# Patient Record
Sex: FEMALE | Race: BLACK OR AFRICAN AMERICAN | Employment: OTHER | URBAN - METROPOLITAN AREA
[De-identification: names, ages, dates, MRNs, and addresses within clinical notes are randomized per-mention and may not be internally consistent; named-entity substitution may affect disease eponyms.]

---

## 2017-01-04 ENCOUNTER — HOSPITAL ENCOUNTER (OUTPATIENT)
Dept: MAMMOGRAPHY | Age: 62
Discharge: HOME | End: 2017-01-04
Attending: OBSTETRICS & GYNECOLOGY
Payer: MEDICARE

## 2017-01-04 DIAGNOSIS — Z12.31 VISIT FOR SCREENING MAMMOGRAM: ICD-10-CM

## 2017-01-04 PROCEDURE — 77067 SCR MAMMO BI INCL CAD: CPT

## 2017-01-19 RX ORDER — GABAPENTIN 300 MG/1
300 CAPSULE ORAL 3 TIMES DAILY
Qty: 90 CAP | Refills: 2 | Status: SHIPPED | OUTPATIENT
Start: 2017-01-19 | End: 2017-06-05 | Stop reason: SDUPTHER

## 2017-01-19 NOTE — TELEPHONE ENCOUNTER
Last Visit: 12/27/2016 with MD Giorgio Guerrero    Next Appointment: 02/06/2017 with MD Giorgio Guerrero   Previous Refill Encounters: 09/01/2016 per MD Alvares Ek #90 with 2 refills     Requested Prescriptions     Pending Prescriptions Disp Refills    gabapentin (NEURONTIN) 300 mg capsule 90 Cap 2     Sig: Take 1 Cap by mouth three (3) times daily.  Indications: sensory sensitivity

## 2017-02-06 ENCOUNTER — OFFICE VISIT (OUTPATIENT)
Dept: ORTHOPEDIC SURGERY | Age: 62
End: 2017-02-06

## 2017-02-06 VITALS
RESPIRATION RATE: 20 BRPM | WEIGHT: 224 LBS | BODY MASS INDEX: 33.95 KG/M2 | OXYGEN SATURATION: 98 % | SYSTOLIC BLOOD PRESSURE: 145 MMHG | DIASTOLIC BLOOD PRESSURE: 88 MMHG | TEMPERATURE: 98.8 F | HEART RATE: 113 BPM | HEIGHT: 68 IN

## 2017-02-06 DIAGNOSIS — M47.899 FACET SYNDROME: ICD-10-CM

## 2017-02-06 DIAGNOSIS — M79.18 MYOFASCIAL PAIN: ICD-10-CM

## 2017-02-06 RX ORDER — TRAMADOL HYDROCHLORIDE 50 MG/1
50 TABLET ORAL
Qty: 90 TAB | Refills: 2 | Status: SHIPPED | OUTPATIENT
Start: 2017-02-06 | End: 2017-10-16 | Stop reason: SDUPTHER

## 2017-02-06 RX ORDER — SOD CHLOR,BICARB/SQUEEZ BOTTLE
PACKET, WITH RINSE DEVICE NASAL
Refills: 0 | COMMUNITY
Start: 2016-12-16 | End: 2018-05-03

## 2017-02-06 RX ORDER — PAROXETINE 7.5 MG/1
CAPSULE ORAL
Refills: 0 | COMMUNITY
Start: 2017-01-04 | End: 2018-05-03

## 2017-02-06 RX ORDER — MOMETASONE FUROATE 50 UG/1
SPRAY, METERED NASAL
Refills: 0 | COMMUNITY
Start: 2016-12-16 | End: 2018-07-19 | Stop reason: ALTCHOICE

## 2017-02-06 NOTE — MR AVS SNAPSHOT
Visit Information Date & Time Provider Department Dept. Phone Encounter #  
 2/6/2017  2:30 PM Eileen Coelho MD [facility] [phone] [id number] Follow-up Instructions Return if symptoms worsen or fail to improve. Upcoming Health Maintenance Date Due Hepatitis C Screening 1955 DTaP/Tdap/Td series (1 - Tdap) 9/11/1978 PAP AKA CERVICAL CYTOLOGY 9/11/1978 FOBT Q 1 YEAR AGE 50-75 9/11/2007 INFLUENZA AGE 9 TO ADULT 8/1/2016 BREAST CANCER SCRN MAMMOGRAM 1/4/2019 Allergies as of 2/6/2017  Review Complete On: 2/6/2017 By: Eileen Coelho MD  
  
 Severity Noted Reaction Type Reactions Aspirin High 11/21/2012   Side Effect Other (comments) GI Pain Adhesive Tape-silicones  91/45/7703    Other (comments)  
 blisters Current Immunizations  Never Reviewed No immunizations on file. Not reviewed this visit You Were Diagnosed With   
  
 Codes Comments Facet syndrome     ICD-10-CM: M12.88 ICD-9-CM: 724.8 Myofascial pain     ICD-10-CM: M79.1 ICD-9-CM: 729.1 Vitals BP Pulse Temp Resp Height(growth percentile) Weight(growth percentile) 145/88 (!) 113 98.8 °F (37.1 °C) (Oral) 20 5' 8\" (1.727 m) 224 lb (101.6 kg) SpO2 BMI OB Status Smoking Status 98% 34.06 kg/m2 Postmenopausal Never Smoker BMI and BSA Data Body Mass Index Body Surface Area 34.06 kg/m 2 2.21 m 2 Preferred Pharmacy Pharmacy Name Phone [address] [facility] [address] [facility] [phone] Your Updated Medication List  
  
   
This list is accurate as of: 2/6/17  3:38 PM.  Always use your most recent med list.  
  
  
  
  
 acetaminophen 650 mg Tab Take 650 mg by mouth every four (4) hours as needed for Pain or Fever. ascorbic acid (vitamin C) 250 mg tablet Commonly known as:  VITAMIN C Take 1 Tab by mouth two (2) times daily (with meals). BRISDELLE 7.5 mg Cap Generic drug:  PARoxetine mesylate TAKE 1 CAPSULE BY MOUTH EVERY DAY  
  
 DEPAKOTE PO Take 500 mg by mouth as needed. Pt takes for migraines  
  
 diclofenac EC 75 mg EC tablet Commonly known as:  VOLTAREN Take 1 Tab by mouth two (2) times daily as needed. docusate sodium 100 mg capsule Commonly known as:  Case [place] Take 1 Cap by mouth two (2) times a day. esomeprazole 40 mg capsule Commonly known as:  NexIUM Take 1 Cap by mouth daily. Indications: GASTROESOPHAGEAL REFLUX  
  
 ferrous sulfate 325 mg (65 mg iron) tablet Take 1 Tab by mouth two (2) times daily (with meals). FIORICET -40 mg per tablet Generic drug:  butalbital-acetaminophen-caffeine Take 1 Tab by mouth. FLEXERIL 10 mg tablet Generic drug:  cyclobenzaprine Take  by mouth three (3) times daily as needed for Muscle Spasm(s). gabapentin 300 mg capsule Commonly known as:  NEURONTIN Take 1 Cap by mouth three (3) times daily. Indications: sensory sensitivity  
  
 losartan-hydroCHLOROthiazide 100-25 mg per tablet Commonly known as:  HYZAAR Take 1 Tab by mouth daily. Indications: HYPERTENSION  
  
 mometasone 50 mcg/actuation nasal spray Commonly known as:  NASONEX  
instill 2 spray into each nostril once daily if needed NEILMED SINUS RINSE COMPLETE Pkdv Generic drug:  sod chlor-bicarb-squeez bottle  
use 1 packet NASAL daily  
  
 nortriptyline 75 mg capsule Commonly known as:  PAMELOR Take 1 Cap by mouth two (2) times a day. potassium chloride 20 mEq tablet Commonly known as:  K-DUR, KLOR-CON Take 1 Tab by mouth daily. traMADol 50 mg tablet Commonly known as:  ULTRAM  
Take 1 Tab by mouth three (3) times daily as needed for Pain. Max Daily Amount: 150 mg. VITAMIN D2 50,000 unit capsule Generic drug:  ergocalciferol Take 50,000 Units by mouth. Prescriptions Printed Refills traMADol (ULTRAM) 50 mg tablet 2 Sig: Take 1 Tab by mouth three (3) times daily as needed for Pain. Max Daily Amount: 150 mg.  
 Class: Print Route: Oral  
  
Follow-up Instructions Return if symptoms worsen or fail to improve. To-Do List   
 02/16/2017 1:15 PM  
  Appointment with [facility] RM 2 at [address] [place] [address] ([phone]) GENERAL INSTRUCTIONS  Bring information (ID card) if you have any medically implanted devices. You will be required to lie still while the procedure is being performed. Remove any jewelry (including body piercing, hairpins) prior to MRI. If you have had a creatinine level drawn within the past 30 days, please bring most recent results to your appt. Bring any films, CD's, and reports related to your study with you on the day of your exam.  This only includes studies done outside of [facility], [facility], [facility], and [facility]. Bring a complete list of all medications you are currently taking to include prescriptions, over-the-counter meds, herbals, vitamins & any dietary supplements. If you were given medications for claustrophobia or anxiety, please arrange to have someone drive you to your appointment. QUESTIONS  Notify the MRI Department if you have any questions concerning your study. [facility] - [phone] [facility] [address] [place] [address] - [phone] [facility] SERVICES! Dear Isabel Sanchez: Thank you for requesting a [employer] account. Our records indicate that you already have an active [employer] account. You can access your account anytime at [email or web address] Did you know that you can access your hospital and ER discharge instructions at any time in [employer]? You can also review all of your test results from your hospital stay or ER visit. Additional Information If you have questions, please visit the Frequently Asked Questions section of the [employer] website at [email or web address] Remember, [employer] is NOT to be used for urgent needs. For medical emergencies, dial 911. Now available from your iPhone and Android! Please provide this summary of care documentation to your next provider. Your primary care clinician is listed as Sally Manning. If you have any questions after today's visit, please call [phone].

## 2017-02-06 NOTE — PROGRESS NOTES
[address], [address]  [place], [facility] [address]  Phone: [phone]  Fax: [phone]        Elijah Doran  : 1955  PCP: Shama Grimaldo MD  2017    PROGRESS NOTE      ASSESSMENT AND PLAN    Vamshi Monterroso comes in to the office today for a medication f/u. She has been finding relief with diclofenac and tramadol although she is still experiencing some lumbar pain from her facet syndrome and myofascial pain. We discussed having her see Dr. Doreatha Lefort for the RFA but pt notes that she currently has other issues and would be unable to have transportation for the procedure. I prescribed her refills of her medications and advised her to look into the procedure again. We also discussed her vertigo. Pt will f/u prn. Stephanie Love was seen today for back pain and follow-up. Diagnoses and all orders for this visit:    Facet syndrome  -     traMADol (ULTRAM) 50 mg tablet; Take 1 Tab by mouth three (3) times daily as needed for Pain. Max Daily Amount: 150 mg. Myofascial pain  -     traMADol (ULTRAM) 50 mg tablet; Take 1 Tab by mouth three (3) times daily as needed for Pain. Max Daily Amount: 150 mg. Follow-up Disposition:  Return if symptoms worsen or fail to improve. HISTORY OF PRESENT ILLNESS  Vamshi Monterroso is a 63 y.o. female. A&P / HPI from 2016:  Brian Lopez was in the office today for a f/u visit. She was prescribed Norco 7.5 and Lyrica 100mg. The risks, benefits, and potential side effects of this medication were discussed.       She was advised to f/u with Dr. Bry Christian to be evaluated for RFA and for medication management.       Pt will f/u in 3 months, or prn.      Updates from 16:  Pt presents for a medication and RFA f/u. She did not have the RFA completed due to scheduling issues. Her pain today is predominantly due to facet syndrome with a component of myofascial pain.     Updates from 17:  Pt presents for a medication f/u. She has been finding relief with diclofenac and tramadol although she is still experiencing some lumbar pain from her facet syndrome and myofascial pain. PAST MEDICAL HISTORY   Past Medical History   Diagnosis Date    Arthritis     Balance problems     Bronchitis     Fibromyalgia 11/29/2012    GERD (gastroesophageal reflux disease)      Hiatal Hernia    Hypertension     Kidney stones     Low back pain     Lumbar spinal stenosis 11/25/2012    Migraine headache     Nervousness     Neurological disease     Osteoarthritis 11/25/2012    Other ill-defined conditions(799.89)      neuropathy    Other ill-defined conditions(799.89)      fibromyalgia    Peripheral neuropathy ([address] 11/29/2012    Post laminectomy syndrome     Postoperative anemia due to acute blood loss 11/30/2012    Ringing in ears     Spinal stenosis     Thoracic spine pain     Vitamin D deficiency 11/30/2012       Past Surgical History   Procedure Laterality Date    Hx orthopaedic       foot l heel spur    Hx orthopaedic       right knee sx    Hx other surgical       non cancer cyst removed from right shoulder    Pr neurological procedure unlisted       Bilateral L3-4 hemilaminotomy and medial facetectomy by Dr. Magaly Wan   . MEDICATIONS      Current Outpatient Prescriptions   Medication Sig Dispense Refill    mometasone (NASONEX) 50 mcg/actuation nasal spray instill 2 spray into each nostril once daily if needed  0    BRISDELLE 7.5 mg cap TAKE 1 CAPSULE BY MOUTH EVERY DAY  0    NEILMED SINUS RINSE COMPLETE pkdv use 1 packet NASAL daily  0    traMADol (ULTRAM) 50 mg tablet Take 1 Tab by mouth three (3) times daily as needed for Pain. Max Daily Amount: 150 mg. 90 Tab 2    gabapentin (NEURONTIN) 300 mg capsule Take 1 Cap by mouth three (3) times daily. Indications: sensory sensitivity 90 Cap 2    diclofenac EC (VOLTAREN) 75 mg EC tablet Take 1 Tab by mouth two (2) times daily as needed.  60 Tab 5  nortriptyline (PAMELOR) 75 mg capsule Take 1 Cap by mouth two (2) times a day. 60 Cap 0    cyclobenzaprine (FLEXERIL) 10 mg tablet Take  by mouth three (3) times daily as needed for Muscle Spasm(s).  butalbital-acetaminophen-caffeine (FIORICET) -40 mg per tablet Take 1 Tab by mouth.  ergocalciferol (VITAMIN D2) 50,000 unit capsule Take 50,000 Units by mouth.  acetaminophen 650 mg Tab Take 650 mg by mouth every four (4) hours as needed for Pain or Fever. 30 Tab 0    esomeprazole (NEXIUM) 40 mg capsule Take 1 Cap by mouth daily. Indications: GASTROESOPHAGEAL REFLUX 15 Cap 0    docusate sodium (COLACE) 100 mg capsule Take 1 Cap by mouth two (2) times a day. 30 Cap 0    ferrous sulfate 325 mg (65 mg iron) tablet Take 1 Tab by mouth two (2) times daily (with meals). 30 Tab 0    ascorbic acid (VITAMIN C) 250 mg tablet Take 1 Tab by mouth two (2) times daily (with meals). 30 Tab 0    losartan-hydrochlorothiazide (HYZAAR) 100-25 mg per tablet Take 1 Tab by mouth daily. Indications: HYPERTENSION      potassium chloride (K-DUR, KLOR-CON) 20 mEq tablet Take 1 Tab by mouth daily. 15 Tab 0    DIVALPROEX SODIUM (DEPAKOTE PO) Take 500 mg by mouth as needed.  Pt takes for migraines           ALLERGIES    Allergies   Allergen Reactions    Aspirin Other (comments)     GI Pain    Adhesive Tape-Silicones Other (comments)     blisters          SOCIAL HISTORY    Social History     Social History    Marital status: SINGLE     Spouse name: N/A    Number of children: N/A    Years of education: N/A     Social History Main Topics    Smoking status: Never Smoker    Smokeless tobacco: None    Alcohol use No    Drug use: No    Sexual activity: Not Asked     Other Topics Concern    None     Social History Narrative     Social History Narrative      Problem Relation Age of Onset    Hypertension Mother     Arthritis-osteo Mother     Neuropathy Mother     Hypertension Father     Cancer Father     Hypertension Brother     Cancer Brother     Neuropathy Other     Arthritis-rheumatoid Other     GERD Other     Diabetes Other          REVIEW OF SYSTEMS  Review of Systems   Constitutional: Negative for chills, diaphoresis, fever, malaise/fatigue and weight loss. Respiratory: Negative for shortness of breath. Cardiovascular: Negative for chest pain and leg swelling. Gastrointestinal: Negative for constipation, nausea and vomiting. Neurological: Negative for dizziness, tingling, seizures, loss of consciousness and headaches. Psychiatric/Behavioral: The patient does not have insomnia. PHYSICAL EXAMINATION  Visit Vitals    /88    Pulse (!) 113    Temp 98.8 °F (37.1 °C) (Oral)    Resp 20    Ht 5' 8\" (1.727 m)    Wt 224 lb (101.6 kg)    SpO2 98%    BMI 34.06 kg/m2       Pain Assessment  12/27/2016   Location of Pain Back   Severity of Pain 7   Quality of Pain Aching   Quality of Pain Comment N/T   Duration of Pain -   Frequency of Pain Constant   Aggravating Factors -   Relieving Factors -   Result of Injury No           Constitutional:  Well developed, well nourished, in no acute distress. Psychiatric: Affect and mood are appropriate. Integumentary: No rashes or abrasions noted on exposed areas. SPINE/MUSCULOSKELETAL EXAM    Lumbar spine:  No rash, ecchymosis, or gross obliquity. No fasciculations. No focal atrophy is noted. No pain with hip ROM. Range of motion is normal.   Diffuse tenderness to palpation. No tenderness to palpation at the sciatic notch. SI joints non-tender. Trochanters non tender. Pain with back extension > back flexion.      Sensation in the bilateral legs grossly intact to light touch.     MOTOR:      Biceps  Triceps Deltoids Wrist Ext Wrist Flex Hand Intrin   Right 5/5 5/5 5/5 5/5 5/5 5/5   Left 5/5 5/5 5/5 5/5 5/5 5/5             Hip Flex  Quads Hamstrings Ankle DF EHL Ankle PF   Right 5/5 5/5 5/5 5/5 5/5 5/5   Left 5/5 5/5 5/5 5/5 5/5 5/5     DTRs are 2+ biceps, triceps, brachioradialis, patella, and Achilles.      Negative Straight Leg raise. Squat not tested.       Ambulation with single point cane. DAVID. Jenn Martinez PMP reviewed      This plan was reviewed with the patient and patient agrees. All questions were answered. More than half of this visit today was spent on counseling.     Written by Julita Weston, as dictated by Dr. Chichi Rodriguez, Dr. Emil Reece, confirm that all documentation is accurate.

## 2017-02-08 ENCOUNTER — HOSPITAL ENCOUNTER (OUTPATIENT)
Dept: LAB | Age: 62
Discharge: HOME | End: 2017-02-08
Payer: MEDICARE

## 2017-02-08 LAB
BUN SERPL-MCNC: 13 MG/DL (ref 7–18)
CREAT SERPL-MCNC: 1.06 MG/DL (ref 0.6–1.3)

## 2017-02-08 PROCEDURE — 82565 ASSAY OF CREATININE: CPT | Performed by: OTOLARYNGOLOGY

## 2017-02-08 PROCEDURE — 84520 ASSAY OF UREA NITROGEN: CPT | Performed by: OTOLARYNGOLOGY

## 2017-02-08 PROCEDURE — 36415 COLL VENOUS BLD VENIPUNCTURE: CPT | Performed by: OTOLARYNGOLOGY

## 2017-02-16 ENCOUNTER — HOSPITAL ENCOUNTER (OUTPATIENT)
Age: 62
Discharge: HOME | End: 2017-02-16
Attending: OTOLARYNGOLOGY
Payer: MEDICARE

## 2017-02-16 DIAGNOSIS — H81.13 BENIGN PAROXYSMAL VERTIGO OF BOTH EARS: ICD-10-CM

## 2017-02-16 PROCEDURE — A9585 GADOBUTROL INJECTION: HCPCS | Performed by: OTOLARYNGOLOGY

## 2017-02-16 PROCEDURE — 70553 MRI BRAIN STEM W/O & W/DYE: CPT

## 2017-02-16 PROCEDURE — 74011250636 HC RX REV CODE- 250/636: Performed by: OTOLARYNGOLOGY

## 2017-02-16 RX ADMIN — GADOBUTROL 10 ML: 604.72 INJECTION INTRAVENOUS at 13:00

## 2017-05-31 ENCOUNTER — HOSPITAL ENCOUNTER (OUTPATIENT)
Dept: LAB | Age: 62
Discharge: HOME | End: 2017-05-31
Payer: MEDICARE

## 2017-05-31 ENCOUNTER — HOSPITAL ENCOUNTER (OUTPATIENT)
Age: 62
Discharge: HOME | End: 2017-05-31
Attending: PSYCHIATRY & NEUROLOGY
Payer: MEDICARE

## 2017-05-31 DIAGNOSIS — G43.009 COMMON MIGRAINE: ICD-10-CM

## 2017-05-31 LAB
ERYTHROCYTE [SEDIMENTATION RATE] IN BLOOD: 31 MM/HR (ref 0–30)
FOLATE SERPL-MCNC: >20 NG/ML (ref 3.1–17.5)
TSH SERPL DL<=0.05 MIU/L-ACNC: 1.21 UIU/ML (ref 0.36–3.74)
VIT B12 SERPL-MCNC: 354 PG/ML (ref 211–911)

## 2017-05-31 PROCEDURE — 70551 MRI BRAIN STEM W/O DYE: CPT

## 2017-06-02 LAB
ANA TITR SER IF: POSITIVE {TITER}
HOMOGENEOUS PATTERN, 016279: NORMAL
NOTE:, 016270: NORMAL
T PALLIDUM AB SER QL IF: NON REACTIVE

## 2017-06-05 RX ORDER — GABAPENTIN 300 MG/1
300 CAPSULE ORAL 3 TIMES DAILY
Qty: 90 CAP | Refills: 2 | Status: SHIPPED | OUTPATIENT
Start: 2017-06-05 | End: 2017-10-16 | Stop reason: SDUPTHER

## 2017-06-05 NOTE — TELEPHONE ENCOUNTER
Last Visit: 02/06/2017 with MD Norma Oppenheim    Next Appointment: noted to f/u as needed   Previous Refill Encounters: 01/19/2017 per MD Norma Oppenheim #90 with 2 refills     Requested Prescriptions     Pending Prescriptions Disp Refills    gabapentin (NEURONTIN) 300 mg capsule 90 Cap 2     Sig: Take 1 Cap by mouth three (3) times daily.  Indications: sensory sensitivity

## 2017-06-06 ENCOUNTER — HOSPITAL ENCOUNTER (OUTPATIENT)
Dept: LAB | Age: 62
Discharge: HOME | End: 2017-06-06

## 2017-06-06 LAB — SENTARA SPECIMEN COL,SENBCF: NORMAL

## 2017-06-06 PROCEDURE — 99001 SPECIMEN HANDLING PT-LAB: CPT | Performed by: PSYCHIATRY & NEUROLOGY

## 2017-10-04 DIAGNOSIS — M47.899 FACET SYNDROME: ICD-10-CM

## 2017-10-04 DIAGNOSIS — M79.18 MYOFASCIAL PAIN: ICD-10-CM

## 2017-10-04 RX ORDER — TRAMADOL HYDROCHLORIDE 50 MG/1
50 TABLET ORAL
Qty: 90 TAB | Refills: 2 | OUTPATIENT
Start: 2017-10-04

## 2017-10-04 NOTE — TELEPHONE ENCOUNTER
PHONE IN RX    Last Visit: 02/06/2017 with MD Vidal Blanco    Next Appointment: noted to f/u prn   Previous Refill Encounters: 02/06/2017 per MD Vidal Blanco #90 with 2 refills     Requested Prescriptions     Pending Prescriptions Disp Refills    traMADol (ULTRAM) 50 mg tablet 90 Tab 2     Sig: Take 1 Tab by mouth three (3) times daily as needed for Pain. Max Daily Amount: 150 mg.

## 2017-10-04 NOTE — TELEPHONE ENCOUNTER
Please contact the patient for scheduling per refusal reason below. Thanks.      Refused by: Trace Martin NP  Refusal reason: other (has not been seen in 6 months, no uds/pa)

## 2017-10-06 NOTE — TELEPHONE ENCOUNTER
Left a voicemail on an unidentifed voicemail requesting a return call to the office. The request for medication has been denied. An appointment is required prior to refills.      Refused by: Trace Martin NP  Refusal reason: other (has not been seen in 6 months, no uds/pa)

## 2017-10-16 ENCOUNTER — OFFICE VISIT (OUTPATIENT)
Dept: ORTHOPEDIC SURGERY | Age: 62
End: 2017-10-16

## 2017-10-16 VITALS
RESPIRATION RATE: 18 BRPM | BODY MASS INDEX: 32.58 KG/M2 | WEIGHT: 215 LBS | DIASTOLIC BLOOD PRESSURE: 68 MMHG | HEIGHT: 68 IN | TEMPERATURE: 98.2 F | OXYGEN SATURATION: 99 % | HEART RATE: 108 BPM | SYSTOLIC BLOOD PRESSURE: 127 MMHG

## 2017-10-16 DIAGNOSIS — Z79.891 ENCOUNTER FOR LONG-TERM OPIATE ANALGESIC USE: ICD-10-CM

## 2017-10-16 DIAGNOSIS — M79.18 MYOFASCIAL PAIN: ICD-10-CM

## 2017-10-16 DIAGNOSIS — M47.899 FACET SYNDROME: ICD-10-CM

## 2017-10-16 RX ORDER — AMITRIPTYLINE HYDROCHLORIDE 25 MG/1
TABLET, FILM COATED ORAL
Refills: 0 | COMMUNITY
Start: 2017-09-10 | End: 2018-07-19 | Stop reason: SDUPTHER

## 2017-10-16 RX ORDER — DICLOFENAC SODIUM 75 MG/1
75 TABLET, DELAYED RELEASE ORAL
Qty: 60 TAB | Refills: 5 | Status: SHIPPED | OUTPATIENT
Start: 2017-10-16 | End: 2018-01-15 | Stop reason: SDUPTHER

## 2017-10-16 RX ORDER — OMEPRAZOLE 40 MG/1
CAPSULE, DELAYED RELEASE ORAL DAILY
Refills: 1 | COMMUNITY
Start: 2017-09-18 | End: 2018-07-19 | Stop reason: ALTCHOICE

## 2017-10-16 RX ORDER — TRAMADOL HYDROCHLORIDE 50 MG/1
50 TABLET ORAL
Qty: 90 TAB | Refills: 2 | Status: SHIPPED | OUTPATIENT
Start: 2017-10-16 | End: 2018-07-19 | Stop reason: SDUPTHER

## 2017-10-16 RX ORDER — GABAPENTIN 300 MG/1
300 CAPSULE ORAL 3 TIMES DAILY
Qty: 90 CAP | Refills: 2 | Status: SHIPPED | OUTPATIENT
Start: 2017-10-16 | End: 2018-01-15 | Stop reason: SDUPTHER

## 2017-10-16 RX ORDER — BUDESONIDE 32 UG/1
SPRAY, METERED NASAL
Refills: 1 | COMMUNITY
Start: 2017-09-11 | End: 2018-07-19 | Stop reason: ALTCHOICE

## 2017-10-16 RX ORDER — ALBUTEROL SULFATE 90 UG/1
AEROSOL, METERED RESPIRATORY (INHALATION)
Refills: 0 | COMMUNITY
Start: 2017-08-17 | End: 2018-05-03

## 2017-10-16 NOTE — PATIENT INSTRUCTIONS

## 2017-10-16 NOTE — MR AVS SNAPSHOT
Visit Information Date & Time Provider Department Dept. Phone Encounter #  
 10/16/2017  1:30 PM Andrey Dorantes MD [facility] [phone] [id number] Follow-up Instructions Return in about 3 months (around 1/16/2018), or if symptoms worsen or fail to improve. Upcoming Health Maintenance Date Due Hepatitis C Screening 1955 DTaP/Tdap/Td series (1 - Tdap) 9/11/1978 PAP AKA CERVICAL CYTOLOGY 9/11/1978 FOBT Q 1 YEAR AGE 50-75 9/11/2007 ZOSTER VACCINE AGE 60> 7/11/2017 INFLUENZA AGE 9 TO ADULT 8/1/2017 BREAST CANCER SCRN MAMMOGRAM 1/4/2019 Allergies as of 10/16/2017  Review Complete On: 10/16/2017 By: Andrey Dorantes MD  
  
 Severity Noted Reaction Type Reactions Aspirin High 11/21/2012   Side Effect Other (comments) GI Pain Adhesive Tape-silicones  79/20/3603    Other (comments)  
 blisters Current Immunizations  Never Reviewed No immunizations on file. Not reviewed this visit You Were Diagnosed With   
  
 Codes Comments Facet syndrome     ICD-10-CM: M12.88 ICD-9-CM: 724.8 Myofascial pain     ICD-10-CM: M79.1 ICD-9-CM: 729.1 Encounter for long-term opiate analgesic use     ICD-10-CM: X69.125 ICD-9-CM: V58.69 Vitals BP Pulse Temp Resp Height(growth percentile) Weight(growth percentile) 127/68 (!) 108 98.2 °F (36.8 °C) 18 5' 8\" (1.727 m) 215 lb (97.5 kg) SpO2 BMI OB Status Smoking Status 99% 32.69 kg/m2 Postmenopausal Never Smoker BMI and BSA Data Body Mass Index Body Surface Area  
 32.69 kg/m 2 2.16 m 2 Preferred Pharmacy Pharmacy Name Phone [address], [facility], [address] [place] [address] [phone] Your Updated Medication List  
  
   
This list is accurate as of: 10/16/17  2:41 PM.  Always use your most recent med list.  
  
  
  
  
 acetaminophen 650 mg Tab Take 650 mg by mouth every four (4) hours as needed for Pain or Fever. amitriptyline 25 mg tablet Commonly known as:  ELAVIL  
  
 ascorbic acid (vitamin C) 250 mg tablet Commonly known as:  VITAMIN C Take 1 Tab by mouth two (2) times daily (with meals). BRISDELLE 7.5 mg Cap Generic drug:  PARoxetine mesylate TAKE 1 CAPSULE BY MOUTH EVERY DAY  
  
 DEPAKOTE PO Take 500 mg by mouth as needed. Pt takes for migraines  
  
 diclofenac EC 75 mg EC tablet Commonly known as:  VOLTAREN Take 1 Tab by mouth two (2) times daily as needed. docusate sodium 100 mg capsule Commonly known as:  David Bough Take 1 Cap by mouth two (2) times a day. esomeprazole 40 mg capsule Commonly known as:  NexIUM Take 1 Cap by mouth daily. Indications: GASTROESOPHAGEAL REFLUX  
  
 ferrous sulfate 325 mg (65 mg iron) tablet Take 1 Tab by mouth two (2) times daily (with meals). FIORICET -40 mg per tablet Generic drug:  butalbital-acetaminophen-caffeine Take 1 Tab by mouth. FLEXERIL 10 mg tablet Generic drug:  cyclobenzaprine Take  by mouth three (3) times daily as needed for Muscle Spasm(s). gabapentin 300 mg capsule Commonly known as:  NEURONTIN Take 1 Cap by mouth three (3) times daily. Indications: sensory sensitivity  
  
 losartan-hydroCHLOROthiazide 100-25 mg per tablet Commonly known as:  HYZAAR Take 1 Tab by mouth daily. Indications: HYPERTENSION  
  
 mometasone 50 mcg/actuation nasal spray Commonly known as:  NASONEX  
instill 2 spray into each nostril once daily if needed NEILMED SINUS RINSE COMPLETE Pkdv Generic drug:  sod chlor-bicarb-squeez bottle  
use 1 packet NASAL daily  
  
 nortriptyline 75 mg capsule Commonly known as:  PAMELOR Take 1 Cap by mouth two (2) times a day. omeprazole 40 mg capsule Commonly known as:  PRILOSEC  
  
 potassium chloride 20 mEq tablet Commonly known as:  K-STEVE, TONYA-CON  
 Take 1 Tab by mouth daily. RHINOCORT ALLERGY 32 mcg/actuation nasal spray Generic drug:  budesonide  
instill 2 sprays into each nostril once daily  
  
 traMADol 50 mg tablet Commonly known as:  ULTRAM  
Take 1 Tab by mouth three (3) times daily as needed for Pain. Max Daily Amount: 150 mg. VENTOLIN HFA 90 mcg/actuation inhaler Generic drug:  albuterol  
inhale 2 puffs by mouth every 4 to 6 hours if needed VITAMIN D2 50,000 unit capsule Generic drug:  ergocalciferol Take 50,000 Units by mouth. Prescriptions Printed Refills  
 traMADol (ULTRAM) 50 mg tablet 2 Sig: Take 1 Tab by mouth three (3) times daily as needed for Pain. Max Daily Amount: 150 mg.  
 Class: Print Route: Oral  
  
Prescriptions Sent to Pharmacy Refills  
 gabapentin (NEURONTIN) 300 mg capsule 2 Sig: Take 1 Cap by mouth three (3) times daily. Indications: sensory sensitivity Class: Normal  
 Pharmacy: [address] , [facility] Ph #: [phone] Route: Oral  
 diclofenac EC (VOLTAREN) 75 mg EC tablet 5 Sig: Take 1 Tab by mouth two (2) times daily as needed. Class: Normal  
 Pharmacy: [address] , [address] [facility] Ph #: [phone] Route: Oral  
  
Follow-up Instructions Return in about 3 months (around 1/16/2018), or if symptoms worsen or fail to improve. To-Do List   
 10/16/2017 Lab:  DRUG SCREEN UR - W/ CONFIRM Patient Instructions Low Back Pain: Exercises Your Care Instructions Here are some examples of typical rehabilitation exercises for your condition. Start each exercise slowly. Ease off the exercise if you start to have pain. Your doctor or physical therapist will tell you when you can start these exercises and which ones will work best for you. How to do the exercises Press-up 1. Lie on your stomach, supporting your body with your forearms. 2. Press your elbows down into the floor to raise your upper back. As you do this, relax your stomach muscles and allow your back to arch without using your back muscles. As your press up, do not let your hips or pelvis come off the floor. 3. Hold for 15 to 30 seconds, then relax. 4. Repeat 2 to 4 times. Alternate arm and leg (bird dog) exercise Note: Do this exercise slowly. Try to keep your body straight at all times, and do not let one hip drop lower than the other. 1. Start on the floor, on your hands and knees. 2. Tighten your belly muscles. 3. Raise one leg off the floor, and hold it straight out behind you. Be careful not to let your hip drop down, because that will twist your trunk. 4. Hold for about 6 seconds, then lower your leg and switch to the other leg. 5. Repeat 8 to 12 times on each leg. 6. Over time, work up to holding for 10 to 30 seconds each time. 7. If you feel stable and secure with your leg raised, try raising the opposite arm straight out in front of you at the same time. Knee-to-chest exercise 1. Lie on your back with your knees bent and your feet flat on the floor. 2. Bring one knee to your chest, keeping the other foot flat on the floor (or keeping the other leg straight, whichever feels better on your lower back). 3. Keep your lower back pressed to the floor. Hold for at least 15 to 30 seconds. 4. Relax, and lower the knee to the starting position. 5. Repeat with the other leg. Repeat 2 to 4 times with each leg. 6. To get more stretch, put your other leg flat on the floor while pulling your knee to your chest. 
Curl-ups 1. Lie on the floor on your back with your knees bent at a 90-degree angle. Your feet should be flat on the floor, about 12 inches from your buttocks. 2. Cross your arms over your chest. If this bothers your neck, try putting your hands behind your neck (not your head), with your elbows spread apart. 3. Slowly tighten your belly muscles and raise your shoulder blades off the floor. 4. Keep your head in line with your body, and do not press your chin to your chest. 
5. Hold this position for 1 or 2 seconds, then slowly lower yourself back down to the floor. 6. Repeat 8 to 12 times. Pelvic tilt exercise 1. Lie on your back with your knees bent. 2. \"Brace\" your stomach. This means to tighten your muscles by pulling in and imagining your belly button moving toward your spine. You should feel like your back is pressing to the floor and your hips and pelvis are rocking back. 3. Hold for about 6 seconds while you breathe smoothly. 4. Repeat 8 to 12 times. Heel dig bridging 1. Lie on your back with both knees bent and your ankles bent so that only your heels are digging into the floor. Your knees should be bent about 90 degrees. 2. Then push your heels into the floor, squeeze your buttocks, and lift your hips off the floor until your shoulders, hips, and knees are all in a straight line. 3. Hold for about 6 seconds as you continue to breathe normally, and then slowly lower your hips back down to the floor and rest for up to 10 seconds. 4. Do 8 to 12 repetitions. Hamstring stretch in doorway 1. Lie on your back in a doorway, with one leg through the open door. 2. Slide your leg up the wall to straighten your knee. You should feel a gentle stretch down the back of your leg. 3. Hold the stretch for at least 15 to 30 seconds. Do not arch your back, point your toes, or bend either knee. Keep one heel touching the floor and the other heel touching the wall. 4. Repeat with your other leg. 5. Do 2 to 4 times for each leg. Hip flexor stretch 1. Kneel on the floor with one knee bent and one leg behind you. Place your forward knee over your foot. Keep your other knee touching the floor. 2. Slowly push your hips forward until you feel a stretch in the upper thigh of your rear leg. 3. Hold the stretch for at least 15 to 30 seconds. Repeat with your other leg. 4. Do 2 to 4 times on each side. Wall sit 1. Stand with your back 10 to 12 inches away from a wall. 2. Lean into the wall until your back is flat against it. 3. Slowly slide down until your knees are slightly bent, pressing your lower back into the wall. 4. Hold for about 6 seconds, then slide back up the wall. 5. Repeat 8 to 12 times. Follow-up care is a key part of your treatment and safety. Be sure to make and go to all appointments, and call your doctor if you are having problems. It's also a good idea to know your test results and keep a list of the medicines you take. Where can you learn more? Go to http://janessa-raphael.info/. Enter E388 in the search box to learn more about \"Low Back Pain: Exercises. \" Current as of: March 21, 2017 Content Version: 11.3 © 2102-4456 [employer]. Care instructions adapted under license by [employer] (which disclaims liability or warranty for this information). If you have questions about a medical condition or this instruction, always ask your healthcare professional. [place] [address] any warranty or liability for your use of this information. Introducing [facility]! Dear Nida Nunez: Thank you for requesting a [employer] account. Our records indicate that you already have an active [employer] account. You can access your account anytime at [email or web address] Did you know that you can access your hospital and ER discharge instructions at any time in [employer]? You can also review all of your test results from your hospital stay or ER visit. Additional Information If you have questions, please visit the Frequently Asked Questions section of the [employer] website at [email or web address] Remember, [employer] is NOT to be used for urgent needs. For medical emergencies, dial 911. Now available from your iPhone and Android! Please provide this summary of care documentation to your next provider. Your primary care clinician is listed as [place]. If you have any questions after today's visit, please call [phone].

## 2017-10-16 NOTE — PROGRESS NOTES
[address]  [address], [address]  [place] [address]  Phone: [phone]  Fax: [phone]        Jerry Jim  : 1955  PCP: Suyapa Gonzalez MD  10/16/2017    PROGRESS NOTE      ASSESSMENT AND PLAN    Cathi Maldonado comes in to the office today for a medication f/u. Her current medication regimen is working well for her. I refilled her Tramadol 50 mg TID prn, Gabapentin 300 mg TID, and Diclofenac 75 mg BID prn. This is a chronic problem that is not changed. Per review of available records and patients , there are not sign of overuse, misuse, diversion, or concerning side effects. Today we reviewed: the risk of overdose, addiction, and dependency  The following changes were made to the patients current treatment plan: nothing, medications refilled. Pt will f/u in 3 months or sooner as needed. Diagnoses and all orders for this visit:    1. Facet syndrome  -     traMADol (ULTRAM) 50 mg tablet; Take 1 Tab by mouth three (3) times daily as needed for Pain. Max Daily Amount: 150 mg.  -     diclofenac EC (VOLTAREN) 75 mg EC tablet; Take 1 Tab by mouth two (2) times daily as needed. 2. Myofascial pain  -     traMADol (ULTRAM) 50 mg tablet; Take 1 Tab by mouth three (3) times daily as needed for Pain. Max Daily Amount: 150 mg.  -     diclofenac EC (VOLTAREN) 75 mg EC tablet; Take 1 Tab by mouth two (2) times daily as needed. Other orders  -     gabapentin (NEURONTIN) 300 mg capsule; Take 1 Cap by mouth three (3) times daily. Indications: sensory sensitivity       Follow-up Disposition:  Return in about 3 months (around 2018), or if symptoms worsen or fail to improve. HISTORY OF PRESENT ILLNESS  Cathi Maldonado is a 63 y.o. female. A&P / HPI from 2016:  Adolfoazeem Villanueva was in the office today for a f/u visit. She was prescribed Norco 7.5 and Lyrica 100mg.  The risks, benefits, and potential side effects of this medication were discussed.       She was advised to f/u with Dr. Nakul Fields to be evaluated for RFA and for medication management.       Pt will f/u in 3 months, or prn.       Updates from 12/27/16:  Pt presents for a medication and RFA f/u. She did not have the RFA completed due to scheduling issues. Her pain today is predominantly due to facet syndrome with a component of myofascial pain.     Updates from 02/06/17:  Pt presents for a medication f/u. She has been finding relief with diclofenac and tramadol although she is still experiencing some lumbar pain from her facet syndrome and myofascial pain. Updates from 10/16/17:  Pt presents for a medication f/u. She continues to find relief with her current medication regimen of Diclofenac, Tramadol, and Gabapentin. Her symptoms have not changed since her last office visit. Her pain today is rated at an intermittent aching 7/10 with numbness.        PAST MEDICAL HISTORY   Past Medical History:   Diagnosis Date    Arthritis     Balance problems     Bronchitis     Fibromyalgia 11/29/2012    GERD (gastroesophageal reflux disease)     Hiatal Hernia    Hypertension     Kidney stones     Low back pain     Lumbar spinal stenosis 11/25/2012    Migraine headache     Nervousness     Neurological disease     Osteoarthritis 11/25/2012    Other ill-defined conditions(799.89)     neuropathy    Other ill-defined conditions(799.89)     fibromyalgia    Peripheral neuropathy 11/29/2012    Post laminectomy syndrome     Postoperative anemia due to acute blood loss 11/30/2012    Ringing in ears     Spinal stenosis     Thoracic spine pain     Vitamin D deficiency 11/30/2012       Past Surgical History:   Procedure Laterality Date    HX ORTHOPAEDIC      foot l heel spur    HX ORTHOPAEDIC      right knee sx    HX OTHER SURGICAL      non cancer cyst removed from right shoulder    NEUROLOGICAL PROCEDURE UNLISTED      Bilateral L3-4 hemilaminotomy and medial facetectomy by Dr. Daniel De Luna   . MEDICATIONS      Current Outpatient Prescriptions   Medication Sig Dispense Refill    RHINOCORT ALLERGY 32 mcg/actuation nasal spray instill 2 sprays into each nostril once daily  1    VENTOLIN HFA 90 mcg/actuation inhaler inhale 2 puffs by mouth every 4 to 6 hours if needed  0    amitriptyline (ELAVIL) 25 mg tablet   0    omeprazole (PRILOSEC) 40 mg capsule   1    gabapentin (NEURONTIN) 300 mg capsule Take 1 Cap by mouth three (3) times daily. Indications: sensory sensitivity 90 Cap 2    mometasone (NASONEX) 50 mcg/actuation nasal spray instill 2 spray into each nostril once daily if needed  0    BRISDELLE 7.5 mg cap TAKE 1 CAPSULE BY MOUTH EVERY DAY  0    traMADol (ULTRAM) 50 mg tablet Take 1 Tab by mouth three (3) times daily as needed for Pain. Max Daily Amount: 150 mg. 90 Tab 2    diclofenac EC (VOLTAREN) 75 mg EC tablet Take 1 Tab by mouth two (2) times daily as needed. 60 Tab 5    cyclobenzaprine (FLEXERIL) 10 mg tablet Take  by mouth three (3) times daily as needed for Muscle Spasm(s).  butalbital-acetaminophen-caffeine (FIORICET) -40 mg per tablet Take 1 Tab by mouth.  ergocalciferol (VITAMIN D2) 50,000 unit capsule Take 50,000 Units by mouth.  potassium chloride (K-DUR, KLOR-CON) 20 mEq tablet Take 1 Tab by mouth daily. 15 Tab 0    ferrous sulfate 325 mg (65 mg iron) tablet Take 1 Tab by mouth two (2) times daily (with meals). 30 Tab 0    ascorbic acid (VITAMIN C) 250 mg tablet Take 1 Tab by mouth two (2) times daily (with meals). 30 Tab 0    losartan-hydrochlorothiazide (HYZAAR) 100-25 mg per tablet Take 1 Tab by mouth daily. Indications: HYPERTENSION      NEILMED SINUS RINSE COMPLETE pkdv use 1 packet NASAL daily  0    nortriptyline (PAMELOR) 75 mg capsule Take 1 Cap by mouth two (2) times a day. 60 Cap 0    acetaminophen 650 mg Tab Take 650 mg by mouth every four (4) hours as needed for Pain or Fever.  30 Tab 0    esomeprazole (NEXIUM) 40 mg capsule Take 1 Cap by mouth daily. Indications: GASTROESOPHAGEAL REFLUX 15 Cap 0    docusate sodium (COLACE) 100 mg capsule Take 1 Cap by mouth two (2) times a day. 30 Cap 0    DIVALPROEX SODIUM (DEPAKOTE PO) Take 500 mg by mouth as needed. Pt takes for migraines           ALLERGIES    Allergies   Allergen Reactions    Aspirin Other (comments)     GI Pain    Adhesive Tape-Silicones Other (comments)     blisters          SOCIAL HISTORY    Social History     Social History    Marital status: SINGLE     Spouse name: N/A    Number of children: N/A    Years of education: N/A     Social History Main Topics    Smoking status: Never Smoker    Smokeless tobacco: Not on file    Alcohol use No    Drug use: No    Sexual activity: Not on file     Other Topics Concern    Not on file     Social History Narrative       FAMILY HISTORY    Family History   Problem Relation Age of Onset    Hypertension Mother     Arthritis-osteo Mother     Neuropathy Mother     Hypertension Father     Cancer Father     Hypertension Brother     Cancer Brother     Neuropathy Other     Arthritis-rheumatoid Other     GERD Other     Diabetes Other          REVIEW OF SYSTEMS  Review of Systems   Constitutional: Negative for chills, diaphoresis, fever, malaise/fatigue and weight loss. Respiratory: Negative for shortness of breath. Cardiovascular: Negative for chest pain and leg swelling. Gastrointestinal: Negative for constipation, nausea and vomiting. Neurological: Negative for dizziness, tingling, seizures, loss of consciousness, weakness and headaches. Psychiatric/Behavioral: The patient does not have insomnia.            PHYSICAL EXAMINATION  Visit Vitals    /68    Pulse (!) 108    Temp 98.2 °F (36.8 °C)    Resp 18    Ht 5' 8\" (1.727 m)    Wt 215 lb (97.5 kg)    SpO2 99%    BMI 32.69 kg/m2       Pain Assessment  10/16/2017   Location of Pain Back   Severity of Pain 7   Quality of Pain Aching   Quality of Pain Comment -   Duration of Pain A few hours   Frequency of Pain Intermittent   Aggravating Factors -   Relieving Factors -   Result of Injury -           Constitutional:  Well developed, well nourished, in no acute distress. Psychiatric: Affect and mood are appropriate. Integumentary: No rashes or abrasions noted on exposed areas. SPINE/MUSCULOSKELETAL EXAM    Lumbar spine:  No rash, ecchymosis, or gross obliquity. No fasciculations. No focal atrophy is noted. No pain with hip ROM. Range of motion is normal.   Diffuse tenderness to palpation. No tenderness to palpation at the sciatic notch. SI joints non-tender. Trochanters non tender. Pain with back extension > back flexion.      Sensation in the bilateral legs grossly intact to light touch. MOTOR:      Biceps  Triceps Deltoids Wrist Ext Wrist Flex Hand Intrin   Right 5/5 5/5 5/5 5/5 5/5 5/5   Left 5/5 5/5 5/5 5/5 5/5 5/5             Hip Flex  Quads Hamstrings Ankle DF EHL Ankle PF   Right 5/5 5/5 5/5 5/5 5/5 5/5   Left 5/5 5/5 5/5 5/5 5/5 5/5     DTRs are 2+ biceps, triceps, brachioradialis, patella, and Achilles.      Negative Straight Leg raise. Squat not tested.       Ambulation with single point cane. FWB. Elvis Cortez   reviewed      This plan was reviewed with the patient and patient agrees. All questions were answered. More than half of this visit today was spent on counseling.      Written by Diane Peña, as dictated by Dr. Anila Kelley. I, Dr. Anila Kelley, confirm that all documentation is accurate.

## 2018-01-15 ENCOUNTER — OFFICE VISIT (OUTPATIENT)
Dept: ORTHOPEDIC SURGERY | Age: 63
End: 2018-01-15

## 2018-01-15 VITALS
OXYGEN SATURATION: 100 % | HEIGHT: 68 IN | WEIGHT: 215 LBS | DIASTOLIC BLOOD PRESSURE: 66 MMHG | BODY MASS INDEX: 32.58 KG/M2 | SYSTOLIC BLOOD PRESSURE: 132 MMHG | RESPIRATION RATE: 18 BRPM | TEMPERATURE: 98.8 F | HEART RATE: 98 BPM

## 2018-01-15 DIAGNOSIS — M47.899 FACET SYNDROME: ICD-10-CM

## 2018-01-15 DIAGNOSIS — Z79.891 USE OF OPIATES FOR THERAPEUTIC PURPOSES: ICD-10-CM

## 2018-01-15 DIAGNOSIS — M79.18 MYOFASCIAL PAIN: ICD-10-CM

## 2018-01-15 DIAGNOSIS — M47.899 FACET SYNDROME: Primary | ICD-10-CM

## 2018-01-15 RX ORDER — BUTALBITAL, ACETAMINOPHEN AND CAFFEINE 300; 40; 50 MG/1; MG/1; MG/1
CAPSULE ORAL
Refills: 0 | COMMUNITY
Start: 2017-12-16 | End: 2018-12-12 | Stop reason: ALTCHOICE

## 2018-01-15 RX ORDER — CLINDAMYCIN HYDROCHLORIDE 300 MG/1
CAPSULE ORAL
Refills: 0 | COMMUNITY
Start: 2017-12-27 | End: 2018-05-03

## 2018-01-15 RX ORDER — TOPIRAMATE 50 MG/1
TABLET, FILM COATED ORAL
Refills: 0 | COMMUNITY
Start: 2017-12-18 | End: 2018-07-19 | Stop reason: ALTCHOICE

## 2018-01-15 RX ORDER — GABAPENTIN 300 MG/1
300 CAPSULE ORAL 3 TIMES DAILY
Qty: 90 CAP | Refills: 2 | Status: SHIPPED | OUTPATIENT
Start: 2018-01-15 | End: 2018-07-19 | Stop reason: SDUPTHER

## 2018-01-15 RX ORDER — BECLOMETHASONE DIPROPIONATE 80 UG/1
AEROSOL, METERED NASAL
Refills: 1 | COMMUNITY
Start: 2018-01-09 | End: 2018-05-03

## 2018-01-15 RX ORDER — DICLOFENAC SODIUM 75 MG/1
75 TABLET, DELAYED RELEASE ORAL
Qty: 60 TAB | Refills: 5 | Status: SHIPPED | OUTPATIENT
Start: 2018-01-15 | End: 2018-07-19 | Stop reason: SDUPTHER

## 2018-01-15 NOTE — PROGRESS NOTES
Janice Juarez [address]  [address], [address]  [place]  Phone: [phone]  Fax: [phone]        Shaneka Ramin  : 1955  PCP: Garfield Saenz MD  1/15/2018    PROGRESS NOTE      ASSESSMENT AND PLAN    Bay Wyatt comes in to the office today for a 3 month f/u. She continues to find relief with her current medication regimen of Diclofenac, Gabapentin and Tramadol. She does not take the Tramadol daily. Opioid Assessment    Least pain over the last week has been 5/10. Worst pain over the last week has been 7/10. Opioid Risk Tool Reviewed: YES,   Aberrant behaviors: None. Urine Drug Screen: reviewed and up to date. Controlled substance agreement on file: YES.  reviewed:yes  Pill count is consistent with her prescription: yes  Concomitant use of a benzodiazepine: yes  Medications exceed 50 MME/day. Will continue on current dose of medications as coarse has been stable and there are no signs of overuse, misuse, diversion, or concerning side effects  Naloxone prescription is warranted. It has been provided or is already on file. Also,  abstinence syndrome was reviewed and discussed with her today YES    Reports that pain would be a 8/10 w/out medication and that it goes down to a 5/10 after medication. This enables the pt to be functional, achieve ADLs and engage in social activities. Risks and benefits of chronic opiate therapy have been reviewed with the patient. No pain behaviors. Denies thoughts of harming self or others. Pt has a good risk to benefit ratio which allows the pt to function in a home environment without side effects      IMPRESSION AND PLAN:  This is a chronic problem that is stable. Per review of available records and patients , there are not sign of overuse, misuse, diversion, or concerning side effects.  Today we reviewed: the risk of overdose, addiction, and dependency the goals of treatment (improve functionality, quality of life, and pain)  The following changes were made to the patients current treatment plan: nothing, medications refilled. Pt will f/u in 3 months or sooner as needed. Diagnoses and all orders for this visit:    1. Facet syndrome  -     diclofenac EC (VOLTAREN) 75 mg EC tablet; Take 1 Tab by mouth two (2) times daily as needed. -     gabapentin (NEURONTIN) 300 mg capsule; Take 1 Cap by mouth three (3) times daily. Indications: sensory sensitivity    2. Myofascial pain  -     diclofenac EC (VOLTAREN) 75 mg EC tablet; Take 1 Tab by mouth two (2) times daily as needed. -     gabapentin (NEURONTIN) 300 mg capsule; Take 1 Cap by mouth three (3) times daily. Indications: sensory sensitivity         Follow-up Disposition:  Return in about 3 months (around 4/15/2018), or if symptoms worsen or fail to improve. HISTORY OF PRESENT ILLNESS  Sara Nick is a 63 y.o. female. A&P / HPI from 4/19/2016:  Umesh Zaman was in the office today for a f/u visit. She was prescribed Norco 7.5 and Lyrica 100mg. The risks, benefits, and potential side effects of this medication were discussed.       She was advised to f/u with Dr. Blessing Abraham to be evaluated for RFA and for medication management.       Pt will f/u in 3 months, or prn.       Updates from 12/27/16:  Pt presents for a medication and RFA f/u. She did not have the RFA completed due to scheduling issues. Her pain today is predominantly due to facet syndrome with a component of myofascial pain.      Updates from 02/06/17:  Pt presents for a medication f/u. She has been finding relief with diclofenac and tramadol although she is still experiencing some lumbar pain from her facet syndrome and myofascial pain.     Updates from 10/16/17:  Pt presents for a medication f/u. She continues to find relief with her current medication regimen of Diclofenac, Tramadol, and Gabapentin. Her symptoms have not changed since her last office visit.  Her pain today is rated at an intermittent aching 7/10 with numbness.        Updates from 01/15/18:  Pt presents for a 3 month f/u. The Gabapentin and Tramadol continues to provide relief. However, she notes that her low back pain has started to worsen. HPI  This is a patient with chronic pain who has a hx of facet syndrome and myofascial pain that contributes to her pain. Pain is described as aching and is worse with walking and affects recreational activities, and her ability to perform ADLs. Pain is better with pain medication. She has tried; PT-Yes,  Non-opioid medications Yes, and spinal injections Yes. PAST MEDICAL HISTORY   Past Medical History:   Diagnosis Date    Arthritis     Balance problems     Bronchitis     Fibromyalgia 11/29/2012    GERD (gastroesophageal reflux disease)     Hiatal Hernia    Hypertension     Kidney stones     Low back pain     Lumbar spinal stenosis 11/25/2012    Migraine headache     Nervousness     Neurological disease     Osteoarthritis 11/25/2012    Other ill-defined conditions(799.89)     neuropathy    Other ill-defined conditions(799.89)     fibromyalgia    Peripheral neuropathy 11/29/2012    Post laminectomy syndrome     Postoperative anemia due to acute blood loss 11/30/2012    Ringing in ears     Spinal stenosis     Thoracic spine pain     Vitamin D deficiency 11/30/2012       Past Surgical History:   Procedure Laterality Date    HX ORTHOPAEDIC      foot l heel spur    HX ORTHOPAEDIC      right knee sx    HX OTHER SURGICAL      non cancer cyst removed from right shoulder    NEUROLOGICAL PROCEDURE UNLISTED      Bilateral L3-4 hemilaminotomy and medial facetectomy by Dr. Ronda River   .       MEDICATIONS      Current Outpatient Prescriptions   Medication Sig Dispense Refill    QNASL 80 mcg/actuation HFAA   1    butalbital-acetaminophen-caff (FIORICET) -40 mg per capsule   0    topiramate (TOPAMAX) 50 mg tablet take 1 tablet by mouth EVERY NIGHT FOR 2 WEEKS then 2 tablets by mouth EVERY NIGHT  0    RHINOCORT ALLERGY 32 mcg/actuation nasal spray instill 2 sprays into each nostril once daily  1    VENTOLIN HFA 90 mcg/actuation inhaler inhale 2 puffs by mouth every 4 to 6 hours if needed  0    omeprazole (PRILOSEC) 40 mg capsule   1    gabapentin (NEURONTIN) 300 mg capsule Take 1 Cap by mouth three (3) times daily. Indications: sensory sensitivity 90 Cap 2    traMADol (ULTRAM) 50 mg tablet Take 1 Tab by mouth three (3) times daily as needed for Pain. Max Daily Amount: 150 mg. 90 Tab 2    diclofenac EC (VOLTAREN) 75 mg EC tablet Take 1 Tab by mouth two (2) times daily as needed. 60 Tab 5    mometasone (NASONEX) 50 mcg/actuation nasal spray instill 2 spray into each nostril once daily if needed  0    BRISDELLE 7.5 mg cap TAKE 1 CAPSULE BY MOUTH EVERY DAY  0    NEILMED SINUS RINSE COMPLETE pkdv use 1 packet NASAL daily  0    cyclobenzaprine (FLEXERIL) 10 mg tablet Take  by mouth three (3) times daily as needed for Muscle Spasm(s).  butalbital-acetaminophen-caffeine (FIORICET) -40 mg per tablet Take 1 Tab by mouth.  ergocalciferol (VITAMIN D2) 50,000 unit capsule Take 50,000 Units by mouth.  acetaminophen 650 mg Tab Take 650 mg by mouth every four (4) hours as needed for Pain or Fever. 30 Tab 0    esomeprazole (NEXIUM) 40 mg capsule Take 1 Cap by mouth daily. Indications: GASTROESOPHAGEAL REFLUX 15 Cap 0    docusate sodium (COLACE) 100 mg capsule Take 1 Cap by mouth two (2) times a day. 30 Cap 0    ferrous sulfate 325 mg (65 mg iron) tablet Take 1 Tab by mouth two (2) times daily (with meals). 30 Tab 0    ascorbic acid (VITAMIN C) 250 mg tablet Take 1 Tab by mouth two (2) times daily (with meals). 30 Tab 0    losartan-hydrochlorothiazide (HYZAAR) 100-25 mg per tablet Take 1 Tab by mouth daily.     Indications: HYPERTENSION      clindamycin (CLEOCIN) 300 mg capsule take 1 capsule by mouth every 6 hours for 10 days  0  amitriptyline (ELAVIL) 25 mg tablet   0    nortriptyline (PAMELOR) 75 mg capsule Take 1 Cap by mouth two (2) times a day. 60 Cap 0    potassium chloride (K-DUR, KLOR-CON) 20 mEq tablet Take 1 Tab by mouth daily. 15 Tab 0    DIVALPROEX SODIUM (DEPAKOTE PO) Take 500 mg by mouth as needed. Pt takes for migraines           ALLERGIES    Allergies   Allergen Reactions    Aspirin Other (comments)     GI Pain    Adhesive Tape-Silicones Other (comments)     blisters          SOCIAL HISTORY    Social History     Social History    Marital status: SINGLE     Spouse name: N/A    Number of children: N/A    Years of education: N/A     Social History Main Topics    Smoking status: Never Smoker    Smokeless tobacco: Not on file    Alcohol use No    Drug use: No    Sexual activity: Not on file     Other Topics Concern    Not on file     Social History Narrative     Social History Narrative      Problem Relation Age of Onset    Hypertension Mother     Arthritis-osteo Mother     Neuropathy Mother     Hypertension Father     Cancer Father     Hypertension Brother     Cancer Brother     Neuropathy Other     Arthritis-rheumatoid Other     GERD Other     Diabetes Other          REVIEW OF SYSTEMS  Review of Systems   Constitutional: Negative for chills, diaphoresis, fever, malaise/fatigue and weight loss. Respiratory: Negative for shortness of breath. Cardiovascular: Negative for chest pain and leg swelling. Gastrointestinal: Negative for constipation, nausea and vomiting. Neurological: Negative for dizziness, tingling, seizures, loss of consciousness, weakness and headaches. Psychiatric/Behavioral: The patient does not have insomnia.            PHYSICAL EXAMINATION  Visit Vitals    /66    Pulse 98    Temp 98.8 °F (37.1 °C)    Resp 18    Ht 5' 8\" (1.727 m)    Wt 215 lb (97.5 kg)    SpO2 100%    BMI 32.69 kg/m2       Pain Assessment  10/16/2017   Location of Pain Back   Severity of Pain 7 Quality of Pain Aching   Quality of Pain Comment -   Duration of Pain A few hours   Frequency of Pain Intermittent   Aggravating Factors -   Relieving Factors -   Result of Injury -           Constitutional:  Well developed, well nourished, in no acute distress. Psychiatric: Affect and mood are appropriate. Integumentary: No rashes or abrasions noted on exposed areas. SPINE/MUSCULOSKELETAL EXAM    Lumbar spine:  No rash, ecchymosis, or gross obliquity. No fasciculations. No focal atrophy is noted. No pain with hip ROM. Range of motion is normal.   Diffuse tenderness to palpation. No tenderness to palpation at the sciatic notch. SI joints non-tender. Trochanters non tender. Pain with back extension > back flexion.      Sensation in the bilateral legs grossly intact to light touch. MOTOR:      Biceps  Triceps Deltoids Wrist Ext Wrist Flex Hand Intrin   Right 5/5 5/5 5/5 5/5 5/5 5/5   Left 5/5 5/5 5/5 5/5 5/5 5/5             Hip Flex  Quads Hamstrings Ankle DF EHL Ankle PF   Right 5/5 5/5 5/5 5/5 5/5 5/5   Left 5/5 5/5 5/5 5/5 5/5 5/5     DTRs are 2+ biceps, triceps, brachioradialis, patella, and Achilles.      Negative Straight Leg raise. Squat not tested.       Ambulation with single point cane. [place] reviewed      This plan was reviewed with the patient and patient agrees. All questions were answered. More than half of this visit today was spent on counseling.      Written by Florentin Aceves, as dictated by Dr. Edwin Nunez, Dr. Clotilde Hunt, confirm that all documentation is accurate.

## 2018-02-20 ENCOUNTER — HOSPITAL ENCOUNTER (OUTPATIENT)
Dept: MAMMOGRAPHY | Age: 63
Discharge: HOME | End: 2018-02-20
Attending: OBSTETRICS & GYNECOLOGY
Payer: MEDICARE

## 2018-02-20 DIAGNOSIS — Z12.31 VISIT FOR SCREENING MAMMOGRAM: ICD-10-CM

## 2018-02-20 PROCEDURE — 77063 BREAST TOMOSYNTHESIS BI: CPT

## 2018-03-06 ENCOUNTER — HOSPITAL ENCOUNTER (OUTPATIENT)
Dept: ULTRASOUND IMAGING | Age: 63
Discharge: HOME | End: 2018-03-06
Attending: OBSTETRICS & GYNECOLOGY
Payer: MEDICARE

## 2018-03-06 ENCOUNTER — HOSPITAL ENCOUNTER (OUTPATIENT)
Dept: MAMMOGRAPHY | Age: 63
Discharge: HOME | End: 2018-03-06
Attending: OBSTETRICS & GYNECOLOGY
Payer: MEDICARE

## 2018-03-06 DIAGNOSIS — N63.0 LUMP OR MASS IN BREAST: ICD-10-CM

## 2018-03-06 DIAGNOSIS — R92.8 ABNORMAL MAMMOGRAM: ICD-10-CM

## 2018-03-06 PROCEDURE — 76642 ULTRASOUND BREAST LIMITED: CPT

## 2018-03-06 PROCEDURE — 77065 DX MAMMO INCL CAD UNI: CPT

## 2018-03-14 ENCOUNTER — HOSPITAL ENCOUNTER (OUTPATIENT)
Dept: ULTRASOUND IMAGING | Age: 63
Discharge: HOME | End: 2018-03-14
Attending: OBSTETRICS & GYNECOLOGY
Payer: MEDICARE

## 2018-03-14 ENCOUNTER — HOSPITAL ENCOUNTER (OUTPATIENT)
Dept: MAMMOGRAPHY | Age: 63
Discharge: HOME | End: 2018-03-14
Attending: OBSTETRICS & GYNECOLOGY
Payer: MEDICARE

## 2018-03-14 DIAGNOSIS — R92.8 ABNORMAL MAMMOGRAM: ICD-10-CM

## 2018-03-14 PROCEDURE — 77065 DX MAMMO INCL CAD UNI: CPT

## 2018-03-14 PROCEDURE — 88341 IMHCHEM/IMCYTCHM EA ADD ANTB: CPT | Performed by: RADIOLOGY

## 2018-03-14 PROCEDURE — 88374 M/PHMTRC ALYS ISHQUANT/SEMIQ: CPT | Performed by: RADIOLOGY

## 2018-03-14 PROCEDURE — 88342 IMHCHEM/IMCYTCHM 1ST ANTB: CPT | Performed by: RADIOLOGY

## 2018-03-14 PROCEDURE — 88360 TUMOR IMMUNOHISTOCHEM/MANUAL: CPT | Performed by: RADIOLOGY

## 2018-03-14 PROCEDURE — 88305 TISSUE EXAM BY PATHOLOGIST: CPT | Performed by: RADIOLOGY

## 2018-03-14 PROCEDURE — 77030020003 US BX BREAST VAC RT 1ST LESION W/CLIP AND SPECIMEN

## 2018-03-14 PROCEDURE — 74011000250 HC RX REV CODE- 250

## 2018-03-14 PROCEDURE — A4648 IMPLANTABLE TISSUE MARKER: HCPCS

## 2018-03-14 RX ORDER — LIDOCAINE HYDROCHLORIDE AND EPINEPHRINE 10; 10 MG/ML; UG/ML
1.5 INJECTION, SOLUTION INFILTRATION; PERINEURAL
Status: COMPLETED | OUTPATIENT
Start: 2018-03-14 | End: 2018-03-14

## 2018-03-14 RX ORDER — LIDOCAINE HYDROCHLORIDE 10 MG/ML
5 INJECTION, SOLUTION EPIDURAL; INFILTRATION; INTRACAUDAL; PERINEURAL
Status: COMPLETED | OUTPATIENT
Start: 2018-03-14 | End: 2018-03-14

## 2018-03-14 RX ADMIN — LIDOCAINE HYDROCHLORIDE AND EPINEPHRINE 10 MG: 10; 10 INJECTION, SOLUTION INFILTRATION; PERINEURAL at 13:23

## 2018-03-14 RX ADMIN — LIDOCAINE HYDROCHLORIDE 5 ML: 10 INJECTION, SOLUTION EPIDURAL; INFILTRATION; INTRACAUDAL; PERINEURAL at 13:21

## 2018-03-19 ENCOUNTER — DOCUMENTATION ONLY (OUTPATIENT)
Dept: SURGERY | Age: 63
End: 2018-03-19

## 2018-03-19 NOTE — PROGRESS NOTES
Met with patient, pt's spouse, and radiologist (Dr. Uriel Bingham) to discuss positive breast biopsy results. Reviewed patient guide for breast cancer w/ pt and gave her my contact information if she has questions or concerns. Included copy of pathology report as well. Pt encouraged to attend breast cancer support group and flyer given. Gave pt opportunity to ask questions which were answered to her satisfaction. She has an appt with Dr. Alis Hannah on Friday, 3/23/18 at 18 (Dr. Gracia Joe notified earlier today and gave ok to refer to Dr. Alis Hannah). Pt declined sooner appt which was previously made for her for tomorrow morning.

## 2018-03-23 ENCOUNTER — OFFICE VISIT (OUTPATIENT)
Dept: SURGERY | Age: 63
End: 2018-03-23

## 2018-03-23 VITALS
RESPIRATION RATE: 18 BRPM | BODY MASS INDEX: 32.58 KG/M2 | WEIGHT: 215 LBS | HEART RATE: 90 BPM | TEMPERATURE: 98.2 F | OXYGEN SATURATION: 97 % | SYSTOLIC BLOOD PRESSURE: 128 MMHG | HEIGHT: 68 IN | DIASTOLIC BLOOD PRESSURE: 78 MMHG

## 2018-03-23 DIAGNOSIS — C50.911 STAGE 1 BREAST CANCER, ER+, RIGHT (HCC): ICD-10-CM

## 2018-03-23 DIAGNOSIS — Z17.0 STAGE 1 BREAST CANCER, ER+, RIGHT (HCC): ICD-10-CM

## 2018-03-23 DIAGNOSIS — C50.919 MALIGNANT NEOPLASM OF FEMALE BREAST, UNSPECIFIED ESTROGEN RECEPTOR STATUS, UNSPECIFIED LATERALITY, UNSPECIFIED SITE OF BREAST (HCC): Primary | ICD-10-CM

## 2018-03-23 NOTE — PROGRESS NOTES
[facility]  General Surgery    Subjective:      HPI: The patient is a very pleasant 27-year-old female with a past medical history that is remarkable for obesity with BMI 32.69 kg/m², hypertension, fibromyalgia, degenerative disc disease of the lumbar spine, lumbar spondylosis, nervousness, post laminectomy syndrome, vitamin D deficiency, gastroesophageal reflux disease and tinnitus. [facility] for evaluation and management of newly diagnosed right breast cancer. 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 I took greater than 10 minutes explaining the patient's pathology, receptor status, staging workup, referrals to medical oncology and radiation oncology, surgical options and proton therapy to the patient's daughter and again to the patient. The patient denies any unintentional weight loss or breast pain or nipple drainage or discharge or masses in the breast on self breast exam.  She had no family history of breast cancer. Her father had colon cancer in his 76s. She was 17years old at menarche. 27years old at first live birth birth. She was 41years old at menopause. She recently underwent ultrasound-guided core biopsy of a 1.1 cm x 0.7 cm x 1 cm spiculated mass in the 5 o'clock position of the right breast 9 cm from the nipple. The pathology reveals a invasive mammary carcinoma with tubular features ER IL positive HER-2 pending.     Patient Active Problem List    Diagnosis Date Noted    DDD (degenerative disc disease), lumbar 12/17/2015    Lumbar spondylosis 12/17/2015    Chronic pain 10/26/2015    HNP (herniated nucleus pulposus), lumbar 10/26/2015    Facet arthritis of lumbar region [facility]) 10/26/2015    Post laminectomy syndrome     Kidney stones     Ringing in ears     Balance problems     Nervousness     Postoperative anemia due to acute blood loss 11/30/2012    Vitamin D deficiency 11/30/2012    Status post laminectomy 11/29/2012    Impaired mobility and activities of daily living 11/29/2012    Hypertension 11/29/2012    Peripheral neuropathy 11/29/2012    Fibromyalgia 11/29/2012    Lumbar spinal stenosis 11/25/2012    GERD (gastroesophageal reflux disease) 11/25/2012    Osteoarthritis 11/25/2012    Migraines 11/25/2012     Past Medical History:   Diagnosis Date    Arthritis     Balance problems     Bronchitis     Fibromyalgia 11/29/2012    GERD (gastroesophageal reflux disease)     Hiatal Hernia    Hypertension     Kidney stones     Low back pain     Lumbar spinal stenosis 11/25/2012    Migraine headache     Nervousness     Neurological disease     Osteoarthritis 11/25/2012    Other ill-defined conditions(799.89)     neuropathy    Other ill-defined conditions(799.89)     fibromyalgia    Peripheral neuropathy 11/29/2012    Post laminectomy syndrome     Postoperative anemia due to acute blood loss 11/30/2012    Ringing in ears     Spinal stenosis     Thoracic spine pain     Vitamin D deficiency 11/30/2012      Past Surgical History:   Procedure Laterality Date    HX ORTHOPAEDIC      foot l heel spur    HX ORTHOPAEDIC      right knee sx    HX OTHER SURGICAL      non cancer cyst removed from right shoulder    NEUROLOGICAL PROCEDURE UNLISTED      Bilateral L3-4 hemilaminotomy and medial facetectomy by Dr. Silvestre Habermann      Family History   Problem Relation Age of Onset    Hypertension Mother     Arthritis-osteo Mother     Neuropathy Mother     Hypertension Father     Cancer Father     Hypertension Brother     Cancer Brother     Neuropathy Other     Arthritis-rheumatoid Other     GERD Other     Diabetes Other       Social History   Substance Use Topics    Smoking status: Never Smoker    Smokeless tobacco: Never Used    Alcohol use No      Allergies   Allergen Reactions    Aspirin Other (comments)     GI Pain    Adhesive Tape-Silicones Other (comments)     blisters       Prior to Admission medications    Medication Sig Start Date End Date Taking? Authorizing Provider   QNASL 80 mcg/actuation HFAA  1/9/18  Yes Historical Provider   topiramate (TOPAMAX) 50 mg tablet take 1 tablet by mouth EVERY NIGHT FOR 2 WEEKS then 2 tablets by mouth EVERY NIGHT 12/18/17  Yes Historical Provider   diclofenac EC (VOLTAREN) 75 mg EC tablet Take 1 Tab by mouth two (2) times daily as needed. 1/15/18  Yes Clmeentine Bryant MD   gabapentin (NEURONTIN) 300 mg capsule Take 1 Cap by mouth three (3) times daily. Indications: sensory sensitivity 1/15/18  Yes Clementine Bryant MD   VENTOLIN HFA 90 mcg/actuation inhaler inhale 2 puffs by mouth every 4 to 6 hours if needed 8/17/17  Yes Historical Provider   amitriptyline (ELAVIL) 25 mg tablet  9/10/17  Yes Historical Provider   omeprazole (PRILOSEC) 40 mg capsule  9/18/17  Yes Historical Provider   nortriptyline (PAMELOR) 75 mg capsule Take 1 Cap by mouth two (2) times a day. 7/11/16  Yes Clementine Bryant MD   ergocalciferol (VITAMIN D2) 50,000 unit capsule Take 50,000 Units by mouth. Yes Historical Provider   potassium chloride (K-DUR, KLOR-CON) 20 mEq tablet Take 1 Tab by mouth daily. 12/7/12  Yes Rhonda Mccracken MD   docusate sodium (COLACE) 100 mg capsule Take 1 Cap by mouth two (2) times a day. 12/7/12  Yes Rhonda Mccracken MD   ferrous sulfate 325 mg (65 mg iron) tablet Take 1 Tab by mouth two (2) times daily (with meals). 12/7/12  Yes Rhonda Mccracken MD   ascorbic acid (VITAMIN C) 250 mg tablet Take 1 Tab by mouth two (2) times daily (with meals). 12/7/12  Yes Rhonda Mccracken MD   losartan-hydrochlorothiazide [facility]) 100-25 mg per tablet Take 1 Tab by mouth daily.     Indications: HYPERTENSION   Yes Historical Provider   butalbital-acetaminophen-caff (FIORICET) -40 mg per capsule  12/16/17   Historical Provider   clindamycin (CLEOCIN) 300 mg capsule take 1 capsule by mouth every 6 hours for 10 days 12/27/17 Historical Provider   RHINOCORT ALLERGY 32 mcg/actuation nasal spray instill 2 sprays into each nostril once daily 9/11/17   Historical Provider   traMADol (ULTRAM) 50 mg tablet Take 1 Tab by mouth three (3) times daily as needed for Pain. Max Daily Amount: 150 mg. 10/16/17   Charmaine Rogel MD   mometasone (NASONEX) 50 mcg/actuation nasal spray instill 2 spray into each nostril once daily if needed 12/16/16   Historical Provider   BRISDELLE 7.5 mg cap TAKE 1 CAPSULE BY MOUTH EVERY DAY 1/4/17   Historical Provider   NEILMED SINUS RINSE COMPLETE pkdv use 1 packet NASAL daily 12/16/16   Historical Provider   cyclobenzaprine (FLEXERIL) 10 mg tablet Take  by mouth three (3) times daily as needed for Muscle Spasm(s). Historical Provider   butalbital-acetaminophen-caffeine (FIORICET) -40 mg per tablet Take 1 Tab by mouth. Historical Provider   acetaminophen 650 mg Tab Take 650 mg by mouth every four (4) hours as needed for Pain or Fever. 12/7/12   Merritt Lynn MD   esomeprazole (NEXIUM) 40 mg capsule Take 1 Cap by mouth daily. Indications: GASTROESOPHAGEAL REFLUX 12/7/12   Merritt Lynn MD   DIVALPROEX SODIUM (DEPAKOTE PO) Take 500 mg by mouth as needed. Pt takes for migraines     Historical Provider       Review of Systems:    14 systems were reviewed. The results are as above in the HPI and otherwise negative. Objective:     Vitals:    03/23/18 1338   BP: 128/78   Pulse: 90   Resp: 18   Temp: 98.2 °F (36.8 °C)   TempSrc: Oral   SpO2: 97%   Weight: 97.5 kg (215 lb)   Height: 5' 8\" (1.727 m)       Physical Exam:  GENERAL: alert, cooperative, no distress, appears stated age,   EYE: conjunctivae/corneas clear. PERRL, EOM's intact.   THROAT & NECK: normal and no erythema or exudates noted. ,    LYMPHATIC: Cervical, supraclavicular, and axillary nodes normal. ,   LUNG: clear to auscultation bilaterally,   HEART: regular rate and rhythm, S1, S2 normal, no murmur, click, rub or gallop,   BREASTS:   Left: No dimpling, discoloration, nipple inversion or retractions. No axillary or supraclavicular lymphadenopathy. No mass  Right: No dimpling, discoloration, nipple inversion or retractions. No axillary or supraclavicular lymphadenopathy. No mass  ABDOMEN: soft, non-tender. Bowel sounds normal. No masses,  no organomegaly,   EXTREMITIES:  extremities normal, atraumatic, no cyanosis or edema,   SKIN: Normal.,   NEUROLOGIC: AOx3. Cranial nerves 2-12 and sensation grossly intact. ,     Data Review:  to be done    Impression:     · Patient with newly diagnosed invasive mammary carcinoma with tubular features of the right breast inner lower quadrant ER HI positive.     Plan:     · Staging workup to include bilateral breast MRI, CT chest abdomen pelvis, whole body bone scan  · Referral to Dr. Lazarus Ariza for medical oncology  · Referral to Dr. Stephanie Schwab or [place] for radiation oncology  · Follow-up in 1 week

## 2018-03-23 NOTE — PROGRESS NOTES
HISTORY OF PRESENT ILLNESS  Melba Jones is a 63 y.o. female. HPI    Review of Systems   Constitutional: Negative. HENT: Positive for hearing loss. Negative for congestion, ear discharge, ear pain, nosebleeds and tinnitus. Eyes: Negative. Respiratory: Positive for shortness of breath. Negative for cough, hemoptysis, sputum production and wheezing. Cardiovascular: Negative. Gastrointestinal: Negative. Genitourinary: Negative. Musculoskeletal: Positive for back pain, joint pain and neck pain. Negative for falls and myalgias. Neurological: Positive for tingling. Negative for dizziness, tremors, sensory change, speech change, focal weakness, seizures, loss of consciousness and headaches. Endo/Heme/Allergies: Negative. Psychiatric/Behavioral: Negative for depression, hallucinations, memory loss, substance abuse and suicidal ideas. The patient is nervous/anxious. The patient does not have insomnia.         Physical Exam

## 2018-03-29 ENCOUNTER — HOSPITAL ENCOUNTER (OUTPATIENT)
Dept: RADIATION THERAPY | Age: 63
Discharge: HOME | End: 2018-03-29
Payer: MEDICARE

## 2018-03-29 PROCEDURE — 99211 OFF/OP EST MAY X REQ PHY/QHP: CPT

## 2018-04-02 ENCOUNTER — HOSPITAL ENCOUNTER (OUTPATIENT)
Dept: ONCOLOGY | Age: 63
Discharge: HOME | End: 2018-04-02

## 2018-04-02 ENCOUNTER — OFFICE VISIT (OUTPATIENT)
Dept: ONCOLOGY | Age: 63
End: 2018-04-02

## 2018-04-02 ENCOUNTER — HOSPITAL ENCOUNTER (OUTPATIENT)
Dept: LAB | Age: 63
Discharge: HOME | End: 2018-04-02
Payer: MEDICARE

## 2018-04-02 VITALS
HEIGHT: 68 IN | DIASTOLIC BLOOD PRESSURE: 85 MMHG | SYSTOLIC BLOOD PRESSURE: 131 MMHG | TEMPERATURE: 97.5 F | WEIGHT: 215.4 LBS | HEART RATE: 98 BPM | BODY MASS INDEX: 32.64 KG/M2

## 2018-04-02 DIAGNOSIS — C50.511 MALIGNANT NEOPLASM OF LOWER-OUTER QUADRANT OF RIGHT BREAST OF FEMALE, ESTROGEN RECEPTOR POSITIVE (HCC): ICD-10-CM

## 2018-04-02 DIAGNOSIS — Z17.0 MALIGNANT NEOPLASM OF LOWER-OUTER QUADRANT OF RIGHT BREAST OF FEMALE, ESTROGEN RECEPTOR POSITIVE (HCC): Primary | ICD-10-CM

## 2018-04-02 DIAGNOSIS — C50.511 MALIGNANT NEOPLASM OF LOWER-OUTER QUADRANT OF RIGHT BREAST OF FEMALE, ESTROGEN RECEPTOR POSITIVE (HCC): Primary | ICD-10-CM

## 2018-04-02 DIAGNOSIS — Z17.0 MALIGNANT NEOPLASM OF LOWER-OUTER QUADRANT OF RIGHT BREAST OF FEMALE, ESTROGEN RECEPTOR POSITIVE (HCC): ICD-10-CM

## 2018-04-02 LAB
ALBUMIN SERPL-MCNC: 4 G/DL (ref 3.4–5)
ALBUMIN/GLOB SERPL: 0.9 {RATIO} (ref 0.8–1.7)
ALP SERPL-CCNC: 92 U/L (ref 45–117)
ALT SERPL-CCNC: 21 U/L (ref 13–56)
ANION GAP SERPL CALC-SCNC: 8 MMOL/L (ref 3–18)
AST SERPL-CCNC: 24 U/L (ref 15–37)
BASO+EOS+MONOS # BLD AUTO: 0.5 K/UL (ref 0–2.3)
BASO+EOS+MONOS # BLD AUTO: 6 % (ref 0.1–17)
BILIRUB SERPL-MCNC: 0.4 MG/DL (ref 0.2–1)
BUN SERPL-MCNC: 11 MG/DL (ref 7–18)
BUN/CREAT SERPL: 10 (ref 12–20)
CALCIUM SERPL-MCNC: 9.5 MG/DL (ref 8.5–10.1)
CHLORIDE SERPL-SCNC: 102 MMOL/L (ref 100–108)
CO2 SERPL-SCNC: 27 MMOL/L (ref 21–32)
CREAT SERPL-MCNC: 1.05 MG/DL (ref 0.6–1.3)
DIFFERENTIAL METHOD BLD: ABNORMAL
ERYTHROCYTE [DISTWIDTH] IN BLOOD BY AUTOMATED COUNT: 13.8 % (ref 11.5–14.5)
GLOBULIN SER CALC-MCNC: 4.4 G/DL (ref 2–4)
GLUCOSE SERPL-MCNC: 88 MG/DL (ref 74–99)
HCT VFR BLD AUTO: 36.7 % (ref 36–48)
HGB BLD-MCNC: 11.7 G/DL (ref 12–16)
LYMPHOCYTES # BLD: 2.8 K/UL (ref 1.1–5.9)
LYMPHOCYTES NFR BLD: 35 % (ref 14–44)
MCH RBC QN AUTO: 26.7 PG (ref 25–35)
MCHC RBC AUTO-ENTMCNC: 31.9 G/DL (ref 31–37)
MCV RBC AUTO: 83.6 FL (ref 78–102)
NEUTS SEG # BLD: 4.7 K/UL (ref 1.8–9.5)
NEUTS SEG NFR BLD: 59 % (ref 40–70)
PLATELET # BLD AUTO: 289 K/UL (ref 140–440)
POTASSIUM SERPL-SCNC: 3.9 MMOL/L (ref 3.5–5.5)
PROT SERPL-MCNC: 8.4 G/DL (ref 6.4–8.2)
RBC # BLD AUTO: 4.39 M/UL (ref 4.1–5.1)
SODIUM SERPL-SCNC: 137 MMOL/L (ref 136–145)
WBC # BLD AUTO: 8 K/UL (ref 4.5–13)

## 2018-04-02 PROCEDURE — 86300 IMMUNOASSAY TUMOR CA 15-3: CPT | Performed by: INTERNAL MEDICINE

## 2018-04-02 PROCEDURE — 80053 COMPREHEN METABOLIC PANEL: CPT | Performed by: INTERNAL MEDICINE

## 2018-04-02 PROCEDURE — 36415 COLL VENOUS BLD VENIPUNCTURE: CPT | Performed by: INTERNAL MEDICINE

## 2018-04-02 NOTE — PATIENT INSTRUCTIONS
Breast Cancer: Care Instructions  Your Care Instructions    Breast cancer occurs when abnormal cells grow out of control in the breast. These cancer cells can spread within the breast, to nearby lymph nodes and other tissues, and to other parts of the body. Being treated for cancer can weaken your body, and you may feel very tired. Get the rest your body needs so you can feel better. Finding out that you have cancer is scary. You may feel many emotions and may need some help coping. Seek out family, friends, and counselors for support. You also can do things at home to make yourself feel better while you go through treatment. Call the [facility] (0-581.521.7896) or visit its website at [employer] for more information. Follow-up care is a key part of your treatment and safety. Be sure to make and go to all appointments, and call your doctor if you are having problems. It's also a good idea to know your test results and keep a list of the medicines you take. How can you care for yourself at home? · Take your medicines exactly as prescribed. Call your doctor if you think you are having a problem with your medicine. You may get medicine for nausea and vomiting if you have these side effects. · Follow your doctor's instructions to relieve pain. Pain from cancer and surgery can almost always be controlled. Use pain medicine when you first notice pain, before it becomes severe. · Eat healthy food. If you do not feel like eating, try to eat food that has protein and extra calories to keep up your strength and prevent weight loss. Drink liquid meal replacements for extra calories and protein. Try to eat your main meal early. · Get some physical activity every day, but do not get too tired. Keep doing the hobbies you enjoy as your energy allows. · Do not smoke. Smoking can make your cancer worse. If you need help quitting, talk to your doctor about stop-smoking programs and medicines.  These can increase your chances of quitting for good. · Take steps to control your stress and workload. Learn relaxation techniques. ¨ Share your feelings. Stress and tension affect our emotions. By expressing your feelings to others, you may be able to understand and cope with them. ¨ Consider joining a support group. Talking about a problem with your spouse, a good friend, or other people with similar problems is a good way to reduce tension and stress. ¨ Express yourself through art. Try writing, crafts, dance, or art to relieve stress. Some dance, writing, or art groups may be available just for people who have cancer. ¨ Be kind to your body and mind. Getting enough sleep, eating a healthy diet, and taking time to do things you enjoy can contribute to an overall feeling of balance in your life and can help reduce stress. ¨ Get help if you need it. Discuss your concerns with your doctor or counselor. · If you are vomiting or have diarrhea:  ¨ Drink plenty of fluids (enough so that your urine is light yellow or clear like water) to prevent dehydration. Choose water and other caffeine-free clear liquids. If you have kidney, heart, or liver disease and have to limit fluids, talk with your doctor before you increase the amount of fluids you drink. ¨ When you are able to eat, try clear soups, mild foods, and liquids until all symptoms are gone for 12 to 48 hours. Other good choices include dry toast, crackers, cooked cereal, and gelatin dessert, such as Jell-O.  · If you have not already done so, prepare a list of advance directives. Advance directives are instructions to your doctor and family members about what kind of care you want if you become unable to speak or express yourself. When should you call for help? Call 911 anytime you think you may need emergency care. For example, call if:  ? · You passed out (lost consciousness). ?Call your doctor now or seek immediate medical care if:  ? · You have a fever. ? · You have abnormal bleeding. ? · You think you have an infection. ? · You have new or worse pain. ? · You have new symptoms, such as a cough, belly pain, vomiting, diarrhea, or a rash. ? Watch closely for changes in your health, and be sure to contact your doctor if:  ? · You are much more tired than usual.   ? · You have swollen glands in your armpits, groin, or neck. ? · You do not get better as expected. Where can you learn more? Go to http://janessa-raphael.info/. Enter V321 in the search box to learn more about \"Breast Cancer: Care Instructions. \"  Current as of: May 12, 2017  Content Version: 11.4  © 9464-0828 [employer] Care instructions adapted under license by [employer] (which disclaims liability or warranty for this information). If you have questions about a medical condition or this instruction, always ask your healthcare professional. [address] any warranty or liability for your use of this information.

## 2018-04-02 NOTE — PROGRESS NOTES
Hematology/Oncology Consultation Note    Name: Sandi Barnes  Date: 2018  : 1955    PCP: Latoya Garcia MD       Ms. Alexa Perez  is a 63 y.o. woman who has recently been diagnosed with an invasive ductal carcinoma involving the right breast    Subjective:   Chief complaint: Breast cancer    History of present illness:  Ms. Alexa Perez is a 42-year-old -American woman who had an abnormal screening mammography done recently which revealed a lesion in the right breast.  On 3/14/2018 she underwent a core biopsy of the right breast mass and this was positive for invasive mammary carcinoma with tubular features. Additional testing of the specimen revealed that the tumor is positive for both the estrogen and progesterone receptors. HER-2 was negative by FISH analysis. The patient now is undergoing an extensive evaluation, which was arranged by her surgeon, to stage the extent of involvement. She is here today to discuss potential adjuvant treatment options while she completes her staging evaluation and surgical therapeutic intervention. The patient reports that she had not been doing her breast self exams on a regular basis. She is not aware of any known family members with breast cancer.   Past Medical History:   Diagnosis Date    Arthritis     Balance problems     Bronchitis     Fibromyalgia 2012    GERD (gastroesophageal reflux disease)     Hiatal Hernia    Hypertension     Kidney stones     Low back pain     Lumbar spinal stenosis 2012    Migraine headache     Nervousness     Neurological disease     Osteoarthritis 2012    Other ill-defined conditions(799.89)     neuropathy    Other ill-defined conditions(799.89)     fibromyalgia    Peripheral neuropathy 2012    Post laminectomy syndrome     Postoperative anemia due to acute blood loss 2012    Ringing in ears     Spinal stenosis     Thoracic spine pain     Vitamin D deficiency 2012 Allergies   Allergen Reactions    Aspirin Other (comments)     GI Pain    Adhesive Tape-Silicones Other (comments)     blisters       Past Surgical History:   Procedure Laterality Date    HX ORTHOPAEDIC      foot l heel spur    HX ORTHOPAEDIC      right knee sx    HX OTHER SURGICAL      non cancer cyst removed from right shoulder    NEUROLOGICAL PROCEDURE UNLISTED      Bilateral L3-4 hemilaminotomy and medial facetectomy by Dr. Nadiya Hughes History     Social History    Marital status: SINGLE     Spouse name: N/A    Number of children: N/A    Years of education: N/A     Occupational History    Not on file. Social History Main Topics    Smoking status: Never Smoker    Smokeless tobacco: Never Used    Alcohol use No    Drug use: No    Sexual activity: Not on file     Other Topics Concern    Not on file     Social History Narrative       Family History   Problem Relation Age of Onset    Hypertension Mother     Arthritis-osteo Mother     Neuropathy Mother     Hypertension Father     Cancer Father     Hypertension Brother     Cancer Brother     Neuropathy Other     Arthritis-rheumatoid Other     GERD Other     Diabetes Other        Current Outpatient Prescriptions   Medication Sig Dispense Refill    QNASL 80 mcg/actuation HFAA   1    butalbital-acetaminophen-caff (FIORICET) -40 mg per capsule   0    clindamycin (CLEOCIN) 300 mg capsule take 1 capsule by mouth every 6 hours for 10 days  0    topiramate (TOPAMAX) 50 mg tablet take 1 tablet by mouth EVERY NIGHT FOR 2 WEEKS then 2 tablets by mouth EVERY NIGHT  0    diclofenac EC (VOLTAREN) 75 mg EC tablet Take 1 Tab by mouth two (2) times daily as needed. 60 Tab 5    gabapentin (NEURONTIN) 300 mg capsule Take 1 Cap by mouth three (3) times daily.  Indications: sensory sensitivity 90 Cap 2    RHINOCORT ALLERGY 32 mcg/actuation nasal spray instill 2 sprays into each nostril once daily  1    VENTOLIN HFA 90 mcg/actuation inhaler inhale 2 puffs by mouth every 4 to 6 hours if needed  0    amitriptyline (ELAVIL) 25 mg tablet   0    omeprazole (PRILOSEC) 40 mg capsule   1    traMADol (ULTRAM) 50 mg tablet Take 1 Tab by mouth three (3) times daily as needed for Pain. Max Daily Amount: 150 mg. 90 Tab 2    mometasone (NASONEX) 50 mcg/actuation nasal spray instill 2 spray into each nostril once daily if needed  0    BRISDELLE 7.5 mg cap TAKE 1 CAPSULE BY MOUTH EVERY DAY  0    NEILMED SINUS RINSE COMPLETE pkdv use 1 packet NASAL daily  0    nortriptyline (PAMELOR) 75 mg capsule Take 1 Cap by mouth two (2) times a day. 60 Cap 0    cyclobenzaprine (FLEXERIL) 10 mg tablet Take  by mouth three (3) times daily as needed for Muscle Spasm(s).  butalbital-acetaminophen-caffeine (FIORICET) -40 mg per tablet Take 1 Tab by mouth.  ergocalciferol (VITAMIN D2) 50,000 unit capsule Take 50,000 Units by mouth.  acetaminophen 650 mg Tab Take 650 mg by mouth every four (4) hours as needed for Pain or Fever. 30 Tab 0    esomeprazole (NEXIUM) 40 mg capsule Take 1 Cap by mouth daily. Indications: GASTROESOPHAGEAL REFLUX 15 Cap 0    potassium chloride (K-DUR, KLOR-CON) 20 mEq tablet Take 1 Tab by mouth daily. 15 Tab 0    docusate sodium (COLACE) 100 mg capsule Take 1 Cap by mouth two (2) times a day. 30 Cap 0    ferrous sulfate 325 mg (65 mg iron) tablet Take 1 Tab by mouth two (2) times daily (with meals). 30 Tab 0    ascorbic acid (VITAMIN C) 250 mg tablet Take 1 Tab by mouth two (2) times daily (with meals). 30 Tab 0    DIVALPROEX SODIUM (DEPAKOTE PO) Take 500 mg by mouth as needed. Pt takes for migraines       losartan-hydrochlorothiazide (HYZAAR) 100-25 mg per tablet Take 1 Tab by mouth daily. Indications: HYPERTENSION       Review of Systems    General ROS:The patient has no complaints and there is no physical distress evident.   Psychological ROS: patient denies having any psychological symptoms such as hallucinations, depression or anxiety. Ophthalmic ROS:the patient denies having any visual impairment or eye discomfort. ENT ROS: there are no abnormalities reported. Allergy and Immunology ROS:the patient denies having any seasonal allergies or allergies to medications other than those already outlined above. Hematological and Lymphatic ROS: the patient denies having any bruising, bleeding or lymphadenopathy. Endocrine ROS: the patient denies having any heat or cold intolerance. There is no history of diabetes or thyroid disorders. Breast ROS: the patient has recently been diagnosed with an invasive ductal adenocarcinoma involving the right breast  Respiratory ROS:the patient denies having any cough, shortness of breath, or dyspnea on exertion. Cardiovascular ROS: there are no complaints of chest pain, palpitations, chest pounding, or dyspnea on exertion. Gastrointestinal ROS: the patient denies having nausea, emesis, diarrhea, constipation, or blood in the stool. Genito-Urinary ROS: the patient denies having urinary urgency, frequency, or dysuria. Musculoskeletal ROS: with the exception of mild arthralgias the patient has no other musculoskeletal complaints. Neurological ROS: the patient denies having any numbness, tingling, or neurologic deficits. Dermatological ROS:patient denies having any unexplained rash, skin ulcerations, or hives. Objective:     Visit Vitals    /85    Pulse 98    Temp 97.5 °F (36.4 °C)    Ht 5' 8\" (1.727 m)    Wt 97.7 kg (215 lb 6.4 oz)    BMI 32.75 kg/m2        Physical Exam:   Gen. Appearance: the patient is in no acute distress. Skin: There is no evidence of bruise or rash. HEENT: The head is normocephalic and atraumatic. The conjunctiva and sclera are clear. Pupils are equal, round, reactive to light, and accommodation. The extraocular movements are intact. ENT reveals no oral mucosal lesions or ulcerations.   Neck: Supple without lymphadenopathy or thyromegaly. Anterior chest wall and breast: The left breast shows no abnormality. The left axilla shows no axillary lymphadenopathy. The right breast has a palpable mass lesion in the 5 o'clock position. This area was previously biopsied and a small hematoma persists. The right axilla reveals no palpable right axillary lymphadenopathy. Lungs: Clear to auscultation and percussion; there are no wheezes or rhonchi. Heart: Regular rate and rhythm; there are no murmurs, gallops, or rubs. Abdomen: Bowel sounds are present and normal.  There is no guarding, tenderness, or hepatosplenomegaly. Extremities: There is no clubbing, cyanosis, or edema. Neurologic: There are no focal neurologic deficits. Lymphatics: There is no palpable peripheral lymphadenopathy. Lab data: The pathology report dated 3/14/2018 from a core biopsy shows invasive mammary carcinoma with tubular features. The ER/MA positive and HER-2 is negative by FISH analysis. Assessment:   Invasive mammary (ductal) carcinoma involving the right breast.  The tumor is ER/MA positive and HER-2 negative. Plan:   Invasive mammary (ductal) carcinoma involving the right breast: The tumor is ER/MA positive and HER-2 negative: I have explained to the patient that she is tentatively scheduled to have MRI of the breast, CT scans of the chest, abdomen, and pelvis and a bone scan. Pending this evaluation she will decide on the surgical approach that she wishes to undergo with her surgeon. Pending the results of the final surgical pathologic staging we will be able to discuss whether or not she will be a candidate for antiestrogen therapy only (tamoxifen or an aromatase inhibitor) or if she will require systemic chemotherapy with 1 of the chemotherapeutic regimens depending on the final pathologic stage of her malignancy. She understands that she will require radiation treatment particularly if she decides to go with a breast conserving procedure.   I will therefore plan to see her back in clinic in about 3 weeks to review the final pathologic staging and to discuss the finalize systemic adjuvant treatment approaches. The patient had her questions answered to her satisfaction. She also had her daughter on speaker phone who asked multiple questions and appeared somewhat irritated that we did not have all the answers regarding her staging procedures. I did explain to the patient's daughter that the procedures have been scheduled and once all of the procedures have been completed and she completes her surgical staging then we will have a more definitive recommendation, systemically, for the patient. At this time, I will order a baseline CA-27-29 level along with the comprehensive metabolic panel. I will plan to see her back in clinic in about 3 weeks. Follow-up in 3 weeks      Orders Placed This Encounter    METABOLIC PANEL, COMPREHENSIVE     Standing Status:   Future     Standing Expiration Date:   4/3/2019    CA 27.29     Standing Status:   Future     Standing Expiration Date:   4/3/2019           Mahendra Foster MD  4/2/2018      Please note: This document has been produced using voice recognition software. Unrecognized errors in transcription may be present.

## 2018-04-02 NOTE — MR AVS SNAPSHOT
[address] 805 200 [facility] 
[phone] Patient: Greta Brothers MRN: [id number] VYI:[id number] Visit Information Date & Time Provider Department Dept. Phone Encounter #  
 4/2/2018 11:00 AM Iram Antony MD [facility] [phone] [id number] Follow-up Instructions Return in about 3 weeks (around 4/23/2018). Upcoming Health Maintenance Date Due Hepatitis C Screening 1955 Pneumococcal 19-64 Highest Risk (1 of 3 - PCV13) 9/11/1976 DTaP/Tdap/Td series (1 - Tdap) 9/11/1978 PAP AKA CERVICAL CYTOLOGY 9/11/1978 FOBT Q 1 YEAR AGE 50-75 9/11/2007 ZOSTER VACCINE AGE 60> 7/11/2017 Influenza Age 5 to Adult 8/1/2017 MEDICARE YEARLY EXAM 3/14/2018 BREAST CANCER SCRN MAMMOGRAM 3/6/2020 Allergies as of 4/2/2018  Review Complete On: 3/23/2018 By: Tobin Fowler MD  
  
 Severity Noted Reaction Type Reactions Aspirin High 11/21/2012   Side Effect Other (comments) GI Pain Adhesive Tape-silicones  45/22/1831    Other (comments)  
 blisters Current Immunizations  Never Reviewed No immunizations on file. Not reviewed this visit You Were Diagnosed With   
  
 Codes Comments Malignant neoplasm of lower-outer quadrant of right breast of female, estrogen receptor positive ([facility]    -  Primary ICD-10-CM: C50.511, Z17.0 ICD-9-CM: 174.5, V86.0 Vitals BP Pulse Temp Height(growth percentile) Weight(growth percentile) BMI  
 131/85 98 97.5 °F (36.4 °C) 5' 8\" (1.727 m) 215 lb 6.4 oz (97.7 kg) 32.75 kg/m2 OB Status Smoking Status Postmenopausal Never Smoker BMI and BSA Data Body Mass Index Body Surface Area 32.75 kg/m 2 2.17 m 2 Preferred Pharmacy Pharmacy Name Phone [address] [facility], [address] [facility] [phone] Your Updated Medication List  
  
   
This list is accurate as of 4/2/18 12:23 PM.  Always use your most recent med list.  
  
  
  
  
 acetaminophen 650 mg Tab Take 650 mg by mouth every four (4) hours as needed for Pain or Fever. amitriptyline 25 mg tablet Commonly known as:  ELAVIL  
  
 ascorbic acid (vitamin C) 250 mg tablet Commonly known as:  VITAMIN C Take 1 Tab by mouth two (2) times daily (with meals). BRISDELLE 7.5 mg Cap Generic drug:  PARoxetine mesylate(menop.sym) TAKE 1 CAPSULE BY MOUTH EVERY DAY  
  
 clindamycin 300 mg capsule Commonly known as:  CLEOCIN  
take 1 capsule by mouth every 6 hours for 10 days DEPAKOTE PO Take 500 mg by mouth as needed. Pt takes for migraines  
  
 diclofenac EC 75 mg EC tablet Commonly known as:  VOLTAREN Take 1 Tab by mouth two (2) times daily as needed. docusate sodium 100 mg capsule Commonly known as:  Leeland Leon Take 1 Cap by mouth two (2) times a day. esomeprazole 40 mg capsule Commonly known as:  NexIUM Take 1 Cap by mouth daily. Indications: GASTROESOPHAGEAL REFLUX  
  
 ferrous sulfate 325 mg (65 mg iron) tablet Take 1 Tab by mouth two (2) times daily (with meals). * FIORICET -40 mg per tablet Generic drug:  butalbital-acetaminophen-caffeine Take 1 Tab by mouth. * butalbital-acetaminophen-caff -40 mg per capsule Commonly known as:  [employer] FLEXERIL 10 mg tablet Generic drug:  cyclobenzaprine Take  by mouth three (3) times daily as needed for Muscle Spasm(s). gabapentin 300 mg capsule Commonly known as:  NEURONTIN Take 1 Cap by mouth three (3) times daily. Indications: sensory sensitivity  
  
 losartan-hydroCHLOROthiazide 100-25 mg per tablet Commonly known as:  HYZAAR Take 1 Tab by mouth daily. Indications: HYPERTENSION  
  
 mometasone 50 mcg/actuation nasal spray Commonly known as:  Lavena Million instill 2 spray into each nostril once daily if needed NEILMED SINUS RINSE COMPLETE Pkdv Generic drug:  sod chlor-bicarb-squeez bottle  
use 1 packet NASAL daily  
  
 nortriptyline 75 mg capsule Commonly known as:  PAMELOR Take 1 Cap by mouth two (2) times a day. omeprazole 40 mg capsule Commonly known as:  PRILOSEC  
  
 potassium chloride 20 mEq tablet Commonly known as:  K-DUR, KLOR-CON Take 1 Tab by mouth daily. QNASL 80 mcg/actuation [employer] Generic drug:  beclomethasone dipropionate RHINOCORT ALLERGY 32 mcg/actuation nasal spray Generic drug:  budesonide  
instill 2 sprays into each nostril once daily  
  
 topiramate 50 mg tablet Commonly known as:  TOPAMAX  
take 1 tablet by mouth EVERY NIGHT FOR 2 WEEKS then 2 tablets by mouth EVERY NIGHT  
  
 traMADol 50 mg tablet Commonly known as:  ULTRAM  
Take 1 Tab by mouth three (3) times daily as needed for Pain. Max Daily Amount: 150 mg. VENTOLIN HFA 90 mcg/actuation inhaler Generic drug:  albuterol  
inhale 2 puffs by mouth every 4 to 6 hours if needed VITAMIN D2 50,000 unit capsule Generic drug:  ergocalciferol Take 50,000 Units by mouth. * Notice: This list has 2 medication(s) that are the same as other medications prescribed for you. Read the directions carefully, and ask your doctor or other care provider to review them with you. Follow-up Instructions Return in about 3 weeks (around 4/23/2018). To-Do List   
 04/02/2018 Lab:  CA 27.29   
  
 04/02/2018 Lab:  METABOLIC PANEL, COMPREHENSIVE   
  
 04/03/2018 11:30 AM  
  Appointment with [facility] MRI RM 2 at [address] [place] [address] ([phone]) GENERAL INSTRUCTIONS  You will be required to lie still face down for 45-60 minutes. Bring information (ID card) if you have any medically implanted devices.   Remove any jewelry (including body piercing, hairpins) prior to MRI. If you have had a creatinine level drawn within the past 30 days, please bring most recent results to your appt. Bring all prior Mammogram, Breast Ultrasound, and Breast MRI films, CD's, and reports with you on the day of your exam.  This only includes studies done outside of [address] [place] [address] and [facility]. Bring a complete list of all medications you are currently taking to include prescriptions, over-the-counter meds, herbals, vitamins & any dietary supplements. If you were given medications for claustrophobia or anxiety, please arrange to have someone drive you to your appointment. QUESTIONS  Notify the MRI Department if you have any questions concerning your study. Teodoramaria luisakali [phone]  
  
 04/05/2018 9:00 AM  
  Appointment with [address] at [facility]  [facility] ([phone]) OUTSIDE FILMS  - Any outside films related to the study being scheduled should be brought with you on the day of the exam.  If this cannot be done there may be a delay in the reading of the study. STUDY DETAILS  - This is a two part test. The first part consists of the injection of our tracer, and the second part (approx. 3 hours post-injection) is the imaging part. Imaging can be 30-60 minutes in duration. MEDICATIONS  - Patient must bring a complete list of all medications currently taking to include prescriptions, over-the-counter meds, herbals, vitamins & any dietary supplements 04/05/2018 9:30 AM  
  Appointment with [facility] CT RM 2 at [facility] RAD [address] ([phone]) ORAL CONTRAST/PREP   Oral Contrast/Prep from radiology  no later than one day prior to study date/time.   DIET RESTRICTIONS  Nothing to eat or drink, 4 hours prior to study  May have water to take meds  May drink oral contrast if directed  GENERAL INSTRUCTIONS  If you were given premedications for IV contrast to take prior to having your study, please arrange to have someone drive you to your appointment. If you have had a creatinine level drawn within the past 30 days, please bring most recent results to your appt. MEDICATIONS  Bring a complete list of all medications you are currently taking to include prescriptions, over-the-counter meds, herbals, vitamins & any dietary supplements. RELATED STUDY INFORMATION  Bring any films, CDs, and reports related with you on the day of your exam.  This only includes studies done outside of [address] [place] [address], [facility], Jhonny Pedraza , and [facility]. QUESTIONS  Notify the CT Department if you have any questions concerning your study. Jhonny Pedraza [address] [facility] [phone]  
  
 04/05/2018 12:00 PM  
  Appointment with [address] [address] at [facility] RAD NUC MED ([phone])  
  
 04/20/2018 1:15 PM  
  Appointment with [address] [facility] at [facility] RADIATION THERAPY ([phone]) ATTENTION: The Appointment Time in [facility] may not reflect your scheduled appointment time as the time is only a place le. If you have any questions about your appointment time, please call [facility] Radiation Therapy at [phone]. Introducing [facility]! Dear Uzma Leal: Thank you for requesting a [employer] account. Our records indicate that you already have an active [employer] account. You can access your account anytime at [email or web address] Did you know that you can access your hospital and ER discharge instructions at any time in [employer]? You can also review all of your test results from your hospital stay or ER visit. Additional Information If you have questions, please visit the Frequently Asked Questions section of the [employer] website at [email or web address] Remember, [employer] is NOT to be used for urgent needs. For medical emergencies, dial 911. Now available from your iPhone and Android! Please provide this summary of care documentation to your next provider. Your primary care clinician is listed as Perlita Ackerman. If you have any questions after today's visit, please call [phone].

## 2018-04-03 ENCOUNTER — HOSPITAL ENCOUNTER (OUTPATIENT)
Age: 63
Discharge: HOME | End: 2018-04-03
Attending: SURGERY
Payer: MEDICARE

## 2018-04-03 DIAGNOSIS — C50.919 MALIGNANT NEOPLASM OF FEMALE BREAST, UNSPECIFIED ESTROGEN RECEPTOR STATUS, UNSPECIFIED LATERALITY, UNSPECIFIED SITE OF BREAST (HCC): ICD-10-CM

## 2018-04-03 LAB — CANCER AG27-29 SERPL-ACNC: 30.5 U/ML (ref 0–38.6)

## 2018-04-03 PROCEDURE — 77059 MRI BREAST BI W WO CONT: CPT

## 2018-04-03 PROCEDURE — A9585 GADOBUTROL INJECTION: HCPCS | Performed by: SURGERY

## 2018-04-03 PROCEDURE — 74011250636 HC RX REV CODE- 250/636: Performed by: SURGERY

## 2018-04-03 RX ADMIN — GADOBUTROL 9.5 ML: 604.72 INJECTION INTRAVENOUS at 13:00

## 2018-04-05 ENCOUNTER — HOSPITAL ENCOUNTER (OUTPATIENT)
Dept: CT IMAGING | Age: 63
Discharge: HOME | End: 2018-04-05
Attending: SURGERY
Payer: MEDICARE

## 2018-04-05 ENCOUNTER — HOSPITAL ENCOUNTER (OUTPATIENT)
Dept: NUCLEAR MEDICINE | Age: 63
Discharge: HOME | End: 2018-04-05
Attending: SURGERY
Payer: MEDICARE

## 2018-04-05 DIAGNOSIS — C50.919 MALIGNANT NEOPLASM OF FEMALE BREAST, UNSPECIFIED ESTROGEN RECEPTOR STATUS, UNSPECIFIED LATERALITY, UNSPECIFIED SITE OF BREAST (HCC): ICD-10-CM

## 2018-04-05 PROCEDURE — 74011636320 HC RX REV CODE- 636/320: Performed by: SURGERY

## 2018-04-05 PROCEDURE — 71270 CT THORAX DX C-/C+: CPT

## 2018-04-05 PROCEDURE — 74177 CT ABD & PELVIS W/CONTRAST: CPT

## 2018-04-05 PROCEDURE — 78306 BONE IMAGING WHOLE BODY: CPT

## 2018-04-05 RX ADMIN — IOPAMIDOL 80 ML: 612 INJECTION, SOLUTION INTRAVENOUS at 09:10

## 2018-04-17 ENCOUNTER — OFFICE VISIT (OUTPATIENT)
Dept: SURGERY | Age: 63
End: 2018-04-17

## 2018-04-17 VITALS
SYSTOLIC BLOOD PRESSURE: 132 MMHG | RESPIRATION RATE: 18 BRPM | HEART RATE: 78 BPM | WEIGHT: 216 LBS | TEMPERATURE: 99 F | BODY MASS INDEX: 32.74 KG/M2 | DIASTOLIC BLOOD PRESSURE: 72 MMHG | HEIGHT: 68 IN

## 2018-04-17 DIAGNOSIS — Z17.0 MALIGNANT NEOPLASM OF RIGHT BREAST, STAGE 1, ESTROGEN RECEPTOR POSITIVE (HCC): Primary | ICD-10-CM

## 2018-04-17 DIAGNOSIS — C50.911 MALIGNANT NEOPLASM OF RIGHT BREAST, STAGE 1, ESTROGEN RECEPTOR POSITIVE (HCC): Primary | ICD-10-CM

## 2018-04-17 DIAGNOSIS — C50.511 MALIGNANT NEOPLASM OF LOWER-OUTER QUADRANT OF RIGHT BREAST OF FEMALE, ESTROGEN RECEPTOR POSITIVE (HCC): Primary | ICD-10-CM

## 2018-04-17 DIAGNOSIS — Z17.0 MALIGNANT NEOPLASM OF LOWER-OUTER QUADRANT OF RIGHT BREAST OF FEMALE, ESTROGEN RECEPTOR POSITIVE (HCC): Primary | ICD-10-CM

## 2018-04-17 RX ORDER — SODIUM CHLORIDE 0.9 % (FLUSH) 0.9 %
5-10 SYRINGE (ML) INJECTION EVERY 8 HOURS
Status: CANCELLED | OUTPATIENT
Start: 2018-04-17

## 2018-04-17 RX ORDER — SODIUM CHLORIDE 0.9 % (FLUSH) 0.9 %
5-10 SYRINGE (ML) INJECTION AS NEEDED
Status: CANCELLED | OUTPATIENT
Start: 2018-04-17

## 2018-04-17 NOTE — PROGRESS NOTES
[facility]  General Surgery    Subjective:      HPI: Patient is a very pleasant 74-year-old female with past medical history which is remarkable for hypertension, vitamin D deficiency, kidney stones, degenerative disc disease of the lumbar spine, lumbar spondylosis, herniated nucleus pulposis of the lumbar spine, post laminectomy syndrome, peripheral neuropathy, migraine headaches and fibromyalgia. 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  Her staging workup was negative for any evidence of metastasis. I had a long discussion with the patient and her daughter regarding the importance of proceeding with needle localized lumpectomy and sentinel lymph node biopsy of the right breast.  The patient would like to wait until June when her daughter can come for the surgery. I explained to the patient that while there always extenuating circumstances, we always like to get patients to surgery within a 2 week period from the diagnosis of the cancer when possible. She and the daughter are adamant that she will not have surgery until June.   Patient Active Problem List    Diagnosis Date Noted    Malignant neoplasm of lower-outer quadrant of right breast of female, estrogen receptor positive ([facility] 04/02/2018    DDD (degenerative disc disease), lumbar 12/17/2015    Lumbar spondylosis 12/17/2015    Chronic pain 10/26/2015    HNP (herniated nucleus pulposus), lumbar 10/26/2015    Facet arthritis of lumbar region [facility]) 10/26/2015    Post laminectomy syndrome     Kidney stones     Ringing in ears     Balance problems     Nervousness     Postoperative anemia due to acute blood loss 11/30/2012    Vitamin D deficiency 11/30/2012    Status post laminectomy 11/29/2012    Impaired mobility and activities of daily living 11/29/2012    Hypertension 11/29/2012    Peripheral neuropathy 11/29/2012    Fibromyalgia 11/29/2012    Lumbar spinal stenosis 11/25/2012    GERD (gastroesophageal reflux disease) 11/25/2012    Osteoarthritis 11/25/2012    Migraines 11/25/2012     Past Medical History:   Diagnosis Date    Arthritis     Balance problems     Bronchitis     Fibromyalgia 11/29/2012    GERD (gastroesophageal reflux disease)     Hiatal Hernia    Hypertension     Kidney stones     Low back pain     Lumbar spinal stenosis 11/25/2012    Migraine headache     Nervousness     Neurological disease     Osteoarthritis 11/25/2012    Other ill-defined conditions(799.89)     neuropathy    Other ill-defined conditions(799.89)     fibromyalgia    Peripheral neuropathy 11/29/2012    Post laminectomy syndrome     Postoperative anemia due to acute blood loss 11/30/2012    Ringing in ears     Spinal stenosis     Thoracic spine pain     Vitamin D deficiency 11/30/2012      Past Surgical History:   Procedure Laterality Date    HX ORTHOPAEDIC      foot l heel spur    HX ORTHOPAEDIC      right knee sx    HX OTHER SURGICAL      non cancer cyst removed from right shoulder    NEUROLOGICAL PROCEDURE UNLISTED      Bilateral L3-4 hemilaminotomy and medial facetectomy by Dr. Carly Lauren      Family History   Problem Relation Age of Onset    Hypertension Mother     Arthritis-osteo Mother     Neuropathy Mother     Hypertension Father     Cancer Father      colon    Hypertension Brother     Cancer Brother     Neuropathy Brother     Neuropathy Other     Arthritis-rheumatoid Other     GERD Other     Diabetes Other     Diabetes Maternal Aunt     Diabetes Maternal Uncle     Hypertension Maternal Grandfather       Social History   Substance Use Topics    Smoking status: Never Smoker    Smokeless tobacco: Never Used    Alcohol use No      Allergies   Allergen Reactions    Aspirin Other (comments)     GI Pain    Adhesive Tape-Silicones Other (comments)     blisters       Prior to Admission medications    Medication Sig Start Date End Date Taking? Authorizing Provider   QNASL 80 mcg/actuation HFAA  1/9/18  Yes Historical Provider   butalbital-acetaminophen-caff (FIORICET) -40 mg per capsule  12/16/17  Yes Historical Provider   clindamycin (CLEOCIN) 300 mg capsule take 1 capsule by mouth every 6 hours for 10 days 12/27/17  Yes Historical Provider   topiramate (TOPAMAX) 50 mg tablet take 1 tablet by mouth EVERY NIGHT FOR 2 WEEKS then 2 tablets by mouth EVERY NIGHT 12/18/17  Yes Historical Provider   diclofenac EC (VOLTAREN) 75 mg EC tablet Take 1 Tab by mouth two (2) times daily as needed. 1/15/18  Yes Tereza Villavicencio MD   gabapentin (NEURONTIN) 300 mg capsule Take 1 Cap by mouth three (3) times daily. Indications: sensory sensitivity 1/15/18  Yes Tereza Villavicencio MD   RHINOCORT ALLERGY 32 mcg/actuation nasal spray instill 2 sprays into each nostril once daily 9/11/17  Yes Historical Provider   VENTOLIN HFA 90 mcg/actuation inhaler inhale 2 puffs by mouth every 4 to 6 hours if needed 8/17/17  Yes Historical Provider   amitriptyline (ELAVIL) 25 mg tablet  9/10/17  Yes Historical Provider   omeprazole (PRILOSEC) 40 mg capsule  9/18/17  Yes Historical Provider   traMADol (ULTRAM) 50 mg tablet Take 1 Tab by mouth three (3) times daily as needed for Pain. Max Daily Amount: 150 mg. 10/16/17  Yes Tereza Villavicencio MD   mometasone (NASONEX) 50 mcg/actuation nasal spray instill 2 spray into each nostril once daily if needed 12/16/16  Yes Historical Provider   BRISDELLE 7.5 mg cap TAKE 1 CAPSULE BY MOUTH EVERY DAY 1/4/17  Yes Historical Provider   NEILMED SINUS RINSE COMPLETE pkdv use 1 packet NASAL daily 12/16/16  Yes Historical Provider   nortriptyline (PAMELOR) 75 mg capsule Take 1 Cap by mouth two (2) times a day. 7/11/16  Yes Tereza Villavicencio MD   cyclobenzaprine (FLEXERIL) 10 mg tablet Take  by mouth three (3) times daily as needed for Muscle Spasm(s).    Yes Historical Provider butalbital-acetaminophen-caffeine (FIORICET) -40 mg per tablet Take 1 Tab by mouth. Yes Historical Provider   ergocalciferol (VITAMIN D2) 50,000 unit capsule Take 50,000 Units by mouth. Yes Historical Provider   acetaminophen 650 mg Tab Take 650 mg by mouth every four (4) hours as needed for Pain or Fever. 12/7/12  Yes Ever Hunt MD   esomeprazole (NEXIUM) 40 mg capsule Take 1 Cap by mouth daily. Indications: GASTROESOPHAGEAL REFLUX 12/7/12  Yes Ever Hunt MD   potassium chloride (K-DUR, KLOR-CON) 20 mEq tablet Take 1 Tab by mouth daily. 12/7/12  Yes Ever Hunt MD   docusate sodium (COLACE) 100 mg capsule Take 1 Cap by mouth two (2) times a day. 12/7/12  Yes Ever Hunt MD   ferrous sulfate 325 mg (65 mg iron) tablet Take 1 Tab by mouth two (2) times daily (with meals). 12/7/12  Yes Ever Hunt MD   ascorbic acid (VITAMIN C) 250 mg tablet Take 1 Tab by mouth two (2) times daily (with meals). 12/7/12  Yes Ever Hunt MD   DIVALPROEX SODIUM (DEPAKOTE PO) Take 500 mg by mouth as needed. Pt takes for migraines    Yes Historical Provider   losartan-hydrochlorothiazide (HYZAAR) 100-25 mg per tablet Take 1 Tab by mouth daily. Indications: HYPERTENSION   Yes Historical Provider       Review of Systems:    14 systems were reviewed. The results are as above in the HPI and otherwise negative. Objective:       Physical Exam:  GENERAL: alert, cooperative, no distress, appears stated age,   EYE: conjunctivae/corneas clear. PERRL, EOM's intact. Fundi benign,  THROAT & NECK: normal and no erythema or exudates noted. ,    LYMPHATIC: Cervical, supraclavicular, and axillary nodes normal. ,   LUNG: clear to auscultation bilaterally,   HEART: regular rate and rhythm, S1, S2 normal, no murmur, click, rub or gallop  BREAST: The breast are symmetrical.  No dimpling, retractions, skin changes or nipple retractions are visible. No axillary of supraclavicular lymphadenopathy bilaterally.   No nipple retraction or discharge bilaterally. No breast masses bilaterally. ABDOMEN: soft, non-tender. Bowel sounds normal. No masses,  no organomegaly,  EXTREMITIES:  extremities normal, atraumatic, no cyanosis or edema,   SKIN: Normal.,   NEUROLOGIC: AOx3. Gait normal. Reflexes and motor strength normal and symmetric. Cranial nerves 2-12 and sensation grossly intact. ,     Data Review: Mammography     Ms. Ana Farnsworth has a reminder for a \"due or due soon\" health maintenance. I have asked that she contact her primary care provider for follow-up on this health maintenance.     Impression:     · Patient with stage I ER MT positive HER-2 negative invasive adenocarcinoma of the inner lower quadrant of the right breast.    Plan:     · Needle localized lumpectomy right breast with right axillary sentinel lymph node biopsy  · Consent on chart  · Preoperative orders written    Signed By: Tobin Fowler MD     April 17, 2018

## 2018-04-20 ENCOUNTER — HOSPITAL ENCOUNTER (OUTPATIENT)
Dept: RADIATION THERAPY | Age: 63
Discharge: HOME | End: 2018-04-20

## 2018-04-30 ENCOUNTER — OFFICE VISIT (OUTPATIENT)
Dept: ONCOLOGY | Age: 63
End: 2018-04-30

## 2018-04-30 ENCOUNTER — HOSPITAL ENCOUNTER (OUTPATIENT)
Dept: ONCOLOGY | Age: 63
Discharge: HOME | End: 2018-04-30

## 2018-04-30 VITALS
TEMPERATURE: 98.3 F | WEIGHT: 213.8 LBS | SYSTOLIC BLOOD PRESSURE: 133 MMHG | HEIGHT: 68 IN | DIASTOLIC BLOOD PRESSURE: 78 MMHG | BODY MASS INDEX: 32.4 KG/M2 | HEART RATE: 101 BPM

## 2018-04-30 DIAGNOSIS — Z17.0 MALIGNANT NEOPLASM OF LOWER-OUTER QUADRANT OF RIGHT BREAST OF FEMALE, ESTROGEN RECEPTOR POSITIVE (HCC): ICD-10-CM

## 2018-04-30 DIAGNOSIS — Z17.0 MALIGNANT NEOPLASM OF LOWER-OUTER QUADRANT OF RIGHT BREAST OF FEMALE, ESTROGEN RECEPTOR POSITIVE (HCC): Primary | ICD-10-CM

## 2018-04-30 DIAGNOSIS — C50.511 MALIGNANT NEOPLASM OF LOWER-OUTER QUADRANT OF RIGHT BREAST OF FEMALE, ESTROGEN RECEPTOR POSITIVE (HCC): Primary | ICD-10-CM

## 2018-04-30 DIAGNOSIS — C50.511 MALIGNANT NEOPLASM OF LOWER-OUTER QUADRANT OF RIGHT BREAST OF FEMALE, ESTROGEN RECEPTOR POSITIVE (HCC): ICD-10-CM

## 2018-04-30 LAB
BASO+EOS+MONOS # BLD AUTO: 0.7 K/UL (ref 0–2.3)
BASO+EOS+MONOS # BLD AUTO: 8 % (ref 0.1–17)
DIFFERENTIAL METHOD BLD: ABNORMAL
ERYTHROCYTE [DISTWIDTH] IN BLOOD BY AUTOMATED COUNT: 14.2 % (ref 11.5–14.5)
HCT VFR BLD AUTO: 35.2 % (ref 36–48)
HGB BLD-MCNC: 11.4 G/DL (ref 12–16)
LYMPHOCYTES # BLD: 2.4 K/UL (ref 1.1–5.9)
LYMPHOCYTES NFR BLD: 27 % (ref 14–44)
MCH RBC QN AUTO: 27 PG (ref 25–35)
MCHC RBC AUTO-ENTMCNC: 32.4 G/DL (ref 31–37)
MCV RBC AUTO: 83.4 FL (ref 78–102)
NEUTS SEG # BLD: 5.8 K/UL (ref 1.8–9.5)
NEUTS SEG NFR BLD: 65 % (ref 40–70)
PLATELET # BLD AUTO: 281 K/UL (ref 140–440)
RBC # BLD AUTO: 4.22 M/UL (ref 4.1–5.1)
WBC # BLD AUTO: 8.9 K/UL (ref 4.5–13)

## 2018-04-30 NOTE — PATIENT INSTRUCTIONS
Breast Cancer: Care Instructions  Your Care Instructions    Breast cancer occurs when abnormal cells grow out of control in the breast. These cancer cells can spread within the breast, to nearby lymph nodes and other tissues, and to other parts of the body. Being treated for cancer can weaken your body, and you may feel very tired. Get the rest your body needs so you can feel better. Finding out that you have cancer is scary. You may feel many emotions and may need some help coping. Seek out family, friends, and counselors for support. You also can do things at home to make yourself feel better while you go through treatment. Call the [employer] (6-786.511.3566) or visit its website at [employer] for more information. Follow-up care is a key part of your treatment and safety. Be sure to make and go to all appointments, and call your doctor if you are having problems. It's also a good idea to know your test results and keep a list of the medicines you take. How can you care for yourself at home? · Take your medicines exactly as prescribed. Call your doctor if you think you are having a problem with your medicine. You may get medicine for nausea and vomiting if you have these side effects. · Follow your doctor's instructions to relieve pain. Pain from cancer and surgery can almost always be controlled. Use pain medicine when you first notice pain, before it becomes severe. · Eat healthy food. If you do not feel like eating, try to eat food that has protein and extra calories to keep up your strength and prevent weight loss. Drink liquid meal replacements for extra calories and protein. Try to eat your main meal early. · Get some physical activity every day, but do not get too tired. Keep doing the hobbies you enjoy as your energy allows. · Do not smoke. Smoking can make your cancer worse. If you need help quitting, talk to your doctor about stop-smoking programs and medicines.  These can increase your chances of quitting for good. · Take steps to control your stress and workload. Learn relaxation techniques. ¨ Share your feelings. Stress and tension affect our emotions. By expressing your feelings to others, you may be able to understand and cope with them. ¨ Consider joining a support group. Talking about a problem with your spouse, a good friend, or other people with similar problems is a good way to reduce tension and stress. ¨ Express yourself through art. Try writing, crafts, dance, or art to relieve stress. Some dance, writing, or art groups may be available just for people who have cancer. ¨ Be kind to your body and mind. Getting enough sleep, eating a healthy diet, and taking time to do things you enjoy can contribute to an overall feeling of balance in your life and can help reduce stress. ¨ Get help if you need it. Discuss your concerns with your doctor or counselor. · If you are vomiting or have diarrhea:  ¨ Drink plenty of fluids (enough so that your urine is light yellow or clear like water) to prevent dehydration. Choose water and other caffeine-free clear liquids. If you have kidney, heart, or liver disease and have to limit fluids, talk with your doctor before you increase the amount of fluids you drink. ¨ When you are able to eat, try clear soups, mild foods, and liquids until all symptoms are gone for 12 to 48 hours. Other good choices include dry toast, crackers, cooked cereal, and gelatin dessert, such as Jell-O.  · If you have not already done so, prepare a list of advance directives. Advance directives are instructions to your doctor and family members about what kind of care you want if you become unable to speak or express yourself. When should you call for help? Call 911 anytime you think you may need emergency care. For example, call if:  ? · You passed out (lost consciousness). ?Call your doctor now or seek immediate medical care if:  ? · You have a fever. ? · You have abnormal bleeding. ? · You think you have an infection. ? · You have new or worse pain. ? · You have new symptoms, such as a cough, belly pain, vomiting, diarrhea, or a rash. ? Watch closely for changes in your health, and be sure to contact your doctor if:  ? · You are much more tired than usual.   ? · You have swollen glands in your armpits, groin, or neck. ? · You do not get better as expected. Where can you learn more? Go to http://janessa-raphael.info/. Enter V321 in the search box to learn more about \"Breast Cancer: Care Instructions. \"  Current as of: May 12, 2017  Content Version: 11.4  © 7099-9997 [employer]. Care instructions adapted under license by [employer] (which disclaims liability or warranty for this information). If you have questions about a medical condition or this instruction, always ask your healthcare professional. [address] any warranty or liability for your use of this information.

## 2018-04-30 NOTE — PROGRESS NOTES
Hematology/medical oncology progress note    4/30/2018  Linsey Borges  YOB: 1955    PCP: Dr. Latrell Camarillo    Diagnosis ER positive invasive ductal carcinoma, right breast    Ms. Meera Jung is a 46-year-old woman who has recently been diagnosed with an ER positive invasive ductal adenocarcinoma the right breast.  She previously completed an MRI of the breasts and the left breast was negative. The right breast MRI confirmed a 1 cm x 0.7 x 0.5 cm lesion. This lesion was previously biopsied. The patient is tentatively scheduled to undergo her definitive surgical procedure on Friday May 4, 2018. I have explained to the patient that I will plan to see her back in clinic in about 3 weeks to finalize systemic treatment. I have also reinforced to the patient that she will need to undergo radiation treatment since she is planning to have a breast conserving surgical procedure. She had her questions answered to her satisfaction. We will plan to see her in clinic in 3 weeks to finalize antiestrogen therapy. Total time 20 minutes, greater than 50% of the time was in counseling and coordination of care. Malcolm Cobos MD, Chantale Macias

## 2018-04-30 NOTE — MR AVS SNAPSHOT
[facility] [address], [place] [address] [facility] 
[phone] Patient: Roslyn Becker MRN: [id number] QFL:2[birth year]31/[birth year]81 Visit Information Date & Time Provider Department Dept. Phone Encounter #  
 4/30/2018  3:15 PM [facility] [phone] [id number] Follow-up Instructions Return in about 3 weeks (around 5/21/2018). Your Appointments 5/9/2018  2:00 PM  
POST OP with JOHNNY Mchugh 33 ([facility]) Appt Note: Re: Jamil Steel / DOS: 05/04/2018 / Needle localized lumpectomy right breast and sentinel lymph node biopsy right axilla / [address] [facility] [address] [id number] [address] [facility] [address] [place] [address] 5/14/2018  2:45 PM  
Follow Up with Silke Cook MD  
[facility] ONE [address] [place] [address]) Appt Note: 4m back f/u  
 [address] [facility] [address]  
[phone]  
  
   
 [address] [facility] [address] 5/21/2018  3:15 PM  
Office Visit with Rosa Klein MD  
[facility] [place] [address] [facility] [address] [place] [address]) Appt Note: 3 weeks [facility] [address], [place] 135 [facility] 3200 [facility], [address] [place] [address] Upcoming Health Maintenance Date Due Hepatitis C Screening 1955 Pneumococcal 19-64 Highest Risk (1 of 3 - PCV13) 9/11/1976 DTaP/Tdap/Td series (1 - Tdap) 9/11/1978 PAP AKA CERVICAL CYTOLOGY 9/11/1978 FOBT Q 1 YEAR AGE 50-75 9/11/2007 ZOSTER VACCINE AGE 60> 7/11/2017 MEDICARE YEARLY EXAM 3/14/2018 Influenza Age 5 to Adult 8/1/2018 BREAST CANCER SCRN MAMMOGRAM 3/6/2020 Allergies as of 4/30/2018  Review Complete On: 4/30/2018 By: Ayla Huynh MD  
  
 Severity Noted Reaction Type Reactions Aspirin High 11/21/2012   Side Effect Other (comments) GI Pain Adhesive Tape-silicones  05/63/4768    Other (comments)  
 blisters Current Immunizations  Never Reviewed No immunizations on file. Not reviewed this visit You Were Diagnosed With   
  
 Codes Comments Malignant neoplasm of lower-outer quadrant of right breast of female, estrogen receptor positive ([facility]    -  Primary ICD-10-CM: C50.511, Z17.0 ICD-9-CM: 174.5, V86.0 Vitals BP Pulse Temp Height(growth percentile) Weight(growth percentile) BMI  
 133/78 (!) 101 98.3 °F (36.8 °C) 5' 8\" (1.727 m) 213 lb 12.8 oz (97 kg) 32.51 kg/m2 OB Status Smoking Status Postmenopausal Never Smoker BMI and BSA Data Body Mass Index Body Surface Area 32.51 kg/m 2 2.16 m 2 [facility] [address] [phone] Your Updated Medication List  
  
   
This list is accurate as of 4/30/18  3:44 PM.  Always use your most recent med list.  
  
  
  
  
 acetaminophen 650 mg Tab Take 650 mg by mouth every four (4) hours as needed for Pain or Fever. amitriptyline 25 mg tablet Commonly known as:  ELAVIL  
  
 ascorbic acid (vitamin C) 250 mg tablet Commonly known as:  VITAMIN C Take 1 Tab by mouth two (2) times daily (with meals). BRISDELLE 7.5 mg Cap Generic drug:  PARoxetine mesylate(menop.sym) TAKE 1 CAPSULE BY MOUTH EVERY DAY  
  
 clindamycin 300 mg capsule Commonly known as:  CLEOCIN  
take 1 capsule by mouth every 6 hours for 10 days DEPAKOTE PO Take 500 mg by mouth as needed. Pt takes for migraines  
  
 diclofenac EC 75 mg EC tablet Commonly known as:  VOLTAREN Take 1 Tab by mouth two (2) times daily as needed. docusate sodium 100 mg capsule Commonly known as:  Tiffany Marley Take 1 Cap by mouth two (2) times a day. esomeprazole 40 mg capsule Commonly known as:  NexIUM Take 1 Cap by mouth daily. Indications: GASTROESOPHAGEAL REFLUX  
  
 ferrous sulfate 325 mg (65 mg iron) tablet Take 1 Tab by mouth two (2) times daily (with meals). * FIORICET -40 mg per tablet Generic drug:  butalbital-acetaminophen-caffeine Take 1 Tab by mouth. * butalbital-acetaminophen-caff -40 mg per capsule Commonly known as:  [employer] FLEXERIL 10 mg tablet Generic drug:  cyclobenzaprine Take  by mouth three (3) times daily as needed for Muscle Spasm(s). gabapentin 300 mg capsule Commonly known as:  NEURONTIN Take 1 Cap by mouth three (3) times daily. Indications: sensory sensitivity  
  
 losartan-hydroCHLOROthiazide 100-25 mg per tablet Commonly known as:  HYZAAR Take 1 Tab by mouth daily. Indications: HYPERTENSION  
  
 mometasone 50 mcg/actuation nasal spray Commonly known as:  NASONEX  
instill 2 spray into each nostril once daily if needed NEILMED SINUS RINSE COMPLETE Pkdv Generic drug:  sod chlor-bicarb-squeez bottle  
use 1 packet NASAL daily  
  
 nortriptyline 75 mg capsule Commonly known as:  PAMELOR Take 1 Cap by mouth two (2) times a day. omeprazole 40 mg capsule Commonly known as:  PRILOSEC  
  
 potassium chloride 20 mEq tablet Commonly known as:  K-DUR, KLOR-CON Take 1 Tab by mouth daily. QNASL 80 mcg/actuation [employer] Generic drug:  beclomethasone dipropionate RHINOCORT ALLERGY 32 mcg/actuation nasal spray Generic drug:  budesonide  
instill 2 sprays into each nostril once daily  
  
 topiramate 50 mg tablet Commonly known as:  TOPAMAX  
take 1 tablet by mouth EVERY NIGHT FOR 2 WEEKS then 2 tablets by mouth EVERY NIGHT  
  
 traMADol 50 mg tablet Commonly known as:  ULTRAM  
Take 1 Tab by mouth three (3) times daily as needed for Pain.  Max Daily Amount: 150 mg. VENTOLIN HFA 90 mcg/actuation inhaler Generic drug:  albuterol  
inhale 2 puffs by mouth every 4 to 6 hours if needed VITAMIN D2 50,000 unit capsule Generic drug:  ergocalciferol Take 50,000 Units by mouth. * Notice: This list has 2 medication(s) that are the same as other medications prescribed for you. Read the directions carefully, and ask your doctor or other care provider to review them with you. We Performed the Following COMPLETE CBC & AUTO DIFF WBC [45789 CPT(R)] Follow-up Instructions Return in about 3 weeks (around 5/21/2018). To-Do List   
 04/30/2018 Lab:  CBC WITH 3 PART DIFF   
  
 05/03/2018 1:00 PM  
  Appointment with Olivia Frederick [place] [facility] at [facility]  [place] [facility] ([phone]) MEDICATIONS  - Patient must bring a complete list of all medications currently taking to include prescriptions, over-the-counter meds, herbals, vitamins & any dietary supplements  STUDY DETAILS  If exam is being done prior to surgery there is no preparation required. .  If exam is being done on the day of surgery follow instructions below:         - Patient must be NPO (Nothing to eat/drink) after Midnight day prior to exam.        - There is no other prep for the nuclear medicine lympho study. 05/04/2018 10:00 AM  
  Appointment with [facility] LOC RM 1 at [facility] ([phone]) OUTSIDE FILMS  - Any outside films related to the study being scheduled should be brought with you on the day of the exam.  If this cannot be done there may be a delay in the reading of the study. MEDICATIONS  - Patient must bring a complete list of all medications currently taking to include prescriptions, over-the-counter meds, herbals, vitamins & any dietary supplements  GENERAL INSTRUCTIONS  - On the day of your exam do not use any bath powder, deodorant or lotions on the armpit area. Patient Instructions Breast Cancer: Care Instructions Your Care Instructions Breast cancer occurs when abnormal cells grow out of control in the breast. These cancer cells can spread within the breast, to nearby lymph nodes and other tissues, and to other parts of the body. Being treated for cancer can weaken your body, and you may feel very tired. Get the rest your body needs so you can feel better. Finding out that you have cancer is scary. You may feel many emotions and may need some help coping. Seek out family, friends, and counselors for support. You also can do things at home to make yourself feel better while you go through treatment. Call the [employer] (1-178.835.5159) or visit its website at [address] for more information. Follow-up care is a key part of your treatment and safety. Be sure to make and go to all appointments, and call your doctor if you are having problems. It's also a good idea to know your test results and keep a list of the medicines you take. How can you care for yourself at home? · Take your medicines exactly as prescribed. Call your doctor if you think you are having a problem with your medicine. You may get medicine for nausea and vomiting if you have these side effects. · Follow your doctor's instructions to relieve pain. Pain from cancer and surgery can almost always be controlled. Use pain medicine when you first notice pain, before it becomes severe. · Eat healthy food. If you do not feel like eating, try to eat food that has protein and extra calories to keep up your strength and prevent weight loss. Drink liquid meal replacements for extra calories and protein. Try to eat your main meal early. · Get some physical activity every day, but do not get too tired. Keep doing the hobbies you enjoy as your energy allows. · Do not smoke. Smoking can make your cancer worse. If you need help quitting, talk to your doctor about stop-smoking programs and medicines. These can increase your chances of quitting for good. · Take steps to control your stress and workload. Learn relaxation techniques. ¨ Share your feelings. Stress and tension affect our emotions. By expressing your feelings to others, you may be able to understand and cope with them. ¨ Consider joining a support group. Talking about a problem with your spouse, a good friend, or other people with similar problems is a good way to reduce tension and stress. ¨ Express yourself through art. Try writing, crafts, dance, or art to relieve stress. Some dance, writing, or art groups may be available just for people who have cancer. ¨ Be kind to your body and mind. Getting enough sleep, eating a healthy diet, and taking time to do things you enjoy can contribute to an overall feeling of balance in your life and can help reduce stress. ¨ Get help if you need it. Discuss your concerns with your doctor or counselor. · If you are vomiting or have diarrhea: ¨ Drink plenty of fluids (enough so that your urine is light yellow or clear like water) to prevent dehydration. Choose water and other caffeine-free clear liquids. If you have kidney, heart, or liver disease and have to limit fluids, talk with your doctor before you increase the amount of fluids you drink. ¨ When you are able to eat, try clear soups, mild foods, and liquids until all symptoms are gone for 12 to 48 hours. Other good choices include dry toast, crackers, cooked cereal, and gelatin dessert, such as Jell-O. · If you have not already done so, prepare a list of advance directives. Advance directives are instructions to your doctor and family members about what kind of care you want if you become unable to speak or express yourself. When should you call for help? Call 911 anytime you think you may need emergency care. For example, call if: 
? · You passed out (lost consciousness). ?Call your doctor now or seek immediate medical care if: ? · You have a fever. ? · You have abnormal bleeding. ? · You think you have an infection. ? · You have new or worse pain. ? · You have new symptoms, such as a cough, belly pain, vomiting, diarrhea, or a rash. ? Watch closely for changes in your health, and be sure to contact your doctor if: 
? · You are much more tired than usual.  
? · You have swollen glands in your armpits, groin, or neck. ? · You do not get better as expected. Where can you learn more? Go to http://janessa-raphael.info/. Enter V321 in the search box to learn more about \"Breast Cancer: Care Instructions. \" Current as of: May 12, 2017 Content Version: 11.4 © 5292-3214 [employer]. Care instructions adapted under license by [employer] (which disclaims liability or warranty for this information). If you have questions about a medical condition or this instruction, always ask your healthcare professional. [place] [address] any warranty or liability for your use of this information. Introducing [facility]! Dear Zoë Carpio: Thank you for requesting a Yast account. Our records indicate that you already have an active Yast account. You can access your account anytime at [email or web address] Did you know that you can access your hospital and ER discharge instructions at any time in Yast? You can also review all of your test results from your hospital stay or ER visit. Additional Information If you have questions, please visit the Frequently Asked Questions section of the Yast website at [email or web address] Remember, Yast is NOT to be used for urgent needs. For medical emergencies, dial 911. Now available from your iPhone and Android! Please provide this summary of care documentation to your next provider. Your primary care clinician is listed as Tate Ackerman.  If you have any questions after today's visit, please call [phone].

## 2018-05-01 ENCOUNTER — HOSPITAL ENCOUNTER (OUTPATIENT)
Dept: GENERAL RADIOLOGY | Age: 63
Discharge: HOME | End: 2018-05-01
Payer: MEDICARE

## 2018-05-01 ENCOUNTER — HOSPITAL ENCOUNTER (OUTPATIENT)
Dept: LAB | Age: 63
Discharge: HOME | End: 2018-05-01
Payer: MEDICARE

## 2018-05-01 DIAGNOSIS — C50.911 MALIGNANT NEOPLASM OF RIGHT BREAST, STAGE 1, ESTROGEN RECEPTOR POSITIVE (HCC): ICD-10-CM

## 2018-05-01 DIAGNOSIS — Z17.0 MALIGNANT NEOPLASM OF RIGHT BREAST, STAGE 1, ESTROGEN RECEPTOR POSITIVE (HCC): ICD-10-CM

## 2018-05-01 LAB
ATRIAL RATE: 85 BPM
CALCULATED P AXIS, ECG09: 47 DEGREES
CALCULATED R AXIS, ECG10: 9 DEGREES
CALCULATED T AXIS, ECG11: 32 DEGREES
DIAGNOSIS, 93000: NORMAL
P-R INTERVAL, ECG05: 134 MS
Q-T INTERVAL, ECG07: 370 MS
QRS DURATION, ECG06: 84 MS
QTC CALCULATION (BEZET), ECG08: 440 MS
VENTRICULAR RATE, ECG03: 85 BPM

## 2018-05-01 PROCEDURE — 71046 X-RAY EXAM CHEST 2 VIEWS: CPT

## 2018-05-01 PROCEDURE — 93005 ELECTROCARDIOGRAM TRACING: CPT

## 2018-05-03 ENCOUNTER — ANESTHESIA EVENT (OUTPATIENT)
Dept: SURGERY | Age: 63
End: 2018-05-03
Payer: MEDICARE

## 2018-05-03 ENCOUNTER — HOSPITAL ENCOUNTER (OUTPATIENT)
Dept: NUCLEAR MEDICINE | Age: 63
Discharge: HOME | End: 2018-05-03
Attending: SURGERY
Payer: MEDICARE

## 2018-05-03 DIAGNOSIS — Z17.0 MALIGNANT NEOPLASM OF RIGHT BREAST, STAGE 1, ESTROGEN RECEPTOR POSITIVE (HCC): ICD-10-CM

## 2018-05-03 DIAGNOSIS — C50.511 MALIGNANT NEOPLASM OF LOWER-OUTER QUADRANT OF RIGHT BREAST OF FEMALE, ESTROGEN RECEPTOR POSITIVE (HCC): ICD-10-CM

## 2018-05-03 DIAGNOSIS — C50.311 MALIGNANT NEOPLASM OF LOWER-INNER QUADRANT OF RIGHT BREAST OF FEMALE, ESTROGEN RECEPTOR POSITIVE (HCC): Primary | ICD-10-CM

## 2018-05-03 DIAGNOSIS — C50.911 MALIGNANT NEOPLASM OF RIGHT BREAST, STAGE 1, ESTROGEN RECEPTOR POSITIVE (HCC): ICD-10-CM

## 2018-05-03 DIAGNOSIS — Z17.0 MALIGNANT NEOPLASM OF LOWER-INNER QUADRANT OF RIGHT BREAST OF FEMALE, ESTROGEN RECEPTOR POSITIVE (HCC): Primary | ICD-10-CM

## 2018-05-03 DIAGNOSIS — Z17.0 MALIGNANT NEOPLASM OF LOWER-OUTER QUADRANT OF RIGHT BREAST OF FEMALE, ESTROGEN RECEPTOR POSITIVE (HCC): ICD-10-CM

## 2018-05-03 PROCEDURE — 78195 LYMPH SYSTEM IMAGING: CPT

## 2018-05-03 PROCEDURE — 74011000250 HC RX REV CODE- 250: Performed by: SURGERY

## 2018-05-03 RX ORDER — LIDOCAINE HYDROCHLORIDE 10 MG/ML
INJECTION, SOLUTION EPIDURAL; INFILTRATION; INTRACAUDAL; PERINEURAL
Status: DISPENSED
Start: 2018-05-03 | End: 2018-05-04

## 2018-05-03 RX ORDER — ACETAMINOPHEN 500 MG
TABLET ORAL DAILY
COMMUNITY
End: 2020-05-29

## 2018-05-03 RX ORDER — FLUTICASONE FUROATE AND VILANTEROL 200; 25 UG/1; UG/1
1 POWDER RESPIRATORY (INHALATION) DAILY
COMMUNITY
End: 2022-04-25

## 2018-05-03 RX ADMIN — SODIUM BICARBONATE 3 ML: 84 INJECTION, SOLUTION INTRAVENOUS at 14:24

## 2018-05-04 ENCOUNTER — HOSPITAL ENCOUNTER (OUTPATIENT)
Dept: MAMMOGRAPHY | Age: 63
Discharge: HOME | End: 2018-05-04
Attending: SURGERY
Payer: MEDICARE

## 2018-05-04 ENCOUNTER — HOSPITAL ENCOUNTER (OUTPATIENT)
Age: 63
Setting detail: OUTPATIENT SURGERY
Discharge: HOME | End: 2018-05-04
Attending: SURGERY | Admitting: SURGERY
Payer: MEDICARE

## 2018-05-04 ENCOUNTER — APPOINTMENT (OUTPATIENT)
Dept: MAMMOGRAPHY | Age: 63
End: 2018-05-04
Attending: SURGERY
Payer: MEDICARE

## 2018-05-04 ENCOUNTER — ANESTHESIA (OUTPATIENT)
Dept: SURGERY | Age: 63
End: 2018-05-04
Payer: MEDICARE

## 2018-05-04 VITALS
DIASTOLIC BLOOD PRESSURE: 74 MMHG | WEIGHT: 215 LBS | SYSTOLIC BLOOD PRESSURE: 127 MMHG | HEIGHT: 68 IN | OXYGEN SATURATION: 100 % | RESPIRATION RATE: 15 BRPM | HEART RATE: 77 BPM | TEMPERATURE: 97.6 F | BODY MASS INDEX: 32.58 KG/M2

## 2018-05-04 DIAGNOSIS — C50.911 BREAST CANCER, STAGE 1, RIGHT (HCC): ICD-10-CM

## 2018-05-04 DIAGNOSIS — G89.18 POSTOPERATIVE PAIN: Primary | ICD-10-CM

## 2018-05-04 DIAGNOSIS — C50.511 MALIGNANT NEOPLASM OF LOWER-OUTER QUADRANT OF RIGHT BREAST OF FEMALE, ESTROGEN RECEPTOR POSITIVE (HCC): ICD-10-CM

## 2018-05-04 DIAGNOSIS — Z17.0 MALIGNANT NEOPLASM OF LOWER-OUTER QUADRANT OF RIGHT BREAST OF FEMALE, ESTROGEN RECEPTOR POSITIVE (HCC): ICD-10-CM

## 2018-05-04 PROCEDURE — 88307 TISSUE EXAM BY PATHOLOGIST: CPT | Performed by: SURGERY

## 2018-05-04 PROCEDURE — 77030020782 HC GWN BAIR PAWS FLX 3M -B: Performed by: SURGERY

## 2018-05-04 PROCEDURE — 76210000026 HC REC RM PH II 1 TO 1.5 HR: Performed by: SURGERY

## 2018-05-04 PROCEDURE — 76060000034 HC ANESTHESIA 1.5 TO 2 HR: Performed by: SURGERY

## 2018-05-04 PROCEDURE — 74011250636 HC RX REV CODE- 250/636

## 2018-05-04 PROCEDURE — 77030002933 HC SUT MCRYL J&J -A: Performed by: SURGERY

## 2018-05-04 PROCEDURE — 77030002996 HC SUT SLK J&J -A: Performed by: SURGERY

## 2018-05-04 PROCEDURE — 77030031139 HC SUT VCRL2 J&J -A: Performed by: SURGERY

## 2018-05-04 PROCEDURE — 77030011640 HC PAD GRND REM COVD -A: Performed by: SURGERY

## 2018-05-04 PROCEDURE — 76210000017 HC OR PH I REC 1.5 TO 2 HR: Performed by: SURGERY

## 2018-05-04 PROCEDURE — 74011250636 HC RX REV CODE- 250/636: Performed by: ANESTHESIOLOGY

## 2018-05-04 PROCEDURE — 77030013079 HC BLNKT BAIR HGGR 3M -A: Performed by: SURGERY

## 2018-05-04 PROCEDURE — 74011250636 HC RX REV CODE- 250/636: Performed by: NURSE ANESTHETIST, CERTIFIED REGISTERED

## 2018-05-04 PROCEDURE — 74011000250 HC RX REV CODE- 250

## 2018-05-04 PROCEDURE — 77030003028 HC SUT VCRL J&J -A: Performed by: SURGERY

## 2018-05-04 PROCEDURE — 19281 PERQ DEVICE BREAST 1ST IMAG: CPT

## 2018-05-04 PROCEDURE — 88342 IMHCHEM/IMCYTCHM 1ST ANTB: CPT | Performed by: SURGERY

## 2018-05-04 PROCEDURE — 77030018836 HC SOL IRR NACL ICUM -A: Performed by: SURGERY

## 2018-05-04 PROCEDURE — 77030032490 HC SLV COMPR SCD KNE COVD -B: Performed by: SURGERY

## 2018-05-04 PROCEDURE — 77030034115 HC SHR ENDO COAG HARM FOCS J&J -F: Performed by: SURGERY

## 2018-05-04 PROCEDURE — 76010000153 HC OR TIME 1.5 TO 2 HR: Performed by: SURGERY

## 2018-05-04 PROCEDURE — 74011250637 HC RX REV CODE- 250/637: Performed by: NURSE ANESTHETIST, CERTIFIED REGISTERED

## 2018-05-04 PROCEDURE — 88341 IMHCHEM/IMCYTCHM EA ADD ANTB: CPT | Performed by: SURGERY

## 2018-05-04 PROCEDURE — 77030010509 HC AIRWY LMA MSK TELE -A: Performed by: ANESTHESIOLOGY

## 2018-05-04 PROCEDURE — 74011000250 HC RX REV CODE- 250: Performed by: SURGERY

## 2018-05-04 PROCEDURE — 74011250636 HC RX REV CODE- 250/636: Performed by: SURGERY

## 2018-05-04 PROCEDURE — 77030011265 HC ELECTRD BLD HEX COVD -A: Performed by: SURGERY

## 2018-05-04 RX ORDER — SODIUM CHLORIDE 0.9 % (FLUSH) 0.9 %
5-10 SYRINGE (ML) INJECTION AS NEEDED
Status: DISCONTINUED | OUTPATIENT
Start: 2018-05-04 | End: 2018-05-04 | Stop reason: HOSPADM

## 2018-05-04 RX ORDER — SODIUM CHLORIDE 0.9 % (FLUSH) 0.9 %
5-10 SYRINGE (ML) INJECTION EVERY 8 HOURS
Status: DISCONTINUED | OUTPATIENT
Start: 2018-05-04 | End: 2018-05-04 | Stop reason: HOSPADM

## 2018-05-04 RX ORDER — DOCUSATE SODIUM 100 MG/1
100 CAPSULE, LIQUID FILLED ORAL 2 TIMES DAILY
Qty: 60 CAP | Refills: 2 | Status: SHIPPED | OUTPATIENT
Start: 2018-05-04 | End: 2018-07-19 | Stop reason: SDUPTHER

## 2018-05-04 RX ORDER — KETOROLAC TROMETHAMINE 30 MG/ML
INJECTION, SOLUTION INTRAMUSCULAR; INTRAVENOUS
Status: DISCONTINUED
Start: 2018-05-04 | End: 2018-05-04 | Stop reason: HOSPADM

## 2018-05-04 RX ORDER — FENTANYL CITRATE 50 UG/ML
25 INJECTION, SOLUTION INTRAMUSCULAR; INTRAVENOUS AS NEEDED
Status: COMPLETED | OUTPATIENT
Start: 2018-05-04 | End: 2018-05-04

## 2018-05-04 RX ORDER — LIDOCAINE HYDROCHLORIDE 20 MG/ML
INJECTION, SOLUTION EPIDURAL; INFILTRATION; INTRACAUDAL; PERINEURAL AS NEEDED
Status: DISCONTINUED | OUTPATIENT
Start: 2018-05-04 | End: 2018-05-04 | Stop reason: HOSPADM

## 2018-05-04 RX ORDER — CEFAZOLIN SODIUM 2 G/50ML
2 SOLUTION INTRAVENOUS ONCE
Status: COMPLETED | OUTPATIENT
Start: 2018-05-04 | End: 2018-05-04

## 2018-05-04 RX ORDER — KETOROLAC TROMETHAMINE 30 MG/ML
30 INJECTION, SOLUTION INTRAMUSCULAR; INTRAVENOUS
Status: COMPLETED | OUTPATIENT
Start: 2018-05-04 | End: 2018-05-04

## 2018-05-04 RX ORDER — BUPIVACAINE HYDROCHLORIDE AND EPINEPHRINE 2.5; 5 MG/ML; UG/ML
INJECTION, SOLUTION EPIDURAL; INFILTRATION; INTRACAUDAL; PERINEURAL AS NEEDED
Status: DISCONTINUED | OUTPATIENT
Start: 2018-05-04 | End: 2018-05-04 | Stop reason: HOSPADM

## 2018-05-04 RX ORDER — ONDANSETRON 2 MG/ML
INJECTION INTRAMUSCULAR; INTRAVENOUS AS NEEDED
Status: DISCONTINUED | OUTPATIENT
Start: 2018-05-04 | End: 2018-05-04 | Stop reason: HOSPADM

## 2018-05-04 RX ORDER — LIDOCAINE HYDROCHLORIDE 10 MG/ML
INJECTION, SOLUTION EPIDURAL; INFILTRATION; INTRACAUDAL; PERINEURAL AS NEEDED
Status: DISCONTINUED | OUTPATIENT
Start: 2018-05-04 | End: 2018-05-04 | Stop reason: HOSPADM

## 2018-05-04 RX ORDER — PROPOFOL 10 MG/ML
INJECTION, EMULSION INTRAVENOUS AS NEEDED
Status: DISCONTINUED | OUTPATIENT
Start: 2018-05-04 | End: 2018-05-04 | Stop reason: HOSPADM

## 2018-05-04 RX ORDER — FAMOTIDINE 20 MG/1
20 TABLET, FILM COATED ORAL ONCE
Status: COMPLETED | OUTPATIENT
Start: 2018-05-04 | End: 2018-05-04

## 2018-05-04 RX ORDER — SODIUM CHLORIDE, SODIUM LACTATE, POTASSIUM CHLORIDE, CALCIUM CHLORIDE 600; 310; 30; 20 MG/100ML; MG/100ML; MG/100ML; MG/100ML
75 INJECTION, SOLUTION INTRAVENOUS CONTINUOUS
Status: DISCONTINUED | OUTPATIENT
Start: 2018-05-04 | End: 2018-05-04 | Stop reason: HOSPADM

## 2018-05-04 RX ORDER — MIDAZOLAM HYDROCHLORIDE 1 MG/ML
INJECTION, SOLUTION INTRAMUSCULAR; INTRAVENOUS AS NEEDED
Status: DISCONTINUED | OUTPATIENT
Start: 2018-05-04 | End: 2018-05-04 | Stop reason: HOSPADM

## 2018-05-04 RX ORDER — FENTANYL CITRATE 50 UG/ML
INJECTION, SOLUTION INTRAMUSCULAR; INTRAVENOUS AS NEEDED
Status: DISCONTINUED | OUTPATIENT
Start: 2018-05-04 | End: 2018-05-04 | Stop reason: HOSPADM

## 2018-05-04 RX ORDER — HYDROCODONE BITARTRATE AND ACETAMINOPHEN 5; 325 MG/1; MG/1
TABLET ORAL
Qty: 40 TAB | Refills: 0 | Status: SHIPPED | OUTPATIENT
Start: 2018-05-04 | End: 2018-07-19 | Stop reason: ALTCHOICE

## 2018-05-04 RX ADMIN — FENTANYL CITRATE 25 MCG: 50 INJECTION INTRAMUSCULAR; INTRAVENOUS at 16:30

## 2018-05-04 RX ADMIN — ONDANSETRON 4 MG: 2 INJECTION INTRAMUSCULAR; INTRAVENOUS at 15:00

## 2018-05-04 RX ADMIN — SODIUM CHLORIDE, SODIUM LACTATE, POTASSIUM CHLORIDE, AND CALCIUM CHLORIDE 75 ML/HR: 600; 310; 30; 20 INJECTION, SOLUTION INTRAVENOUS at 16:00

## 2018-05-04 RX ADMIN — LIDOCAINE HYDROCHLORIDE 40 MG: 20 INJECTION, SOLUTION EPIDURAL; INFILTRATION; INTRACAUDAL; PERINEURAL at 14:09

## 2018-05-04 RX ADMIN — FENTANYL CITRATE 25 MCG: 50 INJECTION INTRAMUSCULAR; INTRAVENOUS at 16:05

## 2018-05-04 RX ADMIN — KETOROLAC TROMETHAMINE 30 MG: 30 INJECTION, SOLUTION INTRAMUSCULAR at 16:57

## 2018-05-04 RX ADMIN — FENTANYL CITRATE 25 MCG: 50 INJECTION INTRAMUSCULAR; INTRAVENOUS at 16:00

## 2018-05-04 RX ADMIN — FENTANYL CITRATE 50 MCG: 50 INJECTION, SOLUTION INTRAMUSCULAR; INTRAVENOUS at 15:42

## 2018-05-04 RX ADMIN — SODIUM CHLORIDE, SODIUM LACTATE, POTASSIUM CHLORIDE, AND CALCIUM CHLORIDE: 600; 310; 30; 20 INJECTION, SOLUTION INTRAVENOUS at 13:50

## 2018-05-04 RX ADMIN — MIDAZOLAM HYDROCHLORIDE 1 MG: 1 INJECTION, SOLUTION INTRAMUSCULAR; INTRAVENOUS at 14:03

## 2018-05-04 RX ADMIN — Medication 10 ML: at 09:00

## 2018-05-04 RX ADMIN — PROPOFOL 150 MG: 10 INJECTION, EMULSION INTRAVENOUS at 14:09

## 2018-05-04 RX ADMIN — FENTANYL CITRATE 25 MCG: 50 INJECTION INTRAMUSCULAR; INTRAVENOUS at 16:45

## 2018-05-04 RX ADMIN — CEFAZOLIN SODIUM 2 G: 2 SOLUTION INTRAVENOUS at 14:03

## 2018-05-04 RX ADMIN — FAMOTIDINE 20 MG: 20 TABLET ORAL at 09:08

## 2018-05-04 RX ADMIN — FENTANYL CITRATE 50 MCG: 50 INJECTION, SOLUTION INTRAMUSCULAR; INTRAVENOUS at 14:09

## 2018-05-04 RX ADMIN — MIDAZOLAM HYDROCHLORIDE 1 MG: 1 INJECTION, SOLUTION INTRAMUSCULAR; INTRAVENOUS at 14:05

## 2018-05-04 RX ADMIN — FENTANYL CITRATE 50 MCG: 50 INJECTION, SOLUTION INTRAMUSCULAR; INTRAVENOUS at 15:00

## 2018-05-04 NOTE — ANESTHESIA POSTPROCEDURE EVALUATION
Post-Anesthesia Evaluation and Assessment    Patient: Riddhi Diana MRN: [id number]  SSN: [id number]    YOB: 1955  Age: 63 y.o. Sex: female       Cardiovascular Function/Vital Signs  Visit Vitals    /74 (BP 1 Location: Left arm, BP Patient Position: At rest)    Pulse 77    Temp 36.4 °C (97.6 °F)    Resp 15    Ht 5' 8\" (1.727 m)    Wt 97.5 kg (215 lb)    SpO2 100%    BMI 32.69 kg/m2       Patient is status post general anesthesia for Procedure(s):  NEEDLE LOCALIZED (1000) LUMPECTOMY RIGHT BREAST AND SENTINEL LYMPH NODE BIOPSY RIGHT AXILLA. Nausea/Vomiting: None    Postoperative hydration reviewed and adequate. Pain:  Pain Scale 1: FACES (05/04/18 1739)  Pain Intensity 1: 4 (05/04/18 1739)   Managed    Neurological Status:   Neuro (WDL): Within Defined Limits (05/04/18 1739)  Neuro  LUE Motor Response: Purposeful (05/04/18 1548)  LLE Motor Response: Purposeful (05/04/18 1548)  RUE Motor Response: Purposeful (05/04/18 1548)  RLE Motor Response: Purposeful (05/04/18 1548)   At baseline    Mental Status and Level of Consciousness: Alert and oriented     Pulmonary Status:   O2 Device: Room air (05/04/18 1739)   Adequate oxygenation and airway patent    Complications related to anesthesia: None    Post-anesthesia assessment completed.  No concerns    Signed By: Norma Stiles MD     May 4, 2018

## 2018-05-04 NOTE — H&P (VIEW-ONLY)
[facility]  General Surgery    Subjective:      HPI: Patient is a very pleasant 80-year-old female with past medical history which is remarkable for hypertension, vitamin D deficiency, kidney stones, degenerative disc disease of the lumbar spine, lumbar spondylosis, herniated nucleus pulposis of the lumbar spine, post laminectomy syndrome, peripheral neuropathy, migraine headaches and fibromyalgia. 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  Her staging workup was negative for any evidence of metastasis. I had a long discussion with the patient and her daughter regarding the importance of proceeding with needle localized lumpectomy and sentinel lymph node biopsy of the right breast.  The patient would like to wait until June when her daughter can come for the surgery. I explained to the patient that while there always extenuating circumstances, we always like to get patients to surgery within a 2 week period from the diagnosis of the cancer when possible. She and the daughter are adamant that she will not have surgery until June.   Patient Active Problem List    Diagnosis Date Noted    Malignant neoplasm of lower-outer quadrant of right breast of female, estrogen receptor positive ([facility] [address] 04/02/2018    DDD (degenerative disc disease), lumbar 12/17/2015    Lumbar spondylosis 12/17/2015    Chronic pain 10/26/2015    HNP (herniated nucleus pulposus), lumbar 10/26/2015    Facet arthritis of lumbar region [facility]) 10/26/2015    Post laminectomy syndrome     Kidney stones     Ringing in ears     Balance problems     Nervousness     Postoperative anemia due to acute blood loss 11/30/2012    Vitamin D deficiency 11/30/2012    Status post laminectomy 11/29/2012    Impaired mobility and activities of daily living 11/29/2012    Hypertension 11/29/2012    Peripheral neuropathy 11/29/2012    Fibromyalgia 11/29/2012    Lumbar spinal stenosis 11/25/2012    GERD (gastroesophageal reflux disease) 11/25/2012    Osteoarthritis 11/25/2012    Migraines 11/25/2012     Past Medical History:   Diagnosis Date    Arthritis     Balance problems     Bronchitis     Fibromyalgia 11/29/2012    GERD (gastroesophageal reflux disease)     Hiatal Hernia    Hypertension     Kidney stones     Low back pain     Lumbar spinal stenosis 11/25/2012    Migraine headache     Nervousness     Neurological disease     Osteoarthritis 11/25/2012    Other ill-defined conditions(799.89)     neuropathy    Other ill-defined conditions(799.89)     fibromyalgia    Peripheral neuropathy 11/29/2012    Post laminectomy syndrome     Postoperative anemia due to acute blood loss 11/30/2012    Ringing in ears     Spinal stenosis     Thoracic spine pain     Vitamin D deficiency 11/30/2012      Past Surgical History:   Procedure Laterality Date    HX ORTHOPAEDIC      foot l heel spur    HX ORTHOPAEDIC      right knee sx    HX OTHER SURGICAL      non cancer cyst removed from right shoulder    NEUROLOGICAL PROCEDURE UNLISTED      Bilateral L3-4 hemilaminotomy and medial facetectomy by Dr. Scout Davies      Family History   Problem Relation Age of Onset    Hypertension Mother     Arthritis-osteo Mother     Neuropathy Mother     Hypertension Father     Cancer Father      colon    Hypertension Brother     Cancer Brother     Neuropathy Brother     Neuropathy Other     Arthritis-rheumatoid Other     GERD Other     Diabetes Other     Diabetes Maternal Aunt     Diabetes Maternal Uncle     Hypertension Maternal Grandfather       Social History   Substance Use Topics    Smoking status: Never Smoker    Smokeless tobacco: Never Used    Alcohol use No      Allergies   Allergen Reactions    Aspirin Other (comments)     GI Pain    Adhesive Tape-Silicones Other (comments)     blisters       Prior to Admission medications    Medication Sig Start Date End Date Taking? Authorizing Provider   QNASL 80 mcg/actuation HFAA  1/9/18  Yes Historical Provider   butalbital-acetaminophen-caff (FIORICET) -40 mg per capsule  12/16/17  Yes Historical Provider   clindamycin (CLEOCIN) 300 mg capsule take 1 capsule by mouth every 6 hours for 10 days 12/27/17  Yes Historical Provider   topiramate (TOPAMAX) 50 mg tablet take 1 tablet by mouth EVERY NIGHT FOR 2 WEEKS then 2 tablets by mouth EVERY NIGHT 12/18/17  Yes Historical Provider   diclofenac EC (VOLTAREN) 75 mg EC tablet Take 1 Tab by mouth two (2) times daily as needed. 1/15/18  Yes Trell Zhou MD   gabapentin (NEURONTIN) 300 mg capsule Take 1 Cap by mouth three (3) times daily. Indications: sensory sensitivity 1/15/18  Yes Trell Zhou MD   RHINOCORT ALLERGY 32 mcg/actuation nasal spray instill 2 sprays into each nostril once daily 9/11/17  Yes Historical Provider   VENTOLIN HFA 90 mcg/actuation inhaler inhale 2 puffs by mouth every 4 to 6 hours if needed 8/17/17  Yes Historical Provider   amitriptyline (ELAVIL) 25 mg tablet  9/10/17  Yes Historical Provider   omeprazole (PRILOSEC) 40 mg capsule  9/18/17  Yes Historical Provider   traMADol (ULTRAM) 50 mg tablet Take 1 Tab by mouth three (3) times daily as needed for Pain. Max Daily Amount: 150 mg. 10/16/17  Yes Trell Zhou MD   mometasone (NASONEX) 50 mcg/actuation nasal spray instill 2 spray into each nostril once daily if needed 12/16/16  Yes Historical Provider   BRISDELLE 7.5 mg cap TAKE 1 CAPSULE BY MOUTH EVERY DAY 1/4/17  Yes Historical Provider   NEILMED SINUS RINSE COMPLETE pkdv use 1 packet NASAL daily 12/16/16  Yes Historical Provider   nortriptyline (PAMELOR) 75 mg capsule Take 1 Cap by mouth two (2) times a day. 7/11/16  Yes Trell Zhou MD   cyclobenzaprine (FLEXERIL) 10 mg tablet Take  by mouth three (3) times daily as needed for Muscle Spasm(s).    Yes Historical Provider butalbital-acetaminophen-caffeine (FIORICET) -40 mg per tablet Take 1 Tab by mouth. Yes Historical Provider   ergocalciferol (VITAMIN D2) 50,000 unit capsule Take 50,000 Units by mouth. Yes Historical Provider   acetaminophen 650 mg Tab Take 650 mg by mouth every four (4) hours as needed for Pain or Fever. 12/7/12  Yes Howie Campbell MD   esomeprazole (NEXIUM) 40 mg capsule Take 1 Cap by mouth daily. Indications: GASTROESOPHAGEAL REFLUX 12/7/12  Yes Howie Campbell MD   potassium chloride (K-DUR, KLOR-CON) 20 mEq tablet Take 1 Tab by mouth daily. 12/7/12  Yes Howie Campbell MD   docusate sodium (COLACE) 100 mg capsule Take 1 Cap by mouth two (2) times a day. 12/7/12  Yes Howie Campbell MD   ferrous sulfate 325 mg (65 mg iron) tablet Take 1 Tab by mouth two (2) times daily (with meals). 12/7/12  Yes Howie Campbell MD   ascorbic acid (VITAMIN C) 250 mg tablet Take 1 Tab by mouth two (2) times daily (with meals). 12/7/12  Yes Howie Campbell MD   DIVALPROEX SODIUM (DEPAKOTE PO) Take 500 mg by mouth as needed. Pt takes for migraines    Yes Historical Provider   losartan-hydrochlorothiazide (HYZAAR) 100-25 mg per tablet Take 1 Tab by mouth daily. Indications: HYPERTENSION   Yes Historical Provider       Review of Systems:    14 systems were reviewed. The results are as above in the HPI and otherwise negative. Objective:       Physical Exam:  GENERAL: alert, cooperative, no distress, appears stated age,   EYE: conjunctivae/corneas clear. PERRL, EOM's intact. Fundi benign,  THROAT & NECK: normal and no erythema or exudates noted. ,    LYMPHATIC: Cervical, supraclavicular, and axillary nodes normal. ,   LUNG: clear to auscultation bilaterally,   HEART: regular rate and rhythm, S1, S2 normal, no murmur, click, rub or gallop  BREAST: The breast are symmetrical.  No dimpling, retractions, skin changes or nipple retractions are visible. No axillary of supraclavicular lymphadenopathy bilaterally.   No nipple retraction or discharge bilaterally. No breast masses bilaterally. ABDOMEN: soft, non-tender. Bowel sounds normal. No masses,  no organomegaly,  EXTREMITIES:  extremities normal, atraumatic, no cyanosis or edema,   SKIN: Normal.,   NEUROLOGIC: AOx3. Gait normal. Reflexes and motor strength normal and symmetric. Cranial nerves 2-12 and sensation grossly intact. ,     Data Review: Mammography     Ms. Jesús Taylor has a reminder for a \"due or due soon\" health maintenance. I have asked that she contact her primary care provider for follow-up on this health maintenance.     Impression:     · Patient with stage I ER [place] positive HER-2 negative invasive adenocarcinoma of the inner lower quadrant of the right breast.    Plan:     · Needle localized lumpectomy right breast with right axillary sentinel lymph node biopsy  · Consent on chart  · Preoperative orders written    Signed By: Jeromy Fajardo MD     April 17, 2018

## 2018-05-04 NOTE — DISCHARGE SUMMARY
Briseida Laurenao MD, [facility]  General Surgery  Discharge Summary     Patient ID:  Riddhi Diana  [id number]  63 y.o.  1955    Admit Date: 5/4/2018    Discharge Date: 5/4/2018    Admission Diagnoses: Malignant neoplasm of right breast, stage 1, estrogen recep*    Discharge Diagnoses:    Problem List as of 5/4/2018  Date Reviewed: 5/4/2018          Codes Class Noted - Resolved    Breast cancer, stage 1, right ([facility] [address] ICD-10-CM: C50.911  ICD-9-CM: 174.9  5/4/2018 - Present        Malignant neoplasm of lower-outer quadrant of right breast of female, estrogen receptor positive ([facility] [address] ICD-10-CM: C50.511, Z17.0  ICD-9-CM: 174.5, V86.0  4/2/2018 - Present        DDD (degenerative disc disease), lumbar ICD-10-CM: M51.36  ICD-9-CM: 722.52  12/17/2015 - Present        Lumbar spondylosis ICD-10-CM: M47.816  ICD-9-CM: 721.3  12/17/2015 - Present        Chronic pain ICD-10-CM: G89.29  ICD-9-CM: 338.29  10/26/2015 - Present        HNP (herniated nucleus pulposus), lumbar ICD-10-CM: M51.26  ICD-9-CM: 722.10  10/26/2015 - Present        Facet arthritis of lumbar region [facility]) ICD-10-CM: M46.96  ICD-9-CM: 721.3  10/26/2015 - Present        Post laminectomy syndrome ICD-10-CM: M96.1  ICD-9-CM: 722.80  Unknown - Present        Kidney stones ICD-10-CM: N20.0  ICD-9-CM: 592.0  Unknown - Present        Ringing in ears ICD-10-CM: H93.19  ICD-9-CM: 388.30  Unknown - Present        Balance problems ICD-10-CM: R26.89  ICD-9-CM: 781.99  Unknown - Present        Nervousness ICD-10-CM: R45.0  ICD-9-CM: 799.21  Unknown - Present        Postoperative anemia due to acute blood loss ICD-10-CM: D62  ICD-9-CM: 285.1  11/30/2012 - Present        Vitamin D deficiency (Chronic) ICD-10-CM: E55.9  ICD-9-CM: 268.9  11/30/2012 - Present    Overview Signed 11/30/2012  9:56 AM by Annette Roberson MD     Vitamin D 25-Hydroxy 19.5             Status post laminectomy ICD-10-CM: P77.589  ICD-9-CM: V45.89  11/29/2012 - Present Overview Signed 11/29/2012  2:30 PM by Will Williamson MD     S/P L3L4 Bilateral Hemilaminotomy and Medial Facetectomy (11/26/2012 - Dr. Champ Whitman)             Impaired mobility and activities of daily living ICD-10-CM: Z74.09  ICD-9-CM: 799.89  11/29/2012 - Present        Hypertension (Chronic) ICD-10-CM: I10  ICD-9-CM: 401.9  11/29/2012 - Present        Peripheral neuropathy (Chronic) ICD-10-CM: G62.9  ICD-9-CM: 356.9  11/29/2012 - Present        Fibromyalgia (Chronic) ICD-10-CM: M79.7  ICD-9-CM: 729.1  11/29/2012 - Present        Lumbar spinal stenosis (Chronic) ICD-10-CM: M48.061  ICD-9-CM: 724.02  11/25/2012 - Present        GERD (gastroesophageal reflux disease) (Chronic) ICD-10-CM: K21.9  ICD-9-CM: 530.81  11/25/2012 - Present        Osteoarthritis (Chronic) ICD-10-CM: M19.90  ICD-9-CM: 715.90  11/25/2012 - Present        Migraines (Chronic) ICD-10-CM: E20.403  ICD-9-CM: 346.90  11/25/2012 - Present        RESOLVED: Hypokalemia ICD-10-CM: E87.6  ICD-9-CM: 276.8  11/30/2012 - 12/7/2012    Overview Signed 11/30/2012  9:56 AM by Will Williamson MD     K 3.4                    Admission Condition: Good    Discharge Condition: Good    Last Procedure: Procedure(s):  NEEDLE LOCALIZED (1000) LUMPECTOMY RIGHT BREAST AND SENTINEL LYMPH NODE BIOPSY (5.3.18 @ 1300) [address] Course:   Normal hospital course for this procedure. Consults: None    Significant Diagnostic Studies: None    Disposition: home    Patient Instructions:   Current Discharge Medication List      START taking these medications    Details   HYDROcodone-acetaminophen (NORCO) 5-325 mg per tablet 1-2 tablets every 4-6 hours prn pain  Qty: 40 Tab, Refills: 0    Associated Diagnoses: Postoperative pain      !! docusate sodium (COLACE) 100 mg capsule Take 1 Cap by mouth two (2) times a day for 90 days. Qty: 60 Cap, Refills: 2    Associated Diagnoses: Postoperative pain       !! - Potential duplicate medications found.  Please discuss with provider. CONTINUE these medications which have NOT CHANGED    Details   Cholecalciferol, Vitamin D3, (VITAMIN D3) 2,000 unit cap capsule Take  by mouth daily. fluticasone-vilanterol (BREO ELLIPTA) 200-25 mcg/dose inhaler Take 1 Puff by inhalation daily. butalbital-acetaminophen-caff (FIORICET) -40 mg per capsule every four (4) hours as needed. Refills: 0      topiramate (TOPAMAX) 50 mg tablet take 1 tablet by mouth EVERY NIGHT FOR 2 WEEKS then 2 tablets by mouth EVERY NIGHT  Refills: 0      diclofenac EC (VOLTAREN) 75 mg EC tablet Take 1 Tab by mouth two (2) times daily as needed. Qty: 60 Tab, Refills: 5    Associated Diagnoses: Facet syndrome ([address] Myofascial pain      gabapentin (NEURONTIN) 300 mg capsule Take 1 Cap by mouth three (3) times daily. Indications: sensory sensitivity  Qty: 90 Cap, Refills: 2    Associated Diagnoses: Facet syndrome ([address] Myofascial pain      RHINOCORT ALLERGY 32 mcg/actuation nasal spray instill 2 sprays into each nostril once daily  Refills: 1      amitriptyline (ELAVIL) 25 mg tablet nightly. Indications: MIGRAINE PREVENTION  Refills: 0      omeprazole (PRILOSEC) 40 mg capsule daily. Refills: 1      traMADol (ULTRAM) 50 mg tablet Take 1 Tab by mouth three (3) times daily as needed for Pain. Max Daily Amount: 150 mg.  Qty: 90 Tab, Refills: 2    Associated Diagnoses: Facet syndrome ([address] Myofascial pain      mometasone (NASONEX) 50 mcg/actuation nasal spray instill 2 spray into each nostril once daily if needed  Refills: 0      cyclobenzaprine (FLEXERIL) 10 mg tablet Take  by mouth three (3) times daily as needed for Muscle Spasm(s). acetaminophen 650 mg Tab Take 650 mg by mouth every four (4) hours as needed for Pain or Fever. Qty: 30 Tab, Refills: 0      !! docusate sodium (COLACE) 100 mg capsule Take 1 Cap by mouth two (2) times a day.   Qty: 30 Cap, Refills: 0      ferrous sulfate 325 mg (65 mg iron) tablet Take 1 Tab by mouth two (2) times daily (with meals). Qty: 30 Tab, Refills: 0      ascorbic acid (VITAMIN C) 250 mg tablet Take 1 Tab by mouth two (2) times daily (with meals). Qty: 30 Tab, Refills: 0      losartan-hydrochlorothiazide (HYZAAR) 100-25 mg per tablet Take 1 Tab by mouth daily. Indications: HYPERTENSION       ! ! - Potential duplicate medications found. Please discuss with provider. Activity: See surgical instructions  Diet: Low fat, Low cholesterol  Wound Care: As directed    Follow-up with Dr. Arcadio Ashford in 1 weeks.   Follow-up tests/labs None    Signed:  Franklin Griffin MD  5/4/2018  12:00 PM

## 2018-05-04 NOTE — DISCHARGE INSTRUCTIONS
[facility]  Tobin Fowler MD, FACS  General Surgery    Pt may shower. Allow soap and water to run over the incision. No driving or operating heavy machinery while on narcotic pain medications. No strenuous activity or contact sports for two weeks. No lifting greater than 15 lbs for 2 weeks. Call MD for any redness, swelling, bleeding or pus at the incision. Also call for any nausea, vomiting, increased pain or pain uncontrolled by pain medicine. [place] Node Biopsy for Breast Cancer: What to Expect at Home  Your Recovery  After a sentinel node biopsy, many women have no side effects. Some women have pain or bruising at the cut (incision) and feel tired. Your breast and underarm area may be slightly swollen. This may last a few days. You should feel close to normal in a few days. The incision the doctor made usually heals in about 2 weeks. The scar usually fades with time. Some women have a buildup of fluid in the area where the lymph nodes were removed. This is known as seroma. This goes away on its own, or your doctor can drain it. When you had this test, your doctor injected blue dye or radioactive material (or both) into your breast. The blue dye may give your breast a bluish color and turn your urine green for about 24 hours. The radioactive material leaves the body on its own in 24 to 48 hours. A sentinel node biopsy may be done at the same time as other breast surgeries. If this is the case, how you recover will be different. This care sheet gives you a general idea about how long it will take for you to recover. But each person recovers at a different pace. Follow the steps below to get better as quickly as possible. How can you care for yourself at home? Activity  ? · Rest when you feel tired. Getting enough sleep will help you recover. ? · Try to walk each day. Start by walking a little more than you did the day before.  Bit by bit, increase the amount you walk. Walking boosts blood flow and helps prevent pneumonia and constipation. ? · You may drive when you are no longer taking pain medicine and you feel up to it. ? · You can lift things when you feel comfortable doing so.   ? · Most women return to work and their normal routines in 2 to 7 days. ? · You may shower 24 to 48 hours after surgery, if your doctor okays it. Pat the incision dry. Do not take a bath for the first 2 weeks, or until your doctor tells you it is okay. ? · Avoid activity or exercise that may put stress on the cut. This includes washing windows, vacuuming, or gardening with the affected arm. Diet  ? · You can eat your normal diet. If your stomach is upset, try bland, low-fat foods like plain rice, broiled chicken, toast, and yogurt. ? · You may notice that your bowels are not regular right after your surgery. This is common. Try to avoid constipation and straining with bowel movements. Take a fiber supplement such as Citrucel or Metamucil every day. If you have not had a bowel movement after a couple of days, take a mild laxative. Medicines  ? · Your doctor will tell you if and when you can restart your medicines. He or she will also give you instructions about taking any new medicines. ? · If you take blood thinners, such as warfarin (Coumadin), clopidogrel (Plavix), or aspirin, be sure to talk to your doctor. He or she will tell you if and when to start taking those medicines again. Make sure that you understand exactly what your doctor wants you to do. ? · Take pain medicines exactly as directed. ¨ If the doctor gave you a prescription medicine for pain, take it as prescribed. ¨ If you are not taking a prescription pain medicine, take an over-the-counter medicine such as acetaminophen (Tylenol), ibuprofen (Advil, Motrin), or naproxen (Aleve). Read and follow all instructions on the label.   ¨ Do not take two or more pain medicines at the same time unless the doctor told you to. Many pain medicines have acetaminophen, which is Tylenol. Too much acetaminophen (Tylenol) can be harmful. ? · If your doctor prescribed antibiotics, take them as directed. Do not stop taking them just because you feel better. You need to take the full course of antibiotics. ? · If you think your pain medicine is making you sick to your stomach:  ¨ Take your medicine after meals (unless your doctor has told you not to). ¨ Ask your doctor for a different pain medicine. Incision care  ? · If you have strips of tape on the cut (incision) the doctor made, leave the tape on for about 1 week or until it falls off.   ? · After you can shower, wash the area daily with warm, soapy water and pat it dry. Follow-up care is a key part of your treatment and safety. Be sure to make and go to all appointments, and call your doctor if you are having problems. It's also a good idea to know your test results and keep a list of the medicines you take. When should you call for help? Call 911 anytime you think you may need emergency care. For example, call if:  ? · You passed out (lost consciousness). ? · You have chest pain, are short of breath, or cough up blood. ?Call your doctor now or seek immediate medical care if:  ? · You have pain that does not get better after you take pain medicine. ? · You cannot pass stools or gas. ? · You are sick to your stomach or cannot drink fluids. ? · You have signs of a blood clot in your leg (called a deep vein thrombosis), such as:  ¨ Pain in your calf, back of the knee, thigh, or groin. ¨ Redness or swelling in your leg. ? · You have signs of infection, such as:  ¨ Increased pain, swelling, warmth, or redness. ¨ Red streaks leading from the incision. ¨ Pus draining from the incision. ¨ A fever. ? · You have loose stitches, or your incision comes open. ? · Bright red blood has soaked through the bandage over your incision. ? Watch closely for changes in your health, and be sure to contact your doctor if:  ? · You have any problems. ? · You have new or worse swelling or pain in your arm. Where can you learn more? Go to http://janessa-raphael.info/. Enter 168 [id number] in the search box to learn more about \"[place] Node Biopsy for Breast Cancer: What to Expect at Home. \"  Current as of: May 12, 2017  Content Version: 11.4  © 2111-3943 [employer]. Care instructions adapted under license by [employer] [place] (which disclaims liability or warranty for this information). If you have questions about a medical condition or this instruction, always ask your healthcare professional. [place] [address] any warranty or liability for your use of this information. Open Breast Biopsy: What to Expect at Home  Your Recovery  An open breast biopsy is surgery to take a sample of breast tissue. A breast biopsy may be done to check a lump found during a breast exam. Or it may be done to check an area of concern found on a mammogram or ultrasound. The breast tissue will be sent to a lab, where a doctor will look at the tissue under a microscope to check for breast cancer. Your doctor may have some answers right away. But it can take up to 1 to 2 weeks to get the final results. Your doctor will discuss the results with you. For a few days after the surgery, you will probably feel tired and have some pain. The skin around the cut (incision) may feel firm, swollen, and tender. The area may be bruised. Tenderness usually goes away in a few days, and the bruising within 2 weeks. Firmness and swelling may take 3 to 6 months to go away. The stitches in your incision may dissolve on their own, or the doctor may take them out 7 to 10 days after surgery. This care sheet gives you a general idea about how long it will take for you to recover. But each person recovers at a different pace.  Follow the steps below to get better as quickly as possible. How can you care for yourself at home? Activity  ? · Rest when you feel tired. Getting enough sleep will help you recover. ? · Try to walk each day. Start by walking a little more than you did the day before. Bit by bit, increase the amount you walk. Walking boosts blood flow and helps prevent pneumonia and constipation. ? · For 2 weeks, avoid strenuous activities that put pressure on your chest or that involve vigorous movement of your upper body and arm on the side of the biopsy. Examples of these might include strenuous housework, holding an active child, jogging, or aerobic exercise. ? · For 2 weeks, avoid lifting anything that would make you strain. This may include heavy grocery bags and milk containers, a heavy briefcase or backpack, cat litter or dog food bags, a vacuum , or a child. ? · Ask your doctor when you can drive again. ? · You will probably need to take 1 or 2 days off from work. This depends on the type of work you do and how you feel. Diet  ? · You can eat your normal diet. If your stomach is upset, try bland, low-fat foods like plain rice, broiled chicken, toast, and yogurt. Medicines  ? · Your doctor will tell you if and when you can restart your medicines. He or she will also give you instructions about taking any new medicines. ? · If you take blood thinners, such as warfarin (Coumadin), clopidogrel (Plavix), or aspirin, be sure to talk to your doctor. He or she will tell you if and when to start taking those medicines again. Make sure that you understand exactly what your doctor wants you to do. ? · Be safe with medicines. Take pain medicines exactly as directed. ¨ If the doctor gave you a prescription medicine for pain, take it as prescribed. ¨ If you are not taking a prescription pain medicine, ask your doctor if you can take an over-the-counter medicine.    ? · If you think your pain medicine is making you sick to your stomach:  ¨ Take your medicine after meals (unless your doctor has told you not to). ¨ Ask your doctor for a different pain medicine. ? · If your doctor prescribed antibiotics, take them as directed. Do not stop taking them just because you feel better. You need to take the full course of antibiotics. Incision care  ? · If you have strips of tape on the incision, leave the tape on for a week or until it falls off.   ? · Wash the area daily with warm, soapy water, and pat it dry. Don't use hydrogen peroxide or alcohol, which can slow healing. You may cover the area with a gauze bandage if it weeps or rubs against clothing. Change the bandage every day. ? · Keep the area clean and dry. Other instructions  ? · For the first 3 days after surgery, wear a supportive bra all the time, even at night. Follow-up care is a key part of your treatment and safety. Be sure to make and go to all appointments, and call your doctor if you are having problems. It's also a good idea to know your test results and keep a list of the medicines you take. When should you call for help? Call 911 anytime you think you may need emergency care. For example, call if:  ? · You passed out (lost consciousness). ? · You have chest pain, are short of breath, or cough up blood. ?Call your doctor now or seek immediate medical care if:  ? · You are sick to your stomach or cannot drink fluids. ? · You have pain that does not get better after you take pain medicine. ? · You cannot pass stools or gas. ? · You have signs of a blood clot in your leg (called a deep vein thrombosis), such as:  ¨ Pain in your calf, back of the knee, thigh, or groin. ¨ Redness or swelling in your leg. ? · You have signs of infection, such as:  ¨ Increased pain, swelling, warmth, or redness. ¨ Red streaks leading from the incision. ¨ Pus draining from the incision. ¨ A fever. ? · You have loose stitches, or your incision comes open.    ? · Bright red blood has soaked through the bandage over your incision. ? Watch closely for changes in your health, and be sure to contact your doctor if:  ? · You have any problems. ? · You have new or worse swelling or pain in your arm. Where can you learn more? Go to http://janessa-raphael.info/. Enter F745 in the search box to learn more about \"Open Breast Biopsy: What to Expect at Home. \"  Current as of: May 12, 2017  Content Version: 11.4  © 4698-5516 [employer]. Care instructions adapted under license by [employer] (which disclaims liability or warranty for this information). If you have questions about a medical condition or this instruction, always ask your healthcare professional. [place] [address] any warranty or liability for your use of this information. Hydrocodone/Acetaminophen (By mouth)   Acetaminophen (v-jtmc-v-MIN-oh-fen), Hydrocodone Bitartrate (pvc-esfn-AMG-done bye-TAR-trate)  Treats pain. This medicine contains a narcotic pain reliever. Brand Name(s): Hycet, Lorcet, Lorcet HD, Lorcet Plus, Lortab 10/325, Lortab 5/325, Lortab 7.5/325, Lortab Elixir, Norco, Verdrocet, Vicodin, Vicodin ES, Vicodin HP, Xodol, Xodol 5/300   There may be other brand names for this medicine. When This Medicine Should Not Be Used: This medicine is not right for everyone. Do not use it if you had an allergic reaction to acetaminophen, hydrocodone, or other narcotic medicines, or stomach or bowel blockage (including paralytic ileus). How to Use This Medicine:   Capsule, Liquid, Tablet  · Your doctor will tell you how much medicine to use. Do not use more than directed. · An overdose can be dangerous. Follow directions carefully so you do not get too much medicine at one time. · Oral liquid: Measure the oral liquid medicine with a marked measuring spoon, oral syringe, or medicine cup. · Drink plenty of liquids to help avoid constipation. · This medicine should come with a Medication Guide. Ask your pharmacist for a copy if you do not have one. · Missed dose: Take a dose as soon as you remember. If it is almost time for your next dose, wait until then and take a regular dose. Do not take extra medicine to make up for a missed dose. · Store the medicine in a closed container at room temperature, away from heat, moisture, and direct light. Flush any unused Norco® tablets down the toilet. Drugs and Foods to Avoid:   Ask your doctor or pharmacist before using any other medicine, including over-the-counter medicines, vitamins, and herbal products. · Do not use this medicine if you are using or have used an MAO inhibitor within the past 14 days. · Some medicines can affect how hydrocodone/acetaminophen works. Tell your doctor if you are using any of the following:   ¨ Carbamazepine, erythromycin, ketoconazole, mirtazapine, phenytoin, rifampin, ritonavir, tramadol, trazodone  ¨ Diuretic (water pill)  ¨ Medicine to treat depression or mental health problems  ¨ Medicine to treat migraine headaches  ¨ Phenothiazine medicine  · Tell your doctor if you use anything else that makes you sleepy. Some examples are allergy medicine, narcotic pain medicine, and alcohol. Tell your doctor if you are using buprenorphine, butorphanol, nalbuphine, pentazocine, or a muscle relaxer. · Do not drink alcohol while you are using this medicine. Acetaminophen can damage your liver, and your risk is higher if you also drink alcohol. Warnings While Using This Medicine:   · Tell your doctor if you are pregnant or breastfeeding, or if you have kidney disease, liver disease, lung or breathing problems, gallbladder or pancreas problems, an underactive thyroid, [place] disease, prostate problems, trouble urinating, stomach problems, or a history of head injury or brain tumor, seizures, alcohol or drug addiction.   · This medicine may cause the following problems:   ¨ High risk of overdose, which can lead to death  ¨ Respiratory depression (serious breathing problem that can be life-threatening)  ¨ Liver problems  ¨ Serious skin reactions  ¨ Serotonin syndrome (when used with certain medicines)  · This medicine can be habit-forming. Do not use more than your prescribed dose. Call your doctor if you think your medicine is not working. · This medicine may make you dizzy or drowsy. Do not drive or doing anything else that could be dangerous until you know how this medicine affects you. · This medicine contains acetaminophen. Read the labels of all other medicines you are using to see if they also contain acetaminophen, or ask your doctor or pharmacist. Ashlee Barrios not use more than 4 grams (4,000 milligrams) total of acetaminophen in one day. · Tell any doctor or dentist who treats you that you are using this medicine. This medicine may affect certain medical test results. · This medicine may cause constipation, especially with long-term use. Ask your doctor if you should use a laxative to prevent and treat constipation. · This medicine could cause infertility. Talk with your doctor before using this medicine if you plan to have children. · Keep all medicine out of the reach of children. Never share your medicine with anyone.   Possible Side Effects While Using This Medicine:   Call your doctor right away if you notice any of these side effects:  · Allergic reaction: Itching or hives, swelling in your face or hands, swelling or tingling in your mouth or throat, chest tightness, trouble breathing  · Anxiety, restlessness, fast heartbeat, fever, sweating, muscle spasms, twitching, diarrhea, seeing or hearing things that are not there  · Blistering, peeling, red skin rash  · Blue lips, fingernails, or skin  · Dark urine or pale stools, loss of appetite, nausea or vomiting, stomach pain, yellow skin or eyes  · Extreme weakness, shallow breathing, slow heartbeat, sweating, seizures, cold or clammy skin  · Lightheadedness, dizziness, fainting  If you notice these less serious side effects, talk with your doctor:   · Constipation, nausea, vomiting  · Tiredness or sleepiness  If you notice other side effects that you think are caused by this medicine, tell your doctor. Call your doctor for medical advice about side effects. You may report side effects to FDA at 8-512-FDA-3622  © 2017 [address] [employer] Information is for End User's use only and may not be sold, redistributed or otherwise used for commercial purposes. The above information is an  only. It is not intended as medical advice for individual conditions or treatments. Talk to your doctor, nurse or pharmacist before following any medical regimen to see if it is safe and effective for you. Laxative, Stimulant Combination (By mouth)   Treats constipation by helping you have a bowel movement. Brand Name(s): Colace, Doc-Q-Lax, Dok Plus, [facility] Stool Softener & Laxative, Good Sense Stimulant Laxative Plus, Laxacin, Medi-Laxx, Sweta-Colace, [employer] Laxative and Stool Softener, [employer] P Col-Rite, Senexon-S, Senna-S, Senna-Time S, SennaLax-S, SennaPrompt   There may be other brand names for this medicine. When This Medicine Should Not Be Used: You should not use this medicine if you have had an allergic reaction to senna, sennosides, docusate, casanthranol, or psyllium. Some brand names for these medicines are Correctol® stool softener, Ex-Lax®, Colace®, or Metamucil®. Make sure your doctor knows if you are allergic to any other laxative medicines. How to Use This Medicine:   Tablet, Liquid Filled Capsule, Liquid, Granule, Capsule, Powder for Suspension  Your doctor will tell you how much medicine to use. Do not use more than directed. If you have had a sudden change in your bowel movements in the past two weeks, ask your doctor before using this medicine.   Follow the instructions on the medicine label if you are using this medicine without a prescription. Drink a full glass of water when you take this medicine. One full glass of water is about 8 ounces or 1 cup. Drinking 6 to 8 full glasses of water every day will help keep your bowel movements soft. You might need to mix the granules or powder with water before you take each dose. Drink this mixture right away. Do not swallow the granules or powder dry unless the directions say you can. Measure the oral liquid medicine with a marked measuring spoon, oral syringe, or medicine cup. Use this medicine at bedtime, unless your doctor tells you otherwise. If a dose is missed: Take a dose as soon as you remember. If it is almost time for your next dose, wait until then and take a regular dose. Do not take extra medicine to make up for a missed dose. How to Store and Dispose of This Medicine:   Store the medicine in a closed container at room temperature, away from heat, moisture, and direct light. Ask your pharmacist, doctor, or health caregiver about the best way to dispose of any outdated medicine or medicine no longer needed. Keep all medicine out of the reach of children. Never share your medicine with anyone. Drugs and Foods to Avoid:   Ask your doctor or pharmacist before using any other medicine, including over-the-counter medicines, vitamins, and herbal products. Make sure your doctor knows if you are also using mineral oil. Some laxatives need to be taken 2 hours before or 2 hours after other medicines. If you need to take any other medicine, ask your health caregiver if you need to follow a special schedule. Warnings While Using This Medicine:   Make sure your doctor knows if you are pregnant or breast feeding. Do not use this medicine if you have stomach pain, nausea, or vomiting, unless your health caregiver tells you to use it. If you do not have a bowel movement after using this medicine, talk to your doctor.  Most people will have a bowel movement within 6 to 12 hours after using this laxative. You should not use this laxative for more than 1 week unless your doctor says it is okay. Laxatives may be habit-forming and can harm your bowels if you use them too long. Possible Side Effects While Using This Medicine:   Call your doctor right away if you notice any of these side effects:  Bleeding from your rectum. Skin rash. Urine turns a different color. If you notice these less serious side effects, talk with your doctor:   Diarrhea, cramps, nausea, burping. If you notice other side effects that you think are caused by this medicine, tell your doctor. Call your doctor for medical advice about side effects. You may report side effects to FDA at 1-473-FDA-2162  © 2017 26[address] [employer] Information is for End User's use only and may not be sold, redistributed or otherwise used for commercial purposes. The above information is an  only. It is not intended as medical advice for individual conditions or treatments. Talk to your doctor, nurse or pharmacist before following any medical regimen to see if it is safe and effective for you. DISCHARGE SUMMARY from Nurse    PATIENT INSTRUCTIONS:    After general anesthesia or intravenous sedation, for 24 hours or while taking prescription Narcotics:  · Limit your activities  · Do not drive and operate hazardous machinery  · Do not make important personal or business decisions  · Do  not drink alcoholic beverages  · If you have not urinated within 8 hours after discharge, please contact your surgeon on call.     Report the following to your surgeon:  · Excessive pain, swelling, redness or odor of or around the surgical area  · Temperature over 100.5  · Nausea and vomiting lasting longer than 4 hours or if unable to take medications  · Any signs of decreased circulation or nerve impairment to extremity: change in color, persistent  numbness, tingling, coldness or increase pain  · Any questions    What to do at Home:  Recommended activity: Activity as tolerated and no driving for today, or while taking narcotic pain medication. *  Please give a list of your current medications to your Primary Care Provider. *  Please update this list whenever your medications are discontinued, doses are      changed, or new medications (including over-the-counter products) are added. *  Please carry medication information at all times in case of emergency situations. These are general instructions for a healthy lifestyle:    No smoking/ No tobacco products/ Avoid exposure to second hand smoke  Surgeon General's Warning:  Quitting smoking now greatly reduces serious risk to your health. Obesity, smoking, and sedentary lifestyle greatly increases your risk for illness    A healthy diet, regular physical exercise & weight monitoring are important for maintaining a healthy lifestyle    You may be retaining fluid if you have a history of heart failure or if you experience any of the following symptoms:  Weight gain of 3 pounds or more overnight or 5 pounds in a week, increased swelling in our hands or feet or shortness of breath while lying flat in bed. Please call your doctor as soon as you notice any of these symptoms; do not wait until your next office visit. Recognize signs and symptoms of STROKE:    F-face looks uneven    A-arms unable to move or move unevenly    S-speech slurred or non-existent    T-time-call 911 as soon as signs and symptoms begin-DO NOT go       Back to bed or wait to see if you get better-TIME IS BRAIN. Warning Signs of HEART ATTACK     Call 911 if you have these symptoms:   Chest discomfort. Most heart attacks involve discomfort in the center of the chest that lasts more than a few minutes, or that goes away and comes back. It can feel like uncomfortable pressure, squeezing, fullness, or pain.  Discomfort in other areas of the upper body.  Symptoms can include pain or discomfort in one or both arms, the back, neck, jaw, or stomach.  Shortness of breath with or without chest discomfort.  Other signs may include breaking out in a cold sweat, nausea, or lightheadedness. Don't wait more than five minutes to call 911 - MINUTES MATTER! Fast action can save your life. Calling 911 is almost always the fastest way to get lifesaving treatment. Emergency Medical Services staff can begin treatment when they arrive -- up to an hour sooner than if someone gets to the hospital by car. The discharge information has been reviewed with the patient and daughter. The patient and daughter verbalized understanding. Discharge medications reviewed with the patient and daughter and appropriate educational materials and side effects teaching were provided.   ___________________________________________________________________________________________________________________________________

## 2018-05-04 NOTE — ANESTHESIA PREPROCEDURE EVALUATION
Anesthetic History   No history of anesthetic complications            Review of Systems / Medical History  Patient summary reviewed and pertinent labs reviewed    Pulmonary  Within defined limits                 Neuro/Psych   Within defined limits           Cardiovascular    Hypertension: well controlled              Exercise tolerance: >4 METS     GI/Hepatic/Renal     GERD: well controlled           Endo/Other        Arthritis and cancer (Right breast cancer)     Other Findings   Comments: Documentation of current medication  Current medications obtained, documented and obtained? YES      Risk Factors for Postoperative nausea/vomiting:       History of postoperative nausea/vomiting? NO       Female? YES       Motion sickness? NO       Intended opioid administration for postoperative analgesia? YES      Smoking Abstinence:  Current Smoker? NO  Elective Surgery? YES  Seen preoperatively by anesthesiologist or proxy prior to day of surgery? YES  Pt abstained from smoking 24 hours prior to anesthesia?  N/A    Preventive care/screening for High Blood Pressure:  Aged 18 years and older: YES  Screened for high blood pressure: YES  Patients with high blood pressure referred to primary care provider   for BP management: YES                 Physical Exam    Airway  Mallampati: II  TM Distance: 4 - 6 cm  Neck ROM: normal range of motion   Mouth opening: Normal     Cardiovascular  Regular rate and rhythm,  S1 and S2 normal,  no murmur, click, rub, or gallop             Dental  No notable dental hx       Pulmonary  Breath sounds clear to auscultation               Abdominal  GI exam deferred       Other Findings            Anesthetic Plan    ASA: 3  Anesthesia type: general          Induction: Intravenous  Anesthetic plan and risks discussed with: Patient

## 2018-05-04 NOTE — IP AVS SNAPSHOT
Nallely Pelayo 
 
 
 [address] [facility] [address] 
[phone] Patient: Cathryn Gauthier MRN: [id number] AZB:[id number] About your hospitalization You were admitted on: May 4, 2018 You last received care in the:  [facility] PACU You were discharged on: May 4, 2018 Why you were hospitalized Your primary diagnosis was:  Not on File Your diagnoses also included:  Breast Cancer, Stage 1, Right (Hcc) Follow-up Information Follow up With Details Comments Contact Info Hermann Severance, [address] [facility] 
[phone] Antonia Jarvis MD Follow up in 1 week(s)  [address] [facility] [address] [facility] 
[phone] Your Scheduled Appointments Wednesday May 09, 2018  2:00 PM EDT  
POST OP with Donita Solano, JOHNNY [address] ([address] [facility]) [address] [facility]  
[phone] Monday May 14, 2018  2:45 PM EDT Follow Up with Shorty Camarillo MD  
[address] [place] [address], [address] [facility] [address]) [address] [place] [address]  
[phone] Monday May 21, 2018  3:15 PM EDT Office Visit with Clair Greco MD  
[address]irm[address] Foy [address] [place] [address]) [address] [facility]  
[phone] Discharge Orders None A check sergio indicates which time of day the medication should be taken. My Medications START taking these medications Instructions Each Dose to Equal  
 Morning Noon Evening Bedtime HYDROcodone-acetaminophen 5-325 mg per tablet Commonly known as:  Tyrone Fay Your last dose was: Your next dose is:    
   
   
 1-2 tablets every 4-6 hours prn pain CHANGE how you take these medications Instructions Each Dose to Equal  
 Morning Noon Evening Bedtime * docusate sodium 100 mg capsule Commonly known as:  Phil Stai What changed:  Another medication with the same name was added. Make sure you understand how and when to take each. Your last dose was: Your next dose is: Take 1 Cap by mouth two (2) times a day. 100 mg  
    
   
   
   
  
 * docusate sodium 100 mg capsule Commonly known as:  Phil Stai What changed: You were already taking a medication with the same name, and this prescription was added. Make sure you understand how and when to take each. Your last dose was: Your next dose is: Take 1 Cap by mouth two (2) times a day for 90 days. 100 mg * Notice: This list has 2 medication(s) that are the same as other medications prescribed for you. Read the directions carefully, and ask your doctor or other care provider to review them with you. CONTINUE taking these medications Instructions Each Dose to Equal  
 Morning Noon Evening Bedtime  
 acetaminophen 650 mg Tab Your last dose was: Your next dose is: Take 650 mg by mouth every four (4) hours as needed for Pain or Fever. 650 mg  
    
   
   
   
  
 amitriptyline 25 mg tablet Commonly known as:  ELAVIL Your last dose was: Your next dose is:    
   
   
 nightly. Indications: MIGRAINE PREVENTION  
     
   
   
   
  
 ascorbic acid (vitamin C) 250 mg tablet Commonly known as:  VITAMIN C Your last dose was: Your next dose is: Take 1 Tab by mouth two (2) times daily (with meals). 250 mg  
    
   
   
   
  
 BREO ELLIPTA 200-25 mcg/dose inhaler Generic drug:  fluticasone-vilanterol Your last dose was: Your next dose is: Take 1 Puff by inhalation daily. 1 Puff  
    
   
   
   
  
 butalbital-acetaminophen-caff -40 mg per capsule Commonly known as:  ESP Systems Your last dose was: Your next dose is:    
   
   
 every four (4) hours as needed. diclofenac EC 75 mg EC tablet Commonly known as:  VOLTAREN Your last dose was: Your next dose is: Take 1 Tab by mouth two (2) times daily as needed. 75 mg  
    
   
   
   
  
 ferrous sulfate 325 mg (65 mg iron) tablet Your last dose was: Your next dose is: Take 1 Tab by mouth two (2) times daily (with meals). 325 mg FLEXERIL 10 mg tablet Generic drug:  cyclobenzaprine Your last dose was: Your next dose is: Take  by mouth three (3) times daily as needed for Muscle Spasm(s). gabapentin 300 mg capsule Commonly known as:  NEURONTIN Your last dose was: Your next dose is: Take 1 Cap by mouth three (3) times daily. Indications: sensory sensitivity 300 mg  
    
   
   
   
  
 losartan-hydroCHLOROthiazide 100-25 mg per tablet Commonly known as:  HYZAAR Your last dose was: Your next dose is: Take 1 Tab by mouth daily. Indications: HYPERTENSION  
 1 Tab  
    
   
   
   
  
 mometasone 50 mcg/actuation nasal spray Commonly known as:  Derby Bun Your last dose was: Your next dose is:    
   
   
 instill 2 spray into each nostril once daily if needed  
     
   
   
   
  
 omeprazole 40 mg capsule Commonly known as:  PRILOSEC Your last dose was: Your next dose is:    
   
   
 daily. RHINOCORT ALLERGY 32 mcg/actuation nasal spray Generic drug:  budesonide Your last dose was: Your next dose is:    
   
   
 instill 2 sprays into each nostril once daily  
     
   
   
   
  
 topiramate 50 mg tablet Commonly known as:  TOPAMAX Your last dose was: Your next dose is: take 1 tablet by mouth EVERY NIGHT FOR 2 WEEKS then 2 tablets by mouth EVERY NIGHT  
     
   
   
   
  
 traMADol 50 mg tablet Commonly known as:  ULTRAM  
   
Your last dose was: Your next dose is: Take 1 Tab by mouth three (3) times daily as needed for Pain. Max Daily Amount: 150 mg.  
 50 mg  
    
   
   
   
  
 VITAMIN D3 2,000 unit Cap capsule Generic drug:  Cholecalciferol (Vitamin D3) Your last dose was: Your next dose is: Take  by mouth daily. Where to Get Your Medications These medications were sent to  Jamar Mascorro, [facility] ([place], [place]), [address] [place] [address] [facility] [address] Phone:  [phone]  
  docusate sodium 100 mg capsule Information on where to get these meds will be given to you by the nurse or doctor. ! Ask your nurse or doctor about these medications HYDROcodone-acetaminophen 5-325 mg per tablet Opioid Education Prescription Opioids: What You Need to Know: 
 
Prescription opioids can be used to help relieve moderate-to-severe pain and are often prescribed following a surgery or injury, or for certain health conditions. These medications can be an important part of treatment but also come with serious risks. Opioids are strong pain medicines. Examples include hydrocodone, oxycodone, fentanyl, and morphine. Heroin is an example of an illegal opioid. It is important to work with your health care provider to make sure you are getting the safest, most effective care. WHAT ARE THE RISKS AND SIDE EFFECTS OF OPIOID USE? Prescription opioids carry serious risks of addiction and overdose, especially with prolonged use. An opioid overdose, often marked by slow breathing, can cause sudden death. The use of prescription opioids can have a number of side effects as well, even when taken as directed. · Tolerance-meaning you might need to take more of a medication for the same pain relief · Physical dependence-meaning you have symptoms of withdrawal when the medication is stopped. Withdrawal symptoms can include nausea, sweating, chills, diarrhea, stomach cramps, and muscle aches. Withdrawal can last up to several weeks, depending on which drug you took and how long you took it. · Increased sensitivity to pain · Constipation · Nausea, vomiting, and dry mouth · Sleepiness and dizziness · Confusion · Depression · Low levels of testosterone that can result in lower sex drive, energy, and strength · Itching and sweating RISKS ARE GREATER WITH:      
· History of drug misuse, substance use disorder, or overdose · Mental health conditions (such as depression or anxiety) · Sleep apnea · Older age (72 years or older) · Pregnancy Avoid alcohol while taking prescription opioids. Also, unless specifically advised by your health care provider, medications to avoid include: · Benzodiazepines (such as Xanax or Valium) · Muscle relaxants (such as Soma or Flexeril) · Hypnotics (such as Ambien or Lunesta) · Other prescription opioids KNOW YOUR OPTIONS Talk to your health care provider about ways to manage your pain that don't involve prescription opioids. Some of these options may actually work better and have fewer risks and side effects. Options may include: 
· Pain relievers such as acetaminophen, ibuprofen, and naproxen · Some medications that are also used for depression or seizures · Physical therapy and exercise · Counseling to help patients learn how to cope better with triggers of pain and stress. · Application of heat or cold compress · Massage therapy · Relaxation techniques Be Informed Make sure you know the name of your medication, how much and how often to take it, and its potential risks & side effects.  
 
IF YOU ARE PRESCRIBED OPIOIDS FOR PAIN: 
 · Never take opioids in greater amounts or more often than prescribed. Remember the goal is not to be pain-free but to manage your pain at a tolerable level. · Follow up with your primary care provider to: · Work together to create a plan on how to manage your pain. · Talk about ways to help manage your pain that don't involve prescription opioids. · Talk about any and all concerns and side effects. · Help prevent misuse and abuse. · Never sell or share prescription opioids · Help prevent misuse and abuse. · Store prescription opioids in a secure place and out of reach of others (this may include visitors, children, friends, and family). · Safely dispose of unused/unwanted prescription opioids: Find your community drug take-back program or your pharmacy mail-back program, or flush them down the toilet, following guidance from the Food and Drug Administration (www.fda.gov/Drugs/ResourcesForYou). · Visit www.cdc.gov/drugoverdose to learn about the risks of opioid abuse and overdose. · If you believe you may be struggling with addiction, tell your health care provider and ask for guidance or call [facility] [employer] at 7-369-040-HIHO. Discharge Instructions [facility] Dorie White MD, FACS General Surgery Pt may shower. Allow soap and water to run over the incision. No driving or operating heavy machinery while on narcotic pain medications. No strenuous activity or contact sports for two weeks. No lifting greater than 15 lbs for 2 weeks. Call MD for any redness, swelling, bleeding or pus at the incision. Also call for any nausea, vomiting, increased pain or pain uncontrolled by pain medicine. [place] Node Biopsy for Breast Cancer: What to Expect at Home Your Recovery After a sentinel node biopsy, many women have no side effects.  Some women have pain or bruising at the cut (incision) and feel tired. Your breast and underarm area may be slightly swollen. This may last a few days. You should feel close to normal in a few days. The incision the doctor made usually heals in about 2 weeks. The scar usually fades with time. Some women have a buildup of fluid in the area where the lymph nodes were removed. This is known as seroma. This goes away on its own, or your doctor can drain it. When you had this test, your doctor injected blue dye or radioactive material (or both) into your breast. The blue dye may give your breast a bluish color and turn your urine green for about 24 hours. The radioactive material leaves the body on its own in 24 to 48 hours. A sentinel node biopsy may be done at the same time as other breast surgeries. If this is the case, how you recover will be different. This care sheet gives you a general idea about how long it will take for you to recover. But each person recovers at a different pace. Follow the steps below to get better as quickly as possible. How can you care for yourself at home? Activity ? · Rest when you feel tired. Getting enough sleep will help you recover. ? · Try to walk each day. Start by walking a little more than you did the day before. Bit by bit, increase the amount you walk. Walking boosts blood flow and helps prevent pneumonia and constipation. ? · You may drive when you are no longer taking pain medicine and you feel up to it. ? · You can lift things when you feel comfortable doing so.  
? · Most women return to work and their normal routines in 2 to 7 days. ? · You may shower 24 to 48 hours after surgery, if your doctor okays it. Pat the incision dry. Do not take a bath for the first 2 weeks, or until your doctor tells you it is okay. ? · Avoid activity or exercise that may put stress on the cut. This includes washing windows, vacuuming, or gardening with the affected arm. Diet ? · You can eat your normal diet. If your stomach is upset, try bland, low-fat foods like plain rice, broiled chicken, toast, and yogurt. ? · You may notice that your bowels are not regular right after your surgery. This is common. Try to avoid constipation and straining with bowel movements. Take a fiber supplement such as Citrucel or Metamucil every day. If you have not had a bowel movement after a couple of days, take a mild laxative. Medicines ? · Your doctor will tell you if and when you can restart your medicines. He or she will also give you instructions about taking any new medicines. ? · If you take blood thinners, such as warfarin (Coumadin), clopidogrel (Plavix), or aspirin, be sure to talk to your doctor. He or she will tell you if and when to start taking those medicines again. Make sure that you understand exactly what your doctor wants you to do. ? · Take pain medicines exactly as directed. ¨ If the doctor gave you a prescription medicine for pain, take it as prescribed. ¨ If you are not taking a prescription pain medicine, take an over-the-counter medicine such as acetaminophen (Tylenol), ibuprofen (Advil, Motrin), or naproxen (Aleve). Read and follow all instructions on the label. ¨ Do not take two or more pain medicines at the same time unless the doctor told you to. Many pain medicines have acetaminophen, which is Tylenol. Too much acetaminophen (Tylenol) can be harmful. ? · If your doctor prescribed antibiotics, take them as directed. Do not stop taking them just because you feel better. You need to take the full course of antibiotics. ? · If you think your pain medicine is making you sick to your stomach: 
¨ Take your medicine after meals (unless your doctor has told you not to). ¨ Ask your doctor for a different pain medicine. Incision care ? · If you have strips of tape on the cut (incision) the doctor made, leave the tape on for about 1 week or until it falls off. ? · After you can shower, wash the area daily with warm, soapy water and pat it dry. Follow-up care is a key part of your treatment and safety. Be sure to make and go to all appointments, and call your doctor if you are having problems. It's also a good idea to know your test results and keep a list of the medicines you take. When should you call for help? Call 911 anytime you think you may need emergency care. For example, call if: 
? · You passed out (lost consciousness). ? · You have chest pain, are short of breath, or cough up blood. ?Call your doctor now or seek immediate medical care if: 
? · You have pain that does not get better after you take pain medicine. ? · You cannot pass stools or gas. ? · You are sick to your stomach or cannot drink fluids. ? · You have signs of a blood clot in your leg (called a deep vein thrombosis), such as: 
¨ Pain in your calf, back of the knee, thigh, or groin. ¨ Redness or swelling in your leg. ? · You have signs of infection, such as: 
¨ Increased pain, swelling, warmth, or redness. ¨ Red streaks leading from the incision. ¨ Pus draining from the incision. ¨ A fever. ? · You have loose stitches, or your incision comes open. ? · Bright red blood has soaked through the bandage over your incision. ? Watch closely for changes in your health, and be sure to contact your doctor if: 
? · You have any problems. ? · You have new or worse swelling or pain in your arm. Where can you learn more? Go to http://janessa-raphael.info/. Enter 210 [id number] in the search box to learn more about \"[place] Node Biopsy for Breast Cancer: What to Expect at Home. \" Current as of: May 12, 2017 Content Version: 11.4 © 1627-7111 Healthwise, Incorporated. Care instructions adapted under license by [employer] (which disclaims liability or warranty for this information).  If you have questions about a medical condition or this instruction, always ask your healthcare professional. [place] [address] any warranty or liability for your use of this information. Open Breast Biopsy: What to Expect at Home Your Recovery An open breast biopsy is surgery to take a sample of breast tissue. A breast biopsy may be done to check a lump found during a breast exam. Or it may be done to check an area of concern found on a mammogram or ultrasound. The breast tissue will be sent to a lab, where a doctor will look at the tissue under a microscope to check for breast cancer. Your doctor may have some answers right away. But it can take up to 1 to 2 weeks to get the final results. Your doctor will discuss the results with you. For a few days after the surgery, you will probably feel tired and have some pain. The skin around the cut (incision) may feel firm, swollen, and tender. The area may be bruised. Tenderness usually goes away in a few days, and the bruising within 2 weeks. Firmness and swelling may take 3 to 6 months to go away. The stitches in your incision may dissolve on their own, or the doctor may take them out 7 to 10 days after surgery. This care sheet gives you a general idea about how long it will take for you to recover. But each person recovers at a different pace. Follow the steps below to get better as quickly as possible. How can you care for yourself at home? Activity ? · Rest when you feel tired. Getting enough sleep will help you recover. ? · Try to walk each day. Start by walking a little more than you did the day before. Bit by bit, increase the amount you walk. Walking boosts blood flow and helps prevent pneumonia and constipation. ? · For 2 weeks, avoid strenuous activities that put pressure on your chest or that involve vigorous movement of your upper body and arm on the side of the biopsy.  Examples of these might include strenuous housework, holding an active child, jogging, or aerobic exercise. ? · For 2 weeks, avoid lifting anything that would make you strain. This may include heavy grocery bags and milk containers, a heavy briefcase or backpack, cat litter or dog food bags, a vacuum , or a child. ? · Ask your doctor when you can drive again. ? · You will probably need to take 1 or 2 days off from work. This depends on the type of work you do and how you feel. Diet ? · You can eat your normal diet. If your stomach is upset, try bland, low-fat foods like plain rice, broiled chicken, toast, and yogurt. Medicines ? · Your doctor will tell you if and when you can restart your medicines. He or she will also give you instructions about taking any new medicines. ? · If you take blood thinners, such as warfarin (Coumadin), clopidogrel (Plavix), or aspirin, be sure to talk to your doctor. He or she will tell you if and when to start taking those medicines again. Make sure that you understand exactly what your doctor wants you to do. ? · Be safe with medicines. Take pain medicines exactly as directed. ¨ If the doctor gave you a prescription medicine for pain, take it as prescribed. ¨ If you are not taking a prescription pain medicine, ask your doctor if you can take an over-the-counter medicine. ? · If you think your pain medicine is making you sick to your stomach: 
¨ Take your medicine after meals (unless your doctor has told you not to). ¨ Ask your doctor for a different pain medicine. ? · If your doctor prescribed antibiotics, take them as directed. Do not stop taking them just because you feel better. You need to take the full course of antibiotics. Incision care ? · If you have strips of tape on the incision, leave the tape on for a week or until it falls off.  
? · Wash the area daily with warm, soapy water, and pat it dry. Don't use hydrogen peroxide or alcohol, which can slow healing.  You may cover the area with a gauze bandage if it weeps or rubs against clothing. Change the bandage every day. ? · Keep the area clean and dry. Other instructions ? · For the first 3 days after surgery, wear a supportive bra all the time, even at night. Follow-up care is a key part of your treatment and safety. Be sure to make and go to all appointments, and call your doctor if you are having problems. It's also a good idea to know your test results and keep a list of the medicines you take. When should you call for help? Call 911 anytime you think you may need emergency care. For example, call if: 
? · You passed out (lost consciousness). ? · You have chest pain, are short of breath, or cough up blood. ?Call your doctor now or seek immediate medical care if: 
? · You are sick to your stomach or cannot drink fluids. ? · You have pain that does not get better after you take pain medicine. ? · You cannot pass stools or gas. ? · You have signs of a blood clot in your leg (called a deep vein thrombosis), such as: 
¨ Pain in your calf, back of the knee, thigh, or groin. ¨ Redness or swelling in your leg. ? · You have signs of infection, such as: 
¨ Increased pain, swelling, warmth, or redness. ¨ Red streaks leading from the incision. ¨ Pus draining from the incision. ¨ A fever. ? · You have loose stitches, or your incision comes open. ? · Bright red blood has soaked through the bandage over your incision. ? Watch closely for changes in your health, and be sure to contact your doctor if: 
? · You have any problems. ? · You have new or worse swelling or pain in your arm. Where can you learn more? Go to http://janessa-raphael.info/. Enter J139 in the search box to learn more about \"Open Breast Biopsy: What to Expect at Home. \" Current as of: May 12, 2017 Content Version: 11.4 © 5565-7909 Healthwise, Incorporated.  Care instructions adapted under license by [address] (which disclaims liability or warranty for this information). If you have questions about a medical condition or this instruction, always ask your healthcare professional. [address] any warranty or liability for your use of this information. Hydrocodone/Acetaminophen (By mouth) Acetaminophen (x-tblw-c-MIN-oh-fen), Hydrocodone Bitartrate (bxk-agww-BMH-done bye-TAR-trate) Treats pain. This medicine contains a narcotic pain reliever. Brand Name(s): Hycet, Lorcet, Lorcet HD, Lorcet Plus, Lortab 10/325, Lortab 5/325, Lortab 7.5/325, Lortab Elixir, Norco, Verdrocet, Vicodin, Vicodin ES, Vicodin HP, Xodol, Xodol 5/300 There may be other brand names for this medicine. When This Medicine Should Not Be Used: This medicine is not right for everyone. Do not use it if you had an allergic reaction to acetaminophen, hydrocodone, or other narcotic medicines, or stomach or bowel blockage (including paralytic ileus). How to Use This Medicine:  
Capsule, Liquid, Tablet · Your doctor will tell you how much medicine to use. Do not use more than directed. · An overdose can be dangerous. Follow directions carefully so you do not get too much medicine at one time. · Oral liquid: Measure the oral liquid medicine with a marked measuring spoon, oral syringe, or medicine cup. · Drink plenty of liquids to help avoid constipation. · This medicine should come with a Medication Guide. Ask your pharmacist for a copy if you do not have one. · Missed dose: Take a dose as soon as you remember. If it is almost time for your next dose, wait until then and take a regular dose. Do not take extra medicine to make up for a missed dose. · Store the medicine in a closed container at room temperature, away from heat, moisture, and direct light. Flush any unused Norco® tablets down the toilet. Drugs and Foods to Avoid: Ask your doctor or pharmacist before using any other medicine, including over-the-counter medicines, vitamins, and herbal products. · Do not use this medicine if you are using or have used an MAO inhibitor within the past 14 days. · Some medicines can affect how hydrocodone/acetaminophen works. Tell your doctor if you are using any of the following: ¨ Carbamazepine, erythromycin, ketoconazole, mirtazapine, phenytoin, rifampin, ritonavir, tramadol, trazodone ¨ Diuretic (water pill) ¨ Medicine to treat depression or mental health problems ¨ Medicine to treat migraine headaches ¨ Phenothiazine medicine · Tell your doctor if you use anything else that makes you sleepy. Some examples are allergy medicine, narcotic pain medicine, and alcohol. Tell your doctor if you are using buprenorphine, butorphanol, nalbuphine, pentazocine, or a muscle relaxer. · Do not drink alcohol while you are using this medicine. Acetaminophen can damage your liver, and your risk is higher if you also drink alcohol. Warnings While Using This Medicine: · Tell your doctor if you are pregnant or breastfeeding, or if you have kidney disease, liver disease, lung or breathing problems, gallbladder or pancreas problems, an underactive thyroid, Webster disease, prostate problems, trouble urinating, stomach problems, or a history of head injury or brain tumor, seizures, alcohol or drug addiction. · This medicine may cause the following problems:  
¨ High risk of overdose, which can lead to death ¨ Respiratory depression (serious breathing problem that can be life-threatening) ¨ Liver problems ¨ Serious skin reactions ¨ Serotonin syndrome (when used with certain medicines) · This medicine can be habit-forming. Do not use more than your prescribed dose. Call your doctor if you think your medicine is not working. · This medicine may make you dizzy or drowsy.  Do not drive or doing anything else that could be dangerous until you know how this medicine affects you. · This medicine contains acetaminophen. Read the labels of all other medicines you are using to see if they also contain acetaminophen, or ask your doctor or pharmacist. Iglesia Ballard not use more than 4 grams (4,000 milligrams) total of acetaminophen in one day. · Tell any doctor or dentist who treats you that you are using this medicine. This medicine may affect certain medical test results. · This medicine may cause constipation, especially with long-term use. Ask your doctor if you should use a laxative to prevent and treat constipation. · This medicine could cause infertility. Talk with your doctor before using this medicine if you plan to have children. · Keep all medicine out of the reach of children. Never share your medicine with anyone. Possible Side Effects While Using This Medicine:  
Call your doctor right away if you notice any of these side effects: · Allergic reaction: Itching or hives, swelling in your face or hands, swelling or tingling in your mouth or throat, chest tightness, trouble breathing · Anxiety, restlessness, fast heartbeat, fever, sweating, muscle spasms, twitching, diarrhea, seeing or hearing things that are not there · Blistering, peeling, red skin rash · Blue lips, fingernails, or skin · Dark urine or pale stools, loss of appetite, nausea or vomiting, stomach pain, yellow skin or eyes · Extreme weakness, shallow breathing, slow heartbeat, sweating, seizures, cold or clammy skin · Lightheadedness, dizziness, fainting If you notice these less serious side effects, talk with your doctor: · Constipation, nausea, vomiting · Tiredness or sleepiness If you notice other side effects that you think are caused by this medicine, tell your doctor. Call your doctor for medical advice about side effects. You may report side effects to FDA at 5-719-NPE-8366 © 2017 Formerly named [facility] Information is for End User's use only and may not be sold, redistributed or otherwise used for commercial purposes. The above information is an  only. It is not intended as medical advice for individual conditions or treatments. Talk to your doctor, nurse or pharmacist before following any medical regimen to see if it is safe and effective for you. Laxative, Stimulant Combination (By mouth) Treats constipation by helping you have a bowel movement. Brand Name(s): Colace, Doc-Q-Lax, Dok Plus, [facility] Stool Softener & Laxative, Good Sense Stimulant Laxative Plus, Laxacin, Medi-Laxx, Sweta-Colace, [employer] Laxative and Stool Softener, [employer] P Col-Rite, Senexon-S, Senna-S, Senna-Time S, SennaLax-S, SennaPrompt There may be other brand names for this medicine. When This Medicine Should Not Be Used: You should not use this medicine if you have had an allergic reaction to senna, sennosides, docusate, casanthranol, or psyllium. Some brand names for these medicines are Correctol® stool softener, Ex-Lax®, Colace®, or Metamucil®. Make sure your doctor knows if you are allergic to any other laxative medicines. How to Use This Medicine:  
Tablet, Liquid Filled Capsule, Liquid, Granule, Capsule, Powder for Suspension Your doctor will tell you how much medicine to use. Do not use more than directed. If you have had a sudden change in your bowel movements in the past two weeks, ask your doctor before using this medicine. Follow the instructions on the medicine label if you are using this medicine without a prescription. Drink a full glass of water when you take this medicine. One full glass of water is about 8 ounces or 1 cup. Drinking 6 to 8 full glasses of water every day will help keep your bowel movements soft.  
You might need to mix the granules or powder with water before you take each dose. Drink this mixture right away. Do not swallow the granules or powder dry unless the directions say you can. Measure the oral liquid medicine with a marked measuring spoon, oral syringe, or medicine cup. Use this medicine at bedtime, unless your doctor tells you otherwise. If a dose is missed: Take a dose as soon as you remember. If it is almost time for your next dose, wait until then and take a regular dose. Do not take extra medicine to make up for a missed dose. How to Store and Dispose of This Medicine:  
Store the medicine in a closed container at room temperature, away from heat, moisture, and direct light. Ask your pharmacist, doctor, or health caregiver about the best way to dispose of any outdated medicine or medicine no longer needed. Keep all medicine out of the reach of children. Never share your medicine with anyone. Drugs and Foods to Avoid: Ask your doctor or pharmacist before using any other medicine, including over-the-counter medicines, vitamins, and herbal products. Make sure your doctor knows if you are also using mineral oil. Some laxatives need to be taken 2 hours before or 2 hours after other medicines. If you need to take any other medicine, ask your health caregiver if you need to follow a special schedule. Warnings While Using This Medicine:  
Make sure your doctor knows if you are pregnant or breast feeding. Do not use this medicine if you have stomach pain, nausea, or vomiting, unless your health caregiver tells you to use it. If you do not have a bowel movement after using this medicine, talk to your doctor. Most people will have a bowel movement within 6 to 12 hours after using this laxative. You should not use this laxative for more than 1 week unless your doctor says it is okay. Laxatives may be habit-forming and can harm your bowels if you use them too long. Possible Side Effects While Using This Medicine: Call your doctor right away if you notice any of these side effects: 
Bleeding from your rectum. Skin rash. Urine turns a different color. If you notice these less serious side effects, talk with your doctor:  
Diarrhea, cramps, nausea, burping. If you notice other side effects that you think are caused by this medicine, tell your doctor. Call your doctor for medical advice about side effects. You may report side effects to FDA at 3-121-TZR-6249 © 2017 [address] Information is for End User's use only and may not be sold, redistributed or otherwise used for commercial purposes. The above information is an  only. It is not intended as medical advice for individual conditions or treatments. Talk to your doctor, nurse or pharmacist before following any medical regimen to see if it is safe and effective for you. DISCHARGE SUMMARY from Nurse PATIENT INSTRUCTIONS: 
 
 
F-face looks uneven A-arms unable to move or move unevenly S-speech slurred or non-existent T-time-call 911 as soon as signs and symptoms begin-DO NOT go Back to bed or wait to see if you get better-TIME IS BRAIN. Warning Signs of HEART ATTACK Call 911 if you have these symptoms: 
? Chest discomfort. Most heart attacks involve discomfort in the center of the chest that lasts more than a few minutes, or that goes away and comes back. It can feel like uncomfortable pressure, squeezing, fullness, or pain. ? Discomfort in other areas of the upper body. Symptoms can include pain or discomfort in one or both arms, the back, neck, jaw, or stomach. ? Shortness of breath with or without chest discomfort. ? Other signs may include breaking out in a cold sweat, nausea, or lightheadedness. Don't wait more than five minutes to call [address]! Fast action can save your life. Calling 911 is almost always the fastest way to get lifesaving treatment. Emergency Medical Services staff can begin treatment when they arrive  up to an hour sooner than if someone gets to the hospital by car. The discharge information has been reviewed with the patient and daughter. The patient and daughter verbalized understanding. Discharge medications reviewed with the patient and daughter and appropriate educational materials and side effects teaching were provided. ___________________________________________________________________________________________________________________________________ Libox Announcement We are excited to announce that we are making your provider's discharge notes available to you in Libox. You will see these notes when they are completed and signed by the physician that discharged you from your recent hospital stay. If you have any questions or concerns about any information you see in Libox, please call the Health Information Department where you were seen or reach out to your Primary Care Provider for more information about your plan of care. Introducing [facility]! Dear Jeremi Mart: Thank you for requesting a Libox account. Our records indicate that you already have an active Libox account. You can access your account anytime at [email or web address] Did you know that you can access your hospital and ER discharge instructions at any time in Libox? You can also review all of your test results from your hospital stay or ER visit. Additional Information If you have questions, please visit the Frequently Asked Questions section of the Libox website at [email or web address] Remember, Libox is NOT to be used for urgent needs. For medical emergencies, dial 911. Now available from your iPhone and Android! Introducing Ward Wade As a [employer] patient, I wanted to make you aware of our electronic visit tool called Ward Wade. [employer] 24/7 allows you to connect within minutes with a medical provider 24 hours a day, seven days a week via a mobile device or tablet or logging into a secure website from your computer. You can access Ward Wade from anywhere in the [place]. A virtual visit might be right for you when you have a simple condition and feel like you just dont want to get out of bed, or cant get away from work for an appointment, when your regular [employer] provider is not available (evenings, weekends or holidays), or when youre out of town and need minor care. Electronic visits cost only $49 and if the [employer] 24/7 provider determines a prescription is needed to treat your condition, one can be electronically transmitted to a nearby pharmacy*. Please take a moment to enroll today if you have not already done so. The enrollment process is free and takes just a few minutes. To enroll, please download the [employer] 24/7 lisa to your tablet or phone, or visit [email or web address] to enroll on your computer. And, as an [address] [place] [address] patient with a [employer] account, the results of your visits will be scanned into your electronic medical record and your primary care provider will be able to view the scanned results. We urge you to continue to see your regular [employer] provider for your ongoing medical care. And while your primary care provider may not be the one available when you seek a Ward Wade virtual visit, the peace of mind you get from getting a real diagnosis real time can be priceless. For more information on Ward Wade, view our Frequently Asked Questions (FAQs) at [email or web address]. Sincerely, 
 
Sarah Tucker MD 
Chief Medical Officer [employer] *:  certain medications cannot be prescribed via Ward Wade Providers Seen During Your Hospitalization Provider Specialty Primary office phone Cara Sebastian, [address] [facility] [phone] Your Primary Care Physician (PCP) Primary Care Physician Office Phone Office Fax [phone] [facility] [address], [address] [phone] You are allergic to the following Allergen Reactions Aspirin Other (comments) GI Pain Adhesive Tape-Silicones Other (comments)  
 blisters Recent Documentation Height Weight BMI OB Status Smoking Status 1.727 m 97.5 kg 32.69 kg/m2 Postmenopausal Never Smoker Emergency Contacts Name Discharge Info Relation Home Work Mobile MICHEL Harmon 99 CAREGIVER [3] Parent [1] [phone] Patient Belongings The following personal items are in your possession at time of discharge: 
  Dental Appliances: None  Visual Aid: Glasses (in bag)      Home Medications: None   Jewelry: None  Clothing: Shirt, Footwear, Undergarments, Pants, Socks    Other Valuables: Eyeglasses, Janina Spark Please provide this summary of care documentation to your next provider. Signatures-by signing, you are acknowledging that this After Visit Summary has been reviewed with you and you have received a copy. Patient Signature:  ____________________________________________________________ Date:  ____________________________________________________________  
  
Mirna Carrington Provider Signature:  ____________________________________________________________ Date:  ____________________________________________________________

## 2018-05-04 NOTE — INTERVAL H&P NOTE
H&P Update:  Katiuska Clay was seen and examined. History and physical has been reviewed. The patient has been examined.  There have been no significant clinical changes since the completion of the originally dated History and Physical.    Signed By: Mariza Feliciano MD     May 4, 2018 11:41 AM

## 2018-05-04 NOTE — OP NOTES
[facility], [address] [place] [address]                                 OPERATIVE REPORT    PATIENT:    Ignacia Keating  MRN:            [id number]      DATE:  2018  [place]LL[place]  ROOM:        @BED@  ATTENDING:   Bee Harrison MD  DICTATING:   Bee Harrison MD      PREOPERATIVE DIAGNOSIS: right  breast cancer     POSTOPERATIVE DIAGNOSIS: Same    PROCEDURES PERFORMED: Needle localized lumpectomy right breast with sentinel lymph node biopsy. SURGEON: Ryan Stephens MD    ASSISTANT: Tyson Marcus SA    ANESTHESIA: LMA general and local (.25 % marcaine plain). SPECIMENS REMOVED: right breast tissue and sentinel nodes. FINDINGS: Specimen mammography is confirmative. ESTIMATED BLOOD LOSS: 25 mL. FLUIDS GIVEN: 1000 mL. DESCRIPTION OF PROCEDURE: The patient was identified in the holding area  with family where consent for needle localized lumpectomy of right breast with right sentinel lymph node biopsy was verified. In the operating room, the patient was positioned supine on the OR table. She did receive perioperative antibiotics. LMA general anesthesia was induced. The patient's left breast and axilla were prepped and draped in sterile fashion using chlorhexidine solution and sterile drapes. The lymphazurin blue was injected intradermally around the areolar in 4 corners. The time-out was performed to ensure correct procedure. The local  anesthetic was infiltrated into the skin and deep dermal tissues at the base of the hair bearing skin. The 15 blade was used to create an incision through skin into the subcutaneous tissue. The pectoral fascia was opened to enter the axilla  The Neoprobe and the blue dye were used to identify the sentinel node. The Harmonic Focus was used to excise the nodes. The nodes were passed off the table to be sent to pathology.        The skin at the base of the wire was injected with the local anesthetic. The #15 blade was used to dissect through the skin and subcutaneous tissue. The electrocautery was used to dissect down approximately 0.5 cm into the breast tissue. The   wire were identified and grasped with an Allis. Circumferential dissection of the  specimen at the distal 5-6 cm of the wire was performed. The specimen was  marked with the a long stitch lateral, short stitch superior and a double  stitch deep. The specimen was sent to radiology where specimen mammography was performed  which confirms the area of interest is in the specimen. Additional local  anesthetic was infiltrated into the cavity. Electrocautery was used to  control bleeding within the cavities. Both incisions were irrigated with warm saline. The 3-0 Vicryl suture in interrupted stitches were used to reapproximate the deep dermal tissues. 4-0 Monocryl  suture was used to reapproximate the skin in a running subcuticular  closure. Dermaflex was used as a wound sealant. The patient tolerated the  procedure very well. DISPOSITION: She was stable upon transport to the recovery room.

## 2018-05-09 ENCOUNTER — DOCUMENTATION ONLY (OUTPATIENT)
Dept: SURGERY | Age: 63
End: 2018-05-09

## 2018-05-09 ENCOUNTER — OFFICE VISIT (OUTPATIENT)
Dept: SURGERY | Age: 63
End: 2018-05-09

## 2018-05-09 VITALS
RESPIRATION RATE: 18 BRPM | SYSTOLIC BLOOD PRESSURE: 134 MMHG | WEIGHT: 215 LBS | HEART RATE: 96 BPM | TEMPERATURE: 98.2 F | BODY MASS INDEX: 32.69 KG/M2 | DIASTOLIC BLOOD PRESSURE: 86 MMHG

## 2018-05-09 DIAGNOSIS — Z09 POSTOPERATIVE EXAMINATION: Primary | ICD-10-CM

## 2018-05-09 DIAGNOSIS — Z17.0 MALIGNANT NEOPLASM OF RIGHT BREAST, STAGE 1, ESTROGEN RECEPTOR POSITIVE (HCC): ICD-10-CM

## 2018-05-09 DIAGNOSIS — C50.911 MALIGNANT NEOPLASM OF RIGHT BREAST, STAGE 1, ESTROGEN RECEPTOR POSITIVE (HCC): ICD-10-CM

## 2018-05-09 DIAGNOSIS — F41.8 ANXIETY ABOUT HEALTH: ICD-10-CM

## 2018-05-09 DIAGNOSIS — T78.40XA RASH DUE TO ALLERGY: ICD-10-CM

## 2018-05-09 DIAGNOSIS — R21 RASH DUE TO ALLERGY: ICD-10-CM

## 2018-05-09 RX ORDER — TRIAMCINOLONE ACETONIDE 1 MG/G
CREAM TOPICAL 2 TIMES DAILY
Qty: 15 G | Refills: 0 | Status: SHIPPED | OUTPATIENT
Start: 2018-05-09 | End: 2018-07-19 | Stop reason: ALTCHOICE

## 2018-05-09 RX ORDER — DIAZEPAM 2 MG/1
2 TABLET ORAL
Qty: 90 TAB | Refills: 0 | Status: SHIPPED | OUTPATIENT
Start: 2018-05-09 | End: 2018-07-19 | Stop reason: ALTCHOICE

## 2018-05-09 NOTE — PROGRESS NOTES
Pt here for post-op visit from R breast lumpectomy w/ sentinel lymph node biopsy on 5/4/18. Pt completed distress screen at this time. Pt rated her distress at 7 on 0 to 10 scale. Liban Tong NP, aware and wrote pt a rx for Valium for anxiety. Gave pt information on [facility] for coping and financial assistance and [employer] to help with the physical problems that pt checked on the distress screen. Pt also agreed to be referred to [facility]. Also gave pt another copy of the breast cancer support group flyer. Pt was very grateful. Pt also interested in genetic testing. Information pamphlet given and gave pt opportunity to ask questions which were answered to her satisfaction. Order and informed consent obtained and pt verbalized understanding that although her insurance may not cover she will receive an email from [employer] (the genetic testing company) with other options such as self-pay and financial assistance.

## 2018-05-09 NOTE — PATIENT INSTRUCTIONS
Anxiety Disorder: Care Instructions  Your Care Instructions    Anxiety is a normal reaction to stress. Difficult situations can cause you to have symptoms such as sweaty palms and a nervous feeling. In an anxiety disorder, the symptoms are far more severe. Constant worry, muscle tension, trouble sleeping, nausea and diarrhea, and other symptoms can make normal daily activities difficult or impossible. These symptoms may occur for no reason, and they can affect your work, school, or social life. Medicines, counseling, and self-care can all help. Follow-up care is a key part of your treatment and safety. Be sure to make and go to all appointments, and call your doctor if you are having problems. It's also a good idea to know your test results and keep a list of the medicines you take. How can you care for yourself at home? · Take medicines exactly as directed. Call your doctor if you think you are having a problem with your medicine. · Go to your counseling sessions and follow-up appointments. · Recognize and accept your anxiety. Then, when you are in a situation that makes you anxious, say to yourself, \"This is not an emergency. I feel uncomfortable, but I am not in danger. I can keep going even if I feel anxious. \"  · Be kind to your body:  ¨ Relieve tension with exercise or a massage. ¨ Get enough rest.  ¨ Avoid alcohol, caffeine, nicotine, and illegal drugs. They can increase your anxiety level and cause sleep problems. ¨ Learn and do relaxation techniques. See below for more about these techniques. · Engage your mind. Get out and do something you enjoy. Go to a funny movie, or take a walk or hike. Plan your day. Having too much or too little to do can make you anxious. · Keep a record of your symptoms. Discuss your fears with a good friend or family member, or join a support group for people with similar problems. Talking to others sometimes relieves stress.   · Get involved in social groups, or volunteer to help others. Being alone sometimes makes things seem worse than they are. · Get at least 30 minutes of exercise on most days of the week to relieve stress. Walking is a good choice. You also may want to do other activities, such as running, swimming, cycling, or playing tennis or team sports. Relaxation techniques  Do relaxation exercises 10 to 20 minutes a day. You can play soothing, relaxing music while you do them, if you wish. · Tell others in your house that you are going to do your relaxation exercises. Ask them not to disturb you. · Find a comfortable place, away from all distractions and noise. · Lie down on your back, or sit with your back straight. · Focus on your breathing. Make it slow and steady. · Breathe in through your nose. Breathe out through either your nose or mouth. · Breathe deeply, filling up the area between your navel and your rib cage. Breathe so that your belly goes up and down. · Do not hold your breath. · Breathe like this for 5 to 10 minutes. Notice the feeling of calmness throughout your whole body. As you continue to breathe slowly and deeply, relax by doing the following for another 5 to 10 minutes:  · Tighten and relax each muscle group in your body. You can begin at your toes and work your way up to your head. · Imagine your muscle groups relaxing and becoming heavy. · Empty your mind of all thoughts. · Let yourself relax more and more deeply. · Become aware of the state of calmness that surrounds you. · When your relaxation time is over, you can bring yourself back to alertness by moving your fingers and toes and then your hands and feet and then stretching and moving your entire body. Sometimes people fall asleep during relaxation, but they usually wake up shortly afterward. · Always give yourself time to return to full alertness before you drive a car or do anything that might cause an accident if you are not fully alert.  Never play a relaxation tape while you drive a car. When should you call for help? Call 911 anytime you think you may need emergency care. For example, call if:  ? · You feel you cannot stop from hurting yourself or someone else. ? Keep the numbers for these national suicide hotlines: 3-339-506-TALK (9-367.306.7925) and 5-455-WVSKJPW (9-253.578.6041). If you or someone you know talks about suicide or feeling hopeless, get help right away. ? Watch closely for changes in your health, and be sure to contact your doctor if:  ? · You have anxiety or fear that affects your life. ? · You have symptoms of anxiety that are new or different from those you had before. Where can you learn more? Go to http://janessa-raphael.info/. Enter P754 in the search box to learn more about \"Anxiety Disorder: Care Instructions. \"  Current as of: May 12, 2017  Content Version: 11.4  © 9296-5413 [employer], [employer]. Care instructions adapted under license by [employer] (which disclaims liability or warranty for this information). If you have questions about a medical condition or this instruction, always ask your healthcare professional. [address] any warranty or liability for your use of this information.

## 2018-05-09 NOTE — PROGRESS NOTES
Vidal Momin. RENE HuertasP-C  PROGRESS NOTE    Subjective:    Ms. Antonia Roca is a very pleasant 56-year-old AA female who presents today with her daughter not quite 1 week out from needle localized lumpectomy of the right breast for stage I ER CA positive HER-2 negative invasive ductal adenocarcinoma and sentinel lymph node biopsy of 3 nodes which were all negative for metastasis. I did review the pathology report with Ms. Antonia Roca and her daughter and answered all questions to their satisfaction. Patient has already seen Dr. Shakila Hernández and has an appointment for the end of the month to discuss her treatment plan following her definitive surgery. She states she has also met with Dr. Heather Rojas to was considering her for micro seed radiation. She does not remember if a decision was made or not, so I encouraged her to touch base with Dr. Heather Rojas to make a follow-up appointment so this can be discussed in detail. Today, patient complains of severe tenderness to right lateral breast and axilla. She denies any fevers or chills and states there is been no drainage of any kind from her incision. She also expresses severe anxiety about her current health status. She states she was given Valium in the hospital and she was able to relax enough to find some rest.  She is requesting Valium at this time to help her sleep at night. We did discuss her plan of care moving forward from a surgical perspective and we will schedule her follow-up for 3 months unless symptoms worsen prior to her. She also has a rash on her left hand from the plastic tape used after IV removal.    Objective:    Vitals:    05/09/18 1401   BP: 134/86   Pulse: 96   Resp: 18   Temp: 98.2 °F (36.8 °C)   Weight: 97.5 kg (215 lb)       Physical Exam:    General: Alert and oriented x 3, cooperative, in no apparent distress   Breasts:    Left: Deferred   Right: Incision to outer quadrant is clean, dry, intact. No edema, no erythema, no drainage, no seroma.   Tenderness to light palpation. No signs of infection. Current Medications:  Current Outpatient Prescriptions   Medication Sig Dispense Refill    triamcinolone acetonide (KENALOG) 0.1 % topical cream Apply  to affected area two (2) times a day. use thin layer 15 g 0    diazePAM (VALIUM) 2 mg tablet Take 1 Tab by mouth every eight (8) hours as needed for Anxiety. Max Daily Amount: 6 mg. Indications: anxiety 90 Tab 0    HYDROcodone-acetaminophen (NORCO) 5-325 mg per tablet 1-2 tablets every 4-6 hours prn pain 40 Tab 0    docusate sodium (COLACE) 100 mg capsule Take 1 Cap by mouth two (2) times a day for 90 days. 60 Cap 2    Cholecalciferol, Vitamin D3, (VITAMIN D3) 2,000 unit cap capsule Take  by mouth daily.  fluticasone-vilanterol (BREO ELLIPTA) 200-25 mcg/dose inhaler Take 1 Puff by inhalation daily.  butalbital-acetaminophen-caff (FIORICET) -40 mg per capsule every four (4) hours as needed. 0    topiramate (TOPAMAX) 50 mg tablet take 1 tablet by mouth EVERY NIGHT FOR 2 WEEKS then 2 tablets by mouth EVERY NIGHT  0    diclofenac EC (VOLTAREN) 75 mg EC tablet Take 1 Tab by mouth two (2) times daily as needed. 60 Tab 5    gabapentin (NEURONTIN) 300 mg capsule Take 1 Cap by mouth three (3) times daily. Indications: sensory sensitivity 90 Cap 2    RHINOCORT ALLERGY 32 mcg/actuation nasal spray instill 2 sprays into each nostril once daily  1    amitriptyline (ELAVIL) 25 mg tablet nightly. Indications: MIGRAINE PREVENTION  0    traMADol (ULTRAM) 50 mg tablet Take 1 Tab by mouth three (3) times daily as needed for Pain. Max Daily Amount: 150 mg. 90 Tab 2    mometasone (NASONEX) 50 mcg/actuation nasal spray instill 2 spray into each nostril once daily if needed  0    cyclobenzaprine (FLEXERIL) 10 mg tablet Take  by mouth three (3) times daily as needed for Muscle Spasm(s).  acetaminophen 650 mg Tab Take 650 mg by mouth every four (4) hours as needed for Pain or Fever.  30 Tab 0    ferrous sulfate 325 mg (65 mg iron) tablet Take 1 Tab by mouth two (2) times daily (with meals). 30 Tab 0    ascorbic acid (VITAMIN C) 250 mg tablet Take 1 Tab by mouth two (2) times daily (with meals). 30 Tab 0    losartan-hydrochlorothiazide (HYZAAR) 100-25 mg per tablet Take 1 Tab by mouth daily. Indications: HYPERTENSION      omeprazole (PRILOSEC) 40 mg capsule daily. 1    docusate sodium (COLACE) 100 mg capsule Take 1 Cap by mouth two (2) times a day. 30 Cap 0       Chart and notes reviewed. Data reviewed. I have evaluated and examined the patient. Impression:  Patient not quite 1 week out from right lumpectomy for T1b N0 M0 invasive ductal adenocarcinoma here today doing well surgically. Plan:   · Follow-up with Dr. Vladislav Calero and Dr. Gracia Perez  · Valium as needed  · Triamcinolone cream for rash on hand  · Call if symptoms worsen  · Follow-up 3 months    Ms. Rob Vann has a reminder for a \"due or due soon\" health maintenance. I have asked that she contact her primary care provider for follow-up on this health maintenance. Gerald Simmons.  Anai Reynolds, RENEP-C

## 2018-05-21 ENCOUNTER — OFFICE VISIT (OUTPATIENT)
Dept: ONCOLOGY | Age: 63
End: 2018-05-21

## 2018-05-21 ENCOUNTER — HOSPITAL ENCOUNTER (OUTPATIENT)
Dept: ONCOLOGY | Age: 63
Discharge: HOME | End: 2018-05-21

## 2018-05-21 DIAGNOSIS — Z17.0 MALIGNANT NEOPLASM OF LOWER-OUTER QUADRANT OF RIGHT BREAST OF FEMALE, ESTROGEN RECEPTOR POSITIVE (HCC): Primary | ICD-10-CM

## 2018-05-21 DIAGNOSIS — Z17.0 MALIGNANT NEOPLASM OF LOWER-OUTER QUADRANT OF RIGHT BREAST OF FEMALE, ESTROGEN RECEPTOR POSITIVE (HCC): ICD-10-CM

## 2018-05-21 DIAGNOSIS — C50.511 MALIGNANT NEOPLASM OF LOWER-OUTER QUADRANT OF RIGHT BREAST OF FEMALE, ESTROGEN RECEPTOR POSITIVE (HCC): ICD-10-CM

## 2018-05-21 DIAGNOSIS — C50.911 BREAST CANCER, STAGE 1, RIGHT (HCC): ICD-10-CM

## 2018-05-21 DIAGNOSIS — C50.511 MALIGNANT NEOPLASM OF LOWER-OUTER QUADRANT OF RIGHT BREAST OF FEMALE, ESTROGEN RECEPTOR POSITIVE (HCC): Primary | ICD-10-CM

## 2018-05-21 LAB
BASO+EOS+MONOS # BLD AUTO: 0.4 K/UL (ref 0–2.3)
BASO+EOS+MONOS # BLD AUTO: 4 % (ref 0.1–17)
DIFFERENTIAL METHOD BLD: ABNORMAL
ERYTHROCYTE [DISTWIDTH] IN BLOOD BY AUTOMATED COUNT: 14.2 % (ref 11.5–14.5)
HCT VFR BLD AUTO: 33.8 % (ref 36–48)
HGB BLD-MCNC: 10.9 G/DL (ref 12–16)
LYMPHOCYTES # BLD: 3 K/UL (ref 1.1–5.9)
LYMPHOCYTES NFR BLD: 32 % (ref 14–44)
MCH RBC QN AUTO: 27.1 PG (ref 25–35)
MCHC RBC AUTO-ENTMCNC: 32.2 G/DL (ref 31–37)
MCV RBC AUTO: 84.1 FL (ref 78–102)
NEUTS SEG # BLD: 6 K/UL (ref 1.8–9.5)
NEUTS SEG NFR BLD: 64 % (ref 40–70)
PLATELET # BLD AUTO: 288 K/UL (ref 140–440)
RBC # BLD AUTO: 4.02 M/UL (ref 4.1–5.1)
WBC # BLD AUTO: 9.4 K/UL (ref 4.5–13)

## 2018-05-21 RX ORDER — LETROZOLE 2.5 MG/1
2.5 TABLET, FILM COATED ORAL DAILY
Qty: 90 TAB | Refills: 3 | Status: SHIPPED | OUTPATIENT
Start: 2018-05-21 | End: 2019-05-06 | Stop reason: SDUPTHER

## 2018-05-21 NOTE — PROGRESS NOTES
Hematology/medical oncology progress note    5/21/2018  Berna Prather  YOB: 1955    PCP: Dr. Chantale Bello    Diagnosis ER positive invasive ductal carcinoma, right breast    Ms. Bel Harrington is a 74-year-old woman who has recently been diagnosed with an ER positive invasive ductal adenocarcinoma the right breast.  She previously completed an MRI of the breasts and the left breast was negative. The right breast MRI confirmed a 1 cm x 0.7 x 0.5 cm lesion. On 5/4/2018 the patient underwent a lumpectomy of the right breast mass which revealed a residual 1 x 0.6 x 0.4 cm invasive carcinoma. The right axillary lymph nodes were negative for malignancy. Therefore I have explained to the patient that she has a stage I invasive mammary carcinoma of the right breast.  Based on the current NCCN treatment guidelines, I am recommending single agent Femara at a dose of 2.5 mg p.o. daily. I have reviewed the risk, benefit, and side effects of the treatment regimen with the patient. She has signed a written consent form and a copy of this consent was provided to the patient for her personal record. The patient understands that these treatments for curative intent. Since she underwent a lumpectomy she is being referred to the radiation oncologist for radiation treatment to the right breast as well. The patient had her questions answered to her satisfaction. I will see her back in clinic in 30 days to see how well she is tolerating treatment. Subsequent visits will occur 3 month intervals thereafter. Total time 30 minutes, greater than 50% of the time was in counseling and coordination of care. Malcolm Mei MD, [address]

## 2018-05-21 NOTE — MR AVS SNAPSHOT
Justa Wilson 
 
 
 [facility] [address] [address] [facility] 
[phone] Patient: Brent Martinez MRN: [id number] WED:[birth year] Visit Information Date & Time Provider Department Dept. Phone Encounter #  
 5/21/2018  3:15 PM Slade Tsang MD [address]  Oncology [phone] [id number] Upcoming Health Maintenance Date Due Hepatitis C Screening 1955 Pneumococcal 19-64 Highest Risk (1 of 3 - PCV13) 9/11/1976 DTaP/Tdap/Td series (1 - Tdap) 9/11/1978 PAP AKA CERVICAL CYTOLOGY 9/11/1978 FOBT Q 1 YEAR AGE 50-75 9/11/2007 ZOSTER VACCINE AGE 60> 7/11/2017 MEDICARE YEARLY EXAM 3/14/2018 Influenza Age 5 to Adult 8/1/2018 BREAST CANCER SCRN MAMMOGRAM 3/6/2020 Allergies as of 5/21/2018  Review Complete On: 5/21/2018 By: Slade Tsang MD  
  
 Severity Noted Reaction Type Reactions Aspirin High 11/21/2012   Side Effect Other (comments) GI Pain Adhesive Tape-silicones  92/24/9618    Other (comments)  
 blisters Current Immunizations  Never Reviewed No immunizations on file. Not reviewed this visit You Were Diagnosed With   
  
 Codes Comments Malignant neoplasm of lower-outer quadrant of right breast of female, estrogen receptor positive ([facility] [address]    -  Primary ICD-10-CM: C50.511, Z17.0 ICD-9-CM: 174.5, V86.0 Breast cancer, stage 1, right ([facility] [address]     ICD-10-CM: C50.911 ICD-9-CM: 174.9 Vitals OB Status Smoking Status Postmenopausal Never Smoker Preferred Pharmacy Pharmacy Name Phone [address] [facility] [phone] Your Updated Medication List  
  
   
This list is accurate as of 5/21/18  4:13 PM.  Always use your most recent med list.  
  
  
  
  
 acetaminophen 650 mg Tab Take 650 mg by mouth every four (4) hours as needed for Pain or Fever. amitriptyline 25 mg tablet Commonly known as:  ELAVIL  
nightly. Indications: MIGRAINE PREVENTION  
  
 ascorbic acid (vitamin C) 250 mg tablet Commonly known as:  VITAMIN C Take 1 Tab by mouth two (2) times daily (with meals). BREO ELLIPTA 200-25 mcg/dose inhaler Generic drug:  fluticasone-vilanterol Take 1 Puff by inhalation daily. butalbital-acetaminophen-caff -40 mg per capsule Commonly known as:  FIORICET  
every four (4) hours as needed. diazePAM 2 mg tablet Commonly known as:  VALIUM Take 1 Tab by mouth every eight (8) hours as needed for Anxiety. Max Daily Amount: 6 mg. Indications: anxiety  
  
 diclofenac EC 75 mg EC tablet Commonly known as:  VOLTAREN Take 1 Tab by mouth two (2) times daily as needed. * docusate sodium 100 mg capsule Commonly known as:  Wilfrido Pian Take 1 Cap by mouth two (2) times a day. * docusate sodium 100 mg capsule Commonly known as:  [place] Pian Take 1 Cap by mouth two (2) times a day for 90 days. ferrous sulfate 325 mg (65 mg iron) tablet Take 1 Tab by mouth two (2) times daily (with meals). FLEXERIL 10 mg tablet Generic drug:  cyclobenzaprine Take  by mouth three (3) times daily as needed for Muscle Spasm(s). gabapentin 300 mg capsule Commonly known as:  NEURONTIN Take 1 Cap by mouth three (3) times daily. Indications: sensory sensitivity HYDROcodone-acetaminophen 5-325 mg per tablet Commonly known as:  NORCO  
1-2 tablets every 4-6 hours prn pain  
  
 losartan-hydroCHLOROthiazide 100-25 mg per tablet Commonly known as:  HYZAAR Take 1 Tab by mouth daily. Indications: HYPERTENSION  
  
 mometasone 50 mcg/actuation nasal spray Commonly known as:  NASONEX  
instill 2 spray into each nostril once daily if needed  
  
 omeprazole 40 mg capsule Commonly known as:  PRILOSEC  
daily. RHINOCORT ALLERGY 32 mcg/actuation nasal spray Generic drug:  budesonide instill 2 sprays into each nostril once daily  
  
 topiramate 50 mg tablet Commonly known as:  TOPAMAX  
take 1 tablet by mouth EVERY NIGHT FOR 2 WEEKS then 2 tablets by mouth EVERY NIGHT  
  
 traMADol 50 mg tablet Commonly known as:  ULTRAM  
Take 1 Tab by mouth three (3) times daily as needed for Pain. Max Daily Amount: 150 mg.  
  
 triamcinolone acetonide 0.1 % topical cream  
Commonly known as:  KENALOG Apply  to affected area two (2) times a day. use thin layer VITAMIN D3 2,000 unit Cap capsule Generic drug:  Cholecalciferol (Vitamin D3) Take  by mouth daily. * Notice: This list has 2 medication(s) that are the same as other medications prescribed for you. Read the directions carefully, and ask your doctor or other care provider to review them with you. We Performed the Following COMPLETE CBC & AUTO DIFF WBC [09137 CPT(R)] To-Do List   
 05/21/2018 Lab:  CBC WITH 3 PART DIFF Introducing [facility] SERVICES! Dear Enedina Jackson: Thank you for requesting a [employer] account. Our records indicate that you already have an active Readmill account. You can access your account anytime at [email or web address] Did you know that you can access your hospital and ER discharge instructions at any time in [employer]? You can also review all of your test results from your hospital stay or ER visit. Additional Information If you have questions, please visit the Frequently Asked Questions section of the [employer] website at [email or web address] Remember, [facility] is NOT to be used for urgent needs. For medical emergencies, dial 911. Now available from your iPhone and Android! Please provide this summary of care documentation to your next provider. Your primary care clinician is listed as Armand Ackerman. If you have any questions after today's visit, please call [phone].

## 2018-05-22 ENCOUNTER — DOCUMENTATION ONLY (OUTPATIENT)
Dept: SURGERY | Age: 63
End: 2018-05-22

## 2018-05-22 NOTE — PROGRESS NOTES
Saliva sample collected from pt and sent out to [facility] for genetic testing 5/9/18.

## 2018-05-22 NOTE — PROGRESS NOTES
Received call from pt requesting resources for nutritional/ dietary support/ counseling. Pt gave permission for me to email her with resources. Info emailed on [employer], [employer], and [employer].

## 2018-05-23 ENCOUNTER — HOSPITAL ENCOUNTER (OUTPATIENT)
Dept: RADIATION THERAPY | Age: 63
Discharge: HOME | End: 2018-05-23

## 2018-05-30 ENCOUNTER — TELEPHONE (OUTPATIENT)
Dept: SURGERY | Age: 63
End: 2018-05-30

## 2018-05-30 NOTE — TELEPHONE ENCOUNTER
Called Ms. Genao to discuss the results of her recent genetic testing. Results were negative and I explained the meaning of this. Results will be available to her via the email address she provided on the day of testing. I encouraged both she and her daughters to call me should they have any questions and we will see them for her regular follow-ups.

## 2018-06-05 ENCOUNTER — HOSPITAL ENCOUNTER (OUTPATIENT)
Dept: RADIATION THERAPY | Age: 63
Discharge: HOME | End: 2018-06-05
Payer: MEDICARE

## 2018-06-05 ENCOUNTER — APPOINTMENT (OUTPATIENT)
Dept: RADIATION THERAPY | Age: 63
End: 2018-06-05

## 2018-06-05 PROCEDURE — 77290 THER RAD SIMULAJ FIELD CPLX: CPT

## 2018-06-05 PROCEDURE — 77332 RADIATION TREATMENT AID(S): CPT

## 2018-06-06 ENCOUNTER — HOSPITAL ENCOUNTER (OUTPATIENT)
Dept: RADIATION THERAPY | Age: 63
Discharge: HOME | End: 2018-06-06
Payer: MEDICARE

## 2018-06-06 PROCEDURE — 77295 3-D RADIOTHERAPY PLAN: CPT

## 2018-06-06 PROCEDURE — 77300 RADIATION THERAPY DOSE PLAN: CPT

## 2018-06-06 PROCEDURE — 77334 RADIATION TREATMENT AID(S): CPT

## 2018-06-07 ENCOUNTER — HOSPITAL ENCOUNTER (OUTPATIENT)
Dept: RADIATION THERAPY | Age: 63
Discharge: HOME | End: 2018-06-07
Payer: MEDICARE

## 2018-06-07 PROCEDURE — 77412 RADIATION TX DELIVERY LVL 3: CPT

## 2018-06-07 PROCEDURE — 77280 THER RAD SIMULAJ FIELD SMPL: CPT

## 2018-06-08 ENCOUNTER — HOSPITAL ENCOUNTER (OUTPATIENT)
Dept: RADIATION THERAPY | Age: 63
Discharge: HOME | End: 2018-06-08
Payer: MEDICARE

## 2018-06-08 PROCEDURE — 77412 RADIATION TX DELIVERY LVL 3: CPT

## 2018-06-11 ENCOUNTER — HOSPITAL ENCOUNTER (OUTPATIENT)
Dept: RADIATION THERAPY | Age: 63
Discharge: HOME | End: 2018-06-11
Payer: MEDICARE

## 2018-06-11 PROCEDURE — 77412 RADIATION TX DELIVERY LVL 3: CPT

## 2018-06-12 ENCOUNTER — HOSPITAL ENCOUNTER (OUTPATIENT)
Dept: RADIATION THERAPY | Age: 63
Discharge: HOME | End: 2018-06-12
Payer: MEDICARE

## 2018-06-12 PROCEDURE — 77412 RADIATION TX DELIVERY LVL 3: CPT

## 2018-06-13 ENCOUNTER — HOSPITAL ENCOUNTER (OUTPATIENT)
Dept: RADIATION THERAPY | Age: 63
Discharge: HOME | End: 2018-06-13
Payer: MEDICARE

## 2018-06-13 PROCEDURE — 77412 RADIATION TX DELIVERY LVL 3: CPT

## 2018-06-13 PROCEDURE — 77336 RADIATION PHYSICS CONSULT: CPT

## 2018-06-14 ENCOUNTER — APPOINTMENT (OUTPATIENT)
Dept: RADIATION THERAPY | Age: 63
End: 2018-06-14
Payer: MEDICARE

## 2018-06-15 ENCOUNTER — HOSPITAL ENCOUNTER (OUTPATIENT)
Dept: RADIATION THERAPY | Age: 63
Discharge: HOME | End: 2018-06-15
Payer: MEDICARE

## 2018-06-15 PROCEDURE — 77417 THER RADIOLOGY PORT IMAGE(S): CPT

## 2018-06-15 PROCEDURE — 77412 RADIATION TX DELIVERY LVL 3: CPT

## 2018-06-18 ENCOUNTER — OFFICE VISIT (OUTPATIENT)
Dept: ONCOLOGY | Age: 63
End: 2018-06-18

## 2018-06-18 ENCOUNTER — HOSPITAL ENCOUNTER (OUTPATIENT)
Dept: LAB | Age: 63
Discharge: HOME | End: 2018-06-18
Payer: MEDICARE

## 2018-06-18 ENCOUNTER — HOSPITAL ENCOUNTER (OUTPATIENT)
Dept: ONCOLOGY | Age: 63
Discharge: HOME | End: 2018-06-18

## 2018-06-18 ENCOUNTER — HOSPITAL ENCOUNTER (OUTPATIENT)
Dept: RADIATION THERAPY | Age: 63
Discharge: HOME | End: 2018-06-18
Payer: MEDICARE

## 2018-06-18 VITALS
SYSTOLIC BLOOD PRESSURE: 126 MMHG | TEMPERATURE: 98.9 F | BODY MASS INDEX: 31.63 KG/M2 | HEART RATE: 88 BPM | WEIGHT: 208 LBS | DIASTOLIC BLOOD PRESSURE: 75 MMHG

## 2018-06-18 DIAGNOSIS — C50.511 MALIGNANT NEOPLASM OF LOWER-OUTER QUADRANT OF RIGHT BREAST OF FEMALE, ESTROGEN RECEPTOR POSITIVE (HCC): ICD-10-CM

## 2018-06-18 DIAGNOSIS — C50.511 MALIGNANT NEOPLASM OF LOWER-OUTER QUADRANT OF RIGHT BREAST OF FEMALE, ESTROGEN RECEPTOR POSITIVE (HCC): Primary | ICD-10-CM

## 2018-06-18 DIAGNOSIS — Z17.0 MALIGNANT NEOPLASM OF LOWER-OUTER QUADRANT OF RIGHT BREAST OF FEMALE, ESTROGEN RECEPTOR POSITIVE (HCC): ICD-10-CM

## 2018-06-18 DIAGNOSIS — D50.8 IRON DEFICIENCY ANEMIA SECONDARY TO INADEQUATE DIETARY IRON INTAKE: ICD-10-CM

## 2018-06-18 DIAGNOSIS — E55.9 VITAMIN D DEFICIENCY: Chronic | ICD-10-CM

## 2018-06-18 DIAGNOSIS — Z17.0 MALIGNANT NEOPLASM OF LOWER-OUTER QUADRANT OF RIGHT BREAST OF FEMALE, ESTROGEN RECEPTOR POSITIVE (HCC): Primary | ICD-10-CM

## 2018-06-18 LAB
ALBUMIN SERPL-MCNC: 3.7 G/DL (ref 3.4–5)
ALBUMIN/GLOB SERPL: 0.9 {RATIO} (ref 0.8–1.7)
ALP SERPL-CCNC: 73 U/L (ref 45–117)
ALT SERPL-CCNC: 18 U/L (ref 13–56)
ANION GAP SERPL CALC-SCNC: 9 MMOL/L (ref 3–18)
AST SERPL-CCNC: 23 U/L (ref 15–37)
BASO+EOS+MONOS # BLD AUTO: 0.9 K/UL (ref 0–2.3)
BASO+EOS+MONOS # BLD AUTO: 11 % (ref 0.1–17)
BILIRUB SERPL-MCNC: 0.3 MG/DL (ref 0.2–1)
BUN SERPL-MCNC: 17 MG/DL (ref 7–18)
BUN/CREAT SERPL: 16 (ref 12–20)
CALCIUM SERPL-MCNC: 9 MG/DL (ref 8.5–10.1)
CHLORIDE SERPL-SCNC: 104 MMOL/L (ref 100–108)
CO2 SERPL-SCNC: 26 MMOL/L (ref 21–32)
CREAT SERPL-MCNC: 1.06 MG/DL (ref 0.6–1.3)
DIFFERENTIAL METHOD BLD: ABNORMAL
ERYTHROCYTE [DISTWIDTH] IN BLOOD BY AUTOMATED COUNT: 13.8 % (ref 11.5–14.5)
FERRITIN SERPL-MCNC: 29 NG/ML (ref 8–388)
GLOBULIN SER CALC-MCNC: 3.9 G/DL (ref 2–4)
GLUCOSE SERPL-MCNC: 78 MG/DL (ref 74–99)
HCT VFR BLD AUTO: 34.5 % (ref 36–48)
HGB BLD-MCNC: 11.1 G/DL (ref 12–16)
IRON SATN MFR SERPL: 29 %
IRON SERPL-MCNC: 98 UG/DL (ref 50–175)
LYMPHOCYTES # BLD: 1.9 K/UL (ref 1.1–5.9)
LYMPHOCYTES NFR BLD: 23 % (ref 14–44)
MCH RBC QN AUTO: 27.3 PG (ref 25–35)
MCHC RBC AUTO-ENTMCNC: 32.2 G/DL (ref 31–37)
MCV RBC AUTO: 84.8 FL (ref 78–102)
NEUTS SEG # BLD: 5.4 K/UL (ref 1.8–9.5)
NEUTS SEG NFR BLD: 66 % (ref 40–70)
PLATELET # BLD AUTO: 253 K/UL (ref 140–440)
POTASSIUM SERPL-SCNC: 3.8 MMOL/L (ref 3.5–5.5)
PROT SERPL-MCNC: 7.6 G/DL (ref 6.4–8.2)
RBC # BLD AUTO: 4.07 M/UL (ref 4.1–5.1)
SODIUM SERPL-SCNC: 139 MMOL/L (ref 136–145)
TIBC SERPL-MCNC: 339 UG/DL (ref 250–450)
WBC # BLD AUTO: 8.2 K/UL (ref 4.5–13)

## 2018-06-18 PROCEDURE — 36415 COLL VENOUS BLD VENIPUNCTURE: CPT | Performed by: INTERNAL MEDICINE

## 2018-06-18 PROCEDURE — 86300 IMMUNOASSAY TUMOR CA 15-3: CPT | Performed by: INTERNAL MEDICINE

## 2018-06-18 PROCEDURE — 83540 ASSAY OF IRON: CPT | Performed by: INTERNAL MEDICINE

## 2018-06-18 PROCEDURE — 82728 ASSAY OF FERRITIN: CPT | Performed by: INTERNAL MEDICINE

## 2018-06-18 PROCEDURE — 77412 RADIATION TX DELIVERY LVL 3: CPT

## 2018-06-18 PROCEDURE — 82306 VITAMIN D 25 HYDROXY: CPT | Performed by: INTERNAL MEDICINE

## 2018-06-18 PROCEDURE — 80053 COMPREHEN METABOLIC PANEL: CPT | Performed by: INTERNAL MEDICINE

## 2018-06-18 NOTE — MR AVS SNAPSHOT
[address] [address] 200 [facility] 
[phone] Patient: Jessenia Guerrero MRN: [id number] DQO:2/98/2389 Visit Information Date & Time Provider Department Dept. Phone Encounter #  
 6/18/2018  3:00 PM Jerome Lerner MD [address] Oncology [phone] [id number] Follow-up Instructions Return in about 3 months (around 9/18/2018). Your Appointments 9/17/2018  3:00 PM  
Office Visit with Jerome Lerner MD  
[address] On[facility]) Appt Note: 3 MO RET  
 [address], [address] [place] [address] Upcoming Health Maintenance Date Due Hepatitis C Screening 1955 Pneumococcal 19-64 Highest Risk (1 of 3 - PCV13) 9/11/1976 DTaP/Tdap/Td series (1 - Tdap) 9/11/1978 PAP AKA CERVICAL CYTOLOGY 9/11/1978 FOBT Q 1 YEAR AGE 50-75 9/11/2007 ZOSTER VACCINE AGE 60> 7/11/2017 MEDICARE YEARLY EXAM 3/14/2018 Influenza Age 5 to Adult 8/1/2018 BREAST CANCER SCRN MAMMOGRAM 3/6/2020 Allergies as of 6/18/2018  Review Complete On: 6/18/2018 By: Jerome Lerner MD  
  
 Severity Noted Reaction Type Reactions Aspirin High 11/21/2012   Side Effect Other (comments) GI Pain Adhesive Tape-silicones  63/31/6051    Other (comments)  
 blisters Current Immunizations  Never Reviewed No immunizations on file. Not reviewed this visit You Were Diagnosed With   
  
 Codes Comments Malignant neoplasm of lower-outer quadrant of right breast of female, estrogen receptor positive ([facility] [address]    -  Primary ICD-10-CM: C50.511, Z17.0 ICD-9-CM: 174.5, V86.0 Vitals BP Pulse Temp Weight(growth percentile) BMI OB Status 126/75 88 98.9 °F (37.2 °C) 208 lb (94.3 kg) 31.63 kg/m2 Postmenopausal  
 Smoking Status Never Smoker BMI and BSA Data Body Mass Index Body Surface Area  
 31.63 kg/m 2 2.13 m 2 Preferred Pharmacy Pharmacy Name Phone [address] , [address] [phone] Your Updated Medication List  
  
   
This list is accurate as of 6/18/18  3:23 PM.  Always use your most recent med list.  
  
  
  
  
 acetaminophen 650 mg Tab Take 650 mg by mouth every four (4) hours as needed for Pain or Fever. amitriptyline 25 mg tablet Commonly known as:  ELAVIL  
nightly. Indications: MIGRAINE PREVENTION  
  
 ascorbic acid (vitamin C) 250 mg tablet Commonly known as:  VITAMIN C Take 1 Tab by mouth two (2) times daily (with meals). BREO ELLIPTA 200-25 mcg/dose inhaler Generic drug:  fluticasone-vilanterol Take 1 Puff by inhalation daily. butalbital-acetaminophen-caff -40 mg per capsule Commonly known as:  FIORICET  
every four (4) hours as needed. diazePAM 2 mg tablet Commonly known as:  VALIUM Take 1 Tab by mouth every eight (8) hours as needed for Anxiety. Max Daily Amount: 6 mg. Indications: anxiety  
  
 diclofenac EC 75 mg EC tablet Commonly known as:  VOLTAREN Take 1 Tab by mouth two (2) times daily as needed. * docusate sodium 100 mg capsule Commonly known as:  Hernandez Raker Take 1 Cap by mouth two (2) times a day. * docusate sodium 100 mg capsule Commonly known as:  Hernandez Raker Take 1 Cap by mouth two (2) times a day for 90 days. ferrous sulfate 325 mg (65 mg iron) tablet Take 1 Tab by mouth two (2) times daily (with meals). FLEXERIL 10 mg tablet Generic drug:  cyclobenzaprine Take  by mouth three (3) times daily as needed for Muscle Spasm(s). gabapentin 300 mg capsule Commonly known as:  NEURONTIN Take 1 Cap by mouth three (3) times daily. Indications: sensory sensitivity HYDROcodone-acetaminophen 5-325 mg per tablet Commonly known as:  NORCO  
1-2 tablets every 4-6 hours prn pain  
  
 letrozole 2.5 mg tablet Commonly known as:  [facility] Take 1 Tab by mouth daily. losartan-hydroCHLOROthiazide 100-25 mg per tablet Commonly known as:  HYZAAR Take 1 Tab by mouth daily. Indications: HYPERTENSION  
  
 mometasone 50 mcg/actuation nasal spray Commonly known as:  NASONEX  
instill 2 spray into each nostril once daily if needed  
  
 omeprazole 40 mg capsule Commonly known as:  PRILOSEC  
daily. RHINOCORT ALLERGY 32 mcg/actuation nasal spray Generic drug:  budesonide  
instill 2 sprays into each nostril once daily  
  
 topiramate 50 mg tablet Commonly known as:  TOPAMAX  
take 1 tablet by mouth EVERY NIGHT FOR 2 WEEKS then 2 tablets by mouth EVERY NIGHT  
  
 traMADol 50 mg tablet Commonly known as:  ULTRAM  
Take 1 Tab by mouth three (3) times daily as needed for Pain. Max Daily Amount: 150 mg.  
  
 triamcinolone acetonide 0.1 % topical cream  
Commonly known as:  KENALOG Apply  to affected area two (2) times a day. use thin layer VITAMIN D3 2,000 unit Cap capsule Generic drug:  Cholecalciferol (Vitamin D3) Take  by mouth daily. * Notice: This list has 2 medication(s) that are the same as other medications prescribed for you. Read the directions carefully, and ask your doctor or other care provider to review them with you. We Performed the Following COMPLETE CBC & AUTO DIFF WBC [42100 CPT(R)] Follow-up Instructions Return in about 3 months (around 9/18/2018). To-Do List   
 06/18/2018 Lab:  CBC WITH 3 PART DIFF   
  
 06/19/2018 8:00 AM  
  Appointment with HBV RADIATION ONCOLOGY at [facility] RADIATION THERAPY ([phone]) ATTENTION: The Appointment Time in [address] may not reflect your scheduled appointment time as the time is only a place le.    If you have any questions about your appointment time, please call [facility] / [facility] Radiation Therapy at [phone].  
  
 06/20/2018 8:00 AM  
  Appointment with HBV RADIATION ONCOLOGY at [facility] RADIATION THERAPY ([phone]) ATTENTION: The Appointment Time in [address] may not reflect your scheduled appointment time as the time is only a place le. If you have any questions about your appointment time, please call [address] at [phone].  
  
 06/21/2018 8:00 AM  
  Appointment with HBV RADIATION ONCOLOGY at [facility] RADIATION THERAPY ([phone]) ATTENTION: The Appointment Time in [address] may not reflect your scheduled appointment time as the time is only a place le. If you have any questions about your appointment time, please call [address] at [phone].  
  
 06/22/2018 8:00 AM  
  Appointment with HBV RADIATION ONCOLOGY at [facility] RADIATION THERAPY ([phone]) ATTENTION: The Appointment Time in [address] may not reflect your scheduled appointment time as the time is only a place le. If you have any questions about your appointment time, please call [address] at [phone].  
  
 06/25/2018 8:00 AM  
  Appointment with HBV RADIATION ONCOLOGY at [facility] RADIATION THERAPY ([phone]) ATTENTION: The Appointment Time in [address] may not reflect your scheduled appointment time as the time is only a place le. If you have any questions about your appointment time, please call [address] at [phone].  
  
 06/26/2018 8:00 AM  
  Appointment with HBV RADIATION ONCOLOGY at [place] [address] ([phone]) ATTENTION: The Appointment Time in [address] may not reflect your scheduled appointment time as the time is only a place le.    If you have any questions about your appointment time, please call [address] at [phone].  
  
 06/27/2018 8:00 AM  
 Appointment with [facility] RADIATION ONCOLOGY at [facility] RADIATION THERAPY ([phone]) ATTENTION: The Appointment Time in [address] may not reflect your scheduled appointment time as the time is only a place le. If you have any questions about your appointment time, please call [facility] / [address] [place] [address] at [phone].  
  
 06/28/2018 8:00 AM  
  Appointment with [facility] RADIATION ONCOLOGY at [facility] RADIATION THERAPY ([phone]) ATTENTION: The Appointment Time in [address] may not reflect your scheduled appointment time as the time is only a place le. If you have any questions about your appointment time, please call [facility] / [address] [place] [address] at [phone]. Patient Instructions Learning About Breast Cancer Treatments Your Care Instructions Breast cancer means abnormal cells grow out of control in one or both breasts. These cancer cells can spread from the breast to nearby lymph nodes and other tissues. They can also spread to other parts of the body. The type and stage of cancer you have is based on: · Where in the breast the cancer started. · The genetics of those cancer cells. · How far cancer has spread within the breast, to nearby tissues, or to other organs. · What the cancer cells look like under a microscope. · Whether there is cancer in the nearby lymph nodes. Tests that help find the stage of your cancer can help choose the right treatment for you. These tests can include a breast biopsy, lymph node biopsy, blood tests, and X-rays. You may need other tests as well, such as a bone, CT, or MRI scan. Whether you have more tests depends on your symptoms and the stage of the cancer. When you find out that you have cancer, you may feel many emotions and may need some help coping. Seek out family, friends, and counselors for support.  You also can do things at home to make yourself feel better while you go through treatment. Call the Corrie Randhawa (2-841.375.4651) or visit its website at [address] [employer]. eMithilaHaat for more information. How is breast cancer treated? Your doctor may combine treatments. This is a common way to treat breast cancer. Treatment depends on what type and stage of cancer you have. You may have: · Surgery to remove the cancer. · Radiation. This uses high-dose X-rays to kill cancer cells and shrink tumors. · Chemotherapy. This uses medicine to kill cancer cells. · Hormone therapy. This uses medicines such as tamoxifen. It limits the effect of the hormone estrogen. This hormone can help some types of breast cancer cells to grow. · Biological therapy. This uses medicines such as trastuzumab (Herceptin) to help your immune system fight the cancer. What surgeries are done for breast cancer? Surgery is a key part of treatment for breast cancer. The main types of surgeries are: · Breast-conserving surgery. This is surgery that does not remove the whole breast. This includes: 
¨ Lumpectomy. This surgery removes the cancer in the breast and some of the normal tissue around it. ¨ Partial mastectomy. This surgery removes part of the breast. The lining of the chest muscles below the cancer and some of the lymph nodes may also be removed. · Surgery to remove the breast. This includes: ¨ Total mastectomy. This removes the whole breast. 
¨ Nipple-sparing mastectomy. This removes the whole breast but leaves the nipple. ¨ Modified radical mastectomy. This removes the whole breast and the lymph nodes under the arm (axillary lymph nodes). ¨ Radical mastectomy. This removes the whole breast, the chest muscles, and all the lymph nodes under the arm. If lymph nodes under the arm are removed, they are looked at under the microscope to check for cancer. There are two types of lymph node removal: · Juda lymph node biopsy removes the lymph node that is the first to receive lymph fluid from the tumor. If cancer has spread from the breast to the lymph nodes, cancer cells most often show up in the sentinel node first. 
· Axillary lymph node dissection removes most of the lymph nodes in the armpit. The type of surgery you have depends on the size, location, and type of the cancer. It also depends on your overall health and personal preferences. Even if your doctor removes all the cancer that can be seen at the time of your surgery, you may still need more treatment. You may get radiation, chemotherapy, or hormone therapy after surgery to try to kill any cancer cells that may be left. No matter what kind of surgery you have, you will get information about why you are having it, what its risks are, how to prepare, and what to do after surgery. Follow-up care is a key part of your treatment and safety. Be sure to make and go to all appointments, and call your doctor if you are having problems. It's also a good idea to know your test results and keep a list of the medicines you take. Where can you learn more? Go to http://janessa-raphael.info/. Enter X810 in the search box to learn more about \"Learning About Breast Cancer Treatments. \" Current as of: May 12, 2017 Content Version: 11.4 © 3682-3402 Healthwise, Pathable. Care instructions adapted under license by [employer] (which disclaims liability or warranty for this information). If you have questions about a medical condition or this instruction, always ask your healthcare professional. [place] [address] any warranty or liability for your use of this information. Introducing [facility]! Dear Enedina Jackson: Thank you for requesting a Buzzero account. Our records indicate that you already have an active [employer]ero account. You can access your account anytime at [email or web address]. [employer]/Altenera Technology Did you know that you can access your hospital and ER discharge instructions at any time in [employer]? You can also review all of your test results from your hospital stay or ER visit. Additional Information If you have questions, please visit the Frequently Asked Questions section of the [employer] website at [email or web address] Remember, [employer] is NOT to be used for urgent needs. For medical emergencies, dial 911. Now available from your iPhone and Android! Please provide this summary of care documentation to your next provider. Your primary care clinician is listed as Wanda Ackerman. If you have any questions after today's visit, please call [phone].

## 2018-06-18 NOTE — PATIENT INSTRUCTIONS
Learning About Breast Cancer Treatments  Your Care Instructions    Breast cancer means abnormal cells grow out of control in one or both breasts. These cancer cells can spread from the breast to nearby lymph nodes and other tissues. They can also spread to other parts of the body. The type and stage of cancer you have is based on:  · Where in the breast the cancer started. · The genetics of those cancer cells. · How far cancer has spread within the breast, to nearby tissues, or to other organs. · What the cancer cells look like under a microscope. · Whether there is cancer in the nearby lymph nodes. Tests that help find the stage of your cancer can help choose the right treatment for you. These tests can include a breast biopsy, lymph node biopsy, blood tests, and X-rays. You may need other tests as well, such as a bone, CT, or MRI scan. Whether you have more tests depends on your symptoms and the stage of the cancer. When you find out that you have cancer, you may feel many emotions and may need some help coping. Seek out family, friends, and counselors for support. You also can do things at home to make yourself feel better while you go through treatment. Call the [employer] (0-176.349.5792) or visit its website at [address]. [employer] for more information. How is breast cancer treated? Your doctor may combine treatments. This is a common way to treat breast cancer. Treatment depends on what type and stage of cancer you have. You may have:  · Surgery to remove the cancer. · Radiation. This uses high-dose X-rays to kill cancer cells and shrink tumors. · Chemotherapy. This uses medicine to kill cancer cells. · Hormone therapy. This uses medicines such as tamoxifen. It limits the effect of the hormone estrogen. This hormone can help some types of breast cancer cells to grow. · Biological therapy.  This uses medicines such as trastuzumab (Herceptin) to help your immune system fight the cancer. What surgeries are done for breast cancer? Surgery is a key part of treatment for breast cancer. The main types of surgeries are:  · Breast-conserving surgery. This is surgery that does not remove the whole breast. This includes:  ¨ Lumpectomy. This surgery removes the cancer in the breast and some of the normal tissue around it. ¨ Partial mastectomy. This surgery removes part of the breast. The lining of the chest muscles below the cancer and some of the lymph nodes may also be removed. · Surgery to remove the breast. This includes:  ¨ Total mastectomy. This removes the whole breast.  ¨ Nipple-sparing mastectomy. This removes the whole breast but leaves the nipple. ¨ Modified radical mastectomy. This removes the whole breast and the lymph nodes under the arm (axillary lymph nodes). ¨ Radical mastectomy. This removes the whole breast, the chest muscles, and all the lymph nodes under the arm. If lymph nodes under the arm are removed, they are looked at under the microscope to check for cancer. There are two types of lymph node removal:  · [place] lymph node biopsy removes the lymph node that is the first to receive lymph fluid from the tumor. If cancer has spread from the breast to the lymph nodes, cancer cells most often show up in the sentinel node first.  · Axillary lymph node dissection removes most of the lymph nodes in the armpit. The type of surgery you have depends on the size, location, and type of the cancer. It also depends on your overall health and personal preferences. Even if your doctor removes all the cancer that can be seen at the time of your surgery, you may still need more treatment. You may get radiation, chemotherapy, or hormone therapy after surgery to try to kill any cancer cells that may be left. No matter what kind of surgery you have, you will get information about why you are having it, what its risks are, how to prepare, and what to do after surgery.   Follow-up care is a key part of your treatment and safety. Be sure to make and go to all appointments, and call your doctor if you are having problems. It's also a good idea to know your test results and keep a list of the medicines you take. Where can you learn more? Go to http://janessa-raphael.info/. Enter X810 in the search box to learn more about \"Learning About Breast Cancer Treatments. \"  Current as of: May 12, 2017  Content Version: 11.4  © 4863-2689 Healthwise, Incorporated. Care instructions adapted under license by [employer] (which disclaims liability or warranty for this information). If you have questions about a medical condition or this instruction, always ask your healthcare professional. [address] any warranty or liability for your use of this information.

## 2018-06-18 NOTE — PROGRESS NOTES
Hematology/medical oncology progress note    6/18/2018  Yolanda Dillard  YOB: 1955    PCP: Dr. Ki Arroyo    Diagnosis ER positive invasive ductal carcinoma, right breast    Ms. Jessica Bray is a 44-year-old woman who has recently been diagnosed with an ER positive invasive ductal adenocarcinoma the right breast.  She previously completed an MRI of the breasts and the left breast was negative. The right breast MRI confirmed a 1 cm x 0.7 x 0.5 cm lesion. On 5/4/2018 the patient underwent a lumpectomy of the right breast mass which revealed a residual 1 x 0.6 x 0.4 cm invasive carcinoma. The right axillary lymph nodes were negative for malignancy. Therefore I have explained to the patient that she has a stage I invasive mammary carcinoma of the right breast.  Based on the current NCCN treatment guidelines, I previously recommended single agent Femara at a dose of 2.5 mg p.o. daily. The patient has been taking the medicine for the past 30 days and reports that she is tolerating the medicine reasonably well. She does occasionally have some arthralgias particularly in her knee and hip. I have advised her to take Tylenol arthritis 2 tablets every 8 hours as needed on a as needed basis for this problem. The patient had her questions answered to her satisfaction. She will continue the medication I will see her in the future at 3 month intervals. Total time 20 minutes, greater than 50% of the time was in counseling and coordination of care. Malcolm Zee MD, [place]

## 2018-06-19 ENCOUNTER — HOSPITAL ENCOUNTER (OUTPATIENT)
Dept: RADIATION THERAPY | Age: 63
Discharge: HOME | End: 2018-06-19
Payer: MEDICARE

## 2018-06-19 LAB
25(OH)D3 SERPL-MCNC: 45.2 NG/ML (ref 30–100)
CANCER AG27-29 SERPL-ACNC: 28.7 U/ML (ref 0–38.6)

## 2018-06-20 ENCOUNTER — HOSPITAL ENCOUNTER (OUTPATIENT)
Dept: RADIATION THERAPY | Age: 63
Discharge: HOME | End: 2018-06-20
Payer: MEDICARE

## 2018-06-20 PROCEDURE — 77412 RADIATION TX DELIVERY LVL 3: CPT

## 2018-06-21 ENCOUNTER — HOSPITAL ENCOUNTER (OUTPATIENT)
Dept: RADIATION THERAPY | Age: 63
Discharge: HOME | End: 2018-06-21
Payer: MEDICARE

## 2018-06-21 PROCEDURE — 77412 RADIATION TX DELIVERY LVL 3: CPT

## 2018-06-22 ENCOUNTER — HOSPITAL ENCOUNTER (OUTPATIENT)
Dept: RADIATION THERAPY | Age: 63
Discharge: HOME | End: 2018-06-22
Payer: MEDICARE

## 2018-06-22 PROCEDURE — 77412 RADIATION TX DELIVERY LVL 3: CPT

## 2018-06-22 PROCEDURE — 77336 RADIATION PHYSICS CONSULT: CPT

## 2018-06-25 ENCOUNTER — HOSPITAL ENCOUNTER (OUTPATIENT)
Dept: RADIATION THERAPY | Age: 63
Discharge: HOME | End: 2018-06-25
Payer: MEDICARE

## 2018-06-25 PROCEDURE — 77412 RADIATION TX DELIVERY LVL 3: CPT

## 2018-06-25 PROCEDURE — 77417 THER RADIOLOGY PORT IMAGE(S): CPT

## 2018-06-27 ENCOUNTER — HOSPITAL ENCOUNTER (OUTPATIENT)
Dept: RADIATION THERAPY | Age: 63
Discharge: HOME | End: 2018-06-27
Payer: MEDICARE

## 2018-06-27 PROCEDURE — 77412 RADIATION TX DELIVERY LVL 3: CPT

## 2018-06-28 ENCOUNTER — HOSPITAL ENCOUNTER (OUTPATIENT)
Dept: RADIATION THERAPY | Age: 63
Discharge: HOME | End: 2018-06-28
Payer: MEDICARE

## 2018-06-28 PROCEDURE — 77412 RADIATION TX DELIVERY LVL 3: CPT

## 2018-06-29 ENCOUNTER — HOSPITAL ENCOUNTER (OUTPATIENT)
Dept: RADIATION THERAPY | Age: 63
Discharge: HOME | End: 2018-06-29
Payer: MEDICARE

## 2018-06-29 PROCEDURE — 77412 RADIATION TX DELIVERY LVL 3: CPT

## 2018-07-02 ENCOUNTER — HOSPITAL ENCOUNTER (OUTPATIENT)
Dept: RADIATION THERAPY | Age: 63
Discharge: HOME | End: 2018-07-02
Payer: MEDICARE

## 2018-07-02 PROCEDURE — 77412 RADIATION TX DELIVERY LVL 3: CPT

## 2018-07-02 PROCEDURE — 77336 RADIATION PHYSICS CONSULT: CPT

## 2018-07-03 ENCOUNTER — HOSPITAL ENCOUNTER (OUTPATIENT)
Dept: RADIATION THERAPY | Age: 63
Discharge: HOME | End: 2018-07-03
Payer: MEDICARE

## 2018-07-03 PROCEDURE — 77412 RADIATION TX DELIVERY LVL 3: CPT

## 2018-07-16 DIAGNOSIS — M47.899 FACET SYNDROME: ICD-10-CM

## 2018-07-16 DIAGNOSIS — M79.18 MYOFASCIAL PAIN: ICD-10-CM

## 2018-07-16 RX ORDER — GABAPENTIN 300 MG/1
300 CAPSULE ORAL 3 TIMES DAILY
Qty: 90 CAP | Refills: 2 | OUTPATIENT
Start: 2018-07-16

## 2018-07-16 NOTE — TELEPHONE ENCOUNTER
Last Visit: 01/15/2018 with MD Maritza Durán    Next Appointment: 09/04/2018 with MD Maritza Durán; noted to f/u in 3 months   Previous Refill Encounters: 01/15/2018 per MD Maritza Durán #90 with 2 refills     Requested Prescriptions     Pending Prescriptions Disp Refills    gabapentin (NEURONTIN) 300 mg capsule 90 Cap 2     Sig: Take 1 Cap by mouth three (3) times daily.  Indications: sensory sensitivity

## 2018-07-16 NOTE — TELEPHONE ENCOUNTER
Patient called in requesting a refill on her gabapentin (NEURONTIN) 300 mg capsule   Please advise at [phone].

## 2018-07-19 ENCOUNTER — OFFICE VISIT (OUTPATIENT)
Dept: ORTHOPEDIC SURGERY | Age: 63
End: 2018-07-19

## 2018-07-19 VITALS
SYSTOLIC BLOOD PRESSURE: 129 MMHG | TEMPERATURE: 98.3 F | HEART RATE: 88 BPM | HEIGHT: 68 IN | RESPIRATION RATE: 17 BRPM | BODY MASS INDEX: 30.74 KG/M2 | OXYGEN SATURATION: 99 % | WEIGHT: 202.8 LBS | DIASTOLIC BLOOD PRESSURE: 80 MMHG

## 2018-07-19 DIAGNOSIS — M48.062 SPINAL STENOSIS OF LUMBAR REGION WITH NEUROGENIC CLAUDICATION: Chronic | ICD-10-CM

## 2018-07-19 DIAGNOSIS — Z79.891 USE OF OPIATES FOR THERAPEUTIC PURPOSES: ICD-10-CM

## 2018-07-19 DIAGNOSIS — M96.1 POST LAMINECTOMY SYNDROME: ICD-10-CM

## 2018-07-19 DIAGNOSIS — M79.18 MYOFASCIAL PAIN: ICD-10-CM

## 2018-07-19 DIAGNOSIS — M51.36 DDD (DEGENERATIVE DISC DISEASE), LUMBAR: ICD-10-CM

## 2018-07-19 DIAGNOSIS — M96.1 POST LAMINECTOMY SYNDROME: Primary | ICD-10-CM

## 2018-07-19 DIAGNOSIS — M47.899 FACET SYNDROME: ICD-10-CM

## 2018-07-19 RX ORDER — GABAPENTIN 300 MG/1
300 CAPSULE ORAL 3 TIMES DAILY
Qty: 270 CAP | Refills: 1 | Status: SHIPPED | OUTPATIENT
Start: 2018-07-19 | End: 2019-01-10 | Stop reason: SDUPTHER

## 2018-07-19 RX ORDER — AMITRIPTYLINE HYDROCHLORIDE 25 MG/1
25 TABLET, FILM COATED ORAL
Qty: 90 TAB | Refills: 1 | Status: SHIPPED | OUTPATIENT
Start: 2018-07-19 | End: 2021-01-13

## 2018-07-19 RX ORDER — TRAMADOL HYDROCHLORIDE 50 MG/1
TABLET ORAL
Qty: 30 TAB | Refills: 0 | Status: SHIPPED | OUTPATIENT
Start: 2018-07-19 | End: 2019-01-07 | Stop reason: SDUPTHER

## 2018-07-19 RX ORDER — ESOMEPRAZOLE MAGNESIUM 40 MG/1
40 CAPSULE, DELAYED RELEASE ORAL DAILY
Refills: 0 | COMMUNITY
Start: 2018-06-29 | End: 2022-04-25

## 2018-07-19 RX ORDER — DICLOFENAC SODIUM 75 MG/1
75 TABLET, DELAYED RELEASE ORAL
Qty: 60 TAB | Refills: 5 | Status: SHIPPED | OUTPATIENT
Start: 2018-07-19 | End: 2019-02-05 | Stop reason: SDUPTHER

## 2018-07-19 NOTE — PATIENT INSTRUCTIONS

## 2018-07-19 NOTE — MR AVS SNAPSHOT
[address] [facility] 
 
 
 [address] [facility] [address] 
[phone] Patient: Lexus Bryant MRN: [id number] QGM:2/65/3119 Visit Information Date & Time Provider Department Dept. Phone Encounter #  
 7/19/2018  1:00 PM Anival Villanueva NP [facility] [phone] [id number] Follow-up Instructions Return in about 3 months (around 10/19/2018). Your Appointments 9/4/2018  2:30 PM  
Follow Up with Doris Lewis MD  
[facility] ONE [address] [facility]) Appt Note: 4m back f/u; had Sx will r/s when healed; 4m back f/u r/s from 05/14/18  
 [address] [facility] [id number] [phone]  
  
   
 [address] [facility] [address] 9/17/2018  3:00 PM  
Office Visit with Alejandro Trevizo MD  
[facility] [facility]  On[facility] [address] [facility]) Appt Note: 3 MO RET  
 [address] [place] [address], [address] [place] [address] Upcoming Health Maintenance Date Due Hepatitis C Screening 1955 Pneumococcal 19-64 Highest Risk (1 of 3 - PCV13) 9/11/1976 DTaP/Tdap/Td series (1 - Tdap) 9/11/1978 PAP AKA CERVICAL CYTOLOGY 9/11/1978 FOBT Q 1 YEAR AGE 50-75 9/11/2007 ZOSTER VACCINE AGE 60> 7/11/2017 MEDICARE YEARLY EXAM 3/14/2018 Influenza Age 5 to Adult 8/1/2018 BREAST CANCER SCRN MAMMOGRAM 3/6/2020 Allergies as of 7/19/2018  Review Complete On: 7/19/2018 By: Presley Zimmer LPN Severity Noted Reaction Type Reactions Aspirin High 11/21/2012   Side Effect Other (comments) GI Pain Adhesive Tape-silicones  57/39/8869    Other (comments)  
 blisters Current Immunizations  Never Reviewed No immunizations on file. Not reviewed this visit You Were Diagnosed With   
  
 Codes Comments Post laminectomy syndrome    -  Primary ICD-10-CM: M96.1 ICD-9-CM: 722.80 Use of opiates for therapeutic purposes     ICD-10-CM: Z79.891 ICD-9-CM: V58.69   
 DDD (degenerative disc disease), lumbar     ICD-10-CM: M51.36 
ICD-9-CM: 722.52 Spinal stenosis of lumbar region with neurogenic claudication     ICD-10-CM: M48.062 
ICD-9-CM: 724.03 Facet syndrome ([address]     ICD-10-CM: M46.90 ICD-9-CM: 724.8 Myofascial pain     ICD-10-CM: M79.1 ICD-9-CM: 729.1 Vitals BP Pulse Temp Resp Height(growth percentile) Weight(growth percentile) 129/80 88 98.3 °F (36.8 °C) (Oral) 17 5' 8\" (1.727 m) 202 lb 12.8 oz (92 kg) SpO2 BMI OB Status Smoking Status 99% 30.84 kg/m2 Postmenopausal Never Smoker BMI and BSA Data Body Mass Index Body Surface Area  
 30.84 kg/m 2 2.1 m 2 Preferred Pharmacy Pharmacy Name Phone [address] [facility] [place], [address] [phone] Your Updated Medication List  
  
   
This list is accurate as of 7/19/18  2:04 PM.  Always use your most recent med list.  
  
  
  
  
 amitriptyline 25 mg tablet Commonly known as:  ELAVIL Take 1 Tab by mouth nightly. Indications: MIGRAINE PREVENTION  
  
 ascorbic acid (vitamin C) 250 mg tablet Commonly known as:  VITAMIN C Take 1 Tab by mouth two (2) times daily (with meals). BREO ELLIPTA 200-25 mcg/dose inhaler Generic drug:  fluticasone-vilanterol Take 1 Puff by inhalation daily. butalbital-acetaminophen-caff -40 mg per capsule Commonly known as:  FIORICET  
every four (4) hours as needed. diclofenac EC 75 mg EC tablet Commonly known as:  VOLTAREN Take 1 Tab by mouth two (2) times daily as needed. docusate sodium 100 mg capsule Commonly known as:  Chaparrita Durbin Take 1 Cap by mouth two (2) times a day. esomeprazole 40 mg capsule Commonly known as:  Thurnell Letcher ferrous sulfate 325 mg (65 mg iron) tablet Take 1 Tab by mouth two (2) times daily (with meals). gabapentin 300 mg capsule Commonly known as:  NEURONTIN Take 1 Cap by mouth three (3) times daily. Indications: sensory sensitivity  
  
 letrozole 2.5 mg tablet Commonly known as:  [facility] Take 1 Tab by mouth daily. losartan-hydroCHLOROthiazide 100-25 mg per tablet Commonly known as:  HYZAAR Take 1 Tab by mouth daily. Indications: HYPERTENSION  
  
 traMADol 50 mg tablet Commonly known as:  ULTRAM  
Take 1 Tab daily PRN Pain VITAMIN D3 2,000 unit Cap capsule Generic drug:  Cholecalciferol (Vitamin D3) Take  by mouth daily. Prescriptions Printed Refills  
 traMADol (ULTRAM) 50 mg tablet 0 Sig: Take 1 Tab daily PRN Pain Class: Print Prescriptions Sent to Pharmacy Refills  
 diclofenac EC (VOLTAREN) 75 mg EC tablet 5 Sig: Take 1 Tab by mouth two (2) times daily as needed. Class: Normal  
 Pharmacy:  [facility], [address] [place] [address] Ph #: [phone] Route: Oral  
 gabapentin (NEURONTIN) 300 mg capsule 1 Sig: Take 1 Cap by mouth three (3) times daily. Indications: sensory sensitivity Class: Normal  
 Pharmacy:  [facility] , [address] [place] [address] Ph #: [phone] Route: Oral  
 amitriptyline (ELAVIL) 25 mg tablet 1 Sig: Take 1 Tab by mouth nightly. Indications: MIGRAINE PREVENTION Class: Normal  
 Pharmacy:  [facility] , [address] [place] [address] Ph #: [phone] Route: Oral  
  
Follow-up Instructions Return in about 3 months (around 10/19/2018). To-Do List   
 07/19/2018 Lab:  DRUG SCREEN UR - W/ CONFIRM   
  
 08/31/2018 11:00 AM  
  Appointment with [facility] RADIATION ONCOLOGY at [facility] THERAP[facility] ([phone]) ATTENTION: The Appointment Time in [address] may not reflect your scheduled appointment time as the time is only a place le. If you have any questions about your appointment time, please call [facility] / [address] [place] [address] at [phone]. Patient Instructions Back Stretches: Exercises Your Care Instructions Here are some examples of exercises for stretching your back. Start each exercise slowly. Ease off the exercise if you start to have pain. Your doctor or physical therapist will tell you when you can start these exercises and which ones will work best for you. How to do the exercises Overhead stretch 1. Stand comfortably with your feet shoulder-width apart. 2. Looking straight ahead, raise both arms over your head and reach toward the ceiling. Do not allow your head to tilt back. 3. Hold for 15 to 30 seconds, then lower your arms to your sides. 4. Repeat 2 to 4 times. Side stretch 1. Stand comfortably with your feet shoulder-width apart. 2. Raise one arm over your head, and then lean to the other side. 3. Slide your hand down your leg as you let the weight of your arm gently stretch your side muscles. Hold for 15 to 30 seconds. 4. Repeat 2 to 4 times on each side. Press-up 1. Lie on your stomach, supporting your body with your forearms. 2. Press your elbows down into the floor to raise your upper back. As you do this, relax your stomach muscles and allow your back to arch without using your back muscles. As your press up, do not let your hips or pelvis come off the floor. 3. Hold for 15 to 30 seconds, then relax. 4. Repeat 2 to 4 times. Relax and rest 
 
1. Lie on your back with a rolled towel under your neck and a pillow under your knees. Extend your arms comfortably to your sides. 2. Relax and breathe normally. 3. Remain in this position for about 10 minutes. 4. If you can, do this 2 or 3 times each day. Follow-up care is a key part of your treatment and safety.  Be sure to make and go to all appointments, and call your doctor if you are having problems. It's also a good idea to know your test results and keep a list of the medicines you take. Where can you learn more? Go to http://janessa-raphael.info/. Enter Y934 in the search box to learn more about \"Back Stretches: Exercises. \" Current as of: November 29, 2017 Content Version: 11.7 © 9186-0115 [employer]. Care instructions adapted under license by [employer] (which disclaims liability or warranty for this information). If you have questions about a medical condition or this instruction, always ask your healthcare professional. [address] any warranty or liability for your use of this information. Introducing [facility]! Dear Isaiah Morales: Thank you for requesting a [employer] account. Our records indicate that you already have an active [employer] account. You can access your account anytime at [email or web address] Did you know that you can access your hospital and ER discharge instructions at any time in [employer]? You can also review all of your test results from your hospital stay or ER visit. Additional Information If you have questions, please visit the Frequently Asked Questions section of the [employer] website at [email or web address] Remember, [employer] is NOT to be used for urgent needs. For medical emergencies, dial 911. Now available from your iPhone and Android! Please provide this summary of care documentation to your next provider. Your primary care clinician is listed as Kylie Ackerman. If you have any questions after today's visit, please call [phone].

## 2018-07-19 NOTE — PROGRESS NOTES
[facility]  [address] [facility], [facility] [address]  Phone: [phone]  Fax: [phone]  PROGRESS NOTE  Patient: Juan A Modi                MRN: [id number]       SSN: [id number]  YOB: 1955        AGE: 63 y.o. SEX: female  Body mass index is 30.84 kg/(m^2). PCP: Kaya Brian MD  07/19/18    Chief Complaint   Patient presents with    Medication Refill       HISTORY OF PRESENT ILLNESS:  Juan A Modi is a 63 y.o.  female with history of chronic low back pain and BLE radicular pain in the L3 distribution. Prior history of back problems: recurrent self limited episodes of low back pain in the past, previous osteoarthritis of lumbar spine, disc protrusions and stenosis, DDD, and previous spinal surgery - L3-4 Bilateral lami in 2012 by Dr. Narayan Urban. She has a hx of breast cancer, this is a new diagnosis since last OV (she had surgery and has gone thru radiation), fibromyalgia, and peripheral neuropathy that contributes to her pain. She has tried; PT-Yes,  Non-opioid medications Yes, and spinal injections Unknown. Pain is aching, burning, numbing, stabbing and throbbing. Pain is worse with manual/sedentary work, walking and affects sleep and recreational activities  and her ability to perform ADLs. Pain is better with relaxation, pain medication and lying down. Today, she reports mid-low back pain indicative of DDD and BLE peripheral neuropathy and radicular pain in the L3 distribution. She reports general weakness and fatigue related to her cancer. We discussed her medications and alternative treatments, such as; HEP, ice, heat, and massage. She was on Elavil in the past. This helped with her pain and migraines. She did well with that. On exam, she has pain with flexion and good strength throughout. States she has been using Gabapentin 300mg TID, Diclofenac PRN, Tramadol 50mg 1-2x weekly with moderate, relief.  Reports that pain would be a 10/10 w/out medication and that it goes down to a 3/10 after medication. This enables the pt to be functional, achieve ADLs and engage in social activities. Denies bladder/bowel dysfunction, saddle paresthesia, weakness, gait disturbance, or other neurological deficits. Denies chills, fever,night sweats, unexplained weight loss/weight gain, chest pain, sob or anxiety. Pt at this time desires to  continue with current care/proceed with medication evaluation/proceed with evaluation of back pain. Opioid Assessment    Least pain over the last week has been 2/10. Worst pain over the last week has been 7/10. Opioid Risk Tool Reviewed: YES, score: 4  Aberrant behaviors: None. Urine Drug Screen: due - order placed. Controlled substance agreement on file: YES.  reviewed:yes, she got 40 tabs of Norco from her breast ca surgery back in May  Pill count is consistent with her prescription: n/a  Concomitant use of a benzodiazepine: no  MME is 0-10  Narcan is not warranted   Also,  abstinence syndrome was reviewed and discussed with her today N/A    Risks and benefits of ongoing opiate therapy have been reviewed with the patient. No pain behaviors. Denies thoughts of harming self or others. Pt has a good risk to benefit ratio which allows the pt to function in a home environment without side effects      ASSESSMENT   Diagnoses and all orders for this visit:    1. Post laminectomy syndrome  -     DRUG SCREEN UR - W/ CONFIRM; Future    2. Use of opiates for therapeutic purposes  -     DRUG SCREEN UR - W/ CONFIRM; Future    3. DDD (degenerative disc disease), lumbar    4. Spinal stenosis of lumbar region with neurogenic claudication    5. Facet syndrome (HCC)  -     diclofenac EC (VOLTAREN) 75 mg EC tablet; Take 1 Tab by mouth two (2) times daily as needed. -     traMADol (ULTRAM) 50 mg tablet; Take 1 Tab daily PRN Pain  -     gabapentin (NEURONTIN) 300 mg capsule;  Take 1 Cap by mouth three (3) times daily. Indications: sensory sensitivity    6. Myofascial pain  -     diclofenac EC (VOLTAREN) 75 mg EC tablet; Take 1 Tab by mouth two (2) times daily as needed. -     traMADol (ULTRAM) 50 mg tablet; Take 1 Tab daily PRN Pain  -     gabapentin (NEURONTIN) 300 mg capsule; Take 1 Cap by mouth three (3) times daily. Indications: sensory sensitivity    Other orders  -     amitriptyline (ELAVIL) 25 mg tablet; Take 1 Tab by mouth nightly. Indications: MIGRAINE PREVENTION       IMPRESSION AND PLAN:  This is a chronic problem that is not changed, stable. Per review of available records and patients , there are not sign of overuse, misuse, diversion, or concerning side effects. Today we reviewed: the risk of overdose, addiction, and dependency proper storage and disposal of medications the goals of treatment (improve functionality, quality of life, and pain) alternative treatment options including non-narcotic modalities the risks and benefits of continuing with a narcotic based pain regimen considering narcotic therapy discontinuation if benefits do not outweigh risks  The following changes were made to the patients current treatment plan: nothing, medications refilled. 1) Pt was given information on back pain   2) Continue Gabapentin and Diclofenac  3) Re-start Elavil  3) Reduce amt of Tramadol given to #30 for 30 day supply  4) UDS  5) HEP  4) Ms. Jeananne Landau has a reminder for a \"due or due soon\" health maintenance. I have asked that she contact her primary care provider, Jamaal Reeves MD, for follow-up on this health maintenance. 5) We have informed patient to notify us for immediate appointment if he has any worsening neurogical symptoms or if an emergency situation presents, then call 911  6) Pt will follow-up in 3 mo.     Subjective    Work retired    Smoking Status non-smoker    Pain Scale: 7/10    Pain Assessment  10/16/2017   Location of Pain Back   Severity of Pain 7   Quality of Pain Aching   Quality of Pain Comment -   Duration of Pain A few hours   Frequency of Pain Intermittent   Aggravating Factors -   Relieving Factors -   Result of Injury -         REVIEW OF SYSTEMS  Constitutional: Negative for fever, chills, or weight change. Respiratory: Negative for cough or shortness of breath. Cardiovascular: Negative for chest pain or palpitations. Gastrointestinal: Negative for incontinence, acid reflux, change in bowel habits, or constipation. Genitourinary: Negative for incontinence, dysuria and flank pain. Musculoskeletal: Positive for back and BLE L3 and peripheral neuropathy pain. See HPI. Skin: Negative for rash. Neurological:BLE L3 and peripheral neuropathy  radiculopathy. See HPI. Endo/Heme/Allergies: Negative. Psychiatric/Behavioral: Negative. PHYSICAL EXAMINATION  Visit Vitals    /80    Pulse 88    Temp 98.3 °F (36.8 °C) (Oral)    Resp 17    Ht 5' 8\" (1.727 m)    Wt 202 lb 12.8 oz (92 kg)    SpO2 99%    BMI 30.84 kg/m2         Accompanied by self. Constitutional:  Well developed, well nourished, in no acute distress. Psychiatric: Affect and mood are appropriate. Integumentary: No rashes or abrasions noted on exposed areas. Cardiovascular/Peripheral Vascular: +2 radial & pedal pulses. No peripheral edema is noted. Lymphatic:  No evidence of lymphedema. No cervical lymphadenopathy. SPINE/MUSCULOSKELETAL EXAM     Lumbar spine:  No rash, ecchymosis, or gross obliquity. No fasciculations. No focal atrophy is noted. Range of motion is pain with flexion, extension, turning right, turning left. Tenderness to palpation mid-low back. No tenderness to palpation at the sciatic notch. SI joints non-tender. Trochanters non tender. Straight leg raise negative    Sensation grossly intact to light touch.       MOTOR:     Hip Flex Quads Hamstrings Ankle DF EHL Ankle PF   Right +4/5 +4/5 +4/5 +4/5 +4/5 +4/5   Left +4/5 +4/5 +4/5 +4/5 +4/5 +4/5 DTRs are No  patella, and Achilles. Ambulation with single point cane. FWB.    + 's cart        PAST MEDICAL HISTORY   Past Medical History:   Diagnosis Date    Arthritis     Balance problems     patient states no further issues    Breast CA ([address] 03/2018    right    Bronchitis     Chronic pain     back    Fibromyalgia 11/29/2012    GERD (gastroesophageal reflux disease)     Hiatal Hernia    Hypertension     Kidney stones     Low back pain     Lumbar spinal stenosis 11/25/2012    Migraine headache     Nervousness     Neurological disease     Osteoarthritis 11/25/2012    Other ill-defined conditions(799.89)     neuropathy    Peripheral neuropathy 11/29/2012    Post laminectomy syndrome     Postoperative anemia due to acute blood loss 11/30/2012    Ringing in ears     Spinal stenosis     Thoracic spine pain     Vitamin D deficiency 11/30/2012       Past Surgical History:   Procedure Laterality Date    BX/REMV,LYMPH NODE,DEEP AXILL Right 05/04/2018    Dr. Kristopher May HX MASTECTOMY Right 5/4/2018    NEEDLE LOCALIZED (1000) LUMPECTOMY RIGHT BREAST AND SENTINEL LYMPH NODE BIOPSY RIGHT AXILLA performed by Keaton Owens MD at [address] [place] [address] HX ORTHOPAEDIC      foot l heel spur    HX ORTHOPAEDIC      right knee sx    HX OTHER SURGICAL      non cancer cyst removed from right shoulder    NEUROLOGICAL PROCEDURE UNLISTED      Bilateral L3-4 hemilaminotomy and medial facetectomy by Dr. Tammi Rand   . MEDICATIONS      Current Outpatient Prescriptions   Medication Sig Dispense Refill    esomeprazole (NEXIUM) 40 mg capsule   0    diclofenac EC (VOLTAREN) 75 mg EC tablet Take 1 Tab by mouth two (2) times daily as needed. 60 Tab 5    traMADol (ULTRAM) 50 mg tablet Take 1 Tab daily PRN Pain 30 Tab 0    gabapentin (NEURONTIN) 300 mg capsule Take 1 Cap by mouth three (3) times daily.  Indications: sensory sensitivity 270 Cap 1    amitriptyline (ELAVIL) 25 mg tablet Take 1 Tab by mouth nightly. Indications: MIGRAINE PREVENTION 90 Tab 1    letrozole (FEMARA) 2.5 mg tablet Take 1 Tab by mouth daily. 90 Tab 3    Cholecalciferol, Vitamin D3, (VITAMIN D3) 2,000 unit cap capsule Take  by mouth daily.  fluticasone-vilanterol (BREO ELLIPTA) 200-25 mcg/dose inhaler Take 1 Puff by inhalation daily.  butalbital-acetaminophen-caff (FIORICET) -40 mg per capsule every four (4) hours as needed. 0    ferrous sulfate 325 mg (65 mg iron) tablet Take 1 Tab by mouth two (2) times daily (with meals). 30 Tab 0    ascorbic acid (VITAMIN C) 250 mg tablet Take 1 Tab by mouth two (2) times daily (with meals). 30 Tab 0    losartan-hydrochlorothiazide (HYZAAR) 100-25 mg per tablet Take 1 Tab by mouth daily. Indications: HYPERTENSION      docusate sodium (COLACE) 100 mg capsule Take 1 Cap by mouth two (2) times a day. 30 Cap 0        ALLERGIES    Allergies   Allergen Reactions    Aspirin Other (comments)     GI Pain    Adhesive Tape-Silicones Other (comments)     blisters          SOCIAL HISTORY    Social History     Social History    Marital status: SINGLE     Spouse name: N/A    Number of children: N/A    Years of education: N/A     Occupational History    Not on file.      Social History Main Topics    Smoking status: Never Smoker    Smokeless tobacco: Never Used    Alcohol use No    Drug use: No    Sexual activity: Not on file     Other Topics Concern    Not on file     Social History Narrative       FAMILY HISTORY    Family History   Problem Relation Age of Onset    Hypertension Mother     Arthritis-osteo Mother     Neuropathy Mother     Hypertension Father     Cancer Father      colon    Hypertension Brother     Cancer Brother     Neuropathy Brother     Neuropathy Other     Arthritis-rheumatoid Other     GERD Other     Diabetes Other     Diabetes Maternal Aunt     Diabetes Maternal Uncle     Hypertension Maternal Grandfather          Kristopher Barger NP

## 2018-07-19 NOTE — PROGRESS NOTES
Leif Low is a 63 y.o. female presents in office for medication refill. Pt states she has pain in her back 7/10. Pain is intermittent sharp and stabbing. Currently taking prn medications to help control pain. Health Maintenance Due   Topic Date Due    Hepatitis C Screening  1955    Pneumococcal 19-64 Highest Risk (1 of 3 - PCV13) 09/11/1976    DTaP/Tdap/Td series (1 - Tdap) 09/11/1978    PAP AKA CERVICAL CYTOLOGY  09/11/1978    FOBT Q 1 YEAR AGE 50-75  09/11/2007    ZOSTER VACCINE AGE 60>  07/11/2017    MEDICARE YEARLY EXAM  03/14/2018       1. Have you been to the ER, urgent care clinic since your last visit? Hospitalized since your last visit? [facility]; June 2018; Acute Bronchitis    2. Have you seen or consulted any other health care providers outside of the [address] [place] [address] since your last visit? Include any pap smears or colon screening.  [facility] June 2018

## 2018-08-31 ENCOUNTER — HOSPITAL ENCOUNTER (OUTPATIENT)
Dept: RADIATION THERAPY | Age: 63
Discharge: HOME | End: 2018-08-31
Payer: MEDICARE

## 2018-08-31 PROCEDURE — 99211 OFF/OP EST MAY X REQ PHY/QHP: CPT

## 2018-09-06 ENCOUNTER — OFFICE VISIT (OUTPATIENT)
Dept: ORTHOPEDIC SURGERY | Age: 63
End: 2018-09-06

## 2018-09-06 VITALS
WEIGHT: 197 LBS | OXYGEN SATURATION: 98 % | DIASTOLIC BLOOD PRESSURE: 75 MMHG | BODY MASS INDEX: 29.86 KG/M2 | HEIGHT: 68 IN | TEMPERATURE: 98.1 F | SYSTOLIC BLOOD PRESSURE: 118 MMHG | HEART RATE: 94 BPM | RESPIRATION RATE: 16 BRPM

## 2018-09-06 DIAGNOSIS — M51.36 DDD (DEGENERATIVE DISC DISEASE), LUMBAR: ICD-10-CM

## 2018-09-06 DIAGNOSIS — M48.062 SPINAL STENOSIS OF LUMBAR REGION WITH NEUROGENIC CLAUDICATION: Primary | Chronic | ICD-10-CM

## 2018-09-06 RX ORDER — PANTOPRAZOLE SODIUM 40 MG/1
40 TABLET, DELAYED RELEASE ORAL DAILY
COMMUNITY
End: 2021-10-27

## 2018-09-06 NOTE — MR AVS SNAPSHOT
[address] [facility] 
 
 
 [address] [facility] [address] 
[phone] Patient: Lori Nix MRN: [id number] CMP:[id number] Visit Information Date & Time Provider Department Dept. Phone Encounter #  
 9/6/2018  3:00 PM Tamra Regalado NP [facility] [phone] [id number] Follow-up Instructions Return in about 3 months (around 12/6/2018). Your Appointments 9/17/2018  3:00 PM  
Office Visit with Nannette Zhou MD  
[facility] [address] [place] [address]) Appt Note: 3 MO RET  
 [address], [address] [place] [address]  
  
    
 10/18/2018  3:00 PM  
Follow Up with Tamra Regalado NP  
[facility] ONE ([address] [place] [address]) Appt Note: 3 MO F/U WITH JOHNNY  
 [address] [facility] [address]  
[phone]  
  
   
 [address] [facility] [address] Upcoming Health Maintenance Date Due Hepatitis C Screening 1955 Pneumococcal 19-64 Highest Risk (1 of 3 - PCV13) 9/11/1976 DTaP/Tdap/Td series (1 - Tdap) 9/11/1978 PAP AKA CERVICAL CYTOLOGY 9/11/1978 FOBT Q 1 YEAR AGE 50-75 9/11/2007 ZOSTER VACCINE AGE 60> 7/11/2017 MEDICARE YEARLY EXAM 3/14/2018 Influenza Age 5 to Adult 8/1/2018 BREAST CANCER SCRN MAMMOGRAM 3/6/2020 Allergies as of 9/6/2018  Review Complete On: 9/6/2018 By: Vero Washburn LPN Severity Noted Reaction Type Reactions Aspirin High 11/21/2012   Side Effect Other (comments) GI Pain Adhesive Tape-silicones  02/28/4463    Other (comments)  
 blisters Current Immunizations  Never Reviewed No immunizations on file. Not reviewed this visit Vitals BP Pulse Temp Resp Height(growth percentile) Weight(growth percentile) 118/75 (BP 1 Location: Left arm, BP Patient Position: Sitting) 94 98.1 °F (36.7 °C) (Oral) 16 5' 8\" (1.727 m) 197 lb (89.4 kg) SpO2 BMI OB Status Smoking Status 98% 29.95 kg/m2 Postmenopausal Never Smoker BMI and BSA Data Body Mass Index Body Surface Area  
 29.95 kg/m 2 2.07 m 2 Preferred Pharmacy Pharmacy Name Phone [address] [facility], [address] [place] [address] Avenue [phone] Your Updated Medication List  
  
   
This list is accurate as of 9/6/18  3:38 PM.  Always use your most recent med list.  
  
  
  
  
 amitriptyline 25 mg tablet Commonly known as:  ELAVIL Take 1 Tab by mouth nightly. Indications: MIGRAINE PREVENTION  
  
 ascorbic acid (vitamin C) 250 mg tablet Commonly known as:  VITAMIN C Take 1 Tab by mouth two (2) times daily (with meals). BREO ELLIPTA 200-25 mcg/dose inhaler Generic drug:  fluticasone-vilanterol Take 1 Puff by inhalation daily. butalbital-acetaminophen-caff -40 mg per capsule Commonly known as:  FIORICET  
every four (4) hours as needed. diclofenac EC 75 mg EC tablet Commonly known as:  VOLTAREN Take 1 Tab by mouth two (2) times daily as needed. docusate sodium 100 mg capsule Commonly known as:  [place] Lecher Take 1 Cap by mouth two (2) times a day. esomeprazole 40 mg capsule Commonly known as:  NEXIUM  
  
 ferrous sulfate 325 mg (65 mg iron) tablet Take 1 Tab by mouth two (2) times daily (with meals). gabapentin 300 mg capsule Commonly known as:  NEURONTIN Take 1 Cap by mouth three (3) times daily. Indications: sensory sensitivity  
  
 letrozole 2.5 mg tablet Commonly known as:  [facility] Take 1 Tab by mouth daily. losartan-hydroCHLOROthiazide 100-25 mg per tablet Commonly known as:  HYZAAR Take 1 Tab by mouth daily. Indications: HYPERTENSION  
  
 PROTONIX 40 mg tablet Generic drug:  pantoprazole Take 40 mg by mouth daily. traMADol 50 mg tablet Commonly known as:  ULTRAM  
Take 1 Tab daily PRN Pain VITAMIN D3 2,000 unit Cap capsule Generic drug:  Cholecalciferol (Vitamin D3) Take  by mouth daily. Follow-up Instructions Return in about 3 months (around 12/6/2018). Patient Instructions Amitriptyline (By mouth) Amitriptyline (am-i-TRIP-ti-spenser) Treats depression. This medicine is a TCA. Brand Name(s): Elavil There may be other brand names for this medicine. When This Medicine Should Not Be Used: This medicine is not right for everyone. Do not use if you had an allergic reaction to amitriptyline. How to Use This Medicine:  
Tablet · Take your medicine as directed. Your dose may need to be changed several times to find what works best for you. · This medicine should come with a Medication Guide. Ask your pharmacist for a copy if you do not have one. · Store the medicine in a closed container at room temperature, away from heat, moisture, and direct light. · Missed dose: Take a dose as soon as you remember. If it is almost time for your next dose, wait until then and take a regular dose. Do not take extra medicine to make up for a missed dose. Drugs and Foods to Avoid: Ask your doctor or pharmacist before using any other medicine, including over-the-counter medicines, vitamins, and herbal products. · Do not use this medicine with cisapride. Do not use this medicine and an MAO inhibitor (MAOI) within 14 days of each other. · Some medicines and foods can affect how amitriptyline works. Tell your doctor if you are using cimetidine, disulfiram, or guanethidine. Tell your doctor if you are using other medicine for depression (such as fluoxetine, paroxetine, sertraline), a phenothiazine medicine (such as promethazine, chlorpromazine), medicine for heart rhythm problems (such as flecainide, propafenone, quinidine), or thyroid medicine. · Tell your doctor if you use anything else that makes you sleepy. Some examples are allergy medicine, narcotic pain medicine, and alcohol. Warnings While Using This Medicine: · Tell your doctor if you are pregnant or breastfeeding, or if you have liver disease, heart disease, diabetes, narrow-angle glaucoma, a seizure disorder, a thyroid problem, or trouble urinating. · For some children, teenagers, and young adults, this medicine may increase mental or emotional problems. This may lead to thoughts of suicide and violence. Talk with your doctor right away if you have any thoughts or behavior changes that concern you. Tell your doctor if you or anyone in your family has a history of bipolar disorder or suicide attempts. · This medicine may cause the following problems:  
¨ Heart rhythm problems ¨ High or low blood sugar levels · This medicine may make you dizzy or drowsy. Do not drive or do anything else that could be dangerous until you know how this medicine affects you. · Do not stop using this medicine suddenly. Your doctor will need to slowly decrease your dose before you stop it completely. · Keep all medicine out of the reach of children. Never share your medicine with anyone. Possible Side Effects While Using This Medicine:  
Call your doctor right away if you notice any of these side effects: · Allergic reaction: Itching or hives, swelling in your face or hands, swelling or tingling in your mouth or throat, chest tightness, trouble breathing · Anxiety, restlessness, seeing or hearing things that are not there · Chest pain, trouble breathing · Fast, pounding, or uneven heartbeat · Feeling more excited or energetic than usual, racing thoughts, trouble sleeping · Lightheadedness, dizziness, or fainting · Seizures · Thoughts of hurting yourself or others, unusual behavior · Unusual bleeding or bruising If you notice these less serious side effects, talk with your doctor: · Blurred vision, dry mouth, fever · Change in how much or how often you urinate · Constipation, diarrhea, nausea, vomiting · Drowsiness, sleepiness · Sexual problems If you notice other side effects that you think are caused by this medicine, tell your doctor. Call your doctor for medical advice about side effects. You may report side effects to FDA at 6-592-AOI-5429 © 2017 [address] [employer] Information is for End User's use only and may not be sold, redistributed or otherwise used for commercial purposes. The above information is an  only. It is not intended as medical advice for individual conditions or treatments. Talk to your doctor, nurse or pharmacist before following any medical regimen to see if it is safe and effective for you. Introducing [facility]! Dear Leandra Miguel: Thank you for requesting a [employer] account. Our records indicate that you already have an active [employer] account. You can access your account anytime at [email or web address] Did you know that you can access your hospital and ER discharge instructions at any time in [employer]? You can also review all of your test results from your hospital stay or ER visit. Additional Information If you have questions, please visit the Frequently Asked Questions section of the [employer] website at [email or web address] Remember, [employer] is NOT to be used for urgent needs. For medical emergencies, dial 911. Now available from your iPhone and Android! Please provide this summary of care documentation to your next provider. Your primary care clinician is listed as Imani Ackerman. If you have any questions after today's visit, please call [phone].

## 2018-09-06 NOTE — PATIENT INSTRUCTIONS
Amitriptyline (By mouth)   Amitriptyline (am-i-TRIP-ti-spenser)  Treats depression. This medicine is a TCA. Brand Name(s): Elavil   There may be other brand names for this medicine. When This Medicine Should Not Be Used: This medicine is not right for everyone. Do not use if you had an allergic reaction to amitriptyline. How to Use This Medicine:   Tablet  · Take your medicine as directed. Your dose may need to be changed several times to find what works best for you. · This medicine should come with a Medication Guide. Ask your pharmacist for a copy if you do not have one. · Store the medicine in a closed container at room temperature, away from heat, moisture, and direct light. · Missed dose: Take a dose as soon as you remember. If it is almost time for your next dose, wait until then and take a regular dose. Do not take extra medicine to make up for a missed dose. Drugs and Foods to Avoid:   Ask your doctor or pharmacist before using any other medicine, including over-the-counter medicines, vitamins, and herbal products. · Do not use this medicine with cisapride. Do not use this medicine and an MAO inhibitor (MAOI) within 14 days of each other. · Some medicines and foods can affect how amitriptyline works. Tell your doctor if you are using cimetidine, disulfiram, or guanethidine. Tell your doctor if you are using other medicine for depression (such as fluoxetine, paroxetine, sertraline), a phenothiazine medicine (such as promethazine, chlorpromazine), medicine for heart rhythm problems (such as flecainide, propafenone, quinidine), or thyroid medicine. · Tell your doctor if you use anything else that makes you sleepy. Some examples are allergy medicine, narcotic pain medicine, and alcohol.   Warnings While Using This Medicine:   · Tell your doctor if you are pregnant or breastfeeding, or if you have liver disease, heart disease, diabetes, narrow-angle glaucoma, a seizure disorder, a thyroid problem, or trouble urinating. · For some children, teenagers, and young adults, this medicine may increase mental or emotional problems. This may lead to thoughts of suicide and violence. Talk with your doctor right away if you have any thoughts or behavior changes that concern you. Tell your doctor if you or anyone in your family has a history of bipolar disorder or suicide attempts. · This medicine may cause the following problems:   ¨ Heart rhythm problems  ¨ High or low blood sugar levels  · This medicine may make you dizzy or drowsy. Do not drive or do anything else that could be dangerous until you know how this medicine affects you. · Do not stop using this medicine suddenly. Your doctor will need to slowly decrease your dose before you stop it completely. · Keep all medicine out of the reach of children. Never share your medicine with anyone. Possible Side Effects While Using This Medicine:   Call your doctor right away if you notice any of these side effects:  · Allergic reaction: Itching or hives, swelling in your face or hands, swelling or tingling in your mouth or throat, chest tightness, trouble breathing  · Anxiety, restlessness, seeing or hearing things that are not there  · Chest pain, trouble breathing  · Fast, pounding, or uneven heartbeat  · Feeling more excited or energetic than usual, racing thoughts, trouble sleeping  · Lightheadedness, dizziness, or fainting  · Seizures  · Thoughts of hurting yourself or others, unusual behavior  · Unusual bleeding or bruising  If you notice these less serious side effects, talk with your doctor:   · Blurred vision, dry mouth, fever  · Change in how much or how often you urinate  · Constipation, diarrhea, nausea, vomiting  · Drowsiness, sleepiness  · Sexual problems  If you notice other side effects that you think are caused by this medicine, tell your doctor. Call your doctor for medical advice about side effects.  You may report side effects to FDA at 1-800-FDA-1088  © 2017 [address] [employer] Information is for End User's use only and may not be sold, redistributed or otherwise used for commercial purposes. The above information is an  only. It is not intended as medical advice for individual conditions or treatments. Talk to your doctor, nurse or pharmacist before following any medical regimen to see if it is safe and effective for you.

## 2018-09-06 NOTE — PROGRESS NOTES
[facility]  [address], [address] [facility], [address]  Phone: [phone]  Fax: [phone]  PROGRESS NOTE  Patient: Yoko Tian                MRN: [id number]       SSN: [id number]  YOB: 1955        AGE: 63 y.o. SEX: female  Body mass index is 29.95 kg/(m^2). PCP: Kylie Atwood MD  09/06/18    Chief Complaint   Patient presents with    Follow-up     4 month follow up    LOW BACK PAIN       HISTORY OF PRESENT ILLNESS:  Yoko Tian is a 63 y.o.  female with history of chronic low back pain and BLE radicular pain in the L3 distribution. Prior history of back problems: recurrent self limited episodes of low back pain in the past, previous osteoarthritis of lumbar spine, disc protrusions and stenosis, DDD, and previous spinal surgery - L3-4 Bilateral lami in 2012 by Dr. Chris Sow. She has a hx of breast cancer, (she had surgery and has gone thru radiation), fibromyalgia, and peripheral neuropathy that contributes to her pain. She has tried; PT-Yes,  Non-opioid medications Yes, and spinal injections Unknown. Pain is aching, burning, numbing, stabbing and throbbing. Pain is worse with manual/sedentary work, walking and affects sleep and recreational activities  and her ability to perform ADLs. Pain is better with relaxation, pain medication and lying down.      At her last OV, I re-started her Elavil . Today, she reports mid-low back pain indicative of DDD and BLE peripheral neuropathy and radicular pain in the L3 distribution. She reports general weakness and fatigue related to her cancer. We discussed her medications and alternative treatments, such as; HEP, ice, heat, and massage. The Gabapentin does help but gives her drowsiness at higher doses. She has not been taking the Elavil consistently, she has only been taking it PRN.  On exam, she has pain with flexion and good strength throughout.     States she has been using Gabapentin 300mg TID, Diclofenac PRN, Tramadol 50mg 1-2x weekly with moderate, relief. Reports that pain would be a 10/10 w/out medication and that it goes down to a 3/10 after medication. This enables the pt to be functional, achieve ADLs and engage in social activities. Denies bladder/bowel dysfunction, saddle paresthesia, weakness, gait disturbance, or other neurological deficits. Denies chills, fever,night sweats, unexplained weight loss/weight gain, chest pain, sob or anxiety. Pt at this time desires to  continue with current care/proceed with medication evaluation/proceed with evaluation of back pain    Opioid Assessment    Least pain over the last week has been 2/10. Worst pain over the last week has been 6/10. Opioid Risk Tool Reviewed: YES, score: 4  Aberrant behaviors: None. Urine Drug Screen: reviewed and up to date. Controlled substance agreement on file: YES.  reviewed:yes  Pill count is consistent with her prescription: n/a  Concomitant use of a benzodiazepine: no  MME 0-20  Narcan not warranted   Also,  abstinence syndrome was reviewed and discussed with her today N/A    Risks and benefits of ongoing opiate therapy have been reviewed with the patient. No pain behaviors. Denies thoughts of harming self or others. Pt has a good risk to benefit ratio which allows the pt to function in a home environment without side effects      ASSESSMENT   Diagnoses and all orders for this visit:    1. Spinal stenosis of lumbar region with neurogenic claudication    2. DDD (degenerative disc disease), lumbar       IMPRESSION AND PLAN:  This is a chronic problem that is not changed, stable. Per review of available records and patients , there are not sign of overuse, misuse, diversion, or concerning side effects.  Today we reviewed: the goals of treatment (improve functionality, quality of life, and pain) alternative treatment options including non-narcotic modalities  The following changes were made to the patients current treatment plan: pt does not need a refill. Note: Pt reports that she takes [address] for migraines. She also still has her Rx for Tramadol at home written on 07/19/18, as she didn't need a refill yet. She takes it very PRN. 1) Pt was given information on Elavil   2) Continue Gabapentin and PRN Tramadol, does not need refill  3) Take Elavil more consistently  4) Discussed updating MRI and LS injections, pt declined  4) Ms. Rossy Fox has a reminder for a \"due or due soon\" health maintenance. I have asked that she contact her primary care provider, Kaya Brian MD, for follow-up on this health maintenance. 5) We have informed patient to notify us for immediate appointment if he has any worsening neurogical symptoms or if an emergency situation presents, then call 911  6) Pt will follow-up in 3 mo w/ me. Subjective    Work retired    Smoking Status non-smoker    Pain Scale: 6/10    Pain Assessment  9/6/2018   Location of Pain Back   Location Modifiers Left;Right   Severity of Pain 6   Quality of Pain Sharp; Other (Comment)   Quality of Pain Comment Numbness and tingling   Duration of Pain Persistent   Frequency of Pain Constant   Aggravating Factors Standing;Walking;Bending   Limiting Behavior No   Relieving Factors Rest;NSAID   Result of Injury No         REVIEW OF SYSTEMS  Constitutional: Negative for fever, chills, or weight change. Respiratory: Negative for cough or shortness of breath. Cardiovascular: Negative for chest pain or palpitations. Gastrointestinal: Negative for incontinence, acid reflux, change in bowel habits, or constipation. Genitourinary: Negative for incontinence, dysuria and flank pain. Musculoskeletal: Positive for back and BLE pain. See HPI. Skin: Negative for rash. Neurological:BLE L3  Radiculopathy and peripheral neuropathy. See HPI. Endo/Heme/Allergies: Negative. Psychiatric/Behavioral: Negative.       PHYSICAL EXAMINATION  Visit Vitals    /75 (BP 1 Location: Left arm, BP Patient Position: Sitting)    Pulse 94    Temp 98.1 °F (36.7 °C) (Oral)    Resp 16    Ht 5' 8\" (1.727 m)    Wt 197 lb (89.4 kg)    SpO2 98%    BMI 29.95 kg/m2         Accompanied by self. Constitutional:  Well developed, well nourished, in no acute distress. Psychiatric: Affect and mood are appropriate. Integumentary: No rashes or abrasions noted on exposed areas. Cardiovascular/Peripheral Vascular: +2 radial & pedal pulses. No peripheral edema is noted. Lymphatic:  No evidence of lymphedema. No cervical lymphadenopathy. SPINE/MUSCULOSKELETAL EXAM     Lumbar spine:  No rash, ecchymosis, or gross obliquity. No fasciculations. No focal atrophy is noted. Range of motion is discomfort with extension. Tenderness to palpation mid-low back L3-5. No tenderness to palpation at the sciatic notch. SI joints non-tender. Trochanters non tender. Straight leg raise Negative    Sensation grossly intact to light touch. MOTOR:     Hip Flex Quads Hamstrings Ankle DF EHL Ankle PF   Right +4/5 +4/5 +4/5 +4/5 +4/5 +4/5   Left +4/5 +4/5 +4/5 +4/5 +4/5 +4/5       Ambulation with single point cane. FWB.     Forward postured gait, non-antalgic        PAST MEDICAL HISTORY   Past Medical History:   Diagnosis Date    Arthritis     Balance problems     patient states no further issues    Breast CA ([facility] [address] 03/2018    right    Bronchitis     Chronic pain     back    Fibromyalgia 11/29/2012    GERD (gastroesophageal reflux disease)     Hiatal Hernia    Hypertension     Kidney stones     Low back pain     Lumbar spinal stenosis 11/25/2012    Migraine headache     Nervousness     Neurological disease     Osteoarthritis 11/25/2012    Other ill-defined conditions(799.89)     neuropathy    Peripheral neuropathy 11/29/2012    Post laminectomy syndrome     Postoperative anemia due to acute blood loss 11/30/2012    Ringing in ears     Spinal stenosis     Thoracic spine pain     Vitamin D deficiency 11/30/2012       Past Surgical History:   Procedure Laterality Date    BX/REMV,LYMPH NODE,DEEP AXILL Right 05/04/2018    Dr. Montse Robert HX MASTECTOMY Right 5/4/2018    NEEDLE LOCALIZED (1000) LUMPECTOMY RIGHT BREAST AND SENTINEL LYMPH NODE BIOPSY RIGHT AXILLA performed by Jayleen Elizabeth MD at [address] HX ORTHOPAEDIC      foot l heel spur    HX ORTHOPAEDIC      right knee sx    HX OTHER SURGICAL      non cancer cyst removed from right shoulder    NEUROLOGICAL PROCEDURE UNLISTED      Bilateral L3-4 hemilaminotomy and medial facetectomy by Dr. Shona Coffman   . MEDICATIONS      Current Outpatient Prescriptions   Medication Sig Dispense Refill    pantoprazole (PROTONIX) 40 mg tablet Take 40 mg by mouth daily.  diclofenac EC (VOLTAREN) 75 mg EC tablet Take 1 Tab by mouth two (2) times daily as needed. 60 Tab 5    traMADol (ULTRAM) 50 mg tablet Take 1 Tab daily PRN Pain 30 Tab 0    gabapentin (NEURONTIN) 300 mg capsule Take 1 Cap by mouth three (3) times daily. Indications: sensory sensitivity 270 Cap 1    amitriptyline (ELAVIL) 25 mg tablet Take 1 Tab by mouth nightly. Indications: MIGRAINE PREVENTION 90 Tab 1    letrozole (FEMARA) 2.5 mg tablet Take 1 Tab by mouth daily. 90 Tab 3    Cholecalciferol, Vitamin D3, (VITAMIN D3) 2,000 unit cap capsule Take  by mouth daily.  fluticasone-vilanterol (BREO ELLIPTA) 200-25 mcg/dose inhaler Take 1 Puff by inhalation daily.  butalbital-acetaminophen-caff (FIORICET) -40 mg per capsule every four (4) hours as needed. 0    docusate sodium (COLACE) 100 mg capsule Take 1 Cap by mouth two (2) times a day. 30 Cap 0    ferrous sulfate 325 mg (65 mg iron) tablet Take 1 Tab by mouth two (2) times daily (with meals). 30 Tab 0    ascorbic acid (VITAMIN C) 250 mg tablet Take 1 Tab by mouth two (2) times daily (with meals).  30 Tab 0    losartan-hydrochlorothiazide (HYZAAR) 100-25 mg per tablet Take 1 Tab by mouth daily. Indications: HYPERTENSION      esomeprazole (NEXIUM) 40 mg capsule   0        ALLERGIES    Allergies   Allergen Reactions    Aspirin Other (comments)     GI Pain    Adhesive Tape-Silicones Other (comments)     blisters          SOCIAL HISTORY    Social History     Social History    Marital status: SINGLE     Spouse name: N/A    Number of children: N/A    Years of education: N/A     Occupational History    Not on file.      Social History Main Topics    Smoking status: Never Smoker    Smokeless tobacco: Never Used    Alcohol use No    Drug use: No    Sexual activity: Not on file     Other Topics Concern    Not on file     Social History Narrative     Social History Narrative      Problem Relation Age of Onset    Hypertension Mother     Arthritis-osteo Mother     Neuropathy Mother     Hypertension Father     Cancer Father      colon    Hypertension Brother     Cancer Brother     Neuropathy Brother     Neuropathy Other     Arthritis-rheumatoid Other     GERD Other     Diabetes Other     Diabetes Maternal Aunt     Diabetes Maternal Uncle     Hypertension Maternal Grandfather          Shy Cooper NP

## 2018-09-17 ENCOUNTER — OFFICE VISIT (OUTPATIENT)
Dept: ONCOLOGY | Age: 63
End: 2018-09-17

## 2018-09-17 ENCOUNTER — HOSPITAL ENCOUNTER (OUTPATIENT)
Dept: ONCOLOGY | Age: 63
Discharge: HOME | End: 2018-09-17

## 2018-09-17 ENCOUNTER — HOSPITAL ENCOUNTER (OUTPATIENT)
Dept: LAB | Age: 63
Discharge: HOME | End: 2018-09-17
Payer: MEDICARE

## 2018-09-17 VITALS
WEIGHT: 198 LBS | DIASTOLIC BLOOD PRESSURE: 84 MMHG | HEART RATE: 91 BPM | SYSTOLIC BLOOD PRESSURE: 132 MMHG | RESPIRATION RATE: 18 BRPM | TEMPERATURE: 99.7 F | BODY MASS INDEX: 30.01 KG/M2 | HEIGHT: 68 IN

## 2018-09-17 DIAGNOSIS — C50.511 MALIGNANT NEOPLASM OF LOWER-OUTER QUADRANT OF RIGHT BREAST OF FEMALE, ESTROGEN RECEPTOR POSITIVE (HCC): ICD-10-CM

## 2018-09-17 DIAGNOSIS — Z17.0 MALIGNANT NEOPLASM OF LOWER-OUTER QUADRANT OF RIGHT BREAST OF FEMALE, ESTROGEN RECEPTOR POSITIVE (HCC): ICD-10-CM

## 2018-09-17 DIAGNOSIS — C50.511 MALIGNANT NEOPLASM OF LOWER-OUTER QUADRANT OF RIGHT BREAST OF FEMALE, ESTROGEN RECEPTOR POSITIVE (HCC): Primary | ICD-10-CM

## 2018-09-17 DIAGNOSIS — Z17.0 MALIGNANT NEOPLASM OF LOWER-OUTER QUADRANT OF RIGHT BREAST OF FEMALE, ESTROGEN RECEPTOR POSITIVE (HCC): Primary | ICD-10-CM

## 2018-09-17 LAB
ALBUMIN SERPL-MCNC: 3.7 G/DL (ref 3.4–5)
ALBUMIN/GLOB SERPL: 1 {RATIO} (ref 0.8–1.7)
ALP SERPL-CCNC: 70 U/L (ref 45–117)
ALT SERPL-CCNC: 17 U/L (ref 13–56)
ANION GAP SERPL CALC-SCNC: 11 MMOL/L (ref 3–18)
AST SERPL-CCNC: 18 U/L (ref 15–37)
BASO+EOS+MONOS # BLD AUTO: 0.4 K/UL (ref 0–2.3)
BASO+EOS+MONOS # BLD AUTO: 6 % (ref 0.1–17)
BILIRUB SERPL-MCNC: 0.4 MG/DL (ref 0.2–1)
BUN SERPL-MCNC: 15 MG/DL (ref 7–18)
BUN/CREAT SERPL: 14 (ref 12–20)
CALCIUM SERPL-MCNC: 9.2 MG/DL (ref 8.5–10.1)
CHLORIDE SERPL-SCNC: 109 MMOL/L (ref 100–108)
CO2 SERPL-SCNC: 24 MMOL/L (ref 21–32)
CREAT SERPL-MCNC: 1.1 MG/DL (ref 0.6–1.3)
DIFFERENTIAL METHOD BLD: ABNORMAL
ERYTHROCYTE [DISTWIDTH] IN BLOOD BY AUTOMATED COUNT: 14 % (ref 11.5–14.5)
GLOBULIN SER CALC-MCNC: 3.8 G/DL (ref 2–4)
GLUCOSE SERPL-MCNC: 93 MG/DL (ref 74–99)
HCT VFR BLD AUTO: 33.4 % (ref 36–48)
HGB BLD-MCNC: 11 G/DL (ref 12–16)
LYMPHOCYTES # BLD: 1.8 K/UL (ref 1.1–5.9)
LYMPHOCYTES NFR BLD: 26 % (ref 14–44)
MCH RBC QN AUTO: 28.1 PG (ref 25–35)
MCHC RBC AUTO-ENTMCNC: 32.9 G/DL (ref 31–37)
MCV RBC AUTO: 85.4 FL (ref 78–102)
NEUTS SEG # BLD: 4.9 K/UL (ref 1.8–9.5)
NEUTS SEG NFR BLD: 68 % (ref 40–70)
PLATELET # BLD AUTO: 236 K/UL (ref 140–440)
POTASSIUM SERPL-SCNC: 3.6 MMOL/L (ref 3.5–5.5)
PROT SERPL-MCNC: 7.5 G/DL (ref 6.4–8.2)
RBC # BLD AUTO: 3.91 M/UL (ref 4.1–5.1)
SODIUM SERPL-SCNC: 144 MMOL/L (ref 136–145)
WBC # BLD AUTO: 7.1 K/UL (ref 4.5–13)

## 2018-09-17 PROCEDURE — 86300 IMMUNOASSAY TUMOR CA 15-3: CPT | Performed by: INTERNAL MEDICINE

## 2018-09-17 PROCEDURE — 80053 COMPREHEN METABOLIC PANEL: CPT | Performed by: INTERNAL MEDICINE

## 2018-09-17 NOTE — PATIENT INSTRUCTIONS
Breast Cancer: Care Instructions  Your Care Instructions    Breast cancer occurs when abnormal cells grow out of control in the breast. These cancer cells can spread within the breast, to nearby lymph nodes and other tissues, and to other parts of the body. Being treated for cancer can weaken your body, and you may feel very tired. Get the rest your body needs so you can feel better. Finding out that you have cancer is scary. You may feel many emotions and may need some help coping. Seek out family, friends, and counselors for support. You also can do things at home to make yourself feel better while you go through treatment. Call the [employer] (2-212.681.9716) or visit its website at [employer] for more information. Follow-up care is a key part of your treatment and safety. Be sure to make and go to all appointments, and call your doctor if you are having problems. It's also a good idea to know your test results and keep a list of the medicines you take. How can you care for yourself at home? · Take your medicines exactly as prescribed. Call your doctor if you think you are having a problem with your medicine. You may get medicine for nausea and vomiting if you have these side effects. · Follow your doctor's instructions to relieve pain. Pain from cancer and surgery can almost always be controlled. Use pain medicine when you first notice pain, before it becomes severe. · Eat healthy food. If you do not feel like eating, try to eat food that has protein and extra calories to keep up your strength and prevent weight loss. Drink liquid meal replacements for extra calories and protein. Try to eat your main meal early. · Get some physical activity every day, but do not get too tired. Keep doing the hobbies you enjoy as your energy allows. · Do not smoke. Smoking can make your cancer worse. If you need help quitting, talk to your doctor about stop-smoking programs and medicines.  These can increase your chances of quitting for good. · Take steps to control your stress and workload. Learn relaxation techniques. ¨ Share your feelings. Stress and tension affect our emotions. By expressing your feelings to others, you may be able to understand and cope with them. ¨ Consider joining a support group. Talking about a problem with your spouse, a good friend, or other people with similar problems is a good way to reduce tension and stress. ¨ Express yourself through art. Try writing, crafts, dance, or art to relieve stress. Some dance, writing, or art groups may be available just for people who have cancer. ¨ Be kind to your body and mind. Getting enough sleep, eating a healthy diet, and taking time to do things you enjoy can contribute to an overall feeling of balance in your life and can help reduce stress. ¨ Get help if you need it. Discuss your concerns with your doctor or counselor. · If you are vomiting or have diarrhea:  ¨ Drink plenty of fluids (enough so that your urine is light yellow or clear like water) to prevent dehydration. Choose water and other caffeine-free clear liquids. If you have kidney, heart, or liver disease and have to limit fluids, talk with your doctor before you increase the amount of fluids you drink. ¨ When you are able to eat, try clear soups, mild foods, and liquids until all symptoms are gone for 12 to 48 hours. Other good choices include dry toast, crackers, cooked cereal, and gelatin dessert, such as Jell-O.  · If you have not already done so, prepare a list of advance directives. Advance directives are instructions to your doctor and family members about what kind of care you want if you become unable to speak or express yourself. When should you call for help? Call 911 anytime you think you may need emergency care.  For example, call if:    · You passed out (lost consciousness).    Call your doctor now or seek immediate medical care if:    · You have a fever.     · You have abnormal bleeding.     · You think you have an infection.     · You have new or worse pain.     · You have new symptoms, such as a cough, belly pain, vomiting, diarrhea, or a rash.    Watch closely for changes in your health, and be sure to contact your doctor if:    · You are much more tired than usual.     · You have swollen glands in your armpits, groin, or neck.     · You do not get better as expected. Where can you learn more? Go to http://janessa-raphael.info/. Enter V321 in the search box to learn more about \"Breast Cancer: Care Instructions. \"  Current as of: May 12, 2017  Content Version: 11.7  © 7213-2216 [employer]. Care instructions adapted under license by [employer] (which disclaims liability or warranty for this information). If you have questions about a medical condition or this instruction, always ask your healthcare professional. [address] any warranty or liability for your use of this information.

## 2018-09-17 NOTE — MR AVS SNAPSHOT
[address] [address] 200 [facility] 
[phone] Patient: Sherryle Marsh MRN: [id number] HAV:[id number]/[id number] Visit Information Date & Time Provider Department Dept. Phone Encounter #  
 9/17/2018  3:00 PM Arvin Dozier MD [facility] [phone] [id number] Follow-up Instructions Return in about 3 months (around 12/17/2018). Your Appointments 10/18/2018  3:00 PM  
Follow Up with Jatin Ortega NP  
[facility] ([address] [place] [address]) Appt Note: 3 MO F/U WITH NP  
 [address] [facility] [address]  
[phone]  
  
   
 [address] 2301 [facility] 100 83 [facility]  
  
    
 12/10/2018  3:00 PM  
Follow Up with Jatin Ortega NP  
[facility] ([address] [place] [address]) Appt Note: 3 mo f/u with NP  
 [address] [facility] [address]  
[phone]  
  
    
 12/17/2018  3:00 PM  
Office Visit with Arvin Dozier MD  
[facility] [address] [place] [address]) Appt Note: 3 MO RET  
 [address] 3200 [facility], [address] [place] [address] Upcoming Health Maintenance Date Due Hepatitis C Screening 1955 Pneumococcal 19-64 Highest Risk (1 of 3 - PCV13) 9/11/1976 DTaP/Tdap/Td series (1 - Tdap) 9/11/1978 PAP AKA CERVICAL CYTOLOGY 9/11/1978 FOBT Q 1 YEAR AGE 50-75 9/11/2007 ZOSTER VACCINE AGE 60> 7/11/2017 MEDICARE YEARLY EXAM 3/14/2018 Influenza Age 5 to Adult 8/1/2018 BREAST CANCER SCRN MAMMOGRAM 3/6/2020 Allergies as of 9/17/2018  Review Complete On: 9/17/2018 By: Arvin Dozier MD  
  
 Severity Noted Reaction Type Reactions Aspirin High 11/21/2012   Side Effect Other (comments) GI Pain Adhesive Tape-silicones  61/99/4874    Other (comments)  
 blisters Current Immunizations  Never Reviewed No immunizations on file. Not reviewed this visit You Were Diagnosed With   
  
 Codes Comments Malignant neoplasm of lower-outer quadrant of right breast of female, estrogen receptor positive ([facility] [address]    -  Primary ICD-10-CM: C50.511, Z17.0 ICD-9-CM: 174.5, V86.0 Vitals BP Pulse Temp Resp Height(growth percentile) Weight(growth percentile) 132/84 91 99.7 °F (37.6 °C) (Oral) 18 5' 8\" (1.727 m) 198 lb (89.8 kg) BMI OB Status Smoking Status 30.11 kg/m2 Postmenopausal Never Smoker BMI and BSA Data Body Mass Index Body Surface Area  
 30.11 kg/m 2 2.08 m 2 Preferred [facility] Name Phone [facility] [address] [place] [address] Rd [phone] Your Updated Medication List  
  
   
This list is accurate as of 9/17/18  3:30 PM.  Always use your most recent med list.  
  
  
  
  
 amitriptyline 25 mg tablet Commonly known as:  ELAVIL Take 1 Tab by mouth nightly. Indications: MIGRAINE PREVENTION  
  
 ascorbic acid (vitamin C) 250 mg tablet Commonly known as:  VITAMIN C Take 1 Tab by mouth two (2) times daily (with meals). BREO ELLIPTA 200-25 mcg/dose inhaler Generic drug:  fluticasone-vilanterol Take 1 Puff by inhalation daily. butalbital-acetaminophen-caff -40 mg per capsule Commonly known as:  FIORICET  
every four (4) hours as needed. diclofenac EC 75 mg EC tablet Commonly known as:  VOLTAREN Take 1 Tab by mouth two (2) times daily as needed. docusate sodium 100 mg capsule Commonly known as:  Shaneka Antis Take 1 Cap by mouth two (2) times a day. esomeprazole 40 mg capsule Commonly known as:  NEXIUM  
  
 ferrous sulfate 325 mg (65 mg iron) tablet Take 1 Tab by mouth two (2) times daily (with meals). gabapentin 300 mg capsule Commonly known as:  NEURONTIN Take 1 Cap by mouth three (3) times daily. Indications: sensory sensitivity  
  
 letrozole 2.5 mg tablet Commonly known as:  [facility] Take 1 Tab by mouth daily. losartan-hydroCHLOROthiazide 100-25 mg per tablet Commonly known as:  HYZAAR Take 1 Tab by mouth daily. Indications: HYPERTENSION  
  
 PROTONIX 40 mg tablet Generic drug:  pantoprazole Take 40 mg by mouth daily. traMADol 50 mg tablet Commonly known as:  ULTRAM  
Take 1 Tab daily PRN Pain VITAMIN D3 2,000 unit Cap capsule Generic drug:  Cholecalciferol (Vitamin D3) Take  by mouth daily. We Performed the Following COMPLETE CBC & AUTO DIFF WBC [51607 CPT(R)] Follow-up Instructions Return in about 3 months (around 12/17/2018). To-Do List   
 09/17/2018 Lab:  CBC WITH 3 PART DIFF Patient Instructions Breast Cancer: Care Instructions Your Care Instructions Breast cancer occurs when abnormal cells grow out of control in the breast. These cancer cells can spread within the breast, to nearby lymph nodes and other tissues, and to other parts of the body. Being treated for cancer can weaken your body, and you may feel very tired. Get the rest your body needs so you can feel better. Finding out that you have cancer is scary. You may feel many emotions and may need some help coping. Seek out family, friends, and counselors for support. You also can do things at home to make yourself feel better while you go through treatment. Call the [employer] (2-717.214.2958) or visit its website at 8[address] Click Quote Save. Nualight for more information. Follow-up care is a key part of your treatment and safety. Be sure to make and go to all appointments, and call your doctor if you are having problems. It's also a good idea to know your test results and keep a list of the medicines you take. How can you care for yourself at home? · Take your medicines exactly as prescribed. Call your doctor if you think you are having a problem with your medicine. You may get medicine for nausea and vomiting if you have these side effects. · Follow your doctor's instructions to relieve pain. Pain from cancer and surgery can almost always be controlled. Use pain medicine when you first notice pain, before it becomes severe. · Eat healthy food. If you do not feel like eating, try to eat food that has protein and extra calories to keep up your strength and prevent weight loss. Drink liquid meal replacements for extra calories and protein. Try to eat your main meal early. · Get some physical activity every day, but do not get too tired. Keep doing the hobbies you enjoy as your energy allows. · Do not smoke. Smoking can make your cancer worse. If you need help quitting, talk to your doctor about stop-smoking programs and medicines. These can increase your chances of quitting for good. · Take steps to control your stress and workload. Learn relaxation techniques. ¨ Share your feelings. Stress and tension affect our emotions. By expressing your feelings to others, you may be able to understand and cope with them. ¨ Consider joining a support group. Talking about a problem with your spouse, a good friend, or other people with similar problems is a good way to reduce tension and stress. ¨ Express yourself through art. Try writing, crafts, dance, or art to relieve stress. Some dance, writing, or art groups may be available just for people who have cancer. ¨ Be kind to your body and mind. Getting enough sleep, eating a healthy diet, and taking time to do things you enjoy can contribute to an overall feeling of balance in your life and can help reduce stress. ¨ Get help if you need it. Discuss your concerns with your doctor or counselor. · If you are vomiting or have diarrhea: ¨ Drink plenty of fluids (enough so that your urine is light yellow or clear like water) to prevent dehydration. Choose water and other caffeine-free clear liquids. If you have kidney, heart, or liver disease and have to limit fluids, talk with your doctor before you increase the amount of fluids you drink. ¨ When you are able to eat, try clear soups, mild foods, and liquids until all symptoms are gone for 12 to 48 hours. Other good choices include dry toast, crackers, cooked cereal, and gelatin dessert, such as Jell-O. · If you have not already done so, prepare a list of advance directives. Advance directives are instructions to your doctor and family members about what kind of care you want if you become unable to speak or express yourself. When should you call for help? Call 911 anytime you think you may need emergency care. For example, call if: 
  · You passed out (lost consciousness).  
 Call your doctor now or seek immediate medical care if: 
  · You have a fever.  
  · You have abnormal bleeding.  
  · You think you have an infection.  
  · You have new or worse pain.  
  · You have new symptoms, such as a cough, belly pain, vomiting, diarrhea, or a rash.  
 Watch closely for changes in your health, and be sure to contact your doctor if: 
  · You are much more tired than usual.  
  · You have swollen glands in your armpits, groin, or neck.  
  · You do not get better as expected. Where can you learn more? Go to http://janessa-raphael.info/. Enter V321 in the search box to learn more about \"Breast Cancer: Care Instructions. \" Current as of: May 12, 2017 Content Version: 11.7 © 9342-9009 "[employer]. Care instructions adapted under license by [employer] (which disclaims liability or warranty for this information). If you have questions about a medical condition or this instruction, always ask your healthcare professional. [address] any warranty or liability for your use of this information. Introducing [facility]! Dear Isaiah Morales: Thank you for requesting a [employer] account. Our records indicate that you already have an active [employer] account. You can access your account anytime at [email or web address] (So-Shee) Gold mines. RoboCV/[email or web address] mines Did you know that you can access your hospital and ER discharge instructions at any time in [employer]? You can also review all of your test results from your hospital stay or ER visit. Additional Information If you have questions, please visit the Frequently Asked Questions section of the [employer] website at [email or web address] (So-Shee) Gold mines/. Remember, [employer] is NOT to be used for urgent needs. For medical emergencies, dial 911. Now available from your iPhone and Android! Please provide this summary of care documentation to your next provider. Your primary care clinician is listed as Kylie Ackerman. If you have any questions after today's visit, please call [phone].

## 2018-09-17 NOTE — PROGRESS NOTES
Hematology/Oncology  Progress Note    Name: Cedrick Kirkpatrick  Date: 2018  : 1955    PCP: Chad Smith MD     Ms. Bud Marin is a 66 y.o. 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  Current therapy: Femara 2.5 mg p.o. daily. Subjective:     Ms. Bud Marin is a 72-year-old -American woman was recently diagnosed with estrogen receptor positive breast cancer. She is not taking Humira 2.5 mg daily. Patient reports that she has continued to tolerate the medication without significant untoward side effects. She has no new complaints or concerns at this time. Past medical history, family history, and social history: these were reviewed and remains unchanged.     Past Medical History:   Diagnosis Date    Arthritis     Balance problems     patient states no further issues    Breast CA ([address] 2018    right    Bronchitis     Chronic pain     back    Fibromyalgia 2012    GERD (gastroesophageal reflux disease)     Hiatal Hernia    Hypertension     Kidney stones     Low back pain     Lumbar spinal stenosis 2012    Migraine headache     Nervousness     Neurological disease     Osteoarthritis 2012    Other ill-defined conditions(799.89)     neuropathy    Peripheral neuropathy 2012    Post laminectomy syndrome     Postoperative anemia due to acute blood loss 2012    Ringing in ears     Spinal stenosis     Thoracic spine pain     Vitamin D deficiency 2012     Past Surgical History:   Procedure Laterality Date    BX/REMV,LYMPH NODE,DEEP AXILL Right 2018    Dr. Kristopher Thompson HX MASTECTOMY Right 2018    NEEDLE LOCALIZED (1000) LUMPECTOMY RIGHT BREAST AND SENTINEL LYMPH NODE BIOPSY RIGHT AXILLA performed by Lore Hussein MD at [facility] MAIN OR    HX ORTHOPAEDIC      foot l heel spur    HX ORTHOPAEDIC      right knee sx    HX OTHER SURGICAL      non cancer cyst removed from right shoulder    NEUROLOGICAL PROCEDURE UNLISTED      Bilateral L3-4 hemilaminotomy and medial facetectomy by Dr. José Antonio Puente History     Social History    Marital status: SINGLE     Spouse name: N/A    Number of children: N/A    Years of education: N/A     Occupational History    Not on file. Social History Main Topics    Smoking status: Never Smoker    Smokeless tobacco: Never Used    Alcohol use No    Drug use: No    Sexual activity: Not on file     Other Topics Concern    Not on file     Social History Narrative     Family History   Problem Relation Age of Onset    Hypertension Mother     Arthritis-osteo Mother     Neuropathy Mother     Hypertension Father     Cancer Father      colon    Hypertension Brother     Cancer Brother     Neuropathy Brother     Neuropathy Other     Arthritis-rheumatoid Other     GERD Other     Diabetes Other     Diabetes Maternal Aunt     Diabetes Maternal Uncle     Hypertension Maternal Grandfather      Current Outpatient Prescriptions   Medication Sig Dispense Refill    pantoprazole (PROTONIX) 40 mg tablet Take 40 mg by mouth daily.  esomeprazole (NEXIUM) 40 mg capsule   0    diclofenac EC (VOLTAREN) 75 mg EC tablet Take 1 Tab by mouth two (2) times daily as needed. 60 Tab 5    traMADol (ULTRAM) 50 mg tablet Take 1 Tab daily PRN Pain 30 Tab 0    gabapentin (NEURONTIN) 300 mg capsule Take 1 Cap by mouth three (3) times daily. Indications: sensory sensitivity 270 Cap 1    amitriptyline (ELAVIL) 25 mg tablet Take 1 Tab by mouth nightly. Indications: MIGRAINE PREVENTION 90 Tab 1    letrozole (FEMARA) 2.5 mg tablet Take 1 Tab by mouth daily. 90 Tab 3    Cholecalciferol, Vitamin D3, (VITAMIN D3) 2,000 unit cap capsule Take  by mouth daily.  fluticasone-vilanterol (BREO ELLIPTA) 200-25 mcg/dose inhaler Take 1 Puff by inhalation daily.       butalbital-acetaminophen-caff (FIORICET) -40 mg per capsule every four (4) hours as needed. 0    docusate sodium (COLACE) 100 mg capsule Take 1 Cap by mouth two (2) times a day. 30 Cap 0    ferrous sulfate 325 mg (65 mg iron) tablet Take 1 Tab by mouth two (2) times daily (with meals). 30 Tab 0    ascorbic acid (VITAMIN C) 250 mg tablet Take 1 Tab by mouth two (2) times daily (with meals). 30 Tab 0    losartan-hydrochlorothiazide (HYZAAR) 100-25 mg per tablet Take 1 Tab by mouth daily. Indications: HYPERTENSION         Review of Systems  Constitutional: The patient has no acute distress or discomfort. HEENT: The patient denies recent head trauma, eye pain, blurred vision,  hearing deficit, oropharyngeal mucosal pain or lesions, and the patient denies throat pain or discomfort. Lymphatics: The patient denies palpable peripheral lymphadenopathy. Hematologic: The patient denies having bruising, bleeding, or progressive fatigue. Respiratory: Patient denies having shortness of breath, cough, sputum production, fever, or dyspnea on exertion. Cardiovascular: The patient denies having leg pain, leg swelling, heart palpitations, chest permit, chest pain, or lightheadedness. The patient denies having dyspnea on exertion. Gastrointestinal: The patient denies having nausea, emesis, or diarrhea. The patient denies having any hematemesis or blood in the stool. Genitourinary: Patient denies having urinary urgency, frequency, or dysuria. The patient denies having blood in the urine. Psychological: The patient denies having symptoms of nervousness, anxiety, depression, or thoughts of harming self. Skin: Patient denies having skin rashes, skin, ulcerations, or unexplained itching or pruritus. Musculoskeletal: The patient denies having pain in the joints or bones.       Objective:     Visit Vitals    /84    Pulse 91    Temp 99.7 °F (37.6 °C) (Oral)    Resp 18    Ht 5' 8\" (1.727 m)    Wt 89.8 kg (198 lb)    BMI 30.11 kg/m2     ECOG PS=0; pain score=0/10    Physical Exam: Gen. Appearance: The patient is in no acute distress. Skin: There is no bruise or rash. HEENT: The exam is unremarkable. Neck: Supple without lymphadenopathy or thyromegaly. Lungs: Clear to auscultation and percussion; there are no wheezes or rhonchi. Anterior chest wall and breast: There is no evidence of local recurrence of disease. Heart: Regular rate and rhythm; there are no murmurs, gallops, or rubs. Abdomen: Bowel sounds are present and normal.  There is no guarding, tenderness, or hepatosplenomegaly. Extremities: There is no clubbing, cyanosis, or edema. Neurologic: There are no focal neurologic deficits. Lymphatics: There is no palpable peripheral lymphadenopathy. Musculoskeletal: The patient has full range of motion at all joints. There is no evidence of joint deformity or effusions. There is no focal joint tenderness. Psychological/psychiatric: There is no clinical evidence of anxiety, depression, or melancholy. Lab data:      Results for orders placed or performed during the hospital encounter of 09/17/18   CBC WITH 3 PART DIFF     Status: Abnormal   Result Value Ref Range Status    WBC 7.1 4.5 - 13.0 K/uL Final    RBC 3.91 (L) 4.10 - 5.10 M/uL Final    HGB 11.0 (L) 12.0 - 16 g/dL Final    HCT 33.4 (L) 36 - 48 % Final    MCV 85.4 78 - 102 FL Final    MCH 28.1 25.0 - 35.0 PG Final    MCHC 32.9 31 - 37 g/dL Final    RDW 14.0 11.5 - 14.5 % Final    PLATELET 017 852 - 377 K/uL Final    NEUTROPHILS 68 40 - 70 % Final    MIXED CELLS 6 0.1 - 17 % Final    LYMPHOCYTES 26 14 - 44 % Final    ABS. NEUTROPHILS 4.9 1.8 - 9.5 K/UL Final    ABS. MIXED CELLS 0.4 0.0 - 2.3 K/uL Final    ABS. LYMPHOCYTES 1.8 1.1 - 5.9 K/UL Final     Comment: Test performed at [place] [address] Location. Results Reviewed by Medical Director. DF AUTOMATED   Final           Assessment:     1.  Malignant neoplasm of lower-outer quadrant of right breast of female, estrogen receptor positive ([facility] [address]        Plan: Invasive ductal carcinoma, right breast: Patient was advised to continue taking 2.5 mg p.o. daily. I will check a CA-27-29 level at this time. Follow-up will occur at 3 month intervals in the future. Follow-up in 3 months. Orders Placed This Encounter    COMPLETE CBC & AUTO DIFF WBC    InHouse CBC ([place])     Standing Status:   Future     Number of Occurrences:   1     Standing Expiration Date:   9/24/2018       Judy Burroughs MD  9/17/2018      Please note: This document has been produced using voice recognition software. Unrecognized errors in transcription may be present.                                                                                     Postreduction 1

## 2018-09-19 LAB — CANCER AG27-29 SERPL-ACNC: 29.1 U/ML (ref 0–38.6)

## 2018-10-25 ENCOUNTER — OFFICE VISIT (OUTPATIENT)
Dept: ORTHOPEDIC SURGERY | Age: 63
End: 2018-10-25

## 2018-10-25 VITALS
BODY MASS INDEX: 30.45 KG/M2 | OXYGEN SATURATION: 99 % | WEIGHT: 194 LBS | TEMPERATURE: 97.9 F | HEIGHT: 67 IN | DIASTOLIC BLOOD PRESSURE: 74 MMHG | RESPIRATION RATE: 20 BRPM | HEART RATE: 100 BPM | SYSTOLIC BLOOD PRESSURE: 133 MMHG

## 2018-10-25 DIAGNOSIS — M48.062 SPINAL STENOSIS OF LUMBAR REGION WITH NEUROGENIC CLAUDICATION: Primary | ICD-10-CM

## 2018-10-25 DIAGNOSIS — Z79.891 ENCOUNTER FOR LONG-TERM OPIATE ANALGESIC USE: ICD-10-CM

## 2018-10-25 DIAGNOSIS — M51.36 DDD (DEGENERATIVE DISC DISEASE), LUMBAR: ICD-10-CM

## 2018-10-25 NOTE — PROGRESS NOTES
[address] [facility]  [address], [address] [facility], [facility]  Phone: [phone]  Fax: [phone]  PROGRESS NOTE  Patient: Brent Leggett                MRN: [id number]       SSN: [id number]  YOB: 1955        AGE: 66 y.o. SEX: female  Body mass index is 30.38 kg/m². PCP: Baldemar Bhardwaj MD  10/25/18    Chief Complaint   Patient presents with    Back Pain     Lower     Leg Pain     Bilateral     Follow-up     3 MO        HISTORY OF PRESENT ILLNESS:  Jane Genao is a 66 y.o.  female with history of chronic low back pain and BLE radicular pain in the L3 distribution. Prior history of back problems: recurrent self limited episodes of low back pain in the past, previous osteoarthritis of lumbar spine, disc protrusions and stenosis, DDD, and previous spinal surgery - L3-4 Bilateral lami in 2012 by Dr. Lucien Denise.  Rosi Matta tried; PT-Yes,  Non-opioid medications Yes, and spinal injections Unknown. Pain is aching, burning, numbing, stabbing and throbbing. Pain is worse with manual/sedentary work, walking and affects sleep and recreational activities  and her ability to perform ADLs. Pain is better with relaxation, pain medication and lying down.      At her last OV, I wanted to consistently take her Elavil. We had discussed updating her MRI and injections, she declined. Today, she reports mid-low back pain indicative of DDD and BLE peripheral neuropathy and radicular pain in the L3 distribution. She reports general weakness and fatigue related to her cancer. We discussed her medications and alternative treatments, such as; HEP, ice, heat, and massage. The Gabapentin does help but gives her drowsiness at higher doses. She doesn't feel like she needs the Elavil. She is doing well and continues to want to avoid surgery.     Reports that pain would be a 10/10 w/out medication and that it goes down to a 3/10 after medication.  This enables the pt to be functional, achieve ADLs and engage in social activities. Denies bladder/bowel dysfunction, saddle paresthesia, weakness, gait disturbance, or other neurological deficits. Denies chills, fever,night sweats, unexplained weight loss/weight gain, chest pain, sob or anxiety. Pt at this time desires to  continue with current care/proceed with medication evaluation/proceed with evaluation of back pain    Significant Exam Findings: none    Medications: States she has been using Gabapentin 300mg TID, Diclofenac PRN, Tramadol 50mg 1-2x weekly with moderate, relief (she has some left over from previous Rx). She takes Fiorcet for headaches. PMHx: She has a hx of breast cancer, (she had surgery and has gone thru radiation), fibromyalgia, and peripheral neuropathy that contributes to her pain. Opioid Assessment     Least pain over the last week has been 2/10. Worst pain over the last week has been 6/10. Opioid Risk Tool Reviewed: YES, score: 4  Aberrant behaviors: None. Urine Drug Screen: reviewed and up to date. Controlled substance agreement on file: YES.  reviewed:yes  Pill count is consistent with her prescription: n/a  Concomitant use of a benzodiazepine: no  MME 0-20  Narcan not warranted   Also,  abstinence syndrome was reviewed and discussed with her today N/A     Risks and benefits of ongoing opiate therapy have been reviewed with the patient. No pain behaviors. Denies thoughts of harming self or others. Pt has a good risk to benefit ratio which allows the pt to function in a home environment without side effects      ASSESSMENT   Diagnoses and all orders for this visit:    1. Spinal stenosis of lumbar region with neurogenic claudication    2. DDD (degenerative disc disease), lumbar    3. Encounter for long-term opiate analgesic use         IMPRESSION AND PLAN:  This is a pt with spinal stenosis who is doing is unchanged since last OV.      1) Pt was given information on chronic pain 2) Continue Gabapentin and Tramadol-states she still has July Rx, it may be -she is going to try and get it filled if it is  then we can call in a new refill  3) Continue to be active  4) Ms. Mohan Hicks has a reminder for a \"due or due soon\" health maintenance. I have asked that she contact her primary care provider, Corazon Samaniego MD, for follow-up on this health maintenance. 5) We have informed patient to notify us for immediate appointment if he has any worsening neurogical symptoms or if an emergency situation presents, then call 911  6)  has been reviewed and is appropriate  7) Pt will follow-up in 3 mo w/ me. Subjective    Pain Scale: 5/10    Pain Assessment  10/25/2018   Location of Pain Back;Leg;Foot   Location Modifiers Left;Right; Inferior   Severity of Pain 5   Quality of Pain Sharp; Aching; Other (Comment)   Quality of Pain Comment N/T bilateral legs and feet   Duration of Pain Persistent   Frequency of Pain Constant   Aggravating Factors Walking   Limiting Behavior -   Relieving Factors Nothing   Result of Injury No         REVIEW OF SYSTEMS  Constitutional: Negative for fever, chills, or weight change. Respiratory: Negative for cough or shortness of breath. Cardiovascular: Negative for chest pain or palpitations. Gastrointestinal: Negative for incontinence, acid reflux, change in bowel habits, or constipation. Genitourinary: Negative for incontinence, dysuria and flank pain. Musculoskeletal: Positive for Back and BLE pain. See HPI. Skin: Negative for rash. Neurological:BLE L3  radiculopathy. See HPI. Endo/Heme/Allergies: Negative. Psychiatric/Behavioral: Negative. PHYSICAL EXAMINATION  Visit Vitals  /74   Pulse 100   Temp 97.9 °F (36.6 °C)   Resp 20   Ht 5' 7\" (1.702 m)   Wt 194 lb (88 kg)   SpO2 99%   BMI 30.38 kg/m²         Accompanied by Self. Constitutional:  Well developed, well nourished, in no acute distress.    Psychiatric: Affect and mood are appropriate. Integumentary: No rashes or abrasions noted on exposed areas. Cardiovascular/Peripheral Vascular: +2 radial & pedal pulses. No peripheral edema is noted. Lymphatic:  No evidence of lymphedema. No cervical lymphadenopathy. SPINE/MUSCULOSKELETAL EXAM     Lumbar spine:  No rash, ecchymosis, or gross obliquity. No fasciculations. No focal atrophy is noted. Range of motion is pain with extension. Tenderness to palpation mid and bilateral low back L4-5. No tenderness to palpation at the sciatic notch. SI joints non-tender. Trochanters non tender. Straight leg raise Negative    Sensation grossly intact to light touch. MOTOR:     Hip Flex Quads Hamstrings Ankle DF EHL Ankle PF   Right +4/5 +4/5 +4/5 +4/5 +4/5 +4/5   Left +4/5 +4/5 +4/5 +4/5 +4/5 +4/5       Ambulation without assistive device. FWB.     Non-antalgic gait        PAST MEDICAL HISTORY   Past Medical History:   Diagnosis Date    Arthritis     Balance problems     patient states no further issues    Breast CA ([address] 03/2018    right    Bronchitis     Chronic pain     back    Fibromyalgia 11/29/2012    GERD (gastroesophageal reflux disease)     Hiatal Hernia    Hypertension     Kidney stones     Low back pain     Lumbar spinal stenosis 11/25/2012    Migraine headache     Nervousness     Neurological disease     Osteoarthritis 11/25/2012    Other ill-defined conditions(799.89)     neuropathy    Peripheral neuropathy 11/29/2012    Post laminectomy syndrome     Postoperative anemia due to acute blood loss 11/30/2012    Ringing in ears     Spinal stenosis     Thoracic spine pain     Vitamin D deficiency 11/30/2012       Past Surgical History:   Procedure Laterality Date    BX/REMV,LYMPH NODE,DEEP AXILL Right 05/04/2018    Dr. Bianca Hamm HX ORTHOPAEDIC      foot l heel spur    HX ORTHOPAEDIC      right knee sx    HX OTHER SURGICAL      non cancer cyst removed from right shoulder    NEUROLOGICAL PROCEDURE UNLISTED      Bilateral L3-4 hemilaminotomy and medial facetectomy by Dr. Tex Day   . MEDICATIONS      Current Outpatient Medications   Medication Sig Dispense Refill    esomeprazole (NEXIUM) 40 mg capsule   0    diclofenac EC (VOLTAREN) 75 mg EC tablet Take 1 Tab by mouth two (2) times daily as needed. 60 Tab 5    traMADol (ULTRAM) 50 mg tablet Take 1 Tab daily PRN Pain 30 Tab 0    gabapentin (NEURONTIN) 300 mg capsule Take 1 Cap by mouth three (3) times daily. Indications: sensory sensitivity 270 Cap 1    letrozole (FEMARA) 2.5 mg tablet Take 1 Tab by mouth daily. 90 Tab 3    Cholecalciferol, Vitamin D3, (VITAMIN D3) 2,000 unit cap capsule Take  by mouth daily.  fluticasone-vilanterol (BREO ELLIPTA) 200-25 mcg/dose inhaler Take 1 Puff by inhalation daily.  butalbital-acetaminophen-caff (FIORICET) -40 mg per capsule every four (4) hours as needed. 0    docusate sodium (COLACE) 100 mg capsule Take 1 Cap by mouth two (2) times a day. 30 Cap 0    ferrous sulfate 325 mg (65 mg iron) tablet Take 1 Tab by mouth two (2) times daily (with meals). 30 Tab 0    ascorbic acid (VITAMIN C) 250 mg tablet Take 1 Tab by mouth two (2) times daily (with meals). 30 Tab 0    losartan-hydrochlorothiazide (HYZAAR) 100-25 mg per tablet Take 1 Tab by mouth daily. Indications: HYPERTENSION      pantoprazole (PROTONIX) 40 mg tablet Take 40 mg by mouth daily.  amitriptyline (ELAVIL) 25 mg tablet Take 1 Tab by mouth nightly.  Indications: MIGRAINE PREVENTION 90 Tab 1        ALLERGIES    Allergies   Allergen Reactions    Aspirin Other (comments)     GI Pain    Adhesive Tape-Silicones Other (comments)     blisters          SOCIAL HISTORY    Social History     Socioeconomic History    Marital status: SINGLE     Spouse name: Not on file    Number of children: Not on file    Years of education: Not on file    Highest education level: Not on file   Social Needs    Financial resource strain: Not on file    Food insecurity - worry: Not on file    Food insecurity - inability: Not on file    Transportation needs - medical: Not on file   [employer] needs - non-medical: Not on file   Occupational History    Not on file   Tobacco Use    Smoking status: Never Smoker    Smokeless tobacco: Never Used   Substance and Sexual Activity    Alcohol use: No    Drug use: No    Sexual activity: Not on file   Other Topics Concern    Not on file   Social History Narrative    Not on file       FAMILY HISTORY    Family History   Problem Relation Age of Onset    Hypertension Mother    [facility] Arthritis-osteo Mother     Neuropathy Mother     Hypertension Father     Cancer Father         colon    Hypertension Brother     Cancer Brother     Neuropathy Brother     Neuropathy Other     Arthritis-rheumatoid Other     GERD Other     Diabetes Other     Diabetes Maternal Aunt     Diabetes Maternal Uncle     Hypertension Maternal Grandfather          Imtiaz Ng NP

## 2018-10-26 ENCOUNTER — TELEPHONE (OUTPATIENT)
Dept: SURGERY | Age: 63
End: 2018-10-26

## 2018-10-26 ENCOUNTER — OFFICE VISIT (OUTPATIENT)
Dept: SURGERY | Age: 63
End: 2018-10-26

## 2018-10-26 VITALS
TEMPERATURE: 99.4 F | HEIGHT: 67 IN | WEIGHT: 192 LBS | HEART RATE: 96 BPM | BODY MASS INDEX: 30.13 KG/M2 | SYSTOLIC BLOOD PRESSURE: 121 MMHG | DIASTOLIC BLOOD PRESSURE: 75 MMHG

## 2018-10-26 DIAGNOSIS — N61.0 MASTITIS, ACUTE: Primary | ICD-10-CM

## 2018-10-26 DIAGNOSIS — Z92.3: ICD-10-CM

## 2018-10-26 RX ORDER — OXYCODONE AND ACETAMINOPHEN 5; 325 MG/1; MG/1
1 TABLET ORAL
Qty: 12 TAB | Refills: 0 | Status: SHIPPED | OUTPATIENT
Start: 2018-10-26 | End: 2018-12-12 | Stop reason: ALTCHOICE

## 2018-10-26 RX ORDER — AMOXICILLIN AND CLAVULANATE POTASSIUM 875; 125 MG/1; MG/1
1 TABLET, FILM COATED ORAL EVERY 12 HOURS
Qty: 20 TAB | Refills: 0 | Status: SHIPPED | OUTPATIENT
Start: 2018-10-26 | End: 2019-02-05 | Stop reason: ALTCHOICE

## 2018-10-26 NOTE — PATIENT INSTRUCTIONS
Mastitis: Care Instructions  Your Care Instructions  Mastitis is an inflammation of the breast. It occurs most often in women who are breastfeeding, but it can affect any woman. Mastitis can be caused by poor milk flow from the breast. When milk builds up in a breast, it leaks into the nearby breast tissue. Infection can also develop when the nipples become cracked or irritated. The tissue can then become infected with bacteria. Antibiotics can usually cure mastitis. For women who are nursing, continued breastfeeding (or pumping) can help. If mastitis is not treated, a pocket of pus may form in the breast and need to be drained. Follow-up care is a key part of your treatment and safety. Be sure to make and go to all appointments, and call your doctor if you are having problems. It's also a good idea to know your test results and keep a list of the medicines you take. How can you care for yourself at home? · If your doctor prescribed antibiotics, take them as directed. Do not stop taking them just because you feel better. You need to take the full course of antibiotics. · If you are breastfeeding, continue breastfeeding or pumping breast milk. It is important to empty your breasts regularly, every 2 to 3 hours while you are awake. These tips may help:  ? Before breastfeeding, place a warm, wet washcloth over your breast for about 15 minutes. Try this at least 3 times a day. This increases milk flow in the breast. Massaging the affected breast may also increase milk flow. ? Breastfeed on both sides. Try to start with your healthy breast first. Then, after your milk is flowing, breastfeed from the affected breast until it feels soft. You should empty this breast completely. Then switch back to the healthy breast and breastfeed until your baby has finished. ? Pump or hand-express a small amount of breast milk before breastfeeding if your breasts are too full with milk.  This will make your breasts less full and may make it easier for your baby to latch on to your breast.  ? Pump or express milk from the affected breast if it hurts too much to breastfeed. · Take an over-the-counter pain medicine, such as acetaminophen (Tylenol) or ibuprofen (Advil, Motrin) to relieve pain and fever. Read and follow all instructions on the label. · Do not take two or more pain medicines at the same time unless the doctor told you to. Many pain medicines have acetaminophen, which is Tylenol. Too much acetaminophen (Tylenol) can be harmful. · Rest as much as possible. · Drink extra fluids. · If pus is draining from your infected breast, wash the nipple gently and let it air-dry before you put your bra back on. A disposable breast pad placed in the bra cup may absorb the pus. When should you call for help? Call your doctor now or seek immediate medical care if:    · You have worse symptoms of a breast infection, such as:  ? Increased pain, swelling, redness, or warmth around a breast.  ? Red streaks leading from a breast.  ? Pus draining from a breast.  ? A fever.    Watch closely for changes in your health, and be sure to contact your doctor if:    · You do not get better as expected. Where can you learn more? Go to http://janessa-raphael.info/. Enter Y207 in the search box to learn more about \"Mastitis: Care Instructions. \"  Current as of: November 21, 2017  Content Version: 11.8  © 3387-7417 [employer]. Care instructions adapted under license by [employer] (which disclaims liability or warranty for this information). If you have questions about a medical condition or this instruction, always ask your healthcare professional. [place] [address] any warranty or liability for your use of this information.

## 2018-10-26 NOTE — PROGRESS NOTES
Joanna Hwang. [place], FNP-C  PROGRESS NOTE    Subjective:    Ms. Priyank Ponce is a very pleasant 72-year-old female known to our practice based on her personal history of right breast cancer in May of this year. She saw me 1 week postop and has not followed up since. Completed her external beam radiation the end of July. She continues to see Dr. Lucero Torrez regularly with the last visit in September. She called the office this morning and stress complaining of 10/10 breast pain to her right breast which began 2-3 days ago. She denies having fever, but states that she feels horrible overall. She denies any drainage from her breast or her nipple. She states that she did have some reaction of the skin from radiation, but did not require treatment. She does not recall any trauma to the area and has been in constant pain for a few days. She cries when I attempt to touch her breast.  She has a temperature of 99.5 in the office today. Objective:    Vitals:    10/26/18 1257   BP: 121/75   Pulse: 96   Temp: 99.4 °F (37.4 °C)   Weight: 87.1 kg (192 lb)   Height: 5' 7\" (1.702 m)       Physical Exam:    General: Alert and oriented x 3, cooperative, in no apparent distress   Breasts:    Left: Deferred   Right: Inner quadrant of breast red, hot, extremely tender to touch. Dried, crusty drainage to nipple. Unable to express drainage or blood of any kind. Current Medications:  Current Outpatient Medications   Medication Sig Dispense Refill    oxyCODONE-acetaminophen (PERCOCET) 5-325 mg per tablet Take 1 Tab by mouth every six (6) hours as needed. Max Daily Amount: 4 Tabs. 1 tab every 6 hours as needed for pain 12 Tab 0    amoxicillin-clavulanate (AUGMENTIN) 875-125 mg per tablet Take 1 Tab by mouth every twelve (12) hours. 20 Tab 0    pantoprazole (PROTONIX) 40 mg tablet Take 40 mg by mouth daily.  diclofenac EC (VOLTAREN) 75 mg EC tablet Take 1 Tab by mouth two (2) times daily as needed.  60 Tab 5    traMADol (ULTRAM) 50 mg tablet Take 1 Tab daily PRN Pain 30 Tab 0    gabapentin (NEURONTIN) 300 mg capsule Take 1 Cap by mouth three (3) times daily. Indications: sensory sensitivity 270 Cap 1    amitriptyline (ELAVIL) 25 mg tablet Take 1 Tab by mouth nightly. Indications: MIGRAINE PREVENTION 90 Tab 1    letrozole (FEMARA) 2.5 mg tablet Take 1 Tab by mouth daily. 90 Tab 3    Cholecalciferol, Vitamin D3, (VITAMIN D3) 2,000 unit cap capsule Take  by mouth daily.  fluticasone-vilanterol (BREO ELLIPTA) 200-25 mcg/dose inhaler Take 1 Puff by inhalation daily.  butalbital-acetaminophen-caff (FIORICET) -40 mg per capsule every four (4) hours as needed. 0    docusate sodium (COLACE) 100 mg capsule Take 1 Cap by mouth two (2) times a day. 30 Cap 0    ferrous sulfate 325 mg (65 mg iron) tablet Take 1 Tab by mouth two (2) times daily (with meals). 30 Tab 0    ascorbic acid (VITAMIN C) 250 mg tablet Take 1 Tab by mouth two (2) times daily (with meals). 30 Tab 0    losartan-hydrochlorothiazide (HYZAAR) 100-25 mg per tablet Take 1 Tab by mouth daily. Indications: HYPERTENSION      esomeprazole (NEXIUM) 40 mg capsule   0       Chart and notes reviewed. Data reviewed. I have evaluated and examined the patient. Impression:  · Patient with a personal history of Stage 1, right, invasive, ductal adenocarcinoma S/P lumpectomy, external beam radiation and antiestrogen therapy here today with postradiation mastitis    Plan:   · Augmentin  · Percocet  · Please call the office Monday to let us know how you are feeling  · Follow-up appointment for clinical breast exam when infection has cleared    Ms. Denise Salazar has a reminder for a \"due or due soon\" health maintenance. I have asked that she contact her primary care provider for follow-up on this health maintenance. Alicia Lazo.  [place], RENEP-C

## 2018-10-29 ENCOUNTER — TELEPHONE (OUTPATIENT)
Dept: SURGERY | Age: 63
End: 2018-10-29

## 2018-10-29 NOTE — TELEPHONE ENCOUNTER
Patient called to let Barb Rose NP know that the antibiotic is slowly but surely working, and will call to scheduled a clinical breast exam once the infection has cleared, explained to patient if symptoms become worse or any questions arise to call our office.

## 2018-11-08 ENCOUNTER — OFFICE VISIT (OUTPATIENT)
Dept: SURGERY | Age: 63
End: 2018-11-08

## 2018-11-08 VITALS
SYSTOLIC BLOOD PRESSURE: 116 MMHG | DIASTOLIC BLOOD PRESSURE: 70 MMHG | OXYGEN SATURATION: 99 % | HEART RATE: 86 BPM | WEIGHT: 191 LBS | BODY MASS INDEX: 29.98 KG/M2 | RESPIRATION RATE: 16 BRPM | TEMPERATURE: 98.6 F | HEIGHT: 67 IN

## 2018-11-08 DIAGNOSIS — N61.0 MASTITIS, ACUTE: Primary | ICD-10-CM

## 2018-11-08 DIAGNOSIS — N64.4 BREAST PAIN, RIGHT: ICD-10-CM

## 2018-11-08 DIAGNOSIS — Z85.3 PERSONAL HISTORY OF BREAST CANCER: ICD-10-CM

## 2018-11-08 RX ORDER — DOXYCYCLINE 100 MG/1
100 CAPSULE ORAL 2 TIMES DAILY
Qty: 28 CAP | Refills: 0 | Status: SHIPPED | OUTPATIENT
Start: 2018-11-08 | End: 2018-11-22

## 2018-11-08 NOTE — PATIENT INSTRUCTIONS
Breast Pain: Care Instructions  Your Care Instructions    Breast tenderness and pain may come and go with your monthly periods (cyclic), or it may not follow any pattern (noncyclic). Breast pain is rarely caused by a serious health problem. You may need tests to find the cause. Follow-up care is a key part of your treatment and safety. Be sure to make and go to all appointments, and call your doctor if you are having problems. It's also a good idea to know your test results and keep a list of the medicines you take. How can you care for yourself at home? · If your doctor gave you medicine, take it exactly as prescribed. Call your doctor if you think you are having a problem with your medicine. · Take an over-the-counter pain medicine, such as acetaminophen (Tylenol), ibuprofen (Advil, Motrin), or naproxen (Aleve), to relieve pain and swelling. Read and follow all instructions on the label. · Do not take two or more pain medicines at the same time unless the doctor told you to. Many pain medicines have acetaminophen, which is Tylenol. Too much acetaminophen (Tylenol) can be harmful. · Wear a supportive bra, such as a sports bra or a jog bra. · Cut down on the amount of fat in your diet. If you need help planning healthy meals, see a dietitian. · Get at least 30 minutes of exercise on most days of the week. Walking is a good choice. You also may want to do other activities, such as running, swimming, cycling, or playing tennis or team sports. · Keep a healthy sleep pattern. Go to bed at the same time every night, and get up at the same time every day. When should you call for help? Call your doctor now or seek immediate medical care if:    · You have new changes in a breast, such as:  ? A lump or thickening in your breast or armpit. ? A change in the breast's size or shape. ? Skin changes, such as dimples or puckers. ? Nipple discharge.   ? A change in the color or feel of the skin of your breast or the darker area around the nipple (areola). ? A change in the shape of the nipple (it may look like it's being pulled into the breast).     · You have symptoms of a breast infection, such as:  ? Increased pain, swelling, redness, or warmth around a breast.  ? Red streaks extending from the breast.  ? Pus draining from a breast.  ? A fever.    Watch closely for changes in your health, and be sure to contact your doctor if:    · Your breast pain does not get better after 1 week.     · You have a lump or thickening in your breast or armpit. Where can you learn more? Go to http://janessa-raphael.info/. Enter G204 in the search box to learn more about \"Breast Pain: Care Instructions. \"  Current as of: November 20, 2017  Content Version: 11.8  © 6781-7093 [employer]. Care instructions adapted under license by [employer] (which disclaims liability or warranty for this information). If you have questions about a medical condition or this instruction, always ask your healthcare professional. [address] any warranty or liability for your use of this information.

## 2018-11-08 NOTE — PROGRESS NOTES
Melania Miners. Dayanna Saunders, RENEP-C  PROGRESS NOTE    Subjective:    Ms. Nate Hale presents today for a clinical breast exam and mastitis followup. She completed her dose of Augmentin and feels better, but not completely. She still has a painful lump in the same area. She denies fever or chills, but states she just does not feel completely better. Baseline affect is stoic. She also has a personal history of ER+, invasive ductal, right breast adenocarcinoma. She was treated with lumpectomy in May of this year and completed her course of external beam radiation in July. She continues to take her femara daily and reports no untoward side effects. She has not been seen in our office since her immediate post operative phase until her visit for mastitis last week. She does continue to see Dr. Maile Wong regularly with her last appt in September. She reports an area of pain in the 12:00 region of her right breast that started after surgery and has never resolved. She flinches during exam.     Objective:    Vitals:    11/08/18 0954   BP: 116/70   Pulse: 86   Resp: 16   Temp: 98.6 °F (37 °C)   TempSrc: Oral   SpO2: 99%   Weight: 86.6 kg (191 lb)   Height: 5' 7\" (1.702 m)       Physical Exam:    General: Alert and oriented x 3, cooperative, in no apparent distress   Breasts:    Left:  no dimpling, discoloration, nipple inversion or retractions. no axillary or supraclavicular lymphadenopathy. no mass   Right:  no dimpling, discoloration, nipple inversion or retractions. no axillary or supraclavicular lymphadenopathy. Painful, palpable mass in 5:00 region approx 8cm from nipple. Erythema from last week has resolved. There is also a palpable mass which is also tender in the 12:00 region from 2-6cm from the nipple. Current Medications:  Current Outpatient Medications   Medication Sig Dispense Refill    doxycycline (MONODOX) 100 mg capsule Take 1 Cap by mouth two (2) times a day for 14 days.  28 Cap 0    pantoprazole (PROTONIX) 40 mg tablet Take 40 mg by mouth daily.  esomeprazole (NEXIUM) 40 mg capsule   0    diclofenac EC (VOLTAREN) 75 mg EC tablet Take 1 Tab by mouth two (2) times daily as needed. 60 Tab 5    traMADol (ULTRAM) 50 mg tablet Take 1 Tab daily PRN Pain 30 Tab 0    gabapentin (NEURONTIN) 300 mg capsule Take 1 Cap by mouth three (3) times daily. Indications: sensory sensitivity 270 Cap 1    amitriptyline (ELAVIL) 25 mg tablet Take 1 Tab by mouth nightly. Indications: MIGRAINE PREVENTION 90 Tab 1    letrozole (FEMARA) 2.5 mg tablet Take 1 Tab by mouth daily. 90 Tab 3    Cholecalciferol, Vitamin D3, (VITAMIN D3) 2,000 unit cap capsule Take  by mouth daily.  fluticasone-vilanterol (BREO ELLIPTA) 200-25 mcg/dose inhaler Take 1 Puff by inhalation daily.  butalbital-acetaminophen-caff (FIORICET) -40 mg per capsule every four (4) hours as needed. 0    docusate sodium (COLACE) 100 mg capsule Take 1 Cap by mouth two (2) times a day. 30 Cap 0    ferrous sulfate 325 mg (65 mg iron) tablet Take 1 Tab by mouth two (2) times daily (with meals). 30 Tab 0    ascorbic acid (VITAMIN C) 250 mg tablet Take 1 Tab by mouth two (2) times daily (with meals). 30 Tab 0    losartan-hydrochlorothiazide (HYZAAR) 100-25 mg per tablet Take 1 Tab by mouth daily. Indications: HYPERTENSION      oxyCODONE-acetaminophen (PERCOCET) 5-325 mg per tablet Take 1 Tab by mouth every six (6) hours as needed. Max Daily Amount: 4 Tabs. 1 tab every 6 hours as needed for pain 12 Tab 0    amoxicillin-clavulanate (AUGMENTIN) 875-125 mg per tablet Take 1 Tab by mouth every twelve (12) hours. 20 Tab 0       Chart and notes reviewed. Data reviewed. I have evaluated and examined the patient. Impression:  · Patient with a personal history of ER+, invasive ductal, right breast adenocarcinoma S/P lumpectomy and external beam radiation here today with breast pain in the right breast in 2 separate location.  Mastitis may not be fully resolved in lower, inner quadrant. Plan:   · Right diagnostic U/S now; will call with results  · Doxycycline  · Bilateral mammogram in February followed by clinical breast exam in this office  · Please call with questions or if symptoms worsen     Ms. Soraya Padilla has a reminder for a \"due or due soon\" health maintenance. I have asked that she contact her primary care provider for follow-up on this health maintenance. Zeke Yepez.  Cara Narvaez, LORE-C

## 2018-12-12 ENCOUNTER — TELEPHONE (OUTPATIENT)
Dept: SURGERY | Age: 63
End: 2018-12-12

## 2018-12-12 DIAGNOSIS — N64.4 BREAST PAIN: Primary | ICD-10-CM

## 2018-12-12 RX ORDER — IBUPROFEN 800 MG/1
800 TABLET ORAL
Qty: 90 TAB | Refills: 0 | Status: SHIPPED | OUTPATIENT
Start: 2018-12-12 | End: 2020-06-16 | Stop reason: SDUPTHER

## 2018-12-12 RX ORDER — ACETAMINOPHEN 500 MG
500 TABLET ORAL
Qty: 120 TAB | Refills: 0 | Status: SHIPPED | OUTPATIENT
Start: 2018-12-12

## 2018-12-12 NOTE — PROGRESS NOTES
Patient called to report severe pain in her affected breast. She denies fever, warmth, or redness to the breast. She wants pain medication. I asked her about the documented GI allergy to aspirin, but she denies this allergy. I made a schedule alternating extra strength tylenol with motrin and asked her to get on that schedule for a few days to see if the pain is better controlled.  She was advised to call back to let us know how she's doing and if she develops any fever, chills, or warmth/rednes to the breast.

## 2018-12-12 NOTE — TELEPHONE ENCOUNTER
Pt left antonio requesting antibiotic and pain med refill. Contacted patient verified identity using name and . Pt stated still having excruciating pain in lower left part of right breast. Denies any S/S of infection, denies drainage/edema/redness/streaking/warmth. Spoke with provider about patient sx whom stated she is putting her on a regimen of Motrin and Tylenol. Explained regimen to pt: 800 mg Motrin @ 8 am / 2pm  and 9pm  500 mg Tylenol @ 9am / 3pm and 10pm  Informed pt if sx do not resolve to call and reschedule appt sooner. If sx worsen go to ER. Patient verbalized understanding and will  Motrin and Tylenol at her pharmacy today.

## 2018-12-17 ENCOUNTER — HOSPITAL ENCOUNTER (OUTPATIENT)
Dept: ONCOLOGY | Age: 63
Discharge: HOME | End: 2018-12-17

## 2018-12-17 ENCOUNTER — OFFICE VISIT (OUTPATIENT)
Dept: ONCOLOGY | Age: 63
End: 2018-12-17

## 2018-12-17 ENCOUNTER — HOSPITAL ENCOUNTER (OUTPATIENT)
Dept: LAB | Age: 63
Discharge: HOME | End: 2018-12-17
Payer: MEDICARE

## 2018-12-17 VITALS
BODY MASS INDEX: 29.57 KG/M2 | WEIGHT: 188.4 LBS | SYSTOLIC BLOOD PRESSURE: 118 MMHG | RESPIRATION RATE: 19 BRPM | HEART RATE: 79 BPM | TEMPERATURE: 98.5 F | DIASTOLIC BLOOD PRESSURE: 77 MMHG | HEIGHT: 67 IN

## 2018-12-17 DIAGNOSIS — Z17.0 MALIGNANT NEOPLASM OF LOWER-OUTER QUADRANT OF RIGHT BREAST OF FEMALE, ESTROGEN RECEPTOR POSITIVE (HCC): Primary | ICD-10-CM

## 2018-12-17 DIAGNOSIS — Z17.0 MALIGNANT NEOPLASM OF LOWER-OUTER QUADRANT OF RIGHT BREAST OF FEMALE, ESTROGEN RECEPTOR POSITIVE (HCC): ICD-10-CM

## 2018-12-17 DIAGNOSIS — C50.511 MALIGNANT NEOPLASM OF LOWER-OUTER QUADRANT OF RIGHT BREAST OF FEMALE, ESTROGEN RECEPTOR POSITIVE (HCC): Primary | ICD-10-CM

## 2018-12-17 DIAGNOSIS — C50.511 MALIGNANT NEOPLASM OF LOWER-OUTER QUADRANT OF RIGHT BREAST OF FEMALE, ESTROGEN RECEPTOR POSITIVE (HCC): ICD-10-CM

## 2018-12-17 LAB
ALBUMIN SERPL-MCNC: 3.6 G/DL (ref 3.4–5)
ALBUMIN/GLOB SERPL: 1 {RATIO} (ref 0.8–1.7)
ALP SERPL-CCNC: 67 U/L (ref 45–117)
ALT SERPL-CCNC: 18 U/L (ref 13–56)
ANION GAP SERPL CALC-SCNC: 7 MMOL/L (ref 3–18)
AST SERPL-CCNC: 16 U/L (ref 15–37)
BASO+EOS+MONOS # BLD AUTO: 0.5 K/UL (ref 0–2.3)
BASO+EOS+MONOS # BLD AUTO: 7 % (ref 0.1–17)
BILIRUB SERPL-MCNC: 0.3 MG/DL (ref 0.2–1)
BUN SERPL-MCNC: 17 MG/DL (ref 7–18)
BUN/CREAT SERPL: 18 (ref 12–20)
CALCIUM SERPL-MCNC: 9.2 MG/DL (ref 8.5–10.1)
CHLORIDE SERPL-SCNC: 104 MMOL/L (ref 100–108)
CO2 SERPL-SCNC: 28 MMOL/L (ref 21–32)
CREAT SERPL-MCNC: 0.96 MG/DL (ref 0.6–1.3)
DIFFERENTIAL METHOD BLD: ABNORMAL
ERYTHROCYTE [DISTWIDTH] IN BLOOD BY AUTOMATED COUNT: 13.1 % (ref 11.5–14.5)
GLOBULIN SER CALC-MCNC: 3.7 G/DL (ref 2–4)
GLUCOSE SERPL-MCNC: 85 MG/DL (ref 74–99)
HCT VFR BLD AUTO: 34.7 % (ref 36–48)
HGB BLD-MCNC: 11.4 G/DL (ref 12–16)
LYMPHOCYTES # BLD: 2 K/UL (ref 1.1–5.9)
LYMPHOCYTES NFR BLD: 30 % (ref 14–44)
MCH RBC QN AUTO: 27.9 PG (ref 25–35)
MCHC RBC AUTO-ENTMCNC: 32.9 G/DL (ref 31–37)
MCV RBC AUTO: 84.8 FL (ref 78–102)
NEUTS SEG # BLD: 4.4 K/UL (ref 1.8–9.5)
NEUTS SEG NFR BLD: 63 % (ref 40–70)
PLATELET # BLD AUTO: 248 K/UL (ref 140–440)
POTASSIUM SERPL-SCNC: 3.4 MMOL/L (ref 3.5–5.5)
PROT SERPL-MCNC: 7.3 G/DL (ref 6.4–8.2)
RBC # BLD AUTO: 4.09 M/UL (ref 4.1–5.1)
SODIUM SERPL-SCNC: 139 MMOL/L (ref 136–145)
WBC # BLD AUTO: 6.9 K/UL (ref 4.5–13)

## 2018-12-17 PROCEDURE — 86300 IMMUNOASSAY TUMOR CA 15-3: CPT

## 2018-12-17 PROCEDURE — 80053 COMPREHEN METABOLIC PANEL: CPT

## 2018-12-17 PROCEDURE — 36415 COLL VENOUS BLD VENIPUNCTURE: CPT

## 2018-12-17 NOTE — PATIENT INSTRUCTIONS

## 2018-12-17 NOTE — PROGRESS NOTES
Hematology/Oncology  Progress Note    Name: Sophie Rowley  Date: 2018  : 1955    PCP: Piyush Boggs MD     Ms. Jessie Archer is a 66 y.o. 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  Current therapy: Femara 2.5 mg p.o. daily. Subjective:     Ms. Jessie Archer is a 81-year-old -American woman was recently diagnosed with estrogen receptor positive breast cancer. She is now taking Femara 2.5 mg daily. Patient reports that she has continued to tolerate the medication without significant untoward side effects. Today, she is complaining of having some pain in her legs possibly from over exertion. She thinks that she may fill a small mass or nodule in the breast as well. Past medical history, family history, and social history: these were reviewed and remains unchanged.     Past Medical History:   Diagnosis Date    Arthritis     Balance problems     patient states no further issues    Breast CA ([address] 2018    right    Bronchitis     Chronic pain     back    Fibromyalgia 2012    GERD (gastroesophageal reflux disease)     Hiatal Hernia    Hypertension     Kidney stones     Low back pain     Lumbar spinal stenosis 2012    Migraine headache     Nervousness     Neurological disease     Osteoarthritis 2012    Other ill-defined conditions(799.89)     neuropathy    Peripheral neuropathy 2012    Post laminectomy syndrome     Postoperative anemia due to acute blood loss 2012    Ringing in ears     Spinal stenosis     Thoracic spine pain     Vitamin D deficiency 2012     Past Surgical History:   Procedure Laterality Date    BX/REMV,LYMPH NODE,DEEP AXILL Right 2018    Dr. Brigid Cornell HX MASTECTOMY Right 2018    NEEDLE LOCALIZED (1000) LUMPECTOMY RIGHT BREAST AND SENTINEL LYMPH NODE BIOPSY RIGHT AXILLA performed by Sarath Guerrero MD at [address] [place] [address] ORTHOPAEDIC      foot l heel spur    HX ORTHOPAEDIC      right knee sx    HX OTHER SURGICAL      non cancer cyst removed from right shoulder    NEUROLOGICAL PROCEDURE UNLISTED      Bilateral L3-4 hemilaminotomy and medial facetectomy by Dr. Alexus Mobley History     Socioeconomic History    Marital status: SINGLE     Spouse name: Not on file    Number of children: Not on file    Years of education: Not on file    Highest education level: Not on file   Social Needs    Financial resource strain: Not on file    Food insecurity - worry: Not on file    Food insecurity - inability: Not on file    Transportation needs - medical: Not on file   [employer] needs - non-medical: Not on file   Occupational History    Not on file   Tobacco Use    Smoking status: Never Smoker    Smokeless tobacco: Never Used   Substance and Sexual Activity    Alcohol use: No    Drug use: No    Sexual activity: Not on file   Other Topics Concern    Not on file   Social History Narrative    Not on file     Family History   Problem Relation Age of Onset    Hypertension Mother     Arthritis-osteo Mother     Neuropathy Mother     Hypertension Father     Cancer Father         colon    Hypertension Brother     Cancer Brother     Neuropathy Brother     Neuropathy Other     Arthritis-rheumatoid Other     GERD Other     Diabetes Other     Diabetes Maternal Aunt     Diabetes Maternal Uncle     Hypertension Maternal Grandfather      Current Outpatient Medications   Medication Sig Dispense Refill    acetaminophen (TYLENOL) 500 mg tablet Take 1 Tab by mouth every six (6) hours as needed for Pain. 120 Tab 0    ibuprofen (MOTRIN) 800 mg tablet Take 1 Tab by mouth every eight (8) hours as needed for Pain. 90 Tab 0    amoxicillin-clavulanate (AUGMENTIN) 875-125 mg per tablet Take 1 Tab by mouth every twelve (12) hours. 20 Tab 0    pantoprazole (PROTONIX) 40 mg tablet Take 40 mg by mouth daily.       esomeprazole (NEXIUM) 40 mg capsule   0    diclofenac EC (VOLTAREN) 75 mg EC tablet Take 1 Tab by mouth two (2) times daily as needed. 60 Tab 5    traMADol (ULTRAM) 50 mg tablet Take 1 Tab daily PRN Pain 30 Tab 0    gabapentin (NEURONTIN) 300 mg capsule Take 1 Cap by mouth three (3) times daily. Indications: sensory sensitivity 270 Cap 1    amitriptyline (ELAVIL) 25 mg tablet Take 1 Tab by mouth nightly. Indications: MIGRAINE PREVENTION 90 Tab 1    letrozole (FEMARA) 2.5 mg tablet Take 1 Tab by mouth daily. 90 Tab 3    Cholecalciferol, Vitamin D3, (VITAMIN D3) 2,000 unit cap capsule Take  by mouth daily.  fluticasone-vilanterol (BREO ELLIPTA) 200-25 mcg/dose inhaler Take 1 Puff by inhalation daily.  docusate sodium (COLACE) 100 mg capsule Take 1 Cap by mouth two (2) times a day. 30 Cap 0    ferrous sulfate 325 mg (65 mg iron) tablet Take 1 Tab by mouth two (2) times daily (with meals). 30 Tab 0    ascorbic acid (VITAMIN C) 250 mg tablet Take 1 Tab by mouth two (2) times daily (with meals). 30 Tab 0    losartan-hydrochlorothiazide (HYZAAR) 100-25 mg per tablet Take 1 Tab by mouth daily. Indications: HYPERTENSION         Review of Systems  Constitutional: The patient has no acute distress or discomfort. HEENT: The patient denies recent head trauma, eye pain, blurred vision,  hearing deficit, oropharyngeal mucosal pain or lesions, and the patient denies throat pain or discomfort. Lymphatics: The patient denies palpable peripheral lymphadenopathy. Hematologic: The patient denies having bruising, bleeding, or progressive fatigue. Respiratory: Patient denies having shortness of breath, cough, sputum production, fever, or dyspnea on exertion. Cardiovascular: The patient denies having leg pain, leg swelling, heart palpitations, chest permit, chest pain, or lightheadedness. The patient denies having dyspnea on exertion. Gastrointestinal: The patient denies having nausea, emesis, or diarrhea.  The patient denies having any hematemesis or blood in the stool. Genitourinary: Patient denies having urinary urgency, frequency, or dysuria. The patient denies having blood in the urine. Psychological: The patient denies having symptoms of nervousness, anxiety, depression, or thoughts of harming self. Skin: Patient denies having skin rashes, skin, ulcerations, or unexplained itching or pruritus. Musculoskeletal: The patient denies having pain in the joints or bones. Objective:     Visit Vitals  /77   Pulse 79   Temp 98.5 °F (36.9 °C) (Oral)   Resp 19   Ht 5' 7\" (1.702 m)   Wt 85.5 kg (188 lb 6.4 oz)   BMI 29.51 kg/m²     ECOG PS=0; pain score=0/10    Physical Exam:   Gen. Appearance: The patient is in no acute distress. Skin: There is no bruise or rash. HEENT: The exam is unremarkable. Neck: Supple without lymphadenopathy or thyromegaly. Lungs: Clear to auscultation and percussion; there are no wheezes or rhonchi. Anterior chest wall and breast: There is significant tenderness over the lower quadrant of the right breast.  There is a prominence concerning for a mass or possible abscess. Although there is no nipple discharge. Theholly Oliverio Heart: Regular rate and rhythm; there are no murmurs, gallops, or rubs. Abdomen: Bowel sounds are present and normal.  There is no guarding, tenderness, or hepatosplenomegaly. Extremities: There is no clubbing, cyanosis, or edema. Neurologic: There are no focal neurologic deficits. Lymphatics: There is no palpable peripheral lymphadenopathy. Musculoskeletal: The patient has full range of motion at all joints. There is no evidence of joint deformity or effusions. There is no focal joint tenderness. Psychological/psychiatric: There is no clinical evidence of anxiety, depression, or melancholy.     Lab data:      Results for orders placed or performed during the hospital encounter of 12/17/18   CBC WITH 3 PART DIFF     Status: Abnormal   Result Value Ref Range Status    WBC 6.9 4.5 - 13.0 K/uL Final    RBC 4.09 (L) 4.10 - 5.10 M/uL Final    HGB 11.4 (L) 12.0 - 16 g/dL Final    HCT 34.7 (L) 36 - 48 % Final    MCV 84.8 78 - 102 FL Final    MCH 27.9 25.0 - 35.0 PG Final    MCHC 32.9 31 - 37 g/dL Final    RDW 13.1 11.5 - 14.5 % Final    PLATELET 407 286 - 943 K/uL Final    NEUTROPHILS 63 40 - 70 % Final    MIXED CELLS 7 0.1 - 17 % Final    LYMPHOCYTES 30 14 - 44 % Final    ABS. NEUTROPHILS 4.4 1.8 - 9.5 K/UL Final    ABS. MIXED CELLS 0.5 0.0 - 2.3 K/uL Final    ABS. LYMPHOCYTES 2.0 1.1 - 5.9 K/UL Final     Comment: Test performed at [place] [address] Location. Results Reviewed by Medical Director. DF AUTOMATED   Final           Assessment:     1. Malignant neoplasm of lower-outer quadrant of right breast of female, estrogen receptor positive ([address]        Plan:   Invasive ductal carcinoma, right breast: The patient was advised to continue taking Femara 2.5 mg p.o. daily. There is focal tenderness to the right breast.  There is a palpable mass versus small abscess which is concerning for possible recurrent disease. The patient will be scheduled for an MRI of the breasts and I will see her back in clinic as soon as this has been completed. If this shows a definite mass she will be referred to the surgeon for a complete assessment. Follow-up in 1 week or as soon as the MRI is completed. Orders Placed This Encounter    COMPLETE CBC & AUTO DIFF WBC    InHouse CBC ([facility])     Standing Status:   Future     Number of Occurrences:   1     Standing Expiration Date:   12/24/2018       Gayathri Jansen MD  12/17/2018      Please note: This document has been produced using voice recognition software. Unrecognized errors in transcription may be present.                                                                                     Postreduction 1

## 2018-12-18 LAB — CANCER AG27-29 SERPL-ACNC: 22.9 U/ML (ref 0–38.6)

## 2018-12-27 ENCOUNTER — HOSPITAL ENCOUNTER (OUTPATIENT)
Age: 63
Discharge: HOME | End: 2018-12-27
Attending: INTERNAL MEDICINE
Payer: MEDICARE

## 2018-12-27 DIAGNOSIS — C50.511 MALIGNANT NEOPLASM OF LOWER-OUTER QUADRANT OF RIGHT BREAST OF FEMALE, ESTROGEN RECEPTOR POSITIVE (HCC): ICD-10-CM

## 2018-12-27 DIAGNOSIS — Z17.0 MALIGNANT NEOPLASM OF LOWER-OUTER QUADRANT OF RIGHT BREAST OF FEMALE, ESTROGEN RECEPTOR POSITIVE (HCC): ICD-10-CM

## 2018-12-27 PROCEDURE — 77059 MRI BREAST BI W WO CONT: CPT

## 2018-12-27 PROCEDURE — A9577 INJ MULTIHANCE: HCPCS | Performed by: INTERNAL MEDICINE

## 2018-12-27 PROCEDURE — 74011250636 HC RX REV CODE- 250/636: Performed by: INTERNAL MEDICINE

## 2018-12-27 RX ADMIN — GADOBENATE DIMEGLUMINE 17 ML: 529 INJECTION, SOLUTION INTRAVENOUS at 10:40

## 2018-12-28 ENCOUNTER — TELEPHONE (OUTPATIENT)
Dept: ONCOLOGY | Age: 63
End: 2018-12-28

## 2018-12-28 NOTE — TELEPHONE ENCOUNTER
Called stating patient was in for an MRI and Dr. Conrado Yen wants to order additional test, Diagnostic Mammo, Ultrasound Mammo. And would like the orders faxed to [phone].

## 2019-01-02 ENCOUNTER — OFFICE VISIT (OUTPATIENT)
Dept: SURGERY | Age: 64
End: 2019-01-02

## 2019-01-02 ENCOUNTER — HOSPITAL ENCOUNTER (OUTPATIENT)
Dept: MAMMOGRAPHY | Age: 64
Discharge: HOME | End: 2019-01-02
Payer: MEDICARE

## 2019-01-02 VITALS
TEMPERATURE: 98.1 F | DIASTOLIC BLOOD PRESSURE: 82 MMHG | RESPIRATION RATE: 18 BRPM | SYSTOLIC BLOOD PRESSURE: 124 MMHG | HEART RATE: 81 BPM | OXYGEN SATURATION: 99 %

## 2019-01-02 DIAGNOSIS — Z85.3 HISTORY OF RIGHT BREAST CANCER: Primary | ICD-10-CM

## 2019-01-02 DIAGNOSIS — C50.511 MALIGNANT NEOPLASM OF LOWER-OUTER QUADRANT OF RIGHT FEMALE BREAST (HCC): ICD-10-CM

## 2019-01-02 DIAGNOSIS — Z09 FOLLOW UP: ICD-10-CM

## 2019-01-02 DIAGNOSIS — Z17.0 ESTROGEN RECEPTOR POSITIVE: ICD-10-CM

## 2019-01-02 PROCEDURE — 77062 BREAST TOMOSYNTHESIS BI: CPT

## 2019-01-02 NOTE — PROGRESS NOTES
[facility]  General Surgery    Subjective:  Patient presents today with complaints of 10 out of 10 pain in the right breast from the incision site in the lower inner quadrant into the tail of [place] region. She describes the pain as constant aching. She denies any recent nipple discharge or masses in the breast.  She denies any unintentional weight loss. Objective:  Vitals:    01/02/19 0905   BP: 124/82   Pulse: 81   Resp: 18   Temp: 98.1 °F (36.7 °C)   SpO2: 99%       Physical Exam:    General: Awake and alert, oriented x4, no apparent distress   Breasts:    Left: No dimpling, discoloration, nipple inversion or retractions. No axillary or supraclavicular lymphadenopathy. No mass   Right: No dimpling, discoloration, nipple inversion or retractions. No axillary or supraclavicular lymphadenopathy. No mass. Tenderness in the incision in the lower inner quadrant of the breast as well as in the remaining breast tissue. Current Medications:  Current Outpatient Medications   Medication Sig Dispense Refill    acetaminophen (TYLENOL) 500 mg tablet Take 1 Tab by mouth every six (6) hours as needed for Pain. 120 Tab 0    ibuprofen (MOTRIN) 800 mg tablet Take 1 Tab by mouth every eight (8) hours as needed for Pain. 90 Tab 0    pantoprazole (PROTONIX) 40 mg tablet Take 40 mg by mouth daily.  diclofenac EC (VOLTAREN) 75 mg EC tablet Take 1 Tab by mouth two (2) times daily as needed. 60 Tab 5    traMADol (ULTRAM) 50 mg tablet Take 1 Tab daily PRN Pain 30 Tab 0    gabapentin (NEURONTIN) 300 mg capsule Take 1 Cap by mouth three (3) times daily. Indications: sensory sensitivity 270 Cap 1    amitriptyline (ELAVIL) 25 mg tablet Take 1 Tab by mouth nightly. Indications: MIGRAINE PREVENTION 90 Tab 1    letrozole (FEMARA) 2.5 mg tablet Take 1 Tab by mouth daily. 90 Tab 3    Cholecalciferol, Vitamin D3, (VITAMIN D3) 2,000 unit cap capsule Take  by mouth daily.       ferrous sulfate 325 mg (65 mg iron) tablet Take 1 Tab by mouth two (2) times daily (with meals). 30 Tab 0    ascorbic acid (VITAMIN C) 250 mg tablet Take 1 Tab by mouth two (2) times daily (with meals). 30 Tab 0    losartan-hydrochlorothiazide (HYZAAR) 100-25 mg per tablet Take 1 Tab by mouth daily. Indications: HYPERTENSION      amoxicillin-clavulanate (AUGMENTIN) 875-125 mg per tablet Take 1 Tab by mouth every twelve (12) hours. 20 Tab 0    esomeprazole (NEXIUM) 40 mg capsule   0    fluticasone-vilanterol (BREO ELLIPTA) 200-25 mcg/dose inhaler Take 1 Puff by inhalation daily.  docusate sodium (COLACE) 100 mg capsule Take 1 Cap by mouth two (2) times a day. 30 Cap 0       Chart and notes reviewed. Data reviewed. I have evaluated and examined the patient. Impression and plan:  Patient is 8 months out from lumpectomy and sentinel lymph node biopsy for stage I aT1 cN0 M0 ER positive MD positive HER-2 negative right breast cancer. Diagnostic right breast mammogram and ultrasound today  We will follow-up on the mammogram, ultrasound and MRI readings. Continue monthly self breast exam  Will call the patient with the results and to schedule the next appointment.      Tobin Fowler MD

## 2019-01-02 NOTE — PROGRESS NOTES
Chief Complaint   Patient presents with    Follow-up     c/o pain in right breast and swelling        1. Have you been to the ER, urgent care clinic since your last visit? Hospitalized since your last visit? No    2. Have you seen or consulted any other health care providers outside of the [address] [place] [address] since your last visit? Include any pap smears or colon screening.  No

## 2019-01-07 ENCOUNTER — OFFICE VISIT (OUTPATIENT)
Dept: ONCOLOGY | Age: 64
End: 2019-01-07

## 2019-01-07 VITALS
SYSTOLIC BLOOD PRESSURE: 120 MMHG | TEMPERATURE: 98.4 F | WEIGHT: 190 LBS | DIASTOLIC BLOOD PRESSURE: 82 MMHG | HEIGHT: 67 IN | BODY MASS INDEX: 29.82 KG/M2 | RESPIRATION RATE: 18 BRPM | HEART RATE: 84 BPM

## 2019-01-07 DIAGNOSIS — C50.511 MALIGNANT NEOPLASM OF LOWER-OUTER QUADRANT OF RIGHT BREAST OF FEMALE, ESTROGEN RECEPTOR POSITIVE (HCC): Primary | ICD-10-CM

## 2019-01-07 DIAGNOSIS — G89.3 CANCER ASSOCIATED PAIN: ICD-10-CM

## 2019-01-07 DIAGNOSIS — M79.18 MYOFASCIAL PAIN: ICD-10-CM

## 2019-01-07 DIAGNOSIS — M47.899 FACET SYNDROME: ICD-10-CM

## 2019-01-07 DIAGNOSIS — Z17.0 MALIGNANT NEOPLASM OF LOWER-OUTER QUADRANT OF RIGHT BREAST OF FEMALE, ESTROGEN RECEPTOR POSITIVE (HCC): Primary | ICD-10-CM

## 2019-01-07 RX ORDER — TRAMADOL HYDROCHLORIDE 50 MG/1
TABLET ORAL
Qty: 90 TAB | Refills: 0 | Status: SHIPPED | OUTPATIENT
Start: 2019-01-07 | End: 2019-07-15 | Stop reason: SDUPTHER

## 2019-01-07 NOTE — PATIENT INSTRUCTIONS

## 2019-01-08 NOTE — PROGRESS NOTES
Hematology/medical oncology progress note    1/7/2019  Kayla Momin  YOB: 1955    Diagnosis: Invasive ductal carcinoma of the right breast    Mrs. Neville Titus is a 53-year-old woman who was recently on a right partial mastectomy for breast cancer. Recently she began experiencing some discomfort in her right breast.  The most recent mammogram dated 1/2/2019 showed no evidence of malignancy. She also had an MRI of the breast bilaterally done on 12/27/2018 and this test again showed expected postsurgical and radiation changes in the right breast.  There was no suspicious finding. The left breast showed no suspicious findings. Therefore have explained to the patient that she has no evidence of recurrent disease. She is continued to have pain and is requesting a new prescription for tramadol which was provided. I will see her back in clinic in 3 months. Patient had her questions answered to her satisfaction. Total time 30 minutes, greater than 50% of the time was in counseling and coordination of care. Malcolm Melendez MD, Lori Cristobal

## 2019-01-10 ENCOUNTER — TELEPHONE (OUTPATIENT)
Dept: ORTHOPEDIC SURGERY | Age: 64
End: 2019-01-10

## 2019-01-10 DIAGNOSIS — M79.18 MYOFASCIAL PAIN: ICD-10-CM

## 2019-01-10 DIAGNOSIS — M47.899 FACET SYNDROME: ICD-10-CM

## 2019-01-10 RX ORDER — GABAPENTIN 300 MG/1
300 CAPSULE ORAL 3 TIMES DAILY
Qty: 270 CAP | Refills: 0 | Status: SHIPPED | OUTPATIENT
Start: 2019-01-10 | End: 2019-05-30 | Stop reason: SDUPTHER

## 2019-01-10 NOTE — TELEPHONE ENCOUNTER
Office R/S appt with Mandie Kilgore for 1/24/19 to 2/5/19 with LEATHA Mckeon. Patient states she will be out of her Gabapentin and needs a refill until her next visit.

## 2019-02-05 ENCOUNTER — OFFICE VISIT (OUTPATIENT)
Dept: ORTHOPEDIC SURGERY | Age: 64
End: 2019-02-05

## 2019-02-05 VITALS
WEIGHT: 185 LBS | DIASTOLIC BLOOD PRESSURE: 73 MMHG | HEART RATE: 78 BPM | HEIGHT: 67 IN | SYSTOLIC BLOOD PRESSURE: 109 MMHG | BODY MASS INDEX: 29.03 KG/M2

## 2019-02-05 DIAGNOSIS — M79.18 MYOFASCIAL PAIN: ICD-10-CM

## 2019-02-05 DIAGNOSIS — M48.062 SPINAL STENOSIS OF LUMBAR REGION WITH NEUROGENIC CLAUDICATION: Primary | Chronic | ICD-10-CM

## 2019-02-05 DIAGNOSIS — M47.899 FACET SYNDROME: ICD-10-CM

## 2019-02-05 RX ORDER — DICLOFENAC SODIUM 75 MG/1
75 TABLET, DELAYED RELEASE ORAL
Qty: 60 TAB | Refills: 5 | Status: SHIPPED | OUTPATIENT
Start: 2019-02-05 | End: 2019-11-04 | Stop reason: SDUPTHER

## 2019-02-05 NOTE — PROGRESS NOTES
Chief complaint/History of Present Illness:  Chief Complaint   Patient presents with    Back Pain    Leg Pain     BLE     ANNA Guerrero is a  66 y.o.  female      HISTORY OF PRESENT ILLNESS:  This is a patient who comes in today for follow-up of chronic low back pain and bilateral lower extremity pain with numbness and tingling. She states her back pain is off and on, but the leg pain is pretty constant. It is controlled with gabapentin 300 mg t.i.d., diclofenac b.i.d. and tramadol 50 mg 1 to 2 times a week. She states she was having some breast issues. She does have a history of breast cancer. She saw Dr. Sonia Mejia on 01/17/2019. He did give her tramadol, a 3 month supply, so she will getting her tramadol from him from now on. She denies fever or bowel or bladder dysfunction. She is retired. She is a nonsmoker. PHYSICAL EXAMINATION:  Ms. Celia Seip is a 82-year-old female. She is alert and oriented with normal mood and affect. She has a full weightbearing but slow nonantalgic gait. No assistive device. She does take short steps. She has 4/5 strength bilateral lower extremities. Negative straight leg raise. She does have some pain with hyperextension lumbar spine. ASSESSMENT/PLAN:  This is a patient with spinal stenosis and degenerative disc disease. We have refilled her diclofenac. She knows to take it with food. She does not need the gabapentin at this time, but when she does, she will call us for a refill. She will be getting her tramadol from Dr. Sonia Mejia from now on and we will see her back in 6 months, sooner if needed.            Review of systems:    Past Medical History:   Diagnosis Date    Arthritis     Balance problems     patient states no further issues    Breast CA ([facility] [address] 03/2018    right    Bronchitis     Chronic pain     back    Fibromyalgia 11/29/2012    GERD (gastroesophageal reflux disease)     Hiatal Hernia    Hypertension     Kidney stones     Low back pain     Lumbar spinal stenosis 11/25/2012    Migraine headache     Nervousness     Neurological disease     Osteoarthritis 11/25/2012    Other ill-defined conditions(799.89)     neuropathy    Peripheral neuropathy 11/29/2012    Post laminectomy syndrome     Postoperative anemia due to acute blood loss 11/30/2012    Ringing in ears     Spinal stenosis     Thoracic spine pain     Vitamin D deficiency 11/30/2012     Past Surgical History:   Procedure Laterality Date    BX/REMV,LYMPH NODE,DEEP AXILL Right 05/04/2018    Dr. Angela Wylie HX MASTECTOMY Right 5/4/2018    NEEDLE LOCALIZED (1000) LUMPECTOMY RIGHT BREAST AND SENTINEL LYMPH NODE BIOPSY RIGHT AXILLA performed by Joyce Wan MD at [address] [place] [address] HX ORTHOPAEDIC      foot l heel spur    HX ORTHOPAEDIC      right knee sx    HX OTHER SURGICAL      non cancer cyst removed from right shoulder    NEUROLOGICAL PROCEDURE UNLISTED      Bilateral L3-4 hemilaminotomy and medial facetectomy by Dr. Zahida Carlos History     Socioeconomic History    Marital status: SINGLE     Spouse name: Not on file    Number of children: Not on file    Years of education: Not on file    Highest education level: Not on file   Social Needs    Financial resource strain: Not on file    Food insecurity - worry: Not on file    Food insecurity - inability: Not on file   [employer] needs - medical: Not on file   [employer] needs - non-medical: Not on file   Occupational History    Not on file   Tobacco Use    Smoking status: Never Smoker    Smokeless tobacco: Never Used   Substance and Sexual Activity    Alcohol use: No    Drug use: No    Sexual activity: Not on file   Other Topics Concern    Not on file   Social History Narrative    Not on file     Family History   Problem Relation Age of Onset    Hypertension Mother     Arthritis-osteo Mother     Neuropathy Mother     Hypertension Father     Cancer Father         colon    Hypertension Brother     Cancer Brother     Neuropathy Brother     Neuropathy Other     Arthritis-rheumatoid Other     GERD Other     Diabetes Other     Diabetes Maternal Aunt     Diabetes Maternal Uncle     Hypertension Maternal Grandfather        Physical Exam:  Visit Vitals  /73   Pulse 78   Ht 5' 7\" (1.702 m)   Wt 185 lb (83.9 kg)   BMI 28.98 kg/m²     Pain Scale: 5/10     has been . reviewed and is appropriate          Diagnoses and all orders for this visit:    1. Spinal stenosis of lumbar region with neurogenic claudication  -     diclofenac EC (VOLTAREN) 75 mg EC tablet; Take 1 Tab by mouth two (2) times daily as needed. 2. Facet syndrome (HCC)  -     diclofenac EC (VOLTAREN) 75 mg EC tablet; Take 1 Tab by mouth two (2) times daily as needed. 3. Myofascial pain            Follow-up Disposition:  Return in about 6 months (around 8/5/2019).         We have informed Felisa Stephens to notify us for immediate appointment if she has any worsening neurogical symptoms or if an emergency situation presents, then call [phone]

## 2019-04-15 ENCOUNTER — OFFICE VISIT (OUTPATIENT)
Dept: ONCOLOGY | Age: 64
End: 2019-04-15

## 2019-04-15 ENCOUNTER — HOSPITAL ENCOUNTER (OUTPATIENT)
Dept: ONCOLOGY | Age: 64
Discharge: HOME | End: 2019-04-15

## 2019-04-15 ENCOUNTER — HOSPITAL ENCOUNTER (OUTPATIENT)
Dept: LAB | Age: 64
Discharge: HOME | End: 2019-04-15
Payer: MEDICARE

## 2019-04-15 VITALS
TEMPERATURE: 97.7 F | BODY MASS INDEX: 28.22 KG/M2 | HEART RATE: 79 BPM | OXYGEN SATURATION: 96 % | WEIGHT: 179.8 LBS | DIASTOLIC BLOOD PRESSURE: 55 MMHG | SYSTOLIC BLOOD PRESSURE: 101 MMHG | HEIGHT: 67 IN

## 2019-04-15 DIAGNOSIS — C50.511 MALIGNANT NEOPLASM OF LOWER-OUTER QUADRANT OF RIGHT BREAST OF FEMALE, ESTROGEN RECEPTOR POSITIVE (HCC): ICD-10-CM

## 2019-04-15 DIAGNOSIS — C50.511 MALIGNANT NEOPLASM OF LOWER-OUTER QUADRANT OF RIGHT BREAST OF FEMALE, ESTROGEN RECEPTOR POSITIVE (HCC): Primary | ICD-10-CM

## 2019-04-15 DIAGNOSIS — Z17.0 MALIGNANT NEOPLASM OF LOWER-OUTER QUADRANT OF RIGHT BREAST OF FEMALE, ESTROGEN RECEPTOR POSITIVE (HCC): ICD-10-CM

## 2019-04-15 DIAGNOSIS — G89.3 CANCER ASSOCIATED PAIN: ICD-10-CM

## 2019-04-15 DIAGNOSIS — Z17.0 MALIGNANT NEOPLASM OF LOWER-OUTER QUADRANT OF RIGHT BREAST OF FEMALE, ESTROGEN RECEPTOR POSITIVE (HCC): Primary | ICD-10-CM

## 2019-04-15 DIAGNOSIS — D50.8 IRON DEFICIENCY ANEMIA SECONDARY TO INADEQUATE DIETARY IRON INTAKE: ICD-10-CM

## 2019-04-15 DIAGNOSIS — E55.9 VITAMIN D DEFICIENCY: ICD-10-CM

## 2019-04-15 LAB
ALBUMIN SERPL-MCNC: 3.7 G/DL (ref 3.4–5)
ALBUMIN/GLOB SERPL: 1.1 {RATIO} (ref 0.8–1.7)
ALP SERPL-CCNC: 65 U/L (ref 45–117)
ALT SERPL-CCNC: 22 U/L (ref 13–56)
ANION GAP SERPL CALC-SCNC: 6 MMOL/L (ref 3–18)
AST SERPL-CCNC: 25 U/L (ref 15–37)
BASO+EOS+MONOS # BLD AUTO: 0.3 K/UL (ref 0–2.3)
BASO+EOS+MONOS NFR BLD AUTO: 6 % (ref 0.1–17)
BILIRUB SERPL-MCNC: 0.3 MG/DL (ref 0.2–1)
BUN SERPL-MCNC: 20 MG/DL (ref 7–18)
BUN/CREAT SERPL: 18 (ref 12–20)
CALCIUM SERPL-MCNC: 9.6 MG/DL (ref 8.5–10.1)
CHLORIDE SERPL-SCNC: 107 MMOL/L (ref 100–108)
CO2 SERPL-SCNC: 28 MMOL/L (ref 21–32)
CREAT SERPL-MCNC: 1.11 MG/DL (ref 0.6–1.3)
DIFFERENTIAL METHOD BLD: ABNORMAL
ERYTHROCYTE [DISTWIDTH] IN BLOOD BY AUTOMATED COUNT: 13.5 % (ref 11.5–14.5)
FERRITIN SERPL-MCNC: 38 NG/ML (ref 8–388)
GLOBULIN SER CALC-MCNC: 3.5 G/DL (ref 2–4)
GLUCOSE SERPL-MCNC: 86 MG/DL (ref 74–99)
HCT VFR BLD AUTO: 32.9 % (ref 36–48)
HGB BLD-MCNC: 10.9 G/DL (ref 12–16)
IRON SATN MFR SERPL: 26 %
IRON SERPL-MCNC: 77 UG/DL (ref 50–175)
LYMPHOCYTES # BLD: 1.9 K/UL (ref 1.1–5.9)
LYMPHOCYTES NFR BLD: 35 % (ref 14–44)
MCH RBC QN AUTO: 28.1 PG (ref 25–35)
MCHC RBC AUTO-ENTMCNC: 33.1 G/DL (ref 31–37)
MCV RBC AUTO: 84.8 FL (ref 78–102)
NEUTS SEG # BLD: 3.2 K/UL (ref 1.8–9.5)
NEUTS SEG NFR BLD: 59 % (ref 40–70)
PLATELET # BLD AUTO: 220 K/UL (ref 140–440)
POTASSIUM SERPL-SCNC: 3.5 MMOL/L (ref 3.5–5.5)
PROT SERPL-MCNC: 7.2 G/DL (ref 6.4–8.2)
RBC # BLD AUTO: 3.88 M/UL (ref 4.1–5.1)
SODIUM SERPL-SCNC: 141 MMOL/L (ref 136–145)
TIBC SERPL-MCNC: 298 UG/DL (ref 250–450)
WBC # BLD AUTO: 5.4 K/UL (ref 4.5–13)

## 2019-04-15 PROCEDURE — 36415 COLL VENOUS BLD VENIPUNCTURE: CPT

## 2019-04-15 PROCEDURE — 83540 ASSAY OF IRON: CPT

## 2019-04-15 PROCEDURE — 86300 IMMUNOASSAY TUMOR CA 15-3: CPT

## 2019-04-15 PROCEDURE — 80053 COMPREHEN METABOLIC PANEL: CPT

## 2019-04-15 PROCEDURE — 82728 ASSAY OF FERRITIN: CPT

## 2019-04-15 PROCEDURE — 82306 VITAMIN D 25 HYDROXY: CPT

## 2019-04-15 NOTE — PROGRESS NOTES
Hematology/Oncology  Progress Note    Name: Terra Yin  Date: 4/15/2019  : 1955    PCP: Glendy Hodges MD     Ms. Dex Doll is a 66 y.o. -American woman with invasive ductal carcinoma the right breast.  Current therapy: Femara 2.5 mg p.o. daily      Subjective:     Mrs. Dex Doll is a 19-year-old -American woman with invasive ductal carcinoma involving the right breast.  She continues to tolerate the Femara without significant untoward side effects. She is doing her breast self exam on a monthly basis and has no new physical complaints or concerns to report. Past medical history, family history, and social history: these were reviewed and remains unchanged.     Past Medical History:   Diagnosis Date    Arthritis     Balance problems     patient states no further issues    Breast CA ([facility] [address] 2018    right    Bronchitis     Chronic pain     back    Fibromyalgia 2012    GERD (gastroesophageal reflux disease)     Hiatal Hernia    Hypertension     Kidney stones     Low back pain     Lumbar spinal stenosis 2012    Migraine headache     Nervousness     Neurological disease     Osteoarthritis 2012    Other ill-defined conditions(799.89)     neuropathy    Peripheral neuropathy 2012    Post laminectomy syndrome     Postoperative anemia due to acute blood loss 2012    Ringing in ears     Spinal stenosis     Thoracic spine pain     Vitamin D deficiency 2012     Past Surgical History:   Procedure Laterality Date    BX/REMV,LYMPH NODE,DEEP AXILL Right 2018    Dr. Barbara Posada HX MASTECTOMY Right 2018    NEEDLE LOCALIZED (1000) LUMPECTOMY RIGHT BREAST AND SENTINEL LYMPH NODE BIOPSY RIGHT AXILLA performed by Lalo Carias MD at [address] [place] [address] HX ORTHOPAEDIC      foot l heel spur    HX ORTHOPAEDIC      right knee sx    HX OTHER SURGICAL      non cancer cyst removed from right shoulder    NEUROLOGICAL PROCEDURE UNLISTED Bilateral L3-4 hemilaminotomy and medial facetectomy by Dr. Mascorro Overall History     Socioeconomic History    Marital status: SINGLE     Spouse name: Not on file    Number of children: Not on file    Years of education: Not on file    Highest education level: Not on file   Occupational History    Not on file   Social Needs    Financial resource strain: Not on file    Food insecurity:     Worry: Not on file     Inability: Not on file    Transportation needs:     Medical: Not on file     Non-medical: Not on file   Tobacco Use    Smoking status: Never Smoker    Smokeless tobacco: Never Used   Substance and Sexual Activity    Alcohol use: No    Drug use: No    Sexual activity: Not on file   Lifestyle    Physical activity:     Days per week: Not on file     Minutes per session: Not on file    Stress: Not on file   Relationships    Social connections:     Talks on phone: Not on file     Gets together: Not on file     Attends L[employer]-day service: Not on file     Active member of club or organization: Not on file     Attends meetings of clubs or organizations: Not on file     Relationship status: Not on file    Intimate partner violence:     Fear of current or ex partner: Not on file     Emotionally abused: Not on file     Physically abused: Not on file     Forced sexual activity: Not on file   Other Topics Concern    Not on file   Social History Narrative    Not on file     Family History   Problem Relation Age of Onset    Hypertension Mother     Arthritis-osteo Mother     Neuropathy Mother     Hypertension Father     Cancer Father         colon    Hypertension Brother     Cancer Brother     Neuropathy Brother     Neuropathy Other     Arthritis-rheumatoid Other     GERD Other     Diabetes Other     Diabetes Maternal Aunt     Diabetes Maternal Uncle     Hypertension Maternal Grandfather      Current Outpatient Medications   Medication Sig Dispense Refill    diclofenac EC (VOLTAREN) 75 mg EC tablet Take 1 Tab by mouth two (2) times daily as needed. 60 Tab 5    gabapentin (NEURONTIN) 300 mg capsule Take 1 Cap by mouth three (3) times daily. 270 Cap 0    traMADol (ULTRAM) 50 mg tablet Take 1 tablet every 8 hours as needed for pain 90 Tab 0    acetaminophen (TYLENOL) 500 mg tablet Take 1 Tab by mouth every six (6) hours as needed for Pain. 120 Tab 0    ibuprofen (MOTRIN) 800 mg tablet Take 1 Tab by mouth every eight (8) hours as needed for Pain. 90 Tab 0    pantoprazole (PROTONIX) 40 mg tablet Take 40 mg by mouth daily.  esomeprazole (NEXIUM) 40 mg capsule   0    amitriptyline (ELAVIL) 25 mg tablet Take 1 Tab by mouth nightly. Indications: MIGRAINE PREVENTION 90 Tab 1    letrozole (FEMARA) 2.5 mg tablet Take 1 Tab by mouth daily. 90 Tab 3    Cholecalciferol, Vitamin D3, (VITAMIN D3) 2,000 unit cap capsule Take  by mouth daily.  fluticasone-vilanterol (BREO ELLIPTA) 200-25 mcg/dose inhaler Take 1 Puff by inhalation daily.  docusate sodium (COLACE) 100 mg capsule Take 1 Cap by mouth two (2) times a day. 30 Cap 0    ferrous sulfate 325 mg (65 mg iron) tablet Take 1 Tab by mouth two (2) times daily (with meals). 30 Tab 0    ascorbic acid (VITAMIN C) 250 mg tablet Take 1 Tab by mouth two (2) times daily (with meals). 30 Tab 0    losartan-hydrochlorothiazide (HYZAAR) 100-25 mg per tablet Take 1 Tab by mouth daily. Indications: HYPERTENSION         Review of Systems  Constitutional: The patient has no acute distress or discomfort. HEENT: The patient denies recent head trauma, eye pain, blurred vision,  hearing deficit, oropharyngeal mucosal pain or lesions, and the patient denies throat pain or discomfort. Lymphatics: The patient denies palpable peripheral lymphadenopathy. Hematologic: The patient denies having bruising, bleeding, or progressive fatigue.   Respiratory: Patient denies having shortness of breath, cough, sputum production, fever, or dyspnea on exertion. Cardiovascular: The patient denies having leg pain, leg swelling, heart palpitations, chest permit, chest pain, or lightheadedness. The patient denies having dyspnea on exertion. Gastrointestinal: The patient denies having nausea, emesis, or diarrhea. The patient denies having any hematemesis or blood in the stool. Genitourinary: Patient denies having urinary urgency, frequency, or dysuria. The patient denies having blood in the urine. Psychological: The patient denies having symptoms of nervousness, anxiety, depression, or thoughts of harming self. Skin: Patient denies having skin rashes, skin, ulcerations, or unexplained itching or pruritus. Musculoskeletal: The patient denies having pain in the joints or bones. Objective:     Visit Vitals  /55   Pulse 79   Temp 97.7 °F (36.5 °C)   Ht 5' 7\" (1.702 m)   Wt 81.6 kg (179 lb 12.8 oz)   SpO2 96%   BMI 28.16 kg/m²       ECOG PS=0; pain score=0/10    Physical Exam:   Gen. Appearance: The patient is in no acute distress. Skin: There is no bruise or rash. HEENT: The exam is unremarkable. Neck: Supple without lymphadenopathy or thyromegaly. Lungs: Clear to auscultation and percussion; there are no wheezes or rhonchi. Heart: Regular rate and rhythm; there are no murmurs, gallops, or rubs. Abdomen: Bowel sounds are present and normal.  There is no guarding, tenderness, or hepatosplenomegaly. Extremities: There is no clubbing, cyanosis, or edema. Neurologic: There are no focal neurologic deficits. Lymphatics: There is no palpable peripheral lymphadenopathy. Musculoskeletal: The patient has full range of motion at all joints. There is no evidence of joint deformity or effusions. There is no focal joint tenderness. Psychological/psychiatric: There is no clinical evidence of anxiety, depression, or melancholy.     Lab data:      Results for orders placed or performed during the hospital encounter of 04/15/19   CBC WITH 3 PART DIFF Status: Abnormal   Result Value Ref Range Status    WBC 5.4 4.5 - 13.0 K/uL Final    RBC 3.88 (L) 4.10 - 5.10 M/uL Final    HGB 10.9 (L) 12.0 - 16 g/dL Final    HCT 32.9 (L) 36 - 48 % Final    MCV 84.8 78 - 102 FL Final    MCH 28.1 25.0 - 35.0 PG Final    MCHC 33.1 31 - 37 g/dL Final    RDW 13.5 11.5 - 14.5 % Final    PLATELET 408 673 - 073 K/uL Final    NEUTROPHILS 59 40 - 70 % Final    MIXED CELLS 6 0.1 - 17 % Final    LYMPHOCYTES 35 14 - 44 % Final    ABS. NEUTROPHILS 3.2 1.8 - 9.5 K/UL Final    ABS. MIXED CELLS 0.3 0.0 - 2.3 K/uL Final    ABS. LYMPHOCYTES 1.9 1.1 - 5.9 K/UL Final     Comment: Test performed at [address] [place] [address] or Outpatient Infusion Center Location. Reviewed by Medical Director. DF AUTOMATED   Final           Assessment:     1. Malignant neoplasm of lower-outer quadrant of right breast of female, estrogen receptor positive ([facility] [address]    2. Cancer associated pain    3. Vitamin D deficiency    4. Iron deficiency anemia secondary to inadequate dietary iron intake      Plan:   Invasive ductal carcinoma the right breast: I have instructed the patient to continue to do her breast self exam on a monthly basis. Femara 2.5 mg p.o. daily will be continued. I will check a CA 27-29 level at this time and will plan to see the patient back in clinic for follow-up visit in 3 months.     Follow-up in 3 months  Orders Placed This Encounter    COMPLETE CBC & AUTO DIFF WBC    InHouse CBC ([employer])     Standing Status:   Future     Number of Occurrences:   1     Standing Expiration Date:   7/13/9554    METABOLIC PANEL, COMPREHENSIVE     Standing Status:   Future     Number of Occurrences:   1     Standing Expiration Date:   4/15/2020    FERRITIN     Standing Status:   Future     Number of Occurrences:   1     Standing Expiration Date:   4/15/2020    IRON PROFILE     Standing Status:   Future     Number of Occurrences:   1     Standing Expiration Date:   4/15/2020    CA 27.29     Standing Status:   Future     Number of Occurrences:   1     Standing Expiration Date:   4/15/2020    VITAMIN D, 25 HYDROXY     Standing Status:   Future     Number of Occurrences:   1     Standing Expiration Date:   4/15/2020       Alejandro Trevizo MD  4/15/2019      Please note: This document has been produced using voice recognition software. Unrecognized errors in transcription may be present.

## 2019-04-15 NOTE — PATIENT INSTRUCTIONS

## 2019-04-16 LAB — 25(OH)D3 SERPL-MCNC: 41.7 NG/ML (ref 30–100)

## 2019-04-17 LAB — CANCER AG27-29 SERPL-ACNC: 24.9 U/ML (ref 0–38.6)

## 2019-05-07 ENCOUNTER — TELEPHONE (OUTPATIENT)
Dept: ONCOLOGY | Age: 64
End: 2019-05-07

## 2019-05-07 RX ORDER — LETROZOLE 2.5 MG/1
2.5 TABLET, FILM COATED ORAL DAILY
Qty: 90 TAB | Refills: 3 | Status: SHIPPED | OUTPATIENT
Start: 2019-05-07 | End: 2020-02-03 | Stop reason: SDUPTHER

## 2019-05-13 ENCOUNTER — TELEPHONE (OUTPATIENT)
Dept: SURGERY | Age: 64
End: 2019-05-13

## 2019-05-13 NOTE — TELEPHONE ENCOUNTER
I called Mrs. Genao at 3:49 pm on 05/13/2019 and left a message for Mrs. Genao to return my call to reschedule her appointment from Wednesday May 15, 2019 at 2:30 pm to Tuesday May 14, 2019 at 1:00 pm. I will call again in the morning to reach out to her. . Reid Vickers

## 2019-05-14 ENCOUNTER — OFFICE VISIT (OUTPATIENT)
Dept: SURGERY | Age: 64
End: 2019-05-14

## 2019-05-14 VITALS
SYSTOLIC BLOOD PRESSURE: 112 MMHG | TEMPERATURE: 97.9 F | WEIGHT: 174 LBS | DIASTOLIC BLOOD PRESSURE: 70 MMHG | HEART RATE: 71 BPM | BODY MASS INDEX: 27.31 KG/M2 | HEIGHT: 67 IN | OXYGEN SATURATION: 99 % | RESPIRATION RATE: 18 BRPM

## 2019-05-14 DIAGNOSIS — Z85.3 PERSONAL HISTORY OF BREAST CANCER: Primary | ICD-10-CM

## 2019-05-14 DIAGNOSIS — Z12.31 SCREENING MAMMOGRAM FOR HIGH-RISK PATIENT: ICD-10-CM

## 2019-05-14 NOTE — PROGRESS NOTES
Chief Complaint   Patient presents with    Follow-up     3D mammo done 1/2/19. pt c/o pain in right breast incision area. denies any other issues at this time. 1. Have you been to the ER, urgent care clinic since your last visit? Hospitalized since your last visit? No    2. Have you seen or consulted any other health care providers outside of the [facility] since your last visit? Include any pap smears or colon screening.  No

## 2019-05-15 NOTE — PATIENT INSTRUCTIONS
Mammogram: About This Test  What is it? A mammogram is an X-ray of the breast that is used to screen for breast cancer. This test can find tumors that are too small for you or your doctor to feel. Cancer is most easily treated and cured when it is found at an early stage. Why is this test done? A mammogram is done to:  · Look for breast cancer in women who don't have symptoms. · Find breast cancer in women who have symptoms. Symptoms of breast cancer may include a lump or thickening in the breast, nipple discharge, or dimpling of the skin on one area of the breast.  · Find an area of suspicious breast tissue to remove for an exam under a microscope (biopsy). How can you prepare for the test?  · Tell your doctor if you:  ? Are or might be pregnant. ? Are breastfeeding. ? Have breast implants. ? Have previously had a breast biopsy. · On the day of the test, don't use any deodorant, perfume, powders, or ointments. What happens before the test?  · You will need to take off any jewelry that might interfere with the X-ray pictures. · You will need to take off your clothes above the waist.  · You will be given a cloth or paper gown to use during the test.  What happens during the test?  · You usually stand during a mammogram.  · One at a time, your breasts will be placed on a flat plate that contains the X-ray film. · Another plate is then pressed firmly against your breast to help flatten out the breast tissue. You may be asked to lift your arm. · For a few seconds while the X-ray picture is being taken, you will need to hold your breath. · At least two pictures are taken of each breast. One is taken from the top and one from the side. What else should you know about the test?  · The X-ray plate will feel cold when you place your breast on it. Having your breasts flattened and squeezed isn't comfortable. But it is necessary to flatten out the breast tissue to get the best pictures.   · Mammograms do not prevent breast cancer or reduce a woman's risk of developing cancer. · Most things that are found during a mammogram are not breast cancer. How long does the test take? · The test will take about 10 to 15 minutes. You may be in the clinic for up to an hour. What happens after the test?  · You will probably be able to go home right away. · You can go back to your usual activities right away. Follow-up care is a key part of your treatment and safety. Be sure to make and go to all appointments, and call your doctor if you are having problems. It's also a good idea to keep a list of the medicines you take. Ask your doctor when you can expect to have your test results. Where can you learn more? Go to http://janessa-raphael.info/. Enter E488 in the search box to learn more about \"Mammogram: About This Test.\"  Current as of: March 27, 2018  Content Version: 11.9  © 7110-6696 [employer]. Care instructions adapted under license by [facility] (which disclaims liability or warranty for this information). If you have questions about a medical condition or this instruction, always ask your healthcare professional. [address] any warranty or liability for your use of this information.

## 2019-05-15 NOTE — PROGRESS NOTES
Zoë Jernigan. Tommie Man, FNP-C  PROGRESS NOTE    Subjective:    Ms. Amandeep Barros presents today for a clinical, breast exam. Her last bilateral, mammogram was done in January on the same day she saw Dr. Barbara De León and c/o 10/10 pain. Imaging was read as a BIRADS 2. Today, she continues to c/o \"some\" pain to the same area, but it has gotten less severe and less often. We did discuss normal post op and post radiation sensations that can occur during the healing process. She denies unintentional weight loss, no new or lingering cough, no new or different bone pain and no breast concerns other than the aforementioned pain. We did discuss the need for a clinical, breast exam to be done closer to the imaging date. She has a right, diagnostic, mammogram scheduled for July. I explained that I will need to see her again following that mammogram.     Objective:    Vitals:    05/14/19 1318   BP: 112/70   Pulse: 71   Resp: 18   Temp: 97.9 °F (36.6 °C)   TempSrc: Oral   SpO2: 99%   Weight: 78.9 kg (174 lb)   Height: 5' 7\" (1.702 m)     Review of Systems   Constitutional: Negative for chills, fever, malaise/fatigue and weight loss. Respiratory: Negative for cough. Cardiovascular: Negative for chest pain and palpitations. Gastrointestinal: Negative for abdominal pain, constipation, diarrhea, nausea and vomiting. Musculoskeletal: Negative for myalgias. Skin: Negative for itching and rash. Physical Exam:   Physical Exam   Constitutional: She is oriented to person, place, and time and well-developed, well-nourished, and in no distress. HENT:   Head: Normocephalic and atraumatic. Neck: Normal range of motion. Neck supple. Pulmonary/Chest: Effort normal. No respiratory distress. Abdominal: Soft. She exhibits no distension. There is no tenderness. Musculoskeletal: Normal range of motion. She exhibits no edema or tenderness. Neurological: She is alert and oriented to person, place, and time.  Gait normal.   Skin: Skin is warm and dry. No rash noted. No erythema. No pallor. Psychiatric: Mood, memory, affect and judgment normal.   Breasts:    Left:  no dimpling, discoloration, nipple inversion or retractions. no axillary or supraclavicular lymphadenopathy. no mass   Right:  no dimpling, discoloration, nipple inversion or retractions. no axillary or supraclavicular lymphadenopathy. no mass         Current Medications:  Current Outpatient Medications   Medication Sig Dispense Refill    letrozole (FEMARA) 2.5 mg tablet Take 1 Tab by mouth daily. 90 Tab 3    diclofenac EC (VOLTAREN) 75 mg EC tablet Take 1 Tab by mouth two (2) times daily as needed. 60 Tab 5    gabapentin (NEURONTIN) 300 mg capsule Take 1 Cap by mouth three (3) times daily. 270 Cap 0    traMADol (ULTRAM) 50 mg tablet Take 1 tablet every 8 hours as needed for pain 90 Tab 0    acetaminophen (TYLENOL) 500 mg tablet Take 1 Tab by mouth every six (6) hours as needed for Pain. 120 Tab 0    ibuprofen (MOTRIN) 800 mg tablet Take 1 Tab by mouth every eight (8) hours as needed for Pain. 90 Tab 0    pantoprazole (PROTONIX) 40 mg tablet Take 40 mg by mouth daily.  Cholecalciferol, Vitamin D3, (VITAMIN D3) 2,000 unit cap capsule Take  by mouth daily.  docusate sodium (COLACE) 100 mg capsule Take 1 Cap by mouth two (2) times a day. 30 Cap 0    ascorbic acid (VITAMIN C) 250 mg tablet Take 1 Tab by mouth two (2) times daily (with meals). 30 Tab 0    losartan-hydrochlorothiazide (HYZAAR) 100-25 mg per tablet Take 1 Tab by mouth daily. Indications: HYPERTENSION      esomeprazole (NEXIUM) 40 mg capsule   0    amitriptyline (ELAVIL) 25 mg tablet Take 1 Tab by mouth nightly. Indications: MIGRAINE PREVENTION 90 Tab 1    fluticasone-vilanterol (BREO ELLIPTA) 200-25 mcg/dose inhaler Take 1 Puff by inhalation daily.  ferrous sulfate 325 mg (65 mg iron) tablet Take 1 Tab by mouth two (2) times daily (with meals). 30 Tab 0       Chart and notes reviewed.  Data reviewed. I have evaluated and examined the patient. Impression:  · Patient doing well now one year out from lumpectomy with sentinel node biopsy for right, ER+, invasive, ductal, adenocarcinoma. She has continued twinges of pain consistent with normal, post operative healing. Plan:   · Right, diagnostic mammogram in July followed by clinical, breast exam in this office  · Please call with any questions or concerns prior to next visit    Ms. Kain Perez has a reminder for a \"due or due soon\" health maintenance. I have asked that she contact her primary care provider for follow-up on this health maintenance. Jay Jean.  Chrystalcira Elam, RENEP-C

## 2019-05-30 DIAGNOSIS — M47.899 FACET SYNDROME: ICD-10-CM

## 2019-05-30 DIAGNOSIS — M79.18 MYOFASCIAL PAIN: ICD-10-CM

## 2019-05-30 RX ORDER — GABAPENTIN 300 MG/1
300 CAPSULE ORAL 3 TIMES DAILY
Qty: 270 CAP | Refills: 0 | Status: SHIPPED | OUTPATIENT
Start: 2019-05-30 | End: 2019-10-14 | Stop reason: SDUPTHER

## 2019-05-30 NOTE — TELEPHONE ENCOUNTER
Last Visit: 2/5/19 with NP [place]  Next Appointment: 8/5/19 with NP Alejandro  Previous Refill Encounter(s): 1/10/19 #270    Requested Prescriptions     Pending Prescriptions Disp Refills    gabapentin (NEURONTIN) 300 mg capsule 270 Cap 0     Sig: Take 1 Cap by mouth three (3) times daily.  Indications: sensory sensitivity

## 2019-06-06 ENCOUNTER — HOSPITAL ENCOUNTER (OUTPATIENT)
Dept: GENERAL RADIOLOGY | Age: 64
Discharge: HOME | End: 2019-06-06
Payer: MEDICARE

## 2019-06-06 DIAGNOSIS — C50.911 MALIGNANT NEOPLASM OF RIGHT BREAST (HCC): ICD-10-CM

## 2019-06-06 DIAGNOSIS — J45.40 MODERATE PERSISTENT ASTHMA, UNCOMPLICATED: ICD-10-CM

## 2019-06-06 PROCEDURE — 71046 X-RAY EXAM CHEST 2 VIEWS: CPT

## 2019-07-15 ENCOUNTER — OFFICE VISIT (OUTPATIENT)
Dept: ONCOLOGY | Age: 64
End: 2019-07-15

## 2019-07-15 ENCOUNTER — HOSPITAL ENCOUNTER (OUTPATIENT)
Dept: LAB | Age: 64
Discharge: HOME | End: 2019-07-15
Payer: MEDICARE

## 2019-07-15 ENCOUNTER — HOSPITAL ENCOUNTER (OUTPATIENT)
Dept: ONCOLOGY | Age: 64
Discharge: HOME | End: 2019-07-15

## 2019-07-15 VITALS
SYSTOLIC BLOOD PRESSURE: 119 MMHG | WEIGHT: 173 LBS | BODY MASS INDEX: 27.15 KG/M2 | HEART RATE: 77 BPM | HEIGHT: 67 IN | OXYGEN SATURATION: 97 % | RESPIRATION RATE: 17 BRPM | TEMPERATURE: 99 F | DIASTOLIC BLOOD PRESSURE: 79 MMHG

## 2019-07-15 DIAGNOSIS — G89.3 CANCER ASSOCIATED PAIN: ICD-10-CM

## 2019-07-15 DIAGNOSIS — M47.899 FACET SYNDROME: ICD-10-CM

## 2019-07-15 DIAGNOSIS — Z79.811 LONG TERM CURRENT USE OF AROMATASE INHIBITOR: ICD-10-CM

## 2019-07-15 DIAGNOSIS — E55.9 VITAMIN D DEFICIENCY: ICD-10-CM

## 2019-07-15 DIAGNOSIS — Z17.0 MALIGNANT NEOPLASM OF LOWER-OUTER QUADRANT OF RIGHT BREAST OF FEMALE, ESTROGEN RECEPTOR POSITIVE (HCC): ICD-10-CM

## 2019-07-15 DIAGNOSIS — N63.0 BREAST LUMP: ICD-10-CM

## 2019-07-15 DIAGNOSIS — M79.18 MYOFASCIAL PAIN: ICD-10-CM

## 2019-07-15 DIAGNOSIS — C50.511 MALIGNANT NEOPLASM OF LOWER-OUTER QUADRANT OF RIGHT BREAST OF FEMALE, ESTROGEN RECEPTOR POSITIVE (HCC): ICD-10-CM

## 2019-07-15 DIAGNOSIS — D50.8 IRON DEFICIENCY ANEMIA SECONDARY TO INADEQUATE DIETARY IRON INTAKE: ICD-10-CM

## 2019-07-15 DIAGNOSIS — E55.9 VITAMIN D DEFICIENCY: Primary | ICD-10-CM

## 2019-07-15 LAB
ALBUMIN SERPL-MCNC: 3.8 G/DL (ref 3.4–5)
ALBUMIN/GLOB SERPL: 1.1 {RATIO} (ref 0.8–1.7)
ALP SERPL-CCNC: 63 U/L (ref 45–117)
ALT SERPL-CCNC: 21 U/L (ref 13–56)
ANION GAP SERPL CALC-SCNC: 7 MMOL/L (ref 3–18)
AST SERPL-CCNC: 22 U/L (ref 15–37)
BASO+EOS+MONOS # BLD AUTO: 0.4 K/UL (ref 0–2.3)
BASO+EOS+MONOS NFR BLD AUTO: 9 % (ref 0.1–17)
BILIRUB SERPL-MCNC: 0.2 MG/DL (ref 0.2–1)
BUN SERPL-MCNC: 18 MG/DL (ref 7–18)
BUN/CREAT SERPL: 18 (ref 12–20)
CALCIUM SERPL-MCNC: 9.4 MG/DL (ref 8.5–10.1)
CHLORIDE SERPL-SCNC: 106 MMOL/L (ref 100–108)
CO2 SERPL-SCNC: 27 MMOL/L (ref 21–32)
CREAT SERPL-MCNC: 1 MG/DL (ref 0.6–1.3)
DIFFERENTIAL METHOD BLD: ABNORMAL
ERYTHROCYTE [DISTWIDTH] IN BLOOD BY AUTOMATED COUNT: 13.4 % (ref 11.5–14.5)
FERRITIN SERPL-MCNC: 52 NG/ML (ref 8–388)
GLOBULIN SER CALC-MCNC: 3.6 G/DL (ref 2–4)
GLUCOSE SERPL-MCNC: 76 MG/DL (ref 74–99)
HCT VFR BLD AUTO: 33.7 % (ref 36–48)
HGB BLD-MCNC: 11.1 G/DL (ref 12–16)
IRON SATN MFR SERPL: 15 %
IRON SERPL-MCNC: 53 UG/DL (ref 50–175)
LYMPHOCYTES # BLD: 2 K/UL (ref 1.1–5.9)
LYMPHOCYTES NFR BLD: 40 % (ref 14–44)
MCH RBC QN AUTO: 28.3 PG (ref 25–35)
MCHC RBC AUTO-ENTMCNC: 32.9 G/DL (ref 31–37)
MCV RBC AUTO: 86 FL (ref 78–102)
NEUTS SEG # BLD: 2.5 K/UL (ref 1.8–9.5)
NEUTS SEG NFR BLD: 51 % (ref 40–70)
PLATELET # BLD AUTO: 210 K/UL (ref 140–440)
POTASSIUM SERPL-SCNC: 3.1 MMOL/L (ref 3.5–5.5)
PROT SERPL-MCNC: 7.4 G/DL (ref 6.4–8.2)
RBC # BLD AUTO: 3.92 M/UL (ref 4.1–5.1)
SODIUM SERPL-SCNC: 140 MMOL/L (ref 136–145)
TIBC SERPL-MCNC: 352 UG/DL (ref 250–450)
WBC # BLD AUTO: 4.9 K/UL (ref 4.5–13)

## 2019-07-15 PROCEDURE — 82728 ASSAY OF FERRITIN: CPT

## 2019-07-15 PROCEDURE — 86300 IMMUNOASSAY TUMOR CA 15-3: CPT

## 2019-07-15 PROCEDURE — 83540 ASSAY OF IRON: CPT

## 2019-07-15 PROCEDURE — 80053 COMPREHEN METABOLIC PANEL: CPT

## 2019-07-15 PROCEDURE — 36415 COLL VENOUS BLD VENIPUNCTURE: CPT

## 2019-07-15 PROCEDURE — 82306 VITAMIN D 25 HYDROXY: CPT

## 2019-07-15 RX ORDER — TRAMADOL HYDROCHLORIDE 50 MG/1
50 TABLET ORAL
Qty: 45 TAB | Refills: 0 | Status: SHIPPED | OUTPATIENT
Start: 2019-07-15 | End: 2019-09-25 | Stop reason: SDUPTHER

## 2019-07-15 NOTE — PATIENT INSTRUCTIONS
Complete Blood Count (CBC): About This Test  What is it? A complete blood count (CBC) is a blood test that gives important information about your blood cells, especially red blood cells, white blood cells, and platelets. Why is this test done? A CBC may be done as part of a regular physical exam. There are many other reasons that a doctor may want this blood test, including to:  · Find the cause of symptoms such as fatigue, weakness, fever, bruising, or weight loss. · Find anemia or an infection. · See how much blood has been lost if there is bleeding. · Diagnose diseases of the blood, such as leukemia or polycythemia. How can you prepare for the test?  You do not need to do anything before having this test.  What happens during the test?  The health professional taking a sample of your blood will:  · Wrap an elastic band around your upper arm. This makes the veins below the band larger so it is easier to put a needle into the vein. · Clean the needle site with alcohol. · Put the needle into the vein. · Attach a tube to the needle to fill it with blood. · Remove the band from your arm when enough blood is collected. · Put a gauze pad or cotton ball over the needle site as the needle is removed. · Put pressure on the site and then put on a bandage. If this blood test is done on a baby, a heel stick may be done instead of a blood draw from a vein. What happens after the test?  · You will probably be able to go home right away. · You can go back to your usual activities right away. Follow-up care is a key part of your treatment and safety. Be sure to make and go to all appointments, and call your doctor if you are having problems. It's also a good idea to keep a list of the medicines you take. Ask your doctor when you can expect to have your test results. Where can you learn more? Go to http://janessa-raphael.info/.   Enter A713 in the search box to learn more about \"Complete Blood Count (CBC): About This Test.\"  Current as of: June 25, 2018  Content Version: 11.9  © 1255-0875 [employer]. Care instructions adapted under license by [employer] (which disclaims liability or warranty for this information). If you have questions about a medical condition or this instruction, always ask your healthcare professional. [address] any warranty or liability for your use of this information. Letrozole (By mouth)   Letrozole (LET-ezequiel-zole)  Treats breast cancer. Brand Name(s): Peggy Fish Femara Co-Pack   There may be other brand names for this medicine. When This Medicine Should Not Be Used: This medicine is not right for everyone. Do not use it if you had an allergic reaction to letrozole, or if you are pregnant. How to Use This Medicine:   Tablet  · Your doctor will tell you how much medicine to use. Do not use more than directed. · Missed dose: This medicine needs to be given on a fixed schedule. If you miss a dose, call your doctor for instructions. · Store the medicine in a closed container at room temperature, away from heat, moisture, and direct light. Drugs and Foods to Avoid:   Ask your doctor or pharmacist before using any other medicine, including over-the-counter medicines, vitamins, and herbal products. · Some medicines can affect how letrozole works. Tell your doctor if you are also using tamoxifen. Warnings While Using This Medicine:   · It is not safe to take this medicine during pregnancy. It could harm an unborn baby. Tell your doctor right away if you become pregnant. Use an effective form of birth control during treatment with this medicine and for at least 3 weeks after the last dose. · Do not breastfeed while you are taking this medicine and for at least 3 weeks after your last dose.   · Tell your doctor if you have liver disease (including cirrhosis), bone problems (including osteoporosis), or high cholesterol in the blood. · This medicine may cause the following problems:  ¨ Low bone mineral density  ¨ High cholesterol or fat levels in the blood  ¨ Liver problems  · This medicine may make you dizzy, drowsy, or tired. Do not drive or do anything else that could be dangerous until you know how this medicine affects you. · This medicine could cause infertility. Talk with your doctor before using this medicine if you plan to have children. · Medicines used to treat cancer are very strong and can have many side effects. Before receiving this medicine, make sure you understand all the risks and benefits. It is important for you to work closely with your doctor during your treatment. · Your doctor will do lab tests at regular visits to check on the effects of this medicine. Keep all appointments. · Keep all medicine out of the reach of children. Never share your medicine with anyone.   Possible Side Effects While Using This Medicine:   Call your doctor right away if you notice any of these side effects:  · Allergic reaction: Itching or hives, swelling in your face or hands, swelling or tingling in your mouth or throat, chest tightness, trouble breathing  · Bone pain  · Chest pain, trouble breathing, coughing up blood  · Dark urine, pale stools, nausea, vomiting, loss of appetite, stomach pain, yellow skin or eyes  · Numbness or weakness on one side of your body, sudden or severe headache, problems with vision, speech, or walking  · Pain in your lower leg (calf)  · Swelling in your ankles or feet  · Unusual bleeding or bruising  · Unusual tiredness or weakness  If you notice these less serious side effects, talk with your doctor:   · Breast pain  · Diarrhea, constipation, stomach pain  · Headache  · Increased sweating  · Mild joint, back, or muscle pain  · Trouble sleeping  · Vaginal bleeding  · Warmth or redness in your face, neck, arms, or upper chest  · Weight gain or loss  If you notice other side effects that you think are caused by this medicine, tell your doctor. Call your doctor for medical advice about side effects. You may report side effects to FDA at 8-435-FNX-9212  © 2017 [facility] Information is for End User's use only and may not be sold, redistributed or otherwise used for commercial purposes. The above information is an  only. It is not intended as medical advice for individual conditions or treatments. Talk to your doctor, nurse or pharmacist before following any medical regimen to see if it is safe and effective for you.

## 2019-07-15 NOTE — PROGRESS NOTES
Hematology/Oncology  Progress Note    Name: Benita Washburn  Date: 07/15/2019  : 1955    PCP: Sol Vicente MD     Ms. Festus Sheridan is a 66 y.o. -American woman with invasive ductal carcinoma the right breast.    Current therapy: Femara 2.5mg PO daily      Subjective:     Mrs. Festus Sheridan is a 57-year-old -American woman with invasive ductal carcinoma involving the right breast.  She continues to tolerate the Femara without significant untoward side effects. She states she is doing her breast self exam on a monthly basis and has no new physical complaints or concerns to report. She denies pain or any discomfort. She denies weakness, fatigue, and shortness of breath. She does not have any concerns or complaints to report at this time. Past medical history, family history, and social history: these were reviewed and remains unchanged.     Past Medical History:   Diagnosis Date    Arthritis     Balance problems     patient states no further issues    Breast CA ([address] 2018    right    Bronchitis     Chronic pain     back    Fibromyalgia 2012    GERD (gastroesophageal reflux disease)     Hiatal Hernia    Hypertension     Kidney stones     Low back pain     Lumbar spinal stenosis 2012    Migraine headache     Nervousness     Neurological disease     Osteoarthritis 2012    Other ill-defined conditions(799.89)     neuropathy    Peripheral neuropathy 2012    Post laminectomy syndrome     Postoperative anemia due to acute blood loss 2012    Ringing in ears     Spinal stenosis     Thoracic spine pain     Vitamin D deficiency 2012     Past Surgical History:   Procedure Laterality Date    BX/REMV,LYMPH NODE,DEEP AXILL Right 2018    Dr. uYry Zaldivar HX MASTECTOMY Right 2018    NEEDLE LOCALIZED (1000) LUMPECTOMY RIGHT BREAST AND SENTINEL LYMPH NODE BIOPSY RIGHT AXILLA performed by Olivier Caballero MD at [place] [address] foot l heel spur    HX ORTHOPAEDIC      right knee sx    HX OTHER SURGICAL      non cancer cyst removed from right shoulder    NEUROLOGICAL PROCEDURE UNLISTED      Bilateral L3-4 hemilaminotomy and medial facetectomy by Dr. Esquivel Labor History     Socioeconomic History    Marital status: SINGLE     Spouse name: Not on file    Number of children: Not on file    Years of education: Not on file    Highest education level: Not on file   Occupational History    Not on file   Social Needs    Financial resource strain: Not on file    Food insecurity:     Worry: Not on file     Inability: Not on file    Transportation needs:     Medical: Not on file     Non-medical: Not on file   Tobacco Use    Smoking status: Never Smoker    Smokeless tobacco: Never Used   Substance and Sexual Activity    Alcohol use: No    Drug use: No    Sexual activity: Not on file   Lifestyle    Physical activity:     Days per week: Not on file     Minutes per session: Not on file    Stress: Not on file   Relationships    Social connections:     Talks on phone: Not on file     Gets together: Not on file     Attends Mandaeism service: Not on file     Active member of club or organization: Not on file     Attends meetings of clubs or organizations: Not on file     Relationship status: Not on file    Intimate partner violence:     Fear of current or ex partner: Not on file     Emotionally abused: Not on file     Physically abused: Not on file     Forced sexual activity: Not on file   Other Topics Concern    Not on file   Social History Narrative    Not on file     Family History   Problem Relation Age of Onset    Hypertension Mother     Arthritis-osteo Mother     Neuropathy Mother     Hypertension Father     Cancer Father         colon    Hypertension Brother     Cancer Brother     Neuropathy Brother     Neuropathy Other     Arthritis-rheumatoid Other     GERD Other     Diabetes Other     Diabetes Maternal Aunt     Diabetes Maternal Uncle     Hypertension Maternal Grandfather      Current Outpatient Medications   Medication Sig Dispense Refill    traMADol (ULTRAM) 50 mg tablet Take 1 Tab by mouth every eight (8) hours as needed for Pain for up to 15 days. Max Daily Amount: 150 mg. Take 1 tablet every 8 hours as needed for pain 45 Tab 0    gabapentin (NEURONTIN) 300 mg capsule Take 1 Cap by mouth three (3) times daily. Indications: sensory sensitivity 270 Cap 0    letrozole (FEMARA) 2.5 mg tablet Take 1 Tab by mouth daily. 90 Tab 3    diclofenac EC (VOLTAREN) 75 mg EC tablet Take 1 Tab by mouth two (2) times daily as needed. 60 Tab 5    acetaminophen (TYLENOL) 500 mg tablet Take 1 Tab by mouth every six (6) hours as needed for Pain. 120 Tab 0    ibuprofen (MOTRIN) 800 mg tablet Take 1 Tab by mouth every eight (8) hours as needed for Pain. 90 Tab 0    pantoprazole (PROTONIX) 40 mg tablet Take 40 mg by mouth daily.  esomeprazole (NEXIUM) 40 mg capsule   0    amitriptyline (ELAVIL) 25 mg tablet Take 1 Tab by mouth nightly. Indications: MIGRAINE PREVENTION 90 Tab 1    Cholecalciferol, Vitamin D3, (VITAMIN D3) 2,000 unit cap capsule Take  by mouth daily.  fluticasone-vilanterol (BREO ELLIPTA) 200-25 mcg/dose inhaler Take 1 Puff by inhalation daily.  docusate sodium (COLACE) 100 mg capsule Take 1 Cap by mouth two (2) times a day. 30 Cap 0    ferrous sulfate 325 mg (65 mg iron) tablet Take 1 Tab by mouth two (2) times daily (with meals). 30 Tab 0    ascorbic acid (VITAMIN C) 250 mg tablet Take 1 Tab by mouth two (2) times daily (with meals). 30 Tab 0    losartan-hydrochlorothiazide (HYZAAR) 100-25 mg per tablet Take 1 Tab by mouth daily. Indications: HYPERTENSION         Review of Systems  Constitutional: The patient has no acute distress or discomfort.   HEENT: The patient denies recent head trauma, eye pain, blurred vision,  hearing deficit, oropharyngeal mucosal pain or lesions, and the patient denies throat pain or discomfort. Lymphatics: The patient denies palpable peripheral lymphadenopathy. Hematologic: The patient denies having bruising, bleeding, or progressive fatigue. Respiratory: Patient denies having shortness of breath, cough, sputum production, fever, or dyspnea on exertion. Cardiovascular: The patient denies having leg pain, leg swelling, heart palpitations, chest permit, chest pain, or lightheadedness. The patient denies having dyspnea on exertion. Gastrointestinal: The patient denies having nausea, emesis, or diarrhea. The patient denies having any hematemesis or blood in the stool. Genitourinary: Patient denies having urinary urgency, frequency, or dysuria. The patient denies having blood in the urine. Psychological: The patient denies having symptoms of nervousness, anxiety, depression, or thoughts of harming self. Skin: Patient denies having skin rashes, skin, ulcerations, or unexplained itching or pruritus. Musculoskeletal: The patient denies having pain in the joints or bones. Objective:     Visit Vitals  /79   Pulse 77   Temp 99 °F (37.2 °C) (Oral)   Resp 17   Ht 5' 7\" (1.702 m)   Wt 78.5 kg (173 lb)   SpO2 97%   BMI 27.10 kg/m²       ECOG PS=0; pain score=0/10    Physical Exam:   Gen. Appearance: The patient is in no acute distress. Skin: There is no bruise or rash. HEENT: The exam is unremarkable. Neck: Supple without lymphadenopathy or thyromegaly. Lungs: Clear to auscultation and percussion; there are no wheezes or rhonchi. Heart: Regular rate and rhythm; there are no murmurs, gallops, or rubs. Abdomen: Bowel sounds are present and normal.  There is no guarding, tenderness, or hepatosplenomegaly. Extremities: There is no clubbing, cyanosis, or edema. Neurologic: There are no focal neurologic deficits. Lymphatics: There is no palpable peripheral lymphadenopathy. Musculoskeletal: The patient has full range of motion at all joints.   There is no evidence of joint deformity or effusions. There is no focal joint tenderness. Psychological/psychiatric: There is no clinical evidence of anxiety, depression, or melancholy. Lab data:      Results for orders placed or performed during the hospital encounter of 07/15/19   CBC WITH 3 PART DIFF     Status: Abnormal   Result Value Ref Range Status    WBC 4.9 4.5 - 13.0 K/uL Final    RBC 3.92 (L) 4.10 - 5.10 M/uL Final    HGB 11.1 (L) 12.0 - 16 g/dL Final    HCT 33.7 (L) 36 - 48 % Final    MCV 86.0 78 - 102 FL Final    MCH 28.3 25.0 - 35.0 PG Final    MCHC 32.9 31 - 37 g/dL Final    RDW 13.4 11.5 - 14.5 % Final    PLATELET 608 521 - 377 K/uL Final    NEUTROPHILS 51 40 - 70 % Final    MIXED CELLS 9 0.1 - 17 % Final    LYMPHOCYTES 40 14 - 44 % Final    ABS. NEUTROPHILS 2.5 1.8 - 9.5 K/UL Final    ABS. MIXED CELLS 0.4 0.0 - 2.3 K/uL Final    ABS. LYMPHOCYTES 2.0 1.1 - 5.9 K/UL Final     Comment: Test performed at [address] [place] [address] or Outpatient Infusion Center Location. Reviewed by Medical Director. DF AUTOMATED   Final           Assessment:     1. Vitamin D deficiency    2. Malignant neoplasm of lower-outer quadrant of right breast of female, estrogen receptor positive ([facility] [address]    3. Iron deficiency anemia secondary to inadequate dietary iron intake    4. Breast lump    5. Long term current use of aromatase inhibitor      Plan:   Vitamin D Deficiency: On 4/15 /2019 her Vit D level was normal at 41.7. I will obtain her level at this time. Invasive ductal carcinoma the right breast/long-term use of an aromatase inhibitor: I have instructed the patient to continue to do her breast self exam on a monthly basis. Femara 2.5 mg PO daily will be continued. Her CA27-29 was normal at 24.9 on 4/17/19. I will check her CA 27-29 level at this time. Iron Deficiency Anemia: Today her CBC shows her hemoglobin is 11.1g/dL with hematocrit of 33.7%.  Iron profile and Ferritin will be obtained at this time. Follow-up in 3 months or sooner if indicated. Orders Placed This Encounter    COMPLETE CBC & AUTO DIFF WBC    InHouse CBC ([employer])     Standing Status:   Future     Number of Occurrences:   1     Standing Expiration Date:   7/22/2019    IRON PROFILE     Standing Status:   Future     Number of Occurrences:   1     Standing Expiration Date:   6/84/3533    METABOLIC PANEL, COMPREHENSIVE     Standing Status:   Future     Number of Occurrences:   1     Standing Expiration Date:   7/15/2020    FERRITIN     Standing Status:   Future     Number of Occurrences:   1     Standing Expiration Date:   7/15/2020    VITAMIN D, 25 HYDROXY     Standing Status:   Future     Number of Occurrences:   1     Standing Expiration Date:   7/15/2020    CA 27.29     Standing Status:   Future     Number of Occurrences:   1     Standing Expiration Date:   7/15/2020       Yoni Escaimlla NP  07/15/2019      I have assessed the patient independently and  agree with the full assessment as outlined.   Chong Larson MD, [address]

## 2019-07-16 LAB
25(OH)D3 SERPL-MCNC: 42.9 NG/ML (ref 30–100)
CANCER AG27-29 SERPL-ACNC: 24.2 U/ML (ref 0–38.6)

## 2019-07-30 ENCOUNTER — HOSPITAL ENCOUNTER (OUTPATIENT)
Age: 64
Discharge: HOME | End: 2019-07-30
Attending: INTERNAL MEDICINE
Payer: MEDICARE

## 2019-07-30 DIAGNOSIS — M25.512 LEFT ANTERIOR SHOULDER PAIN: ICD-10-CM

## 2019-07-30 PROCEDURE — 73221 MRI JOINT UPR EXTREM W/O DYE: CPT

## 2019-07-31 ENCOUNTER — OFFICE VISIT (OUTPATIENT)
Dept: SURGERY | Age: 64
End: 2019-07-31

## 2019-07-31 ENCOUNTER — HOSPITAL ENCOUNTER (OUTPATIENT)
Dept: MAMMOGRAPHY | Age: 64
Discharge: HOME | End: 2019-07-31
Attending: NURSE PRACTITIONER
Payer: MEDICARE

## 2019-07-31 VITALS
BODY MASS INDEX: 27.78 KG/M2 | WEIGHT: 177 LBS | RESPIRATION RATE: 17 BRPM | HEART RATE: 67 BPM | TEMPERATURE: 97.9 F | DIASTOLIC BLOOD PRESSURE: 75 MMHG | OXYGEN SATURATION: 99 % | HEIGHT: 67 IN | SYSTOLIC BLOOD PRESSURE: 114 MMHG

## 2019-07-31 DIAGNOSIS — Z12.31 SCREENING MAMMOGRAM FOR HIGH-RISK PATIENT: ICD-10-CM

## 2019-07-31 DIAGNOSIS — Z85.3 PERSONAL HISTORY OF BREAST CANCER: ICD-10-CM

## 2019-07-31 DIAGNOSIS — N64.4 BREAST PAIN, RIGHT: ICD-10-CM

## 2019-07-31 DIAGNOSIS — Z85.3 PERSONAL HISTORY OF BREAST CANCER: Primary | ICD-10-CM

## 2019-07-31 PROCEDURE — 77061 BREAST TOMOSYNTHESIS UNI: CPT

## 2019-07-31 NOTE — PATIENT INSTRUCTIONS
Mammogram: About This Test  What is it? A mammogram is an X-ray of the breast that is used to screen for breast cancer. This test can find tumors that are too small for you or your doctor to feel. Cancer is most easily treated and cured when it is found at an early stage. Why is this test done? A mammogram is done to:  · Look for breast cancer in women who don't have symptoms. · Find breast cancer in women who have symptoms. Symptoms of breast cancer may include a lump or thickening in the breast, nipple discharge, or dimpling of the skin on one area of the breast.  · Find an area of suspicious breast tissue to remove for an exam under a microscope (biopsy). How can you prepare for the test?  · Tell your doctor if you:  ? Are or might be pregnant. ? Are breastfeeding. ? Have breast implants. ? Have previously had a breast biopsy. · On the day of the test, don't use any deodorant, perfume, powders, or ointments. What happens before the test?  · You will need to take off any jewelry that might interfere with the X-ray pictures. · You will need to take off your clothes above the waist.  · You will be given a cloth or paper gown to use during the test.  What happens during the test?  · You usually stand during a mammogram.  · One at a time, your breasts will be placed on a flat plate that contains the X-ray film. · Another plate is then pressed firmly against your breast to help flatten out the breast tissue. You may be asked to lift your arm. · For a few seconds while the X-ray picture is being taken, you will need to hold your breath. · At least two pictures are taken of each breast. One is taken from the top and one from the side. What else should you know about the test?  · The X-ray plate will feel cold when you place your breast on it. Having your breasts flattened and squeezed isn't comfortable. But it is necessary to flatten out the breast tissue to get the best pictures.   · Mammograms do not prevent breast cancer or reduce a woman's risk of developing cancer. · Most things that are found during a mammogram are not breast cancer. How long does the test take? · The test will take about 10 to 15 minutes. You may be in the clinic for up to an hour. What happens after the test?  · You will probably be able to go home right away. · You can go back to your usual activities right away. Follow-up care is a key part of your treatment and safety. Be sure to make and go to all appointments, and call your doctor if you are having problems. It's also a good idea to keep a list of the medicines you take. Ask your doctor when you can expect to have your test results. Where can you learn more? Go to http://janessa-raphael.info/. Enter T749 in the search box to learn more about \"Mammogram: About This Test.\"  Current as of: December 19, 2018  Content Version: 12.1  © 8117-8612 [employer]. Care instructions adapted under license by [employer] (which disclaims liability or warranty for this information). If you have questions about a medical condition or this instruction, always ask your healthcare professional. [address] any warranty or liability for your use of this information.

## 2019-07-31 NOTE — PROGRESS NOTES
Anuradha Taylor. Baron Dunn, FNP-C  PROGRESS NOTE    Subjective:    Ms. Yann Faust presents today for a clinical, breast exam following right, diagnostic, mammogram done earlier today. Imaging was read as a BIRADS 2 showing stable, postsurgical changes within the right breast. Patient continues to c/o pain in the previous, surgical site as well as in the area which was irradiated. These are the same locations she has complained of pain previously. She denies feeling any new masses, denies nipple changes or drainage and denies any skin changes. She denies unintentional weight loss. Objective:    Vitals:    07/31/19 1351   BP: 114/75   Pulse: 67   Resp: 17   Temp: 97.9 °F (36.6 °C)   TempSrc: Oral   SpO2: 99%   Weight: 80.3 kg (177 lb)   Height: 5' 7\" (1.702 m)     Review of Systems   Constitutional: Negative for chills, fever, malaise/fatigue and weight loss. Respiratory: Negative for cough. Cardiovascular: Negative for chest pain and palpitations. Genitourinary: Negative for dysuria. Skin: Negative for itching and rash. Physical Exam:   Physical Exam   Constitutional: She is oriented to person, place, and time and well-developed, well-nourished, and in no distress. HENT:   Head: Normocephalic and atraumatic. Neck: Normal range of motion. Neck supple. Pulmonary/Chest: Effort normal. No respiratory distress. Abdominal: Soft. She exhibits no distension. There is no tenderness. Neurological: She is alert and oriented to person, place, and time. Gait normal.   Skin: Skin is warm and dry. No rash noted. No erythema. Psychiatric: Mood, memory, affect and judgment normal.   Breasts:    Left:  no dimpling, discoloration, nipple inversion or retractions. no axillary or supraclavicular lymphadenopathy. no mass   Right:  no dimpling, discoloration, nipple inversion or retractions. no axillary or supraclavicular lymphadenopathy.  no mass         Current Medications:  Current Outpatient Medications Medication Sig Dispense Refill    gabapentin (NEURONTIN) 300 mg capsule Take 1 Cap by mouth three (3) times daily. Indications: sensory sensitivity 270 Cap 0    letrozole (FEMARA) 2.5 mg tablet Take 1 Tab by mouth daily. 90 Tab 3    diclofenac EC (VOLTAREN) 75 mg EC tablet Take 1 Tab by mouth two (2) times daily as needed. 60 Tab 5    acetaminophen (TYLENOL) 500 mg tablet Take 1 Tab by mouth every six (6) hours as needed for Pain. 120 Tab 0    ibuprofen (MOTRIN) 800 mg tablet Take 1 Tab by mouth every eight (8) hours as needed for Pain. 90 Tab 0    pantoprazole (PROTONIX) 40 mg tablet Take 40 mg by mouth daily.  esomeprazole (NEXIUM) 40 mg capsule   0    amitriptyline (ELAVIL) 25 mg tablet Take 1 Tab by mouth nightly. Indications: MIGRAINE PREVENTION 90 Tab 1    Cholecalciferol, Vitamin D3, (VITAMIN D3) 2,000 unit cap capsule Take  by mouth daily.  fluticasone-vilanterol (BREO ELLIPTA) 200-25 mcg/dose inhaler Take 1 Puff by inhalation daily.  docusate sodium (COLACE) 100 mg capsule Take 1 Cap by mouth two (2) times a day. 30 Cap 0    ferrous sulfate 325 mg (65 mg iron) tablet Take 1 Tab by mouth two (2) times daily (with meals). 30 Tab 0    ascorbic acid (VITAMIN C) 250 mg tablet Take 1 Tab by mouth two (2) times daily (with meals). 30 Tab 0    losartan-hydrochlorothiazide (HYZAAR) 100-25 mg per tablet Take 1 Tab by mouth daily. Indications: HYPERTENSION         Chart and notes reviewed. Data reviewed. I have evaluated and examined the patient. Impression:  · Patient with negative, clinical, exam and BIRADS 2, right, mammogram doing well overall, but with continued twinges of nerve pain in the areas of surgical cavity and radiation therapy. Plan:   · Bilateral, screening in Jan followed by clinical, breast exam in this office  · Continue monthly, self, breast exams   · Please call with any questions or concerns prior to next visit    Ms. Karyn English has a reminder for a \"due or due soon\" health maintenance. I have asked that she contact her primary care provider for follow-up on this health maintenance. Imtiaz Delgado.  Sneha Wilcox, LORE-C

## 2019-08-05 ENCOUNTER — OFFICE VISIT (OUTPATIENT)
Dept: ORTHOPEDIC SURGERY | Age: 64
End: 2019-08-05

## 2019-08-05 VITALS
HEIGHT: 67 IN | SYSTOLIC BLOOD PRESSURE: 123 MMHG | OXYGEN SATURATION: 95 % | TEMPERATURE: 97.8 F | RESPIRATION RATE: 16 BRPM | HEART RATE: 70 BPM | DIASTOLIC BLOOD PRESSURE: 79 MMHG | BODY MASS INDEX: 27.25 KG/M2 | WEIGHT: 173.6 LBS

## 2019-08-05 DIAGNOSIS — M48.062 SPINAL STENOSIS OF LUMBAR REGION WITH NEUROGENIC CLAUDICATION: Primary | ICD-10-CM

## 2019-08-05 DIAGNOSIS — M51.36 DDD (DEGENERATIVE DISC DISEASE), LUMBAR: ICD-10-CM

## 2019-08-05 RX ORDER — GABAPENTIN 400 MG/1
400 CAPSULE ORAL 3 TIMES DAILY
Qty: 90 CAP | Refills: 2 | Status: SHIPPED | OUTPATIENT
Start: 2019-08-05 | End: 2020-02-06 | Stop reason: SDUPTHER

## 2019-08-05 NOTE — PROGRESS NOTES
Chief complaint/History of Present Illness:  Chief Complaint   Patient presents with    Back Pain     6 month follow up and med refill    Leg Pain     BLE     ANNA Milan is a  66 y.o.  female      HISTORY OF PRESENT ILLNESS:  The patient comes in today for followup of her chronic low back pain with bilateral lower extremity pain, numbness, and tingling. Her back pain is only off and on, but her leg pain is constant. She takes Gabapentin 300 mg three times a day and Diclofenac 75 mg. She states she has only taken the Diclofenac as needed. She states the Gabapentin did work better in the past.  It does not seem to be working as well now. It looks like she is status post an L3-4 laminectomy in 2012. She is getting Tramadol through Dr. Gilford Slicker now. She is retired. She is a nonsmoker. PHYSICAL EXAM:  Ms. Andreas Green is a 60-year-old female. She is alert and oriented. She has a normal mood and affect. She has a full weightbearing, non-antalgic gait using no assistive device. She has 4/5 strength of the bilateral lower extremities and negative straight leg raise. She does have mild pain with hyperextension of the lumbar spine. ASSESSMENT/PLAN:  This is a patient who has lumbar stenosis and degenerative disc disease. We are going to increase her Gabapentin to 400 mg three times a day. Hopefully, that will help more with her leg pain. We will see her back in three months or sooner if needed. She will call the office if she has any issues with it.        Review of systems:    Past Medical History:   Diagnosis Date    Arthritis     Balance problems     patient states no further issues    Breast CA ([address] 03/2018    right    Bronchitis     Chronic pain     back    Fibromyalgia 11/29/2012    GERD (gastroesophageal reflux disease)     Hiatal Hernia    Hypertension     Kidney stones     Low back pain     Lumbar spinal stenosis 11/25/2012    Migraine headache     Nervousness     Neurological disease     Osteoarthritis 11/25/2012    Other ill-defined conditions(799.89)     neuropathy    Peripheral neuropathy 11/29/2012    Post laminectomy syndrome     Postoperative anemia due to acute blood loss 11/30/2012    Ringing in ears     Spinal stenosis     Thoracic spine pain     Vitamin D deficiency 11/30/2012     Past Surgical History:   Procedure Laterality Date    BX/REMV,LYMPH NODE,DEEP AXILL Right 05/04/2018    Dr. Yen Tracy Right 5/4/2018    NEEDLE LOCALIZED (1000) LUMPECTOMY RIGHT BREAST AND SENTINEL LYMPH NODE BIOPSY RIGHT AXILLA performed by Trudi Gaytan MD at [address] [place] [address] HX ORTHOPAEDIC      foot l heel spur    HX ORTHOPAEDIC      right knee sx    HX OTHER SURGICAL      non cancer cyst removed from right shoulder    NEUROLOGICAL PROCEDURE UNLISTED      Bilateral L3-4 hemilaminotomy and medial facetectomy by Dr. Suellen Alberto History     Socioeconomic History    Marital status: SINGLE     Spouse name: Not on file    Number of children: Not on file    Years of education: Not on file    Highest education level: Not on file   Occupational History    Not on file   Social Needs    Financial resource strain: Not on file    Food insecurity:     Worry: Not on file     Inability: Not on file    Transportation needs:     Medical: Not on file     Non-medical: Not on file   Tobacco Use    Smoking status: Never Smoker    Smokeless tobacco: Never Used   Substance and Sexual Activity    Alcohol use: No    Drug use: No    Sexual activity: Not on file   Lifestyle    Physical activity:     Days per week: Not on file     Minutes per session: Not on file    Stress: Not on file   Relationships    Social connections:     Talks on phone: Not on file     Gets together: Not on file     Attends Congregation service: Not on file     Active member of club or organization: Not on file     Attends meetings of clubs or organizations: Not on file Relationship status: Not on file    Intimate partner violence:     Fear of current or ex partner: Not on file     Emotionally abused: Not on file     Physically abused: Not on file     Forced sexual activity: Not on file   Other Topics Concern    Not on file   Social History Narrative    Not on file     Family History   Problem Relation Age of Onset    Hypertension Mother    [place] Arthritis-osteo Mother     Neuropathy Mother     Hypertension Father     Cancer Father         colon    Hypertension Brother     Cancer Brother     Neuropathy Brother     Neuropathy Other     Arthritis-rheumatoid Other     GERD Other     Diabetes Other     Diabetes Maternal Aunt     Diabetes Maternal Uncle     Hypertension Maternal Grandfather        Physical Exam:  Visit Vitals  /79   Pulse 70   Temp 97.8 °F (36.6 °C) (Oral)   Resp 16   Ht 5' 7\" (1.702 m)   Wt 173 lb 9.6 oz (78.7 kg)   SpO2 95%   BMI 27.19 kg/m²     Pain Scale: 6/10       has been . reviewed and is appropriate          Diagnoses and all orders for this visit:    1. Spinal stenosis of lumbar region with neurogenic claudication  -     gabapentin (NEURONTIN) 400 mg capsule; Take 1 Cap by mouth three (3) times daily. Max Daily Amount: 1,200 mg. Indications: Neuropathic Pain    2. DDD (degenerative disc disease), lumbar  -     gabapentin (NEURONTIN) 400 mg capsule; Take 1 Cap by mouth three (3) times daily. Max Daily Amount: 1,200 mg. Indications: Neuropathic Pain            Follow-up and Dispositions    · Return in about 3 months (around 11/5/2019) for with NP.              We have informed Carlie Cristela to notify us for immediate appointment if she has any worsening neurogical symptoms or if an emergency situation presents, then call 911

## 2019-08-26 ENCOUNTER — OFFICE VISIT (OUTPATIENT)
Dept: ORTHOPEDIC SURGERY | Age: 64
End: 2019-08-26

## 2019-08-26 VITALS
SYSTOLIC BLOOD PRESSURE: 127 MMHG | TEMPERATURE: 97.7 F | OXYGEN SATURATION: 99 % | HEIGHT: 67 IN | HEART RATE: 79 BPM | BODY MASS INDEX: 27.94 KG/M2 | WEIGHT: 178 LBS | RESPIRATION RATE: 16 BRPM | DIASTOLIC BLOOD PRESSURE: 74 MMHG

## 2019-08-26 DIAGNOSIS — M19.012 GLENOHUMERAL ARTHRITIS, LEFT: Primary | ICD-10-CM

## 2019-08-26 DIAGNOSIS — M25.512 LEFT SHOULDER PAIN, UNSPECIFIED CHRONICITY: ICD-10-CM

## 2019-08-26 RX ORDER — TRIAMCINOLONE ACETONIDE 40 MG/ML
40 INJECTION, SUSPENSION INTRA-ARTICULAR; INTRAMUSCULAR ONCE
Qty: 1 ML | Refills: 0
Start: 2019-08-26 | End: 2019-08-26

## 2019-08-26 NOTE — PROGRESS NOTES
1. Have you been to the ER, urgent care clinic since your last visit? Hospitalized since your last visit? NO    2. Have you seen or consulted any other health care providers outside of the [address] [place] [address] since your last visit? Include any pap smears or colon screening.  YES, Dr. Ne Ricardo

## 2019-08-26 NOTE — PROGRESS NOTES
Candice Dominguez  1955   Chief Complaint   Patient presents with    Shoulder Pain     left shoulder pain        HISTORY OF PRESENT ILLNESS  Candice Dominguez is a 66 y.o. female who presents today for evaluation of left shoulder pain. Pt referred by Dr. Filippo Pedro. She rates her pain 7/10 today. Pain has been present for 2-3 years. Pt denies any injury but reports hx of pinched nerve. The pain has gradually gotten worse. The pt had a cortisone injection 3-4 months ago which provided some relief but the pain has returned. Patient describes the pain as aching that is Intermittent in nature. Symptoms are worse with certain movements of the arm, lifting, reaching up, Activity and is better with  Ice. Associated symptoms include weakness. Since problem started, it: has worsened slightly. Pain does wake patient up at night. Has taken no meds for the problem. Has tried following treatments: Injections:YES; Brace:NO;  Therapy:NO; Cane/Crutch:NO       Allergies   Allergen Reactions    Aspirin Other (comments)     GI Pain    Adhesive Tape-Silicones Other (comments)     blisters        Past Medical History:   Diagnosis Date    Arthritis     Balance problems     patient states no further issues    Breast CA [address] [address] 03/2018    right    Bronchitis     Chronic pain     back    Fibromyalgia 11/29/2012    GERD (gastroesophageal reflux disease)     Hiatal Hernia    Hypertension     Kidney stones     Low back pain     Lumbar spinal stenosis 11/25/2012    Migraine headache     Nervousness     Neurological disease     Osteoarthritis 11/25/2012    Other ill-defined conditions(799.89)     neuropathy    Peripheral neuropathy 11/29/2012    Post laminectomy syndrome     Postoperative anemia due to acute blood loss 11/30/2012    Ringing in ears     Spinal stenosis     Thoracic spine pain     Vitamin D deficiency 11/30/2012      Social History     Socioeconomic History    Marital status: SINGLE     Spouse name: Not on file    Number of children: Not on file    Years of education: Not on file    Highest education level: Not on file   Occupational History    Not on file   Social Needs    Financial resource strain: Not on file    Food insecurity:     Worry: Not on file     Inability: Not on file    Transportation needs:     Medical: Not on file     Non-medical: Not on file   Tobacco Use    Smoking status: Never Smoker    Smokeless tobacco: Never Used   Substance and Sexual Activity    Alcohol use: No    Drug use: No    Sexual activity: Not on file   Lifestyle    Physical activity:     Days per week: Not on file     Minutes per session: Not on file    Stress: Not on file   Relationships    Social connections:     Talks on phone: Not on file     Gets together: Not on file     Attends [employer] service: Not on file     Active member of club or organization: Not on file     Attends meetings of clubs or organizations: Not on file     Relationship status: Not on file    Intimate partner violence:     Fear of current or ex partner: Not on file     Emotionally abused: Not on file     Physically abused: Not on file     Forced sexual activity: Not on file   Other Topics Concern    Not on file   Social History Narrative    Not on file      Past Surgical History:   Procedure Laterality Date    BX/REMV,LYMPH NODE,DEEP AXILL Right 05/04/2018    Dr. Deric Trevizo HX MASTECTOMY Right 5/4/2018    NEEDLE LOCALIZED (1000) LUMPECTOMY RIGHT BREAST AND SENTINEL LYMPH NODE BIOPSY RIGHT AXILLA performed by Baldev Jacinto MD at [facility] MAIN OR    HX ORTHOPAEDIC      foot l heel spur    HX ORTHOPAEDIC      right knee sx    HX OTHER SURGICAL      non cancer cyst removed from right shoulder    NEUROLOGICAL PROCEDURE UNLISTED      Bilateral L3-4 hemilaminotomy and medial facetectomy by Dr. Eli Garcia      Family History   Problem Relation Age of Onset    Hypertension Mother     Arthritis-osteo Mother     Neuropathy Mother     Hypertension Father     Cancer Father         colon    Hypertension Brother     Cancer Brother     Neuropathy Brother     Neuropathy Other     Arthritis-rheumatoid Other     GERD Other     Diabetes Other     Diabetes Maternal Aunt     Diabetes Maternal Uncle     Hypertension Maternal Grandfather       Current Outpatient Medications   Medication Sig    triamcinolone acetonide (KENALOG) 40 mg/mL injection 1 mL by IntraMUSCular route once for 1 dose.  gabapentin (NEURONTIN) 400 mg capsule Take 1 Cap by mouth three (3) times daily. Max Daily Amount: 1,200 mg. Indications: Neuropathic Pain    gabapentin (NEURONTIN) 300 mg capsule Take 1 Cap by mouth three (3) times daily. Indications: sensory sensitivity    letrozole (FEMARA) 2.5 mg tablet Take 1 Tab by mouth daily.  acetaminophen (TYLENOL) 500 mg tablet Take 1 Tab by mouth every six (6) hours as needed for Pain.  pantoprazole (PROTONIX) 40 mg tablet Take 40 mg by mouth daily.  esomeprazole (NEXIUM) 40 mg capsule     amitriptyline (ELAVIL) 25 mg tablet Take 1 Tab by mouth nightly. Indications: MIGRAINE PREVENTION    Cholecalciferol, Vitamin D3, (VITAMIN D3) 2,000 unit cap capsule Take  by mouth daily.  fluticasone-vilanterol (BREO ELLIPTA) 200-25 mcg/dose inhaler Take 1 Puff by inhalation daily.  docusate sodium (COLACE) 100 mg capsule Take 1 Cap by mouth two (2) times a day.  ferrous sulfate 325 mg (65 mg iron) tablet Take 1 Tab by mouth two (2) times daily (with meals).  ascorbic acid (VITAMIN C) 250 mg tablet Take 1 Tab by mouth two (2) times daily (with meals).  losartan-hydrochlorothiazide (HYZAAR) 100-25 mg per tablet Take 1 Tab by mouth daily. Indications: HYPERTENSION    diclofenac EC (VOLTAREN) 75 mg EC tablet Take 1 Tab by mouth two (2) times daily as needed.  ibuprofen (MOTRIN) 800 mg tablet Take 1 Tab by mouth every eight (8) hours as needed for Pain.      No current facility-administered medications for this visit.        REVIEW OF SYSTEM   Patient denies: Weight loss, Fever/Chills, HA, Visual changes, Fatigue, Chest pain, SOB, Abdominal pain, N/V/D/C, Blood in stool or urine, Edema. Pertinent positive as above in HPI. All others were negative    PHYSICAL EXAM:   Visit Vitals  /74 (BP 1 Location: Left arm, BP Patient Position: Sitting)   Pulse 79   Temp 97.7 °F (36.5 °C) (Oral)   Resp 16   Ht 5' 7\" (1.702 m)   Wt 178 lb (80.7 kg)   SpO2 99%   BMI 27.88 kg/m²     The patient is a well-developed, well-nourished female   in no acute distress. The patient is alert and oriented times three. The patient is alert and oriented times three. Mood and affect are normal.  LYMPHATIC: lymph nodes are not enlarged and are within normal limits  SKIN: normal in color and non tender to palpation. There are no bruises or abrasions noted. NEUROLOGICAL: Motor sensory exam is within normal limits. Reflexes are equal bilaterally. There is normal sensation to pinprick and light touch  MUSCULOSKELETAL:  Examination Left shoulder   Skin Intact   AC joint tenderness -   Biceps tenderness -   Forward flexion/Elevation    Active abduction    Glenohumeral abduction 45   External rotation ROM 0   Internal rotation ROM 30   Apprehension -   Baldevs Relocation -   Jerk -   Load and Shift -   Obriens -   Speeds -   Impingement sign +   Supraspinatus/Empty Can +   External Rotation Strength -, 5/5   Lift Off/Belly Press -, 5/5   Neurovascular Intact     PROCEDURE: Left Shoulder Joint Injection with Ultrasound Guidance  Indication:Left Shoulder Joint  pain/swelling    After sterile prep, 6 cc of Xylocaine and 1 cc of Kenalog were injected into the left shoulder joint. Ultrasound images captured using [employer] Ultrasound machine and scanned into patient's chart.        [facility]  OFFICE PROCEDURE PROGRESS NOTE        Chart reviewed for the following:  Dustin Turner M.D, have reviewed the History, Physical and updated the Allergic reactions for Jane Genao     TIME OUT performed immediately prior to start of procedure:  IKimmie M.D, have performed the following reviews on [place] prior to the start of the procedure:            * Patient was identified by name and date of birth   * Agreement on procedure being performed was verified  * Risks and Benefits explained to the patient  * Procedure site verified and marked as necessary  * Patient was positioned for comfort  * Consent was signed and verified     Time: 5:04 PM     Date of procedure: 8/26/2019    Procedure performed by:  Kimmie Koch M.D    Provider assisted by: (see medication administration)    How tolerated by patient: tolerated the procedure well with no complications    Comments: none      IMAGING: XR of left shoulder dated 8/26/19 was reviewed and read: Marked degenerative arthritis of glenohumeral joint      IMPRESSION:      ICD-10-CM ICD-9-CM    1. Glenohumeral arthritis, left M19.012 716.91 TRIAMCINOLONE ACETONIDE INJ      triamcinolone acetonide (KENALOG) 40 mg/mL injection      US GUIDE INJ/ASP/ARTHRO LG JNT/BURSA   2. Left shoulder pain, unspecified chronicity M25.512 719.41 AMB POC XRAY, SHOULDER; COMPLETE, 2+        PLAN:  1. Pt presents today with left shoulder pain due to glenohumeral arthritis and I would like to try an injection today. Risk factors include: htn  2. No ultrasound exam indicated today  3. Yes cortisone injection indicated today L SHOULDER JOINT US  4. No Physical/Occupational Therapy indicated today  5. No diagnostic test indicated today:   6. No durable medical equipment indicated today  7. No referral indicated today   8. No medications indicated today:   9. No Narcotic indicated today        RTC 4 weeks    Office note will be sent to referring provider.     Scribed by Elijah Vang [address]) as dictated by MD SHARONDA Gregory Dr. Anamaria Mercado, confirm that all documentation is accurate.     Anamaria Mercado M.D.   [facility]

## 2019-09-24 ENCOUNTER — TELEPHONE (OUTPATIENT)
Dept: ONCOLOGY | Age: 64
End: 2019-09-24

## 2019-09-25 DIAGNOSIS — M47.899 FACET SYNDROME: ICD-10-CM

## 2019-09-25 DIAGNOSIS — G89.3 CANCER ASSOCIATED PAIN: ICD-10-CM

## 2019-09-25 DIAGNOSIS — M79.18 MYOFASCIAL PAIN: ICD-10-CM

## 2019-09-25 RX ORDER — TRAMADOL HYDROCHLORIDE 50 MG/1
50 TABLET ORAL
Qty: 45 TAB | Refills: 0 | Status: SHIPPED | OUTPATIENT
Start: 2019-09-25 | End: 2020-05-28 | Stop reason: SDUPTHER

## 2019-10-14 DIAGNOSIS — M79.18 MYOFASCIAL PAIN: ICD-10-CM

## 2019-10-14 DIAGNOSIS — M47.899 FACET SYNDROME: ICD-10-CM

## 2019-10-14 RX ORDER — GABAPENTIN 300 MG/1
300 CAPSULE ORAL 3 TIMES DAILY
Qty: 270 CAP | Refills: 0 | Status: SHIPPED | OUTPATIENT
Start: 2019-10-14 | End: 2020-02-06 | Stop reason: SDUPTHER

## 2019-10-14 NOTE — TELEPHONE ENCOUNTER
Patient called, verified name , informed that Rx is available for  at the [employer] [facility] the [facility]. Reminded patient to bring photo id when picking up Rx. Patient verbalized understanding. No further action required at this time.

## 2019-10-14 NOTE — TELEPHONE ENCOUNTER
Patient states the dose was increased to 400mg but patient is unable to take because it is too potent. She is asking to be switched back to the 300mg dose and she will discard of the 400mg. Last Visit: 8/5/19 with NP Alejandro  Next Appointment: 11/4/19 with NP Alejandro  Previous Refill Encounter(s): 5/30/19 #270    Requested Prescriptions     Pending Prescriptions Disp Refills    gabapentin (NEURONTIN) 300 mg capsule 270 Cap 0     Sig: Take 1 Cap by mouth three (3) times daily.  Indications: sensory sensitivity

## 2019-10-28 ENCOUNTER — HOSPITAL ENCOUNTER (OUTPATIENT)
Dept: LAB | Age: 64
Discharge: HOME | End: 2019-10-28
Payer: MEDICARE

## 2019-10-28 ENCOUNTER — OFFICE VISIT (OUTPATIENT)
Dept: ONCOLOGY | Age: 64
End: 2019-10-28

## 2019-10-28 VITALS
RESPIRATION RATE: 18 BRPM | SYSTOLIC BLOOD PRESSURE: 140 MMHG | BODY MASS INDEX: 28.25 KG/M2 | WEIGHT: 180 LBS | HEIGHT: 67 IN | HEART RATE: 76 BPM | OXYGEN SATURATION: 98 % | DIASTOLIC BLOOD PRESSURE: 88 MMHG | TEMPERATURE: 98.8 F

## 2019-10-28 DIAGNOSIS — C50.511 MALIGNANT NEOPLASM OF LOWER-OUTER QUADRANT OF RIGHT FEMALE BREAST, UNSPECIFIED ESTROGEN RECEPTOR STATUS (HCC): ICD-10-CM

## 2019-10-28 DIAGNOSIS — G89.3 CHRONIC PAIN DUE TO NEOPLASM: ICD-10-CM

## 2019-10-28 DIAGNOSIS — D50.8 IRON DEFICIENCY ANEMIA SECONDARY TO INADEQUATE DIETARY IRON INTAKE: ICD-10-CM

## 2019-10-28 DIAGNOSIS — E55.9 VITAMIN D DEFICIENCY: Primary | ICD-10-CM

## 2019-10-28 DIAGNOSIS — E55.9 VITAMIN D DEFICIENCY: ICD-10-CM

## 2019-10-28 PROBLEM — N91.2 AMENORRHEA: Status: ACTIVE | Noted: 2019-10-28

## 2019-10-28 PROBLEM — M25.519 SHOULDER JOINT PAIN: Status: ACTIVE | Noted: 2019-10-28

## 2019-10-28 PROBLEM — Z86.39 HISTORY OF NUTRITIONAL DEFICIENCY: Status: ACTIVE | Noted: 2019-10-28

## 2019-10-28 PROBLEM — Z85.3 PERSONAL HISTORY OF MALIGNANT NEOPLASM OF BREAST: Status: ACTIVE | Noted: 2019-10-28

## 2019-10-28 PROBLEM — Z78.9 NON-SMOKER: Status: ACTIVE | Noted: 2019-10-28

## 2019-10-28 PROBLEM — C50.311 MALIGNANT NEOPLASM OF LOWER-INNER QUADRANT OF RIGHT BREAST OF FEMALE, ESTROGEN RECEPTOR POSITIVE (HCC): Status: ACTIVE | Noted: 2018-04-02

## 2019-10-28 PROBLEM — N95.2 ATROPHIC VAGINITIS: Status: ACTIVE | Noted: 2019-10-28

## 2019-10-28 LAB
ALBUMIN SERPL-MCNC: 3.8 G/DL (ref 3.4–5)
ALBUMIN/GLOB SERPL: 1.2 {RATIO} (ref 0.8–1.7)
ALP SERPL-CCNC: 59 U/L (ref 45–117)
ALT SERPL-CCNC: 22 U/L (ref 13–56)
ANION GAP SERPL CALC-SCNC: 5 MMOL/L (ref 3–18)
AST SERPL-CCNC: 21 U/L (ref 10–38)
BASOPHILS # BLD: 0 K/UL (ref 0–0.1)
BASOPHILS NFR BLD: 1 % (ref 0–2)
BILIRUB SERPL-MCNC: 0.4 MG/DL (ref 0.2–1)
BUN SERPL-MCNC: 19 MG/DL (ref 7–18)
BUN/CREAT SERPL: 20 (ref 12–20)
CALCIUM SERPL-MCNC: 9.4 MG/DL (ref 8.5–10.1)
CHLORIDE SERPL-SCNC: 104 MMOL/L (ref 100–111)
CO2 SERPL-SCNC: 30 MMOL/L (ref 21–32)
CREAT SERPL-MCNC: 0.93 MG/DL (ref 0.6–1.3)
DIFFERENTIAL METHOD BLD: ABNORMAL
EOSINOPHIL # BLD: 0.3 K/UL (ref 0–0.4)
EOSINOPHIL NFR BLD: 4 % (ref 0–5)
ERYTHROCYTE [DISTWIDTH] IN BLOOD BY AUTOMATED COUNT: 14.3 % (ref 11.6–14.5)
GLOBULIN SER CALC-MCNC: 3.3 G/DL (ref 2–4)
GLUCOSE SERPL-MCNC: 85 MG/DL (ref 74–99)
HCT VFR BLD AUTO: 34.2 % (ref 35–45)
HGB BLD-MCNC: 10.9 G/DL (ref 12–16)
LYMPHOCYTES # BLD: 2.3 K/UL (ref 0.9–3.6)
LYMPHOCYTES NFR BLD: 38 % (ref 21–52)
MCH RBC QN AUTO: 27.8 PG (ref 24–34)
MCHC RBC AUTO-ENTMCNC: 31.9 G/DL (ref 31–37)
MCV RBC AUTO: 87.2 FL (ref 74–97)
MONOCYTES # BLD: 0.3 K/UL (ref 0.05–1.2)
MONOCYTES NFR BLD: 4 % (ref 3–10)
NEUTS SEG # BLD: 3.2 K/UL (ref 1.8–8)
NEUTS SEG NFR BLD: 53 % (ref 40–73)
PLATELET # BLD AUTO: 251 K/UL (ref 135–420)
PMV BLD AUTO: 10.6 FL (ref 9.2–11.8)
POTASSIUM SERPL-SCNC: 3.7 MMOL/L (ref 3.5–5.5)
PROT SERPL-MCNC: 7.1 G/DL (ref 6.4–8.2)
RBC # BLD AUTO: 3.92 M/UL (ref 4.2–5.3)
SODIUM SERPL-SCNC: 139 MMOL/L (ref 136–145)
WBC # BLD AUTO: 6 K/UL (ref 4.6–13.2)

## 2019-10-28 PROCEDURE — 82306 VITAMIN D 25 HYDROXY: CPT

## 2019-10-28 PROCEDURE — 36415 COLL VENOUS BLD VENIPUNCTURE: CPT

## 2019-10-28 PROCEDURE — 80053 COMPREHEN METABOLIC PANEL: CPT

## 2019-10-28 PROCEDURE — 82728 ASSAY OF FERRITIN: CPT

## 2019-10-28 PROCEDURE — 83540 ASSAY OF IRON: CPT

## 2019-10-28 PROCEDURE — 85025 COMPLETE CBC W/AUTO DIFF WBC: CPT

## 2019-10-28 RX ORDER — OXYCODONE AND ACETAMINOPHEN 5; 325 MG/1; MG/1
1 TABLET ORAL
Qty: 90 TAB | Refills: 0 | Status: SHIPPED | OUTPATIENT
Start: 2019-10-28 | End: 2020-02-03 | Stop reason: SDUPTHER

## 2019-10-28 NOTE — PROGRESS NOTES
Hematology/Oncology  Progress Note    Name: Loli Song  Date: 10/28/2019  : 1955    PCP: Kayla Johnson MD     Ms. Radha Archer is a 60 y.o. -American woman with invasive ductal carcinoma the right breast.    Current therapy: Femara 2.5mg PO daily      Subjective:     Mrs. Radha Archer is a 72-year-old -American woman with invasive ductal carcinoma involving the right breast. She was diagnosed in 2018. She reports she continues to tolerate the Femara without significant untoward side effects. She states she is doing her breast self exam on a monthly basis and has no new physical complaints or concerns to report. She still complains of some pain due to her surgery. She denies weakness, fatigue, and shortness of breath. She denies chest pain or dizziness. She does not have any concerns or complaints to report at this time. Past medical history, family history, and social history: these were reviewed and remains unchanged.     Past Medical History:   Diagnosis Date    Arthritis     Balance problems     patient states no further issues    Breast CA ([facility] [address] 2018    right    Bronchitis     Chronic pain     back    Fibromyalgia 2012    GERD (gastroesophageal reflux disease)     Hiatal Hernia    Hypertension     Kidney stones     Low back pain     Lumbar spinal stenosis 2012    Migraine headache     Nervousness     Neurological disease     Osteoarthritis 2012    Other ill-defined conditions(799.89)     neuropathy    Peripheral neuropathy 2012    Post laminectomy syndrome     Postoperative anemia due to acute blood loss 2012    Ringing in ears     Spinal stenosis     Thoracic spine pain     Vitamin D deficiency 2012     Past Surgical History:   Procedure Laterality Date    BX/REMV,LYMPH NODE,DEEP AXILL Right 2018    Dr. Lopez So    HX MASTECTOMY Right 2018    NEEDLE LOCALIZED (1000) LUMPECTOMY RIGHT BREAST AND SENTINEL LYMPH NODE BIOPSY RIGHT AXILLA performed by Donald Keith MD at [facility] HX ORTHOPAEDIC      foot l heel spur    HX ORTHOPAEDIC      right knee sx    HX OTHER SURGICAL      non cancer cyst removed from right shoulder    NEUROLOGICAL PROCEDURE UNLISTED      Bilateral L3-4 hemilaminotomy and medial facetectomy by Dr. Lyndsay Colvin History     Socioeconomic History    Marital status: SINGLE     Spouse name: Not on file    Number of children: Not on file    Years of education: Not on file    Highest education level: Not on file   Occupational History    Not on file   Social Needs    Financial resource strain: Not on file    Food insecurity:     Worry: Not on file     Inability: Not on file    Transportation needs:     Medical: Not on file     Non-medical: Not on file   Tobacco Use    Smoking status: Never Smoker    Smokeless tobacco: Never Used   Substance and Sexual Activity    Alcohol use: No    Drug use: No    Sexual activity: Not on file   Lifestyle    Physical activity:     Days per week: Not on file     Minutes per session: Not on file    Stress: Not on file   Relationships    Social connections:     Talks on phone: Not on file     Gets together: Not on file     Attends Hindu service: Not on file     Active member of club or organization: Not on file     Attends meetings of clubs or organizations: Not on file     Relationship status: Not on file    Intimate partner violence:     Fear of current or ex partner: Not on file     Emotionally abused: Not on file     Physically abused: Not on file     Forced sexual activity: Not on file   Other Topics Concern    Not on file   Social History Narrative    Not on file     Family History   Problem Relation Age of Onset    Hypertension Mother     Arthritis-osteo Mother     Neuropathy Mother     Hypertension Father     Cancer Father         colon    Hypertension Brother     Cancer Brother     Neuropathy Brother  Neuropathy Other     Arthritis-rheumatoid Other     GERD Other     Diabetes Other     Diabetes Maternal Aunt     Diabetes Maternal Uncle     Hypertension Maternal Grandfather      Current Outpatient Medications   Medication Sig Dispense Refill    oxyCODONE-acetaminophen (PERCOCET) 5-325 mg per tablet Take 1 Tab by mouth every eight (8) hours as needed for Pain for up to 30 days. Max Daily Amount: 3 Tabs. 90 Tab 0    gabapentin (NEURONTIN) 300 mg capsule Take 1 Cap by mouth three (3) times daily. Indications: sensory sensitivity 270 Cap 0    gabapentin (NEURONTIN) 400 mg capsule Take 1 Cap by mouth three (3) times daily. Max Daily Amount: 1,200 mg. Indications: Neuropathic Pain 90 Cap 2    letrozole (FEMARA) 2.5 mg tablet Take 1 Tab by mouth daily. 90 Tab 3    diclofenac EC (VOLTAREN) 75 mg EC tablet Take 1 Tab by mouth two (2) times daily as needed. 60 Tab 5    acetaminophen (TYLENOL) 500 mg tablet Take 1 Tab by mouth every six (6) hours as needed for Pain. 120 Tab 0    ibuprofen (MOTRIN) 800 mg tablet Take 1 Tab by mouth every eight (8) hours as needed for Pain. 90 Tab 0    pantoprazole (PROTONIX) 40 mg tablet Take 40 mg by mouth daily.  esomeprazole (NEXIUM) 40 mg capsule   0    amitriptyline (ELAVIL) 25 mg tablet Take 1 Tab by mouth nightly. Indications: MIGRAINE PREVENTION 90 Tab 1    Cholecalciferol, Vitamin D3, (VITAMIN D3) 2,000 unit cap capsule Take  by mouth daily.  fluticasone-vilanterol (BREO ELLIPTA) 200-25 mcg/dose inhaler Take 1 Puff by inhalation daily.  docusate sodium (COLACE) 100 mg capsule Take 1 Cap by mouth two (2) times a day. 30 Cap 0    ferrous sulfate 325 mg (65 mg iron) tablet Take 1 Tab by mouth two (2) times daily (with meals). 30 Tab 0    ascorbic acid (VITAMIN C) 250 mg tablet Take 1 Tab by mouth two (2) times daily (with meals). 30 Tab 0    losartan-hydrochlorothiazide (HYZAAR) 100-25 mg per tablet Take 1 Tab by mouth daily.     Indications: HYPERTENSION         Review of Systems  Constitutional: The patient has no acute distress or discomfort. HEENT: The patient denies recent head trauma, eye pain, blurred vision,  hearing deficit, oropharyngeal mucosal pain or lesions, and the patient denies throat pain or discomfort. Lymphatics: The patient denies palpable peripheral lymphadenopathy. Hematologic: The patient denies having bruising, bleeding, or progressive fatigue. Respiratory: Patient denies having shortness of breath, cough, sputum production, fever, or dyspnea on exertion. Cardiovascular: The patient denies having leg pain, leg swelling, heart palpitations, chest permit, chest pain, or lightheadedness. The patient denies having dyspnea on exertion. Gastrointestinal: The patient denies having nausea, emesis, or diarrhea. The patient denies having any hematemesis or blood in the stool. Genitourinary: Patient denies having urinary urgency, frequency, or dysuria. The patient denies having blood in the urine. Psychological: The patient denies having symptoms of nervousness, anxiety, depression, or thoughts of harming self. Skin: Patient denies having skin rashes, skin, ulcerations, or unexplained itching or pruritus. Musculoskeletal: The patient denies having pain in the joints or bones. Objective:     Visit Vitals  /88   Pulse 76   Temp 98.8 °F (37.1 °C) (Oral)   Resp 18   Ht 5' 7\" (1.702 m)   Wt 81.6 kg (180 lb)   SpO2 98%   BMI 28.19 kg/m²       ECOG PS=0; pain score=0/10    Physical Exam:   Gen. Appearance: The patient is in no acute distress. Skin: There is no bruise or rash. HEENT: The exam is unremarkable. Neck: Supple without lymphadenopathy or thyromegaly. Lungs: Clear to auscultation and percussion; there are no wheezes or rhonchi. Heart: Regular rate and rhythm; there are no murmurs, gallops, or rubs. Abdomen: Bowel sounds are present and normal.  There is no guarding, tenderness, or hepatosplenomegaly. Extremities: There is no clubbing, cyanosis, or edema. Neurologic: There are no focal neurologic deficits. Lymphatics: There is no palpable peripheral lymphadenopathy. Musculoskeletal: The patient has full range of motion at all joints. There is no evidence of joint deformity or effusions. There is no focal joint tenderness. Psychological/psychiatric: There is no clinical evidence of anxiety, depression, or melancholy. Lab data:      Results for orders placed or performed during the hospital encounter of 07/15/19   CBC WITH 3 PART DIFF     Status: Abnormal   Result Value Ref Range Status    WBC 4.9 4.5 - 13.0 K/uL Final    RBC 3.92 (L) 4.10 - 5.10 M/uL Final    HGB 11.1 (L) 12.0 - 16 g/dL Final    HCT 33.7 (L) 36 - 48 % Final    MCV 86.0 78 - 102 FL Final    MCH 28.3 25.0 - 35.0 PG Final    MCHC 32.9 31 - 37 g/dL Final    RDW 13.4 11.5 - 14.5 % Final    PLATELET 207 795 - 986 K/uL Final    NEUTROPHILS 51 40 - 70 % Final    MIXED CELLS 9 0.1 - 17 % Final    LYMPHOCYTES 40 14 - 44 % Final    ABS. NEUTROPHILS 2.5 1.8 - 9.5 K/UL Final    ABS. MIXED CELLS 0.4 0.0 - 2.3 K/uL Final    ABS. LYMPHOCYTES 2.0 1.1 - 5.9 K/UL Final     Comment: Test performed at [address] [place] [address] or Outpatient Infusion Center Location. Reviewed by Medical Director. DF AUTOMATED   Final           Assessment:     1. Vitamin D deficiency    2. Malignant neoplasm of lower-outer quadrant of right female breast, unspecified estrogen receptor status ([facility] [address]    3. Iron deficiency anemia secondary to inadequate dietary iron intake    4. Chronic pain due to neoplasm      Plan:   Vitamin D Deficiency: On 07/2019 her Vit D level was normal at 42.9. I will obtain her level at this time. Invasive ductal carcinoma the right breast/long-term use of an aromatase inhibitor: I have instructed the patient to continue to do her breast self exam on a monthly basis. Femara 2.5 mg PO daily will be continued.  Her CA27-29 was normal at 24.2 in 07/19. I will check her CA 27-29 level at this time. Iron Deficiency Anemia: Her most recent CBC showed her hemoglobin was 11.1g/dL with hematocrit of 33.7%. Iron profile and Ferritin will be obtained at this time. Chronic Pain due to Neoplasm: Rx for percocet 5-325mg was provided with instructions to take 1 tab every 8 hours as needed for pain. Follow-up in 3 months or sooner if indicated. Orders Placed This Encounter    VITAMIN D, 25 HYDROXY     Standing Status:   Future     Standing Expiration Date:   10/28/2020    IRON PROFILE     Standing Status:   Future     Standing Expiration Date:   47/44/8162    METABOLIC PANEL, COMPREHENSIVE     Standing Status:   Future     Standing Expiration Date:   10/28/2020    FERRITIN     Standing Status:   Future     Standing Expiration Date:   10/28/2020    CBC WITH AUTOMATED DIFF     Standing Status:   Future     Standing Expiration Date:   10/28/2020    oxyCODONE-acetaminophen (PERCOCET) 5-325 mg per tablet     Sig: Take 1 Tab by mouth every eight (8) hours as needed for Pain for up to 30 days. Max Daily Amount: 3 Tabs. Dispense:  90 Tab     Refill:  0       Jackie Ruiz NP  10/28/2019      I have assessed the patient independently and  agree with the full assessment as outlined.   Aliyah Flores MD, [address]

## 2019-10-28 NOTE — PATIENT INSTRUCTIONS
Letrozole (By mouth)   Letrozole (LET-ezequiel-zole)  Treats breast cancer. Brand Name(s): Polina Estelleamarilis Femdiogo Co-Pack   There may be other brand names for this medicine. When This Medicine Should Not Be Used: This medicine is not right for everyone. Do not use it if you had an allergic reaction to letrozole, or if you are pregnant. How to Use This Medicine:   Tablet  · Your doctor will tell you how much medicine to use. Do not use more than directed. · Missed dose: This medicine needs to be given on a fixed schedule. If you miss a dose, call your doctor for instructions. · Store the medicine in a closed container at room temperature, away from heat, moisture, and direct light. Drugs and Foods to Avoid:   Ask your doctor or pharmacist before using any other medicine, including over-the-counter medicines, vitamins, and herbal products. · Some medicines can affect how letrozole works. Tell your doctor if you are also using tamoxifen. Warnings While Using This Medicine:   · It is not safe to take this medicine during pregnancy. It could harm an unborn baby. Tell your doctor right away if you become pregnant. Use an effective form of birth control during treatment with this medicine and for at least 3 weeks after the last dose. · Do not breastfeed while you are taking this medicine and for at least 3 weeks after your last dose. · Tell your doctor if you have liver disease (including cirrhosis), bone problems (including osteoporosis), or high cholesterol in the blood. · This medicine may cause the following problems:  ¨ Low bone mineral density  ¨ High cholesterol or fat levels in the blood  ¨ Liver problems  · This medicine may make you dizzy, drowsy, or tired. Do not drive or do anything else that could be dangerous until you know how this medicine affects you. · This medicine could cause infertility. Talk with your doctor before using this medicine if you plan to have children.   · Medicines used to treat cancer are very strong and can have many side effects. Before receiving this medicine, make sure you understand all the risks and benefits. It is important for you to work closely with your doctor during your treatment. · Your doctor will do lab tests at regular visits to check on the effects of this medicine. Keep all appointments. · Keep all medicine out of the reach of children. Never share your medicine with anyone. Possible Side Effects While Using This Medicine:   Call your doctor right away if you notice any of these side effects:  · Allergic reaction: Itching or hives, swelling in your face or hands, swelling or tingling in your mouth or throat, chest tightness, trouble breathing  · Bone pain  · Chest pain, trouble breathing, coughing up blood  · Dark urine, pale stools, nausea, vomiting, loss of appetite, stomach pain, yellow skin or eyes  · Numbness or weakness on one side of your body, sudden or severe headache, problems with vision, speech, or walking  · Pain in your lower leg (calf)  · Swelling in your ankles or feet  · Unusual bleeding or bruising  · Unusual tiredness or weakness  If you notice these less serious side effects, talk with your doctor:   · Breast pain  · Diarrhea, constipation, stomach pain  · Headache  · Increased sweating  · Mild joint, back, or muscle pain  · Trouble sleeping  · Vaginal bleeding  · Warmth or redness in your face, neck, arms, or upper chest  · Weight gain or loss  If you notice other side effects that you think are caused by this medicine, tell your doctor. Call your doctor for medical advice about side effects. You may report side effects to FDA at 8-789-NCA-7217  © 2017 [facility] Information is for End User's use only and may not be sold, redistributed or otherwise used for commercial purposes. The above information is an  only. It is not intended as medical advice for individual conditions or treatments.  Talk to your doctor, nurse or pharmacist before following any medical regimen to see if it is safe and effective for you.

## 2019-10-29 LAB
25(OH)D3 SERPL-MCNC: 38.6 NG/ML (ref 30–100)
FERRITIN SERPL-MCNC: 77 NG/ML (ref 8–388)
IRON SATN MFR SERPL: 24 %
IRON SERPL-MCNC: 73 UG/DL (ref 50–175)
TIBC SERPL-MCNC: 304 UG/DL (ref 250–450)

## 2019-11-04 ENCOUNTER — OFFICE VISIT (OUTPATIENT)
Dept: ORTHOPEDIC SURGERY | Age: 64
End: 2019-11-04

## 2019-11-04 VITALS
TEMPERATURE: 97.8 F | SYSTOLIC BLOOD PRESSURE: 134 MMHG | DIASTOLIC BLOOD PRESSURE: 75 MMHG | BODY MASS INDEX: 28.03 KG/M2 | HEART RATE: 80 BPM | HEIGHT: 67 IN | WEIGHT: 178.6 LBS | OXYGEN SATURATION: 98 %

## 2019-11-04 DIAGNOSIS — M48.062 SPINAL STENOSIS OF LUMBAR REGION WITH NEUROGENIC CLAUDICATION: Chronic | ICD-10-CM

## 2019-11-04 DIAGNOSIS — M47.899 FACET SYNDROME: ICD-10-CM

## 2019-11-04 RX ORDER — DICLOFENAC SODIUM 75 MG/1
75 TABLET, DELAYED RELEASE ORAL
Qty: 60 TAB | Refills: 5 | Status: SHIPPED | OUTPATIENT
Start: 2019-11-04 | End: 2021-04-22

## 2019-11-04 RX ORDER — TIAGABINE HYDROCHLORIDE 4 MG/1
TABLET, FILM COATED ORAL
Qty: 60 TAB | Refills: 1 | Status: SHIPPED | OUTPATIENT
Start: 2019-11-04 | End: 2021-01-13

## 2019-11-04 RX ORDER — ALBUTEROL SULFATE 0.63 MG/3ML
0.63 SOLUTION RESPIRATORY (INHALATION)
COMMUNITY

## 2019-11-04 NOTE — PROGRESS NOTES
Chief complaint/History of Present Illness:  Chief Complaint   Patient presents with    Back Pain     HPI  Jessi Pool is a  60 y.o.  female      HISTORY OF PRESENT ILLNESS:  The patient comes in today followup of her chronic low back pain with bilateral lower extremity pain with numbness and tingling. I think it is her leg pain that bothers her more than her back. The Diclofenac 75 mg she takes as needed helped with her back when she needs to take it. She takes Gabapentin 300 mg three times a day. We tried to increase it to 400 mg three times a day, but it made her too sleepy, she states. It does not really work that well. She has tried Lyrica in the past.  Insurance will not pay for it. Topamax and Cymbalta gave her side effects. She is retired. She is a nonsmoker. She has no new medical issues. PHYSICAL EXAM:  Ms. Anshu Maki is a 72-year-old female. She is alert and oriented. She has a normal mood and affect. She has a full weightbearing, stiff gait. She has a bent forward posture and uses no assistive device. She has 4/5 strength of the bilateral lower extremities and negative straight leg raise. She has pain with hyperextension of the lumbar spine. ASSESSMENT/PLAN:  This is a patient who has had an L3-4 laminectomy in 2012 and still has some back and bilateral leg pain. We are going to decrease her down on her Gabapentin until she is off, and we will start her on Gabitril 4 mg ramping her up to twice a day. I wrote out directions for her. We will see her back in two months or sooner if needed.         Review of systems:    Past Medical History:   Diagnosis Date    Arthritis     Balance problems     patient states no further issues    Breast CA ([facility] [address] 03/2018    right    Bronchitis     Chronic pain     back    Fibromyalgia 11/29/2012    GERD (gastroesophageal reflux disease)     Hiatal Hernia    Hypertension     Kidney stones     Low back pain     Lumbar spinal stenosis 11/25/2012    Migraine headache     Nervousness     Neurological disease     Osteoarthritis 11/25/2012    Other ill-defined conditions(799.89)     neuropathy    Peripheral neuropathy 11/29/2012    Post laminectomy syndrome     Postoperative anemia due to acute blood loss 11/30/2012    Ringing in ears     Spinal stenosis     Thoracic spine pain     Vitamin D deficiency 11/30/2012     Past Surgical History:   Procedure Laterality Date    BX/REMV,LYMPH NODE,DEEP AXILL Right 05/04/2018    Dr. Daisy Caceres HX MASTECTOMY Right 5/4/2018    NEEDLE LOCALIZED (1000) LUMPECTOMY RIGHT BREAST AND SENTINEL LYMPH NODE BIOPSY RIGHT AXILLA performed by Jose Elias Henriquez MD at [address] [place] [address] HX ORTHOPAEDIC      foot l heel spur    HX ORTHOPAEDIC      right knee sx    HX OTHER SURGICAL      non cancer cyst removed from right shoulder    NEUROLOGICAL PROCEDURE UNLISTED      Bilateral L3-4 hemilaminotomy and medial facetectomy by Dr. Frankie Monroy History     Socioeconomic History    Marital status: SINGLE     Spouse name: Not on file    Number of children: Not on file    Years of education: Not on file    Highest education level: Not on file   Occupational History    Not on file   Social Needs    Financial resource strain: Not on file    Food insecurity:     Worry: Not on file     Inability: Not on file    Transportation needs:     Medical: Not on file     Non-medical: Not on file   Tobacco Use    Smoking status: Never Smoker    Smokeless tobacco: Never Used   Substance and Sexual Activity    Alcohol use: No    Drug use: No    Sexual activity: Not on file   Lifestyle    Physical activity:     Days per week: Not on file     Minutes per session: Not on file    Stress: Not on file   Relationships    Social connections:     Talks on phone: Not on file     Gets together: Not on file     Attends Jehovah's witness service: Not on file     Active member of club or organization: Not on file     Attends meetings of clubs or organizations: Not on file     Relationship status: Not on file    Intimate partner violence:     Fear of current or ex partner: Not on file     Emotionally abused: Not on file     Physically abused: Not on file     Forced sexual activity: Not on file   Other Topics Concern    Not on file   Social History Narrative    Not on file     Family History   Problem Relation Age of Onset    Hypertension Mother    Samanthalou Castillo Arthritis-osteo Mother     Neuropathy Mother     Hypertension Father     Cancer Father         colon    Hypertension Brother     Cancer Brother     Neuropathy Brother     Neuropathy Other     Arthritis-rheumatoid Other     GERD Other     Diabetes Other     Diabetes Maternal Aunt     Diabetes Maternal Uncle     Hypertension Maternal Grandfather        Physical Exam:  Visit Vitals  /75 (BP 1 Location: Left arm, BP Patient Position: Sitting)   Pulse 80   Temp 97.8 °F (36.6 °C) (Oral)   Ht 5' 7\" (1.702 m)   Wt 178 lb 9.6 oz (81 kg)   SpO2 98%   BMI 27.97 kg/m²     Pain Scale: 5/10       has been . reviewed and is appropriate          Diagnoses and all orders for this visit:    1. Facet syndrome  -     tiaGABine (GABITRIL) 4 mg tablet; 1 tab every day x 1 week then 1 tab po bid  -     diclofenac EC (VOLTAREN) 75 mg EC tablet; Take 1 Tab by mouth two (2) times daily as needed for Pain. Indications: joint damage causing pain and loss of function    2. Spinal stenosis of lumbar region with neurogenic claudication  -     tiaGABine (GABITRIL) 4 mg tablet; 1 tab every day x 1 week then 1 tab po bid  -     diclofenac EC (VOLTAREN) 75 mg EC tablet; Take 1 Tab by mouth two (2) times daily as needed for Pain. Indications: joint damage causing pain and loss of function            Follow-up and Dispositions    · Return in about 2 months (around 1/4/2020) for with NP.              We have informed Juan Liu to notify us for immediate appointment if she has any worsening neurogical symptoms or if an emergency situation presents, then call 911

## 2020-01-10 ENCOUNTER — HOSPITAL ENCOUNTER (OUTPATIENT)
Dept: MAMMOGRAPHY | Age: 65
Discharge: HOME | End: 2020-01-10
Attending: NURSE PRACTITIONER
Payer: MEDICARE

## 2020-01-10 DIAGNOSIS — Z12.31 SCREENING MAMMOGRAM FOR HIGH-RISK PATIENT: ICD-10-CM

## 2020-01-10 DIAGNOSIS — Z85.3 PERSONAL HISTORY OF BREAST CANCER: ICD-10-CM

## 2020-01-10 PROCEDURE — 77063 BREAST TOMOSYNTHESIS BI: CPT

## 2020-01-22 DIAGNOSIS — M79.18 MYOFASCIAL PAIN: ICD-10-CM

## 2020-01-22 DIAGNOSIS — C50.511 MALIGNANT NEOPLASM OF LOWER-OUTER QUADRANT OF RIGHT FEMALE BREAST, UNSPECIFIED ESTROGEN RECEPTOR STATUS (HCC): Primary | ICD-10-CM

## 2020-01-23 RX ORDER — TRAMADOL HYDROCHLORIDE 50 MG/1
TABLET ORAL
Qty: 45 TAB | Refills: 0 | Status: SHIPPED | OUTPATIENT
Start: 2020-01-23 | End: 2020-02-03

## 2020-01-28 ENCOUNTER — OFFICE VISIT (OUTPATIENT)
Dept: SURGERY | Age: 65
End: 2020-01-28

## 2020-01-28 VITALS
DIASTOLIC BLOOD PRESSURE: 60 MMHG | HEIGHT: 67 IN | RESPIRATION RATE: 16 BRPM | BODY MASS INDEX: 28.09 KG/M2 | WEIGHT: 179 LBS | TEMPERATURE: 98.5 F | OXYGEN SATURATION: 98 % | SYSTOLIC BLOOD PRESSURE: 110 MMHG | HEART RATE: 68 BPM

## 2020-01-28 DIAGNOSIS — Z98.890 STATUS POST RIGHT BREAST LUMPECTOMY: ICD-10-CM

## 2020-01-28 DIAGNOSIS — Z85.3 HISTORY OF RIGHT BREAST CANCER: Primary | ICD-10-CM

## 2020-01-29 NOTE — PATIENT INSTRUCTIONS

## 2020-01-29 NOTE — PROGRESS NOTES
[facility]  General Surgery    Subjective:  Patient presents today without complaints. She denies any unintentional weight loss or breast pain or nipple drainage or discharge. I reviewed the recent bilateral 3D screening mammography and agree with the BI-RADS 2 classification. Objective:  Vitals:    01/28/20 1406   BP: 110/60   Pulse: 68   Resp: 16   Temp: 98.5 °F (36.9 °C)   TempSrc: Oral   SpO2: 98%   Weight: 81.2 kg (179 lb)   Height: 5' 7\" (1.702 m)       Physical Exam:    General: Awake and alert, oriented x4, no apparent distress   Breasts:    Left: No dimpling, discoloration, nipple inversion or retractions. No axillary or supraclavicular lymphadenopathy. No mass   Right: No dimpling, nipple inversion or retractions. discoloration hyperpigmentation secondary to radiation   No axillary or supraclavicular lymphadenopathy. No mass. Incision in the inframammary fold has a slight scarring indentation. Current Medications:  Current Outpatient Medications   Medication Sig Dispense Refill    traMADol (ULTRAM) 50 mg tablet take 1 tablet by mouth every 8 hours if needed for pain FOR UP TO 15 DAYS 45 Tab 0    albuterol (ACCUNEB) 0.63 mg/3 mL nebulizer solution 0.63 mg by Nebulization route every six (6) hours as needed for Wheezing.  tiaGABine (GABITRIL) 4 mg tablet 1 tab every day x 1 week then 1 tab po bid 60 Tab 1    diclofenac EC (VOLTAREN) 75 mg EC tablet Take 1 Tab by mouth two (2) times daily as needed for Pain. Indications: joint damage causing pain and loss of function 60 Tab 5    gabapentin (NEURONTIN) 300 mg capsule Take 1 Cap by mouth three (3) times daily. Indications: sensory sensitivity 270 Cap 0    letrozole (FEMARA) 2.5 mg tablet Take 1 Tab by mouth daily. 90 Tab 3    acetaminophen (TYLENOL) 500 mg tablet Take 1 Tab by mouth every six (6) hours as needed for Pain.  120 Tab 0    ibuprofen (MOTRIN) 800 mg tablet Take 1 Tab by mouth every eight (8) hours as needed for Pain. 90 Tab 0    pantoprazole (PROTONIX) 40 mg tablet Take 40 mg by mouth daily.  esomeprazole (NEXIUM) 40 mg capsule   0    amitriptyline (ELAVIL) 25 mg tablet Take 1 Tab by mouth nightly. Indications: MIGRAINE PREVENTION 90 Tab 1    Cholecalciferol, Vitamin D3, (VITAMIN D3) 2,000 unit cap capsule Take  by mouth daily.  fluticasone-vilanterol (BREO ELLIPTA) 200-25 mcg/dose inhaler Take 1 Puff by inhalation daily.  ascorbic acid (VITAMIN C) 250 mg tablet Take 1 Tab by mouth two (2) times daily (with meals). 30 Tab 0    losartan-hydrochlorothiazide (HYZAAR) 100-25 mg per tablet Take 1 Tab by mouth daily. Indications: HYPERTENSION      gabapentin (NEURONTIN) 400 mg capsule Take 1 Cap by mouth three (3) times daily. Max Daily Amount: 1,200 mg. Indications: Neuropathic Pain 90 Cap 2    docusate sodium (COLACE) 100 mg capsule Take 1 Cap by mouth two (2) times a day. 30 Cap 0    ferrous sulfate 325 mg (65 mg iron) tablet Take 1 Tab by mouth two (2) times daily (with meals). 30 Tab 0       Chart and notes reviewed. Data reviewed. I have evaluated and examined the patient. 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   Continue monthly self breast exam  Follow-up in 4 months for clinical breast exam     Tracey Mobley MD

## 2020-02-03 ENCOUNTER — HOSPITAL ENCOUNTER (OUTPATIENT)
Dept: LAB | Age: 65
Discharge: HOME | End: 2020-02-03
Payer: MEDICARE

## 2020-02-03 ENCOUNTER — OFFICE VISIT (OUTPATIENT)
Dept: ONCOLOGY | Age: 65
End: 2020-02-03

## 2020-02-03 VITALS
HEART RATE: 72 BPM | BODY MASS INDEX: 28.09 KG/M2 | OXYGEN SATURATION: 100 % | HEIGHT: 67 IN | WEIGHT: 179 LBS | RESPIRATION RATE: 18 BRPM | SYSTOLIC BLOOD PRESSURE: 121 MMHG | DIASTOLIC BLOOD PRESSURE: 76 MMHG

## 2020-02-03 DIAGNOSIS — D50.8 IRON DEFICIENCY ANEMIA SECONDARY TO INADEQUATE DIETARY IRON INTAKE: ICD-10-CM

## 2020-02-03 DIAGNOSIS — E55.9 VITAMIN D DEFICIENCY: ICD-10-CM

## 2020-02-03 DIAGNOSIS — C50.511 MALIGNANT NEOPLASM OF LOWER-OUTER QUADRANT OF RIGHT FEMALE BREAST, UNSPECIFIED ESTROGEN RECEPTOR STATUS (HCC): Primary | ICD-10-CM

## 2020-02-03 DIAGNOSIS — G89.3 CHRONIC PAIN DUE TO NEOPLASM: ICD-10-CM

## 2020-02-03 DIAGNOSIS — Z17.0 ESTROGEN RECEPTOR POSITIVE: ICD-10-CM

## 2020-02-03 DIAGNOSIS — C50.511 MALIGNANT NEOPLASM OF LOWER-OUTER QUADRANT OF RIGHT FEMALE BREAST, UNSPECIFIED ESTROGEN RECEPTOR STATUS (HCC): ICD-10-CM

## 2020-02-03 LAB
25(OH)D3 SERPL-MCNC: 38.8 NG/ML (ref 30–100)
ALBUMIN SERPL-MCNC: 3.7 G/DL (ref 3.4–5)
ALBUMIN/GLOB SERPL: 1 {RATIO} (ref 0.8–1.7)
ALP SERPL-CCNC: 62 U/L (ref 45–117)
ALT SERPL-CCNC: 18 U/L (ref 13–56)
ANION GAP SERPL CALC-SCNC: 5 MMOL/L (ref 3–18)
AST SERPL-CCNC: 19 U/L (ref 10–38)
BASOPHILS # BLD: 0.1 K/UL (ref 0–0.1)
BASOPHILS NFR BLD: 1 % (ref 0–2)
BILIRUB SERPL-MCNC: 0.5 MG/DL (ref 0.2–1)
BUN SERPL-MCNC: 18 MG/DL (ref 7–18)
BUN/CREAT SERPL: 19 (ref 12–20)
CALCIUM SERPL-MCNC: 9.5 MG/DL (ref 8.5–10.1)
CHLORIDE SERPL-SCNC: 107 MMOL/L (ref 100–111)
CO2 SERPL-SCNC: 28 MMOL/L (ref 21–32)
CREAT SERPL-MCNC: 0.95 MG/DL (ref 0.6–1.3)
DIFFERENTIAL METHOD BLD: ABNORMAL
EOSINOPHIL # BLD: 0.2 K/UL (ref 0–0.4)
EOSINOPHIL NFR BLD: 4 % (ref 0–5)
ERYTHROCYTE [DISTWIDTH] IN BLOOD BY AUTOMATED COUNT: 13.5 % (ref 11.6–14.5)
FERRITIN SERPL-MCNC: 85 NG/ML (ref 8–388)
GLOBULIN SER CALC-MCNC: 3.8 G/DL (ref 2–4)
GLUCOSE SERPL-MCNC: 81 MG/DL (ref 74–99)
HCT VFR BLD AUTO: 33.3 % (ref 35–45)
HGB BLD-MCNC: 10.9 G/DL (ref 12–16)
IRON SATN MFR SERPL: 27 % (ref 20–50)
IRON SERPL-MCNC: 86 UG/DL (ref 50–175)
LYMPHOCYTES # BLD: 2.2 K/UL (ref 0.9–3.6)
LYMPHOCYTES NFR BLD: 37 % (ref 21–52)
MCH RBC QN AUTO: 27.5 PG (ref 24–34)
MCHC RBC AUTO-ENTMCNC: 32.7 G/DL (ref 31–37)
MCV RBC AUTO: 83.9 FL (ref 74–97)
MONOCYTES # BLD: 0.2 K/UL (ref 0.05–1.2)
MONOCYTES NFR BLD: 4 % (ref 3–10)
NEUTS SEG # BLD: 3.2 K/UL (ref 1.8–8)
NEUTS SEG NFR BLD: 54 % (ref 40–73)
PLATELET # BLD AUTO: 230 K/UL (ref 135–420)
PMV BLD AUTO: 10.4 FL (ref 9.2–11.8)
POTASSIUM SERPL-SCNC: 3.7 MMOL/L (ref 3.5–5.5)
PROT SERPL-MCNC: 7.5 G/DL (ref 6.4–8.2)
RBC # BLD AUTO: 3.97 M/UL (ref 4.2–5.3)
SODIUM SERPL-SCNC: 140 MMOL/L (ref 136–145)
TIBC SERPL-MCNC: 323 UG/DL (ref 250–450)
WBC # BLD AUTO: 5.9 K/UL (ref 4.6–13.2)

## 2020-02-03 PROCEDURE — 36415 COLL VENOUS BLD VENIPUNCTURE: CPT

## 2020-02-03 PROCEDURE — 83540 ASSAY OF IRON: CPT

## 2020-02-03 PROCEDURE — 86300 IMMUNOASSAY TUMOR CA 15-3: CPT

## 2020-02-03 PROCEDURE — 80053 COMPREHEN METABOLIC PANEL: CPT

## 2020-02-03 PROCEDURE — 82306 VITAMIN D 25 HYDROXY: CPT

## 2020-02-03 PROCEDURE — 85025 COMPLETE CBC W/AUTO DIFF WBC: CPT

## 2020-02-03 PROCEDURE — 82728 ASSAY OF FERRITIN: CPT

## 2020-02-03 RX ORDER — LETROZOLE 2.5 MG/1
2.5 TABLET, FILM COATED ORAL DAILY
Qty: 90 TAB | Refills: 3 | Status: SHIPPED | OUTPATIENT
Start: 2020-02-03 | End: 2020-05-28 | Stop reason: SDUPTHER

## 2020-02-03 RX ORDER — OXYCODONE AND ACETAMINOPHEN 5; 325 MG/1; MG/1
1 TABLET ORAL
Qty: 90 TAB | Refills: 0 | Status: SHIPPED | OUTPATIENT
Start: 2020-02-03 | End: 2020-03-04

## 2020-02-03 NOTE — PATIENT INSTRUCTIONS
Breast Cancer: Care Instructions  Your Care Instructions    Breast cancer occurs when abnormal cells grow out of control in the breast. These cancer cells can spread within the breast, to nearby lymph nodes and other tissues, and to other parts of the body. Being treated for cancer can weaken your body, and you may feel very tired. Get the rest your body needs so you can feel better. Finding out that you have cancer is scary. You may feel many emotions and may need some help coping. Seek out family, friends, and counselors for support. You also can do things at home to make yourself feel better while you go through treatment. Call the [employer] (1-994.424.4859) or visit its website at [employer] for more information. Follow-up care is a key part of your treatment and safety. Be sure to make and go to all appointments, and call your doctor if you are having problems. It's also a good idea to know your test results and keep a list of the medicines you take. How can you care for yourself at home? · Take your medicines exactly as prescribed. Call your doctor if you think you are having a problem with your medicine. You may get medicine for nausea and vomiting if you have these side effects. · Follow your doctor's instructions to relieve pain. Pain from cancer and surgery can almost always be controlled. Use pain medicine when you first notice pain, before it becomes severe. · Eat healthy food. If you do not feel like eating, try to eat food that has protein and extra calories to keep up your strength and prevent weight loss. Drink liquid meal replacements for extra calories and protein. Try to eat your main meal early. · Get some physical activity every day, but do not get too tired. Keep doing the hobbies you enjoy as your energy allows. · Do not smoke. Smoking can make your cancer worse. If you need help quitting, talk to your doctor about stop-smoking programs and medicines.  These can increase your chances of quitting for good. · Take steps to control your stress and workload. Learn relaxation techniques. ? Share your feelings. Stress and tension affect our emotions. By expressing your feelings to others, you may be able to understand and cope with them. ? Consider joining a support group. Talking about a problem with your spouse, a good friend, or other people with similar problems is a good way to reduce tension and stress. ? Express yourself through art. Try writing, crafts, dance, or art to relieve stress. Some dance, writing, or art groups may be available just for people who have cancer. ? Be kind to your body and mind. Getting enough sleep, eating a healthy diet, and taking time to do things you enjoy can contribute to an overall feeling of balance in your life and can help reduce stress. ? Get help if you need it. Discuss your concerns with your doctor or counselor. · If you are vomiting or have diarrhea:  ? Drink plenty of fluids (enough so that your urine is light yellow or clear like water) to prevent dehydration. Choose water and other caffeine-free clear liquids. If you have kidney, heart, or liver disease and have to limit fluids, talk with your doctor before you increase the amount of fluids you drink. ? When you are able to eat, try clear soups, mild foods, and liquids until all symptoms are gone for 12 to 48 hours. Other good choices include dry toast, crackers, cooked cereal, and gelatin dessert, such as Jell-O.  · If you have not already done so, prepare a list of advance directives. Advance directives are instructions to your doctor and family members about what kind of care you want if you become unable to speak or express yourself. When should you call for help? Call 911 anytime you think you may need emergency care.  For example, call if:    · You passed out (lost consciousness).    Call your doctor now or seek immediate medical care if:    · You have a fever.     · You have abnormal bleeding.     · You think you have an infection.     · You have new or worse pain.     · You have new symptoms, such as a cough, belly pain, vomiting, diarrhea, or a rash.    Watch closely for changes in your health, and be sure to contact your doctor if:    · You are much more tired than usual.     · You have swollen glands in your armpits, groin, or neck.     · You do not get better as expected. Where can you learn more? Go to http://janessa-raphael.info/. Enter V321 in the search box to learn more about \"Breast Cancer: Care Instructions. \"  Current as of: December 19, 2018  Content Version: 12.2  © 2266-7355 [employer]. Care instructions adapted under license by [employer] (which disclaims liability or warranty for this information). If you have questions about a medical condition or this instruction, always ask your healthcare professional. [place] [address] any warranty or liability for your use of this information. Iron Deficiency Anemia: Care Instructions  Your Care Instructions    Anemia means that you do not have enough red blood cells. Red blood cells carry oxygen around your body. When you have anemia, it can make you pale, weak, and tired. Many things can cause anemia. The most common cause is loss of blood. This can happen if you have heavy menstrual periods. It can also happen if you have bleeding in your stomach or bowel. You can also get anemia if you don't have enough iron in your diet or if it's hard for your body to absorb iron. In some cases, pregnancy causes anemia. That's because a pregnant woman needs more iron. Your doctor may do more tests to find the cause of your anemia. If a disease or other health problem is causing it, your doctor will treat that problem.   It's important to follow up with your doctor to make sure that your iron level returns to normal.  Follow-up care is a key part of your treatment and safety. Be sure to make and go to all appointments, and call your doctor if you are having problems. It's also a good idea to know your test results and keep a list of the medicines you take. How can you care for yourself at home? · If your doctor recommended iron pills, take them as directed. ? Try to take the pills on an empty stomach. You can do this about 1 hour before or 2 hours after meals. But you may need to take iron with food to avoid an upset stomach. ? Do not take antacids or drink milk or anything with caffeine within 2 hours of when you take your iron. They can keep your body from absorbing the iron well. ? Vitamin C helps your body absorb iron. You may want to take iron pills with a glass of orange juice or some other food high in vitamin C.  ? Iron pills may cause stomach problems. These include heartburn, nausea, diarrhea, constipation, and cramps. It can help to drink plenty of fluids and include fruits, vegetables, and fiber in your diet. ? It's normal for iron pills to make your stool a greenish or grayish black. But internal bleeding can also cause dark stool. So it's important to tell your doctor about any color changes. ? Call your doctor if you think you are having a problem with your iron pills. Even after you start to feel better, it will take several months for your body to build up its supply of iron. ? If you miss a pill, don't take a double dose. ? Keep iron pills out of the reach of small children. Too much iron can be very dangerous. · Eat foods with a lot of iron. These include red meat, shellfish, poultry, and eggs. They also include beans, raisins, whole-grain bread, and leafy green vegetables. · Steam your vegetables. This is the best way to prepare them if you want to get as much iron as possible. · Be safe with medicines. Do not take nonsteroidal anti-inflammatory pain relievers unless your doctor tells you to.  These include aspirin, naproxen (Aleve), and ibuprofen (Advil, Motrin). · Liquid iron can stain your teeth. But you can mix it with water or juice and drink it with a straw. Then it won't get on your teeth. When should you call for help? Call 911 anytime you think you may need emergency care. For example, call if:    · You passed out (lost consciousness).    Call your doctor now or seek immediate medical care if:    · You are short of breath.     · You are dizzy or light-headed, or you feel like you may faint.     · You have new or worse bleeding.    Watch closely for changes in your health, and be sure to contact your doctor if:    · You feel weaker or more tired than usual.     · You do not get better as expected. Where can you learn more? Go to http://janessa-raphael.info/. Enter V307 in the search box to learn more about \"Iron Deficiency Anemia: Care Instructions. \"  Current as of: March 28, 2019  Content Version: 12.2  © 9278-3666 [employer]. Care instructions adapted under license by [employer] (which disclaims liability or warranty for this information). If you have questions about a medical condition or this instruction, always ask your healthcare professional. [address] any warranty or liability for your use of this information.

## 2020-02-05 LAB — CANCER AG27-29 SERPL-ACNC: 20.6 U/ML (ref 0–38.6)

## 2020-02-06 ENCOUNTER — OFFICE VISIT (OUTPATIENT)
Dept: ORTHOPEDIC SURGERY | Age: 65
End: 2020-02-06

## 2020-02-06 VITALS
HEART RATE: 76 BPM | WEIGHT: 178 LBS | HEIGHT: 67 IN | RESPIRATION RATE: 16 BRPM | BODY MASS INDEX: 27.94 KG/M2 | DIASTOLIC BLOOD PRESSURE: 81 MMHG | TEMPERATURE: 98.2 F | SYSTOLIC BLOOD PRESSURE: 138 MMHG | OXYGEN SATURATION: 98 %

## 2020-02-06 DIAGNOSIS — M79.18 MYOFASCIAL PAIN: ICD-10-CM

## 2020-02-06 DIAGNOSIS — M47.899 FACET SYNDROME: Primary | ICD-10-CM

## 2020-02-06 DIAGNOSIS — M48.062 SPINAL STENOSIS OF LUMBAR REGION WITH NEUROGENIC CLAUDICATION: ICD-10-CM

## 2020-02-06 RX ORDER — GABAPENTIN 300 MG/1
300 CAPSULE ORAL 3 TIMES DAILY
Qty: 90 CAP | Refills: 5 | Status: SHIPPED | OUTPATIENT
Start: 2020-02-22 | End: 2021-01-13

## 2020-02-06 NOTE — PROGRESS NOTES
Chief complaint/History of Present Illness:  Chief Complaint   Patient presents with    Back Pain     lower back pain, f/u appt. ANNA Frye is a  60 y.o.  female      HISTORY OF PRESENT ILLNESS:  The patient comes in today for followup of his chronic low back pain with bilateral lower extremity pain down to her feet. She gets numbness and tingling. She states her back pain is off and on. It is her legs that give her the most problems. She only takes the Diclofenac 75 mg as needed. She is back on Gabapentin 300 mg three times a day. We tried to switch her down to Gabitril; however, she could not tolerate it. She states it made her feel weird. So, the Gabapentin, although it does not give her complete relief, it does help. She is also on Elavil 25 mg for migraines. She is retired. She is a nonsmoker. She states her breast cancer is under control. PHYSICAL EXAM:  Ms. Arjun Herrmann is a 60-year-old female. She is alert and oriented. She has a normal mood and affect. She has a full weightbearing, non-antalgic gait using no assistive device. She has 4/5 strength of the bilateral lower extremities and negative straight leg raise. She has pain with hyperextension of the lumbar spine. ASSESSMENT/PLAN:  This is a patient who has had an L3-4 laminectomy in 2012. She has back pain and bilateral leg pain due to facet syndrome and spinal stenosis. I have refilled her Gabapentin. We will see her back in six months or sooner if needed.         Review of systems:    Past Medical History:   Diagnosis Date    Arthritis     Balance problems     patient states no further issues    Breast CA ([address] 03/2018    right    Bronchitis     Chronic pain     back    Fibromyalgia 11/29/2012    GERD (gastroesophageal reflux disease)     Hiatal Hernia    Hypertension     Kidney stones     Low back pain     Lumbar spinal stenosis 11/25/2012    Migraine headache     Nervousness     Neurological disease  Osteoarthritis 11/25/2012    Other ill-defined conditions(799.89)     neuropathy    Peripheral neuropathy 11/29/2012    Post laminectomy syndrome     Postoperative anemia due to acute blood loss 11/30/2012    Ringing in ears     Spinal stenosis     Thoracic spine pain     Vitamin D deficiency 11/30/2012     Past Surgical History:   Procedure Laterality Date    HX MASTECTOMY Right 5/4/2018    NEEDLE LOCALIZED (1000) LUMPECTOMY RIGHT BREAST AND SENTINEL LYMPH NODE BIOPSY RIGHT AXILLA performed by Jia Fisher MD at [address] [place] [address] HX ORTHOPAEDIC      foot l heel spur    HX ORTHOPAEDIC      right knee sx    HX OTHER SURGICAL      non cancer cyst removed from right shoulder    NEUROLOGICAL PROCEDURE UNLISTED      Bilateral L3-4 hemilaminotomy and medial facetectomy by Dr. Srinivasa Templeton    NV BX/REMV,LYMPH NODE,DEEP AXILL Right 05/04/2018    Dr. Elliot Seals History     Socioeconomic History    Marital status: SINGLE     Spouse name: Not on file    Number of children: Not on file    Years of education: Not on file    Highest education level: Not on file   Occupational History    Not on file   Social Needs    Financial resource strain: Not on file    Food insecurity:     Worry: Not on file     Inability: Not on file    Transportation needs:     Medical: Not on file     Non-medical: Not on file   Tobacco Use    Smoking status: Never Smoker    Smokeless tobacco: Never Used   Substance and Sexual Activity    Alcohol use: No    Drug use: No    Sexual activity: Not on file   Lifestyle    Physical activity:     Days per week: Not on file     Minutes per session: Not on file    Stress: Not on file   Relationships    Social connections:     Talks on phone: Not on file     Gets together: Not on file     Attends [employer] service: Not on file     Active member of club or organization: Not on file     Attends meetings of clubs or organizations: Not on file     Relationship status: Not on file    Intimate partner violence:     Fear of current or ex partner: Not on file     Emotionally abused: Not on file     Physically abused: Not on file     Forced sexual activity: Not on file   Other Topics Concern    Not on file   Social History Narrative    Not on file     Family History   Problem Relation Age of Onset    Hypertension Mother    Vertie Amis Arthritis-osteo Mother     Neuropathy Mother     Hypertension Father     Cancer Father         colon    Hypertension Brother     Cancer Brother     Neuropathy Brother     Neuropathy Other     Arthritis-rheumatoid Other     GERD Other     Diabetes Other     Diabetes Maternal Aunt     Diabetes Maternal Uncle     Hypertension Maternal Grandfather        Physical Exam:  Visit Vitals  /81 (BP 1 Location: Left arm, BP Patient Position: Sitting)   Pulse 76   Temp 98.2 °F (36.8 °C) (Oral)   Resp 16   Ht 5' 7\" (1.702 m)   Wt 178 lb (80.7 kg)   SpO2 98%   BMI 27.88 kg/m²     Pain Scale: 6/10       has been . reviewed and is appropriate          Diagnoses and all orders for this visit:    1. Facet syndrome  -     gabapentin (NEURONTIN) 300 mg capsule; Take 1 Cap by mouth three (3) times daily. Indications: neuropathic pain, sensory sensitivity    2. Spinal stenosis of lumbar region with neurogenic claudication  -     gabapentin (NEURONTIN) 300 mg capsule; Take 1 Cap by mouth three (3) times daily. Indications: neuropathic pain, sensory sensitivity    3. Myofascial pain            Follow-up and Dispositions    · Return in about 6 months (around 8/6/2020) for with NP.              We have informed Lori Nix to notify us for immediate appointment if she has any worsening neurogical symptoms or if an emergency situation presents, then call 911

## 2020-04-01 ENCOUNTER — TELEPHONE (OUTPATIENT)
Dept: ONCOLOGY | Age: 65
End: 2020-04-01

## 2020-04-01 NOTE — TELEPHONE ENCOUNTER
Unfortunately I cannot give her 60-day supply. Narcotics are only supplied x 30 days. Based on our documentation this medication was discontinued as the patient reported as \"ineffective\".

## 2020-04-01 NOTE — TELEPHONE ENCOUNTER
Patient presents for visit accompanied by mother  CC: F/U ER  HPI: Mother reports Jason with vomtiing/abd pain last week- went to [facility] ER on 3/10- diagnosed with 3 mm kidney stone- presents today for follow up  No further vomiting since ER visit  Pain is intermittent, about every 12-24 hours, right sided pain, using motrin and aleve prn pain with improvement  No blood in urine  + family history of kidney stones- mother and Maternal Uncle- calcium stones    Mother also with concerns about weight- is on concerta for ADHD- does eat breakfast, dinner, not much lunch  Father and Paternal uncles were also tall and thin growing up    ALLERGY:Reviewed  MEDICATIONS:Reviewed  IMMUNIZATIONS:reviewed  PMH :reviewed  ROS:   CONSTITUTIONAL:alert, interactive   EYES:no eye discharge   ENT:see HPI   RESP:nl breathing, no wheezing or shortness of breath   GI:see HPI   SKIN:no rash  PHYS. EXAM:vital signs have been reviewed   GEN:well nourished, well developed. No acute distress   SKIN:normal skin turgor, dry skin on abdomen   EYES:PERRLA, nl conjunctiva   EARS:nl pinnae, TM's intact, right TM nl, left TM nl   NASAL:mucosa pink, no congestion, no discharge, oropharynx-mucus membranes moist, no pharyngeal erythema   NECK:supple, no masses   RESP:nl resp. effort, clear to auscultation   HEART:RRR no murmur   ABD: positive BS, soft NT/ND, no HSM, mild Right CVA tenderness   MS:nl tone and motor movement of extremities   LYMPH:no cervical nodes   PSYCH:in no acute distress, appropriate and interactive     Jason was seen today for follow-up, other misc, diarrhea and vomiting.    Diagnoses and all orders for this visit:    Renal stone  -     hydrocodone-acetaminophen 5-325mg (NORCO) 5-325 mg per tablet; Take 1 tablet by mouth every 6 (six) hours as needed for Pain.    Hydronephrosis, right  -     US Retroperitoneal Complete (Kidney and; Future      [facility] ER record reviewed- xray showing stone  US did show mild right hydronephrosis  Repeat  Pt requesting refill of traMADol (ULTRAM) 50 mg tablet for 60 day supply due to COVID-19. Not listed to select for refill. renal US ordered this week  Discussed importance of hydration  Continue motrin/aleve prn pain  Lortab written for break through pain if needed  Did recommend evaluation by Urology- mother given numbers- refer to pt instructions  Poor weight gain in child  Discussed weight BMI just above 5%- continue to encourage 3 meals, good afternoon snack- trial of pediasure or [employer] instant breakfast- will monitor weight at next med check  Discussed periactin if no improvement with nutritional supplements

## 2020-04-13 DIAGNOSIS — G89.4 CHRONIC PAIN SYNDROME: Primary | ICD-10-CM

## 2020-05-28 ENCOUNTER — HOSPITAL ENCOUNTER (OUTPATIENT)
Dept: ONCOLOGY | Age: 65
Discharge: HOME | End: 2020-05-28

## 2020-05-28 ENCOUNTER — HOSPITAL ENCOUNTER (OUTPATIENT)
Dept: LAB | Age: 65
Discharge: HOME | End: 2020-05-28
Payer: MEDICARE

## 2020-05-28 ENCOUNTER — OFFICE VISIT (OUTPATIENT)
Dept: ONCOLOGY | Age: 65
End: 2020-05-28

## 2020-05-28 VITALS
HEART RATE: 79 BPM | BODY MASS INDEX: 28.07 KG/M2 | RESPIRATION RATE: 16 BRPM | DIASTOLIC BLOOD PRESSURE: 77 MMHG | TEMPERATURE: 100.4 F | WEIGHT: 179.2 LBS | OXYGEN SATURATION: 99 % | SYSTOLIC BLOOD PRESSURE: 122 MMHG

## 2020-05-28 DIAGNOSIS — E55.9 VITAMIN D DEFICIENCY: ICD-10-CM

## 2020-05-28 DIAGNOSIS — G89.3 CANCER ASSOCIATED PAIN: ICD-10-CM

## 2020-05-28 DIAGNOSIS — C50.511 MALIGNANT NEOPLASM OF LOWER-OUTER QUADRANT OF RIGHT FEMALE BREAST, UNSPECIFIED ESTROGEN RECEPTOR STATUS (HCC): ICD-10-CM

## 2020-05-28 DIAGNOSIS — Z79.811 LONG TERM CURRENT USE OF AROMATASE INHIBITOR: ICD-10-CM

## 2020-05-28 DIAGNOSIS — C50.511 MALIGNANT NEOPLASM OF LOWER-OUTER QUADRANT OF RIGHT FEMALE BREAST, UNSPECIFIED ESTROGEN RECEPTOR STATUS (HCC): Primary | ICD-10-CM

## 2020-05-28 DIAGNOSIS — G89.3 CHRONIC PAIN DUE TO NEOPLASM: ICD-10-CM

## 2020-05-28 DIAGNOSIS — M79.18 MYOFASCIAL PAIN: ICD-10-CM

## 2020-05-28 DIAGNOSIS — M47.899 FACET SYNDROME: ICD-10-CM

## 2020-05-28 DIAGNOSIS — Z17.0 ESTROGEN RECEPTOR POSITIVE: ICD-10-CM

## 2020-05-28 LAB
25(OH)D3 SERPL-MCNC: 29.1 NG/ML (ref 30–100)
ALBUMIN SERPL-MCNC: 4 G/DL (ref 3.4–5)
ALBUMIN/GLOB SERPL: 1.1 {RATIO} (ref 0.8–1.7)
ALP SERPL-CCNC: 67 U/L (ref 45–117)
ALT SERPL-CCNC: 20 U/L (ref 13–56)
ANION GAP SERPL CALC-SCNC: 7 MMOL/L (ref 3–18)
AST SERPL-CCNC: 21 U/L (ref 10–38)
BASO+EOS+MONOS # BLD AUTO: 0.4 K/UL (ref 0–2.3)
BASO+EOS+MONOS NFR BLD AUTO: 7 % (ref 0.1–17)
BILIRUB SERPL-MCNC: 0.5 MG/DL (ref 0.2–1)
BUN SERPL-MCNC: 19 MG/DL (ref 7–18)
BUN/CREAT SERPL: 20 (ref 12–20)
CALCIUM SERPL-MCNC: 9.7 MG/DL (ref 8.5–10.1)
CHLORIDE SERPL-SCNC: 107 MMOL/L (ref 100–111)
CO2 SERPL-SCNC: 28 MMOL/L (ref 21–32)
CREAT SERPL-MCNC: 0.94 MG/DL (ref 0.6–1.3)
DIFFERENTIAL METHOD BLD: ABNORMAL
ERYTHROCYTE [DISTWIDTH] IN BLOOD BY AUTOMATED COUNT: 12.9 % (ref 11.5–14.5)
FERRITIN SERPL-MCNC: 64 NG/ML (ref 8–388)
GLOBULIN SER CALC-MCNC: 3.6 G/DL (ref 2–4)
GLUCOSE SERPL-MCNC: 80 MG/DL (ref 74–99)
HCT VFR BLD AUTO: 34.2 % (ref 36–48)
HGB BLD-MCNC: 11.2 G/DL (ref 12–16)
IRON SATN MFR SERPL: 27 % (ref 20–50)
IRON SERPL-MCNC: 84 UG/DL (ref 50–175)
LYMPHOCYTES # BLD: 2.3 K/UL (ref 1.1–5.9)
LYMPHOCYTES NFR BLD: 35 % (ref 14–44)
MCH RBC QN AUTO: 28.3 PG (ref 25–35)
MCHC RBC AUTO-ENTMCNC: 32.7 G/DL (ref 31–37)
MCV RBC AUTO: 86.4 FL (ref 78–102)
NEUTS SEG # BLD: 3.8 K/UL (ref 1.8–9.5)
NEUTS SEG NFR BLD: 58 % (ref 40–70)
PLATELET # BLD AUTO: 195 K/UL (ref 140–440)
POTASSIUM SERPL-SCNC: 3.5 MMOL/L (ref 3.5–5.5)
PROT SERPL-MCNC: 7.6 G/DL (ref 6.4–8.2)
RBC # BLD AUTO: 3.96 M/UL (ref 4.1–5.1)
SODIUM SERPL-SCNC: 142 MMOL/L (ref 136–145)
TIBC SERPL-MCNC: 315 UG/DL (ref 250–450)
WBC # BLD AUTO: 6.5 K/UL (ref 4.5–13)

## 2020-05-28 PROCEDURE — 83540 ASSAY OF IRON: CPT

## 2020-05-28 PROCEDURE — 36415 COLL VENOUS BLD VENIPUNCTURE: CPT

## 2020-05-28 PROCEDURE — 82306 VITAMIN D 25 HYDROXY: CPT

## 2020-05-28 PROCEDURE — 82728 ASSAY OF FERRITIN: CPT

## 2020-05-28 PROCEDURE — 80053 COMPREHEN METABOLIC PANEL: CPT

## 2020-05-28 PROCEDURE — 86300 IMMUNOASSAY TUMOR CA 15-3: CPT

## 2020-05-28 RX ORDER — TRAMADOL HYDROCHLORIDE 50 MG/1
50 TABLET ORAL
Qty: 45 TAB | Refills: 0 | Status: SHIPPED | OUTPATIENT
Start: 2020-05-28 | End: 2020-06-27

## 2020-05-28 RX ORDER — LETROZOLE 2.5 MG/1
2.5 TABLET, FILM COATED ORAL DAILY
Qty: 90 TAB | Refills: 3 | Status: SHIPPED | OUTPATIENT
Start: 2020-05-28 | End: 2021-06-04 | Stop reason: SDUPTHER

## 2020-05-28 NOTE — PROGRESS NOTES
Hematology/Oncology  Progress Note    Name: Kari Freeze  Date: 2020   : 1955    PCP: Pillo Pearce MD     Ms. Ray Velasco is a 60 y.o. -American woman with invasive ductal carcinoma the right breast.    Current therapy: Femara 2.5mg PO daily      Subjective:     Mrs. Ray Velasco is a 72-year-old -American woman with invasive ductal carcinoma involving the right breast. She was diagnosed in 2018. She reports she continues to tolerate the Femara without significant untoward side effects. She states she is doing her breast self exam on a monthly basis and has no new physical complaints or concerns to report. She still complains of some pain due to her surgery. She report that it tender and requesting some pain medication. She denies weakness, fatigue, and shortness of breath. She denies chest pain or dizziness. She denies any nipple discharge or any other breast issues. She does not have any concerns or complaints to report at this time. Pt is up to date with her mammogram     Past medical history, family history, and social history: these were reviewed and remains unchanged.     Past Medical History:   Diagnosis Date    Arthritis     Balance problems     patient states no further issues    Breast CA ([address] 2018    right    Bronchitis     Chronic pain     back    Fibromyalgia 2012    GERD (gastroesophageal reflux disease)     Hiatal Hernia    Hypertension     Kidney stones     Low back pain     Lumbar spinal stenosis 2012    Migraine headache     Nervousness     Neurological disease     Osteoarthritis 2012    Other ill-defined conditions(799.89)     neuropathy    Peripheral neuropathy 2012    Post laminectomy syndrome     Postoperative anemia due to acute blood loss 2012    Ringing in ears     Spinal stenosis     Thoracic spine pain     Vitamin D deficiency 2012     Past Surgical History:   Procedure Laterality Date  HX MASTECTOMY Right 5/4/2018    NEEDLE LOCALIZED (1000) LUMPECTOMY RIGHT BREAST AND SENTINEL LYMPH NODE BIOPSY RIGHT AXILLA performed by Terrall Dakin, MD at [address] [place] [address] HX ORTHOPAEDIC      foot l heel spur    HX ORTHOPAEDIC      right knee sx    HX OTHER SURGICAL      non cancer cyst removed from right shoulder    NEUROLOGICAL PROCEDURE UNLISTED      Bilateral L3-4 hemilaminotomy and medial facetectomy by Dr. Kirstie Medina    NM BX/REMV,LYMPH NODE,DEEP AXILL Right 05/04/2018    Dr. Markus Ventura History     Socioeconomic History    Marital status: SINGLE     Spouse name: Not on file    Number of children: Not on file    Years of education: Not on file    Highest education level: Not on file   Occupational History    Not on file   Social Needs    Financial resource strain: Not on file    Food insecurity     Worry: Not on file     Inability: Not on file    Transportation needs     Medical: Not on file     Non-medical: Not on file   Tobacco Use    Smoking status: Never Smoker    Smokeless tobacco: Never Used   Substance and Sexual Activity    Alcohol use: No    Drug use: No    Sexual activity: Not on file   Lifestyle    Physical activity     Days per week: Not on file     Minutes per session: Not on file    Stress: Not on file   Relationships    Social connections     Talks on phone: Not on file     Gets together: Not on file     Attends Congregation service: Not on file     Active member of club or organization: Not on file     Attends meetings of clubs or organizations: Not on file     Relationship status: Not on file    Intimate partner violence     Fear of current or ex partner: Not on file     Emotionally abused: Not on file     Physically abused: Not on file     Forced sexual activity: Not on file   Other Topics Concern    Not on file   Social History Narrative    Not on file     Family History   Problem Relation Age of Onset    Hypertension Mother    [address] [facility] Mother     Neuropathy Mother     Hypertension Father     Cancer Father         colon    Hypertension Brother     Cancer Brother     Neuropathy Brother     Neuropathy Other     Arthritis-rheumatoid Other     GERD Other     Diabetes Other     Diabetes Maternal Aunt     Diabetes Maternal Uncle     Hypertension Maternal Grandfather      Current Outpatient Medications   Medication Sig Dispense Refill    letrozole (FEMARA) 2.5 mg tablet Take 1 Tab by mouth daily. 90 Tab 3    traMADoL (ULTRAM) 50 mg tablet Take 1 Tab by mouth every eight (8) hours as needed for Pain for up to 30 days. Max Daily Amount: 150 mg. Take 1 tablet every 8 hours as needed for pain 45 Tab 0    gabapentin (NEURONTIN) 300 mg capsule Take 1 Cap by mouth three (3) times daily. Indications: neuropathic pain, sensory sensitivity 90 Cap 5    albuterol (ACCUNEB) 0.63 mg/3 mL nebulizer solution 0.63 mg by Nebulization route every six (6) hours as needed for Wheezing.  tiaGABine (GABITRIL) 4 mg tablet 1 tab every day x 1 week then 1 tab po bid 60 Tab 1    diclofenac EC (VOLTAREN) 75 mg EC tablet Take 1 Tab by mouth two (2) times daily as needed for Pain. Indications: joint damage causing pain and loss of function 60 Tab 5    acetaminophen (TYLENOL) 500 mg tablet Take 1 Tab by mouth every six (6) hours as needed for Pain. 120 Tab 0    ibuprofen (MOTRIN) 800 mg tablet Take 1 Tab by mouth every eight (8) hours as needed for Pain. 90 Tab 0    pantoprazole (PROTONIX) 40 mg tablet Take 40 mg by mouth daily.  esomeprazole (NEXIUM) 40 mg capsule   0    amitriptyline (ELAVIL) 25 mg tablet Take 1 Tab by mouth nightly. Indications: MIGRAINE PREVENTION 90 Tab 1    Cholecalciferol, Vitamin D3, (VITAMIN D3) 2,000 unit cap capsule Take  by mouth daily.  fluticasone-vilanterol (BREO ELLIPTA) 200-25 mcg/dose inhaler Take 1 Puff by inhalation daily.  docusate sodium (COLACE) 100 mg capsule Take 1 Cap by mouth two (2) times a day.  [address] Cap 0    ferrous sulfate 325 mg (65 mg iron) tablet Take 1 Tab by mouth two (2) times daily (with meals). 30 Tab 0    ascorbic acid (VITAMIN C) 250 mg tablet Take 1 Tab by mouth two (2) times daily (with meals). 30 Tab 0    losartan-hydrochlorothiazide (HYZAAR) 100-25 mg per tablet Take 1 Tab by mouth daily. Indications: HYPERTENSION         Review of Systems  Constitutional: The patient has no acute distress or discomfort. HEENT: The patient denies recent head trauma, eye pain, blurred vision,  hearing deficit, oropharyngeal mucosal pain or lesions, and the patient denies throat pain or discomfort. Lymphatics: The patient denies palpable peripheral lymphadenopathy. Hematologic: The patient denies having bruising, bleeding, or progressive fatigue. Respiratory: Patient denies having shortness of breath, cough, sputum production, fever, or dyspnea on exertion. Cardiovascular: The patient denies having leg pain, leg swelling, heart palpitations, chest permit, chest pain, or lightheadedness. The patient denies having dyspnea on exertion. Gastrointestinal: The patient denies having nausea, emesis, or diarrhea. The patient denies having any hematemesis or blood in the stool. Genitourinary: Patient denies having urinary urgency, frequency, or dysuria. The patient denies having blood in the urine. Psychological: The patient denies having symptoms of nervousness, anxiety, depression, or thoughts of harming self. Skin: Patient denies having skin rashes, skin, ulcerations, or unexplained itching or pruritus. Musculoskeletal: The patient denies having pain in the joints or bones. Objective:     Visit Vitals  /77 (BP Patient Position: Sitting)   Pulse 79   Temp 100.4 °F (38 °C) (Oral)   Resp 16   Wt 81.3 kg (179 lb 3.2 oz)   SpO2 99%   BMI 28.07 kg/m²       ECOG PS=0; pain score=0/10    Physical Exam:   Gen. Appearance: The patient is in no acute distress. Skin: There is no bruise or rash.   HEENT: The exam is unremarkable. Neck: Supple without lymphadenopathy or thyromegaly. Lungs: Clear to auscultation and percussion; there are no wheezes or rhonchi. Heart: Regular rate and rhythm; there are no murmurs, gallops, or rubs. Abdomen: Bowel sounds are present and normal.  There is no guarding, tenderness, or hepatosplenomegaly. Extremities: There is no clubbing, cyanosis, or edema. Neurologic: There are no focal neurologic deficits. Lymphatics: There is no palpable peripheral lymphadenopathy. Musculoskeletal: The patient has full range of motion at all joints. There is no evidence of joint deformity or effusions. There is no focal joint tenderness. Psychological/psychiatric: There is no clinical evidence of anxiety, depression, or melancholy. Lab data:      Results for orders placed or performed during the hospital encounter of 05/28/20   CBC WITH 3 PART DIFF     Status: Abnormal   Result Value Ref Range Status    WBC 6.5 4.5 - 13.0 K/uL Final    RBC 3.96 (L) 4.10 - 5.10 M/uL Final    HGB 11.2 (L) 12.0 - 16 g/dL Final    HCT 34.2 (L) 36 - 48 % Final    MCV 86.4 78 - 102 FL Final    MCH 28.3 25.0 - 35.0 PG Final    MCHC 32.7 31 - 37 g/dL Final    RDW 12.9 11.5 - 14.5 % Final    PLATELET 004 271 - 767 K/uL Final    NEUTROPHILS 58 40 - 70 % Final    MIXED CELLS 7 0.1 - 17 % Final    LYMPHOCYTES 35 14 - 44 % Final    ABS. NEUTROPHILS 3.8 1.8 - 9.5 K/UL Final    ABS. MIXED CELLS 0.4 0.0 - 2.3 K/uL Final    ABS. LYMPHOCYTES 2.3 1.1 - 5.9 K/UL Final     Comment: Test performed at [address] [place] [address] or [facility]patient [facility] Location. Reviewed by Medical Director. DF AUTOMATED   Final           Assessment:     1. Malignant neoplasm of lower-outer quadrant of right female breast, unspecified estrogen receptor status ([address]    2. Vitamin D deficiency    3. Long term current use of aromatase inhibitor    4. Cancer associated pain    5. Chronic pain due to neoplasm    6. Estrogen receptor positive    7. Facet syndrome    8. Myofascial pain      Plan:   Vitamin D Deficiency: On 02/03/2020 her Vit D level was normal at 38.8. I will obtain her level at this time. Invasive ductal carcinoma the right breast/long-term use of an aromatase inhibitor: I have instructed the patient to continue to do her breast self exam on a monthly basis. Femara 2.5 mg PO daily will be continued. Her CA27-29 was normal at 20.6 in 02/03/2020. I will check her CA 27-29 level at this time. Recent mammogram on 01/10/2020 shows no evidence of malignancy. In absence of concerning physical findings, recommend annual follow-up. Iron Deficiency Anemia: Her most recent CBC shows a WBC of 6.5, her hemoglobin of 11.2 g/dl and hematocrit 34.2%. Iron profile and Ferritin will be obtained at this time. Chronic Pain due to Neoplasm: Ultram 50 mg to take every 8 hours as needed  was provided with instructions to take for pain. Follow-up in 3 months or sooner if indicated. Orders Placed This Encounter    COMPLETE CBC & AUTO DIFF WBC    METABOLIC PANEL, COMPREHENSIVE     Standing Status:   Future     Standing Expiration Date:   5/29/2021    CA 27.29     Standing Status:   Future     Standing Expiration Date:   5/28/2021    VITAMIN D, 25 HYDROXY     Standing Status:   Future     Standing Expiration Date:   6/28/2020    FERRITIN     Standing Status:   Future     Standing Expiration Date:   5/28/2021    IRON PROFILE     Standing Status:   Future     Standing Expiration Date:   5/28/2021    InHouse CBC (Handle)     Standing Status:   Future     Number of Occurrences:   1     Standing Expiration Date:   6/4/2020    letrozole (FEMARA) 2.5 mg tablet     Sig: Take 1 Tab by mouth daily. Dispense:  90 Tab     Refill:  3    traMADoL (ULTRAM) 50 mg tablet     Sig: Take 1 Tab by mouth every eight (8) hours as needed for Pain for up to 30 days. Max Daily Amount: 150 mg.  Take 1 tablet every 8 hours as needed for pain     Dispense:  45 Tab     Refill:  0       Edward Min, NP  5/28/2020

## 2020-05-28 NOTE — PATIENT INSTRUCTIONS
Breast Cancer: Care Instructions  Your Care Instructions     Breast cancer occurs when abnormal cells grow out of control in the breast. These cancer cells can spread within the breast, to nearby lymph nodes and other tissues, and to other parts of the body. Being treated for cancer can weaken your body, and you may feel very tired. Get the rest your body needs so you can feel better. Finding out that you have cancer is scary. You may feel many emotions and may need some help coping. Seek out family, friends, and counselors for support. You also can do things at home to make yourself feel better while you go through treatment. Call the [employer] (1-153.661.1800) or visit its website at [employer] for more information. Follow-up care is a key part of your treatment and safety. Be sure to make and go to all appointments, and call your doctor if you are having problems. It's also a good idea to know your test results and keep a list of the medicines you take. How can you care for yourself at home? · Take your medicines exactly as prescribed. Call your doctor if you think you are having a problem with your medicine. You may get medicine for nausea and vomiting if you have these side effects. · Follow your doctor's instructions to relieve pain. Pain from cancer and surgery can almost always be controlled. Use pain medicine when you first notice pain, before it becomes severe. · Eat healthy food. If you do not feel like eating, try to eat food that has protein and extra calories to keep up your strength and prevent weight loss. Drink liquid meal replacements for extra calories and protein. Try to eat your main meal early. · Get some physical activity every day, but do not get too tired. Keep doing the hobbies you enjoy as your energy allows. · Do not smoke. Smoking can make your cancer worse. If you need help quitting, talk to your doctor about stop-smoking programs and medicines.  These can increase your chances of quitting for good. · Take steps to control your stress and workload. Learn relaxation techniques. ? Share your feelings. Stress and tension affect our emotions. By expressing your feelings to others, you may be able to understand and cope with them. ? Consider joining a support group. Talking about a problem with your spouse, a good friend, or other people with similar problems is a good way to reduce tension and stress. ? Express yourself through art. Try writing, crafts, dance, or art to relieve stress. Some dance, writing, or art groups may be available just for people who have cancer. ? Be kind to your body and mind. Getting enough sleep, eating a healthy diet, and taking time to do things you enjoy can contribute to an overall feeling of balance in your life and can help reduce stress. ? Get help if you need it. Discuss your concerns with your doctor or counselor. · If you are vomiting or have diarrhea:  ? Drink plenty of fluids (enough so that your urine is light yellow or clear like water) to prevent dehydration. Choose water and other caffeine-free clear liquids. If you have kidney, heart, or liver disease and have to limit fluids, talk with your doctor before you increase the amount of fluids you drink. ? When you are able to eat, try clear soups, mild foods, and liquids until all symptoms are gone for 12 to 48 hours. Other good choices include dry toast, crackers, cooked cereal, and gelatin dessert, such as Jell-O.  · If you have not already done so, prepare a list of advance directives. Advance directives are instructions to your doctor and family members about what kind of care you want if you become unable to speak or express yourself. When should you call for help? [id number] anytime you think you may need emergency care. For example, call if:  · You passed out (lost consciousness). Call your doctor now or seek immediate medical care if:  · You have a fever.   · You have abnormal bleeding. · You think you have an infection. · You have new or worse pain. · You have new symptoms, such as a cough, belly pain, vomiting, diarrhea, or a rash. Watch closely for changes in your health, and be sure to contact your doctor if:  · You are much more tired than usual.  · You have swollen glands in your armpits, groin, or neck. · You do not get better as expected. Where can you learn more? Go to http://janessa-raphael.info/  Enter V321 in the search box to learn more about \"Breast Cancer: Care Instructions. \"  Current as of: August 22, 2019               Content Version: 12.5  © 5272-8601 [employer]. Care instructions adapted under license by [employer] (which disclaims liability or warranty for this information). If you have questions about a medical condition or this instruction, always ask your healthcare professional. [place] [address] any warranty or liability for your use of this information. Learning About Breast Cancer Treatments  Your Care Instructions     Breast cancer means abnormal cells grow out of control in one or both breasts. These cancer cells can spread from the breast to nearby lymph nodes and other tissues. They can also spread to other parts of the body. The type and stage of cancer you have is based on:  · Where in the breast the cancer started. · The genetics of those cancer cells. · How far cancer has spread within the breast, to nearby tissues, or to other organs. · What the cancer cells look like under a microscope. · Whether there is cancer in the nearby lymph nodes. Tests that help find the stage of your cancer can help choose the right treatment for you. These tests can include a breast biopsy, lymph node biopsy, blood tests, and X-rays. You may need other tests as well, such as a bone, CT, or MRI scan.  Whether you have more tests depends on your symptoms and the stage of the cancer. When you find out that you have cancer, you may feel many emotions and may need some help coping. Seek out family, friends, and counselors for support. You also can do things at home to make yourself feel better while you go through treatment. Call the [employer] (0-450.679.6828) or visit its website at [address] [employer] for more information. How is breast cancer treated? Your doctor may combine treatments. This is a common way to treat breast cancer. Treatment depends on what type and stage of cancer you have. You may have:  · Surgery to remove the cancer. · Radiation. This uses high-dose X-rays to kill cancer cells and shrink tumors. · Chemotherapy. This uses medicine to kill cancer cells. · Hormone therapy. This uses medicines such as tamoxifen. It limits the effect of the hormone estrogen. This hormone can help some types of breast cancer cells to grow. · Targeted therapy. This uses medicines such as trastuzumab (Herceptin) to help your immune system fight the cancer. What surgeries are done for breast cancer? Surgery is a key part of treatment for breast cancer. The main types of surgeries are:  · Breast-conserving surgery, such as lumpectomy. It removes the cancer in the breast and just enough tissue to make sure that all of the cancer was removed. · Surgery to remove the breast. This includes:  ? Total mastectomy. This removes the whole breast.  ? Nipple-sparing mastectomy. This removes the whole breast but leaves the nipple. ? Modified radical mastectomy. This removes the whole breast and the lymph nodes under the arm (axillary lymph nodes). ? Radical mastectomy. This removes the whole breast, the chest muscles, and all the lymph nodes under the arm. If lymph nodes under the arm are removed, they are looked at under the microscope to check for cancer.  There are two types of lymph node removal:  · Geddes lymph node biopsy removes the lymph node that is the first to receive lymph fluid from the tumor. If cancer has spread from the breast to the lymph nodes, cancer cells most often show up in the sentinel node first.  · Axillary lymph node dissection removes most of the lymph nodes in the armpit. The type of surgery you have depends on the size, location, and type of the cancer. It also depends on your overall health and personal preferences. Even if your doctor removes all the cancer that can be seen at the time of your surgery, you may still need more treatment. You may get radiation, chemotherapy, or hormone therapy after surgery to try to kill any cancer cells that may be left. No matter what kind of surgery you have, you will get information about why you are having it, what its risks are, how to prepare, and what to do after surgery. Follow-up care is a key part of your treatment and safety. Be sure to make and go to all appointments, and call your doctor if you are having problems. It's also a good idea to know your test results and keep a list of the medicines you take. Where can you learn more? Go to http://janessa-raphael.info/  Enter X810 in the search box to learn more about \"Learning About Breast Cancer Treatments. \"  Current as of: August 22, 2019               Content Version: 12.5  © 5041-8453 [employer]. Care instructions adapted under license by [employer] (which disclaims liability or warranty for this information). If you have questions about a medical condition or this instruction, always ask your healthcare professional. [address] any warranty or liability for your use of this information. Anemia: Care Instructions  Your Care Instructions     Anemia is a low level of red blood cells, which carry oxygen throughout your body. Many things can cause anemia. Lack of iron is one of the most common causes.  Your body needs iron to make hemoglobin, a substance in red blood cells that carries oxygen from the lungs to your body's cells. Without enough iron, the body produces fewer and smaller red blood cells. As a result, your body's cells do not get enough oxygen, and you feel tired and weak. And you may have trouble concentrating. Bleeding is the most common cause of a lack of iron. You may have heavy menstrual bleeding or bleeding caused by conditions such as ulcers, hemorrhoids, or cancer. Regular use of aspirin or other anti-inflammatory medicines (such as ibuprofen) also can cause bleeding in some people. A lack of iron in your diet also can cause anemia, especially at times when the body needs more iron, such as during pregnancy, infancy, and the teen years. Your doctor may have prescribed iron pills. It may take several months of treatment for your iron levels to return to normal. Your doctor also may suggest that you eat foods that are rich in iron, such as meat and beans. There are many other causes of anemia. It is not always due to a lack of iron. Finding the specific cause of your anemia will help your doctor find the right treatment for you. Follow-up care is a key part of your treatment and safety. Be sure to make and go to all appointments, and call your doctor if you are having problems. It's also a good idea to know your test results and keep a list of the medicines you take. How can you care for yourself at home? · Take your medicines exactly as prescribed. Call your doctor if you think you are having a problem with your medicine. · If your doctor recommends iron pills, take them as directed:  ? Try to take the pills on an empty stomach about 1 hour before or 2 hours after meals. But you may need to take iron with food to avoid an upset stomach. ? Do not take antacids or drink milk or caffeine drinks (such as coffee, tea, or cola) at the same time or within 2 hours of the time that you take your iron. They can make it hard for your body to absorb the iron. ?  Vitamin C (from food or supplements) helps your body absorb iron. Try taking iron pills with a glass of orange juice or some other food that is high in vitamin C, such as citrus fruits. ? Iron pills may cause stomach problems, such as heartburn, nausea, diarrhea, constipation, and cramps. Be sure to drink plenty of fluids, and include fruits, vegetables, and fiber in your diet each day. Iron pills often make your bowel movements dark or green. ? If you forget to take an iron pill, do not take a double dose of iron the next time you take a pill. ? Keep iron pills out of the reach of small children. An overdose of iron can be very dangerous. · Follow your doctor's advice about eating iron-rich foods. These include red meat, shellfish, poultry, eggs, beans, raisins, whole-grain bread, and leafy green vegetables. · Steam vegetables to help them keep their iron content. When should you call for help? [id number] anytime you think you may need emergency care. For example, call if:  · You have symptoms of a heart attack. These may include:  ? Chest pain or pressure, or a strange feeling in the chest.  ? Sweating. ? Shortness of breath. ? Nausea or vomiting. ? Pain, pressure, or a strange feeling in the back, neck, jaw, or upper belly or in one or both shoulders or arms. ? Lightheadedness or sudden weakness. ? A fast or irregular heartbeat. After you call 911, the  may tell you to chew 1 adult-strength or 2 to 4 low-dose aspirin. Wait for an ambulance. Do not try to drive yourself. · You passed out (lost consciousness). Call your doctor now or seek immediate medical care if:  · You have new or increased shortness of breath. · You are dizzy or lightheaded, or you feel like you may faint. · Your fatigue and weakness continue or get worse. · You have any abnormal bleeding, such as:  ? Nosebleeds.   ? Vaginal bleeding that is different (heavier, more frequent, at a different time of the month) than what you are used to.  ? Bloody or black stools, or rectal bleeding. ? Bloody or pink urine. Watch closely for changes in your health, and be sure to contact your doctor if:  · You do not get better as expected. Where can you learn more? Go to http://janessa-raphael.info/  Enter R301 in the search box to learn more about \"Anemia: Care Instructions. \"  Current as of: November 8, 2019               Content Version: 12.5  © 5315-5153 Healthwise, Incorporated. Care instructions adapted under license by [employer] (which disclaims liability or warranty for this information). If you have questions about a medical condition or this instruction, always ask your healthcare professional. [address] any warranty or liability for your use of this information.

## 2020-05-29 DIAGNOSIS — E55.9 VITAMIN D DEFICIENCY: Primary | ICD-10-CM

## 2020-05-29 RX ORDER — ERGOCALCIFEROL 1.25 MG/1
50000 CAPSULE ORAL
Qty: 12 CAP | Refills: 0 | Status: SHIPPED | OUTPATIENT
Start: 2020-05-29

## 2020-05-30 LAB — CANCER AG27-29 SERPL-ACNC: 25.2 U/ML (ref 0–38.6)

## 2020-06-16 ENCOUNTER — VIRTUAL VISIT (OUTPATIENT)
Dept: SURGERY | Age: 65
End: 2020-06-16

## 2020-06-16 DIAGNOSIS — N64.4 BREAST PAIN: ICD-10-CM

## 2020-06-16 RX ORDER — IBUPROFEN 800 MG/1
800 TABLET ORAL
Qty: 90 TAB | Refills: 0 | Status: SHIPPED | OUTPATIENT
Start: 2020-06-16 | End: 2021-11-03 | Stop reason: ALTCHOICE

## 2020-06-16 NOTE — PROGRESS NOTES
[facility]  General Surgery    Name: Merline Mullet MRN: [id number]   : 1955 Location: [facility]   Date: 2020         Virtal Visit  Informed consent was obtained from the patient for a telehealth visit on via telephone. The patient understands that the charges may be billed for this visit if care is given outside of the global period. Visit is performed with the patient at home. I am in the office. Start time:1410 hrs  End time:1425 hrs  Subjective:  Patient continues in our surveillance program for patients with an increased risk of developing breast cancer. She is 2 years out from right lumpectomy for right breast cancer. She continues to perform self breast exam.  She has occasional discomfort in the breast.  But otherwise she is doing well. She denies any unintentional weight loss or nipple drainage or discharge. Objective:    Physical Exam:    General: Awake and alert, oriented x4, no apparent distress   Neuro: The patient can speak in full sentences without any distress    Current Medications:  Current Outpatient Medications   Medication Sig Dispense Refill    ergocalciferol (ERGOCALCIFEROL) 1,250 mcg (50,000 unit) capsule Take 1 Cap by mouth every seven (7) days. 12 Cap 0    letrozole (FEMARA) 2.5 mg tablet Take 1 Tab by mouth daily. 90 Tab 3    traMADoL (ULTRAM) 50 mg tablet Take 1 Tab by mouth every eight (8) hours as needed for Pain for up to 30 days. Max Daily Amount: 150 mg. Take 1 tablet every 8 hours as needed for pain 45 Tab 0    gabapentin (NEURONTIN) 300 mg capsule Take 1 Cap by mouth three (3) times daily. Indications: neuropathic pain, sensory sensitivity 90 Cap 5    albuterol (ACCUNEB) 0.63 mg/3 mL nebulizer solution 0.63 mg by Nebulization route every six (6) hours as needed for Wheezing.  acetaminophen (TYLENOL) 500 mg tablet Take 1 Tab by mouth every six (6) hours as needed for Pain.  120 Tab 0    ibuprofen (MOTRIN) 800 mg tablet Take 1 Tab by mouth every eight (8) hours as needed for Pain. 90 Tab 0    pantoprazole (PROTONIX) 40 mg tablet Take 40 mg by mouth daily.  amitriptyline (ELAVIL) 25 mg tablet Take 1 Tab by mouth nightly. Indications: MIGRAINE PREVENTION 90 Tab 1    docusate sodium (COLACE) 100 mg capsule Take 1 Cap by mouth two (2) times a day. 30 Cap 0    ascorbic acid (VITAMIN C) 250 mg tablet Take 1 Tab by mouth two (2) times daily (with meals). 30 Tab 0    losartan-hydrochlorothiazide (HYZAAR) 100-25 mg per tablet Take 1 Tab by mouth daily. Indications: HYPERTENSION      tiaGABine (GABITRIL) 4 mg tablet 1 tab every day x 1 week then 1 tab po bid 60 Tab 1    diclofenac EC (VOLTAREN) 75 mg EC tablet Take 1 Tab by mouth two (2) times daily as needed for Pain. Indications: joint damage causing pain and loss of function 60 Tab 5    esomeprazole (NEXIUM) 40 mg capsule   0    fluticasone-vilanterol (BREO ELLIPTA) 200-25 mcg/dose inhaler Take 1 Puff by inhalation daily.  ferrous sulfate 325 mg (65 mg iron) tablet Take 1 Tab by mouth two (2) times daily (with meals). 30 Tab 0       Chart and notes reviewed. Data reviewed. I have evaluated and examined the patient. IMPRESSION:   · Patient with history of right breast cancer status post lumpectomy with sentinel lymph node biopsy and radiation therapy. She continues on adjuvant antiestrogen therapy with Femara.       PLAN:/DISCUSION:   · Continue monthly self breast exam.  · September 2020 follow-up for clinical breast exam  · Bilateral 3D screening mammography in January 2021        Maryruth Dakin, MD

## 2020-06-16 NOTE — PROGRESS NOTES
Esa Martell is a 60 y.o. female  Chief Complaint   Patient presents with    Follow-up     5/4/18 right lumpectomy         There were no vitals filed for this visit. patient unable to provide at this time. Depression Screening:  3 most recent PHQ Screens 5/28/2020   Little interest or pleasure in doing things Not at all   Feeling down, depressed, irritable, or hopeless Not at all   Total Score PHQ 2 0       Learning Assessment:  Learning Assessment 12/17/2015   PRIMARY LEARNER Patient   HIGHEST LEVEL OF EDUCATION - PRIMARY LEARNER  GRADUATED HIGH SCHOOL OR GED   PRIMARY LANGUAGE ENGLISH   LEARNER PREFERENCE PRIMARY OTHER (COMMENT)   ANSWERED BY patient   RELATIONSHIP SELF         Fall Risk  Fall Risk Assessment, last 12 mths 10/28/2019   Able to walk? Yes   Fall in past 12 months? No       Health Maintenance reviewed and discussed and ordered per Provider.

## 2020-06-16 NOTE — PATIENT INSTRUCTIONS

## 2020-08-06 ENCOUNTER — OFFICE VISIT (OUTPATIENT)
Dept: ORTHOPEDIC SURGERY | Age: 65
End: 2020-08-06

## 2020-08-06 DIAGNOSIS — M51.36 DDD (DEGENERATIVE DISC DISEASE), LUMBAR: ICD-10-CM

## 2020-08-06 DIAGNOSIS — M47.899 FACET SYNDROME: Primary | ICD-10-CM

## 2020-08-06 DIAGNOSIS — M48.062 SPINAL STENOSIS OF LUMBAR REGION WITH NEUROGENIC CLAUDICATION: ICD-10-CM

## 2020-08-06 RX ORDER — GABAPENTIN 400 MG/1
400 CAPSULE ORAL 3 TIMES DAILY
Qty: 90 CAP | Refills: 2 | Status: SHIPPED | OUTPATIENT
Start: 2020-08-06 | End: 2020-12-01 | Stop reason: SDUPTHER

## 2020-08-06 NOTE — PROGRESS NOTES
History of Present Illness:    Consent: Etienne Riggs, who was seen by telephone call evaluation and/or her healthcare decision maker, is aware that this patient-initiated, Telehealth encounter on 8/6/2020 is a billable service, with coverage as determined by her insurance carrier. She is aware that she may receive a bill and has provided verbal consent to proceed: Yes. The provider was located at [facility] office and the patient was located at their [facility] building. No one else participated in the visit with the patient. The platform used was telephone call    Patient was evaluated today for follow-up of her low back pain with radiating bilateral lower extremity pain. She states that her leg pain and paresthesias have increased. She denies any injury or excessive activity. She is ambulating with a single-point cane. She states her PCP gave her a permanent Placard a few years ago but she has lost it and her PCP is no longer practicing. She is requesting that we give her replacement placard. She denies fever bowel bladder dysfunction. She denies any new medical issues. She states she got tested for COVID going to home yesterday as she is in the high risk category due to the chronic bronchitis. She does not have the results yet. Physical Exam: The patient is a 81-year-old female who is alert and oriented. She stands with fluency. She did not appear to be short of breath. Vital Signs: (As obtained by patient/caregiver at home)  There were no vitals taken for this visit. Assessment & Plan: Patient has chronic back and bilateral leg pain. Anticipate increase her Neurontin to 400 mg 3 times a day. We will reevaluate her in 3 months. I did replace her handicap placard       Diagnoses and all orders for this visit:    1. Facet syndrome  -     gabapentin (Neurontin) 400 mg capsule; Take 1 Cap by mouth three (3) times daily. Max Daily Amount: 1,200 mg. Indications: neuropathic pain    2. Spinal stenosis of lumbar region with neurogenic claudication  -     gabapentin (Neurontin) 400 mg capsule; Take 1 Cap by mouth three (3) times daily. Max Daily Amount: 1,200 mg. Indications: neuropathic pain    3. DDD (degenerative disc disease), lumbar  -     gabapentin (Neurontin) 400 mg capsule; Take 1 Cap by mouth three (3) times daily. Max Daily Amount: 1,200 mg. Indications: neuropathic pain          Pain Scale: /10         has been reviewed and is appropriate            We discussed the expected course, resolution and complications of the diagnosis(es) in detail. Medication risks, benefits, costs, interactions, and alternatives were discussed as indicated. I advised her to contact the office if her condition worsens, changes or fails to improve as anticipated. For emergencies or worsening neurological symptoms the patient was instructed to call 911. She expressed understanding with the diagnosis(es) and plan. Follow-up and Dispositions    · Return in about 3 months (around 11/6/2020) for with JOHNNY AllenEzekiel Guzman is a 60 y.o. female being evaluated by a video visit encounter for concerns as above. A caregiver was present when appropriate. Due to this being a TeleHealth encounter (During [place] [address] public [facility] emergency), evaluation of the following organ systems was limited: Vitals/Constitutional/EENT/Resp/CV/GI//MS/Neuro/Skin/Heme-Lymph-Imm. Pursuant to the emergency declaration under the [address] [place] [address], [facility] waiver authority and the [employer] and [employer] Act, this Virtual  Visit was conducted, with patient's (and/or legal guardian's) consent, to reduce the patient's risk of exposure to COVID-19 and provide necessary medical care.              CPT Codes [id number] for Established Patients may apply to this Telehealth Visit  Time-based coding, delete if not needed: I spent at least 15 minutes with this established patient, and >50% of the time was spent counseling and/or coordinating care regarding [id number]  Start time: 11:05 am and Stop time: 11:23 AM.        Due to this being a TeleHealth evaluation, many elements of the physical examination are unable to be assessed. Pursuant to the emergency declaration under the [address] [place] [address] [facility] authority and the [employer] and [employer] Act, this Virtual  Visit was conducted, with patient's consent, to reduce the patient's risk of exposure to COVID-19 and provide continuity of care for an established patient. Services were provided through a video synchronous discussion virtually to substitute for in-person clinic visit.     Hector Haney NP

## 2020-08-17 ENCOUNTER — OFFICE VISIT (OUTPATIENT)
Dept: ORTHOPEDIC SURGERY | Age: 65
End: 2020-08-17

## 2020-08-17 VITALS
DIASTOLIC BLOOD PRESSURE: 74 MMHG | SYSTOLIC BLOOD PRESSURE: 121 MMHG | BODY MASS INDEX: 27.78 KG/M2 | HEART RATE: 78 BPM | TEMPERATURE: 97.7 F | OXYGEN SATURATION: 99 % | RESPIRATION RATE: 16 BRPM | WEIGHT: 177 LBS | HEIGHT: 67 IN

## 2020-08-17 DIAGNOSIS — M25.512 LEFT SHOULDER PAIN, UNSPECIFIED CHRONICITY: ICD-10-CM

## 2020-08-17 DIAGNOSIS — M19.012 GLENOHUMERAL ARTHRITIS, LEFT: Primary | ICD-10-CM

## 2020-08-17 RX ORDER — TRIAMCINOLONE ACETONIDE 40 MG/ML
40 INJECTION, SUSPENSION INTRA-ARTICULAR; INTRAMUSCULAR ONCE
Qty: 1 ML | Refills: 0
Start: 2020-08-17 | End: 2020-08-17

## 2020-08-17 RX ORDER — TRAMADOL HYDROCHLORIDE 50 MG/1
50 TABLET ORAL
COMMUNITY
End: 2020-09-02 | Stop reason: SDUPTHER

## 2020-08-17 NOTE — PROGRESS NOTES
Mona Glover  1955   Chief Complaint   Patient presents with    Shoulder Pain     left shoulder f/u         HISTORY OF PRESENT ILLNESS  Mona Glover is a 60 y.o. female who presents today for reevaluation of left shoulder. Patient rates pain as 10/10 today. Pain has been present for years. Pt was last seen here in August 2019 and had a cortisone injection which provided some relief. Has limited ROM with raising the arm. The pt had a previous cortisone injection which provided some relief. Has hx of pinched nerve. Patient denies any fever, chills, chest pain, shortness of breath or calf pain. The remainder of the review of systems is negative. There are no new illness or injuries to report since last seen in the office. There are no changes to medications, allergies, family or social history. Pain Assessment  8/17/2020   Location of Pain Shoulder   Location Modifiers Left   Severity of Pain 10   Quality of Pain Dull;Aching; Sharp   Quality of Pain Comment -   Duration of Pain Persistent   Frequency of Pain Constant   Aggravating Factors Stretching;Straightening   Aggravating Factors Comment -   Limiting Behavior Some   Relieving Factors Rest   Result of Injury No       PHYSICAL EXAM:   Visit Vitals  /74   Pulse 78   Temp 97.7 °F (36.5 °C) (Temporal)   Resp 16   Ht 5' 7\" (1.702 m)   Wt 177 lb (80.3 kg)   SpO2 99%   BMI 27.72 kg/m²     The patient is a well-developed, well-nourished female   in no acute distress. The patient is alert and oriented times three. The patient is alert and oriented times three. Mood and affect are normal.  LYMPHATIC: lymph nodes are not enlarged and are within normal limits  SKIN: normal in color and non tender to palpation. There are no bruises or abrasions noted. NEUROLOGICAL: Motor sensory exam is within normal limits. Reflexes are equal bilaterally.  There is normal sensation to pinprick and light touch  MUSCULOSKELETAL:  Examination Left shoulder   Skin Intact   AC joint tenderness -   Biceps tenderness -   Forward flexion/Elevation    Active abduction    Glenohumeral abduction 45   External rotation ROM 0   Internal rotation ROM 30   Apprehension -   Baldevs Relocation -   Jerk -   Load and Shift -   Obriens -   Speeds -   Impingement sign +   Supraspinatus/Empty Can +   External Rotation Strength -, 5/5   Lift Off/Belly Press -, 5/5   Neurovascular Intact      PROCEDURE: Left Glenohumeral Joint Injection with Ultrasound Guidance  Indication:Left Glenohumeral Joint pain/swelling    After sterile prep, 6 cc of Xylocaine and 1 cc of Kenalog were injected into the left glenohumeral joint. Ultrasound images captured using [employer] Ultrasound machine and scanned into patient's chart.        [facility] - [facility]  OFFICE PROCEDURE PROGRESS NOTE        Chart reviewed for the following:  Elida Martinez M.D, have reviewed the History, Physical and updated the Allergic reactions for [facility] [address] [facility] performed immediately prior to start of procedure:  Elida Martinez M.D, have performed the following reviews on [place] prior to the start of the procedure:            * Patient was identified by name and date of birth   * Agreement on procedure being performed was verified  * Risks and Benefits explained to the patient  * Procedure site verified and marked as necessary  * Patient was positioned for comfort  * Consent was signed and verified     Time: 2:57 PM     Date of procedure: 8/17/2020    Procedure performed by:  Juana Felix M.D    Provider assisted by: (see medication administration)    How tolerated by patient: tolerated the procedure well with no complications    Comments: none      IMAGING: XR of left shoulder obtained in the office dated 8/17/2020 was reviewed and read by Dr. Isabela Estrada: Proximal migration of the humeral head with severe degenerative changes in the glenohumeral joint. XR of left shoulder dated 8/26/19 was reviewed and read: Marked degenerative arthritis of glenohumeral joint      IMPRESSION:      ICD-10-CM ICD-9-CM    1. Glenohumeral arthritis, left  M19.012 716.91 TRIAMCINOLONE ACETONIDE INJ      triamcinolone acetonide (Kenalog) 40 mg/mL injection      US GUIDE INJ/ASP/ARTHRO LG JNT/BURSA   2. Left shoulder pain, unspecified chronicity  M25.512 719.41 AMB POC XRAY, SHOULDER; COMPLETE, 2+        PLAN:   1. Pt presents today with left shoulder pain due to glenohumeral arthritis and I would like to try an injection today. Pt does not want surgery at this time. Risk factors include: htn  2. No ultrasound exam indicated today   3. Yes cortisone injection indicated today L GLENOHUMERAL JOINT US  4. No Physical/Occupational Therapy indicated today  5. No diagnostic test indicated today:   6. No durable medical equipment indicated today  7. No referral indicated today   8. No medications indicated today:   9. No Narcotic indicated today       RTC 3 weeks if pain continues      Scribed by John Banerjee [address]) as dictated by Adam Potter MD    I, Dr. dAam Potter, confirm that all documentation is accurate.     Adam Potter M.D.   [facility]

## 2020-09-02 ENCOUNTER — OFFICE VISIT (OUTPATIENT)
Dept: ONCOLOGY | Age: 65
End: 2020-09-02

## 2020-09-02 ENCOUNTER — HOSPITAL ENCOUNTER (OUTPATIENT)
Dept: LAB | Age: 65
Discharge: HOME | End: 2020-09-02
Payer: MEDICARE

## 2020-09-02 VITALS
WEIGHT: 178 LBS | RESPIRATION RATE: 16 BRPM | SYSTOLIC BLOOD PRESSURE: 117 MMHG | DIASTOLIC BLOOD PRESSURE: 79 MMHG | BODY MASS INDEX: 27.88 KG/M2 | TEMPERATURE: 99.8 F | OXYGEN SATURATION: 99 % | HEART RATE: 78 BPM

## 2020-09-02 DIAGNOSIS — E55.9 VITAMIN D DEFICIENCY: ICD-10-CM

## 2020-09-02 DIAGNOSIS — C50.511 MALIGNANT NEOPLASM OF LOWER-OUTER QUADRANT OF RIGHT FEMALE BREAST, UNSPECIFIED ESTROGEN RECEPTOR STATUS (HCC): Primary | ICD-10-CM

## 2020-09-02 DIAGNOSIS — G89.3 CHRONIC PAIN DUE TO NEOPLASM: ICD-10-CM

## 2020-09-02 DIAGNOSIS — Z17.0 ESTROGEN RECEPTOR POSITIVE: ICD-10-CM

## 2020-09-02 DIAGNOSIS — C50.511 MALIGNANT NEOPLASM OF LOWER-OUTER QUADRANT OF RIGHT BREAST OF FEMALE, ESTROGEN RECEPTOR POSITIVE (HCC): ICD-10-CM

## 2020-09-02 DIAGNOSIS — Z17.0 MALIGNANT NEOPLASM OF LOWER-OUTER QUADRANT OF RIGHT BREAST OF FEMALE, ESTROGEN RECEPTOR POSITIVE (HCC): ICD-10-CM

## 2020-09-02 DIAGNOSIS — Z79.811 LONG TERM CURRENT USE OF AROMATASE INHIBITOR: ICD-10-CM

## 2020-09-02 DIAGNOSIS — C50.511 MALIGNANT NEOPLASM OF LOWER-OUTER QUADRANT OF RIGHT FEMALE BREAST, UNSPECIFIED ESTROGEN RECEPTOR STATUS (HCC): ICD-10-CM

## 2020-09-02 DIAGNOSIS — D50.8 IRON DEFICIENCY ANEMIA SECONDARY TO INADEQUATE DIETARY IRON INTAKE: ICD-10-CM

## 2020-09-02 LAB
ALBUMIN SERPL-MCNC: 3.8 G/DL (ref 3.4–5)
ALBUMIN/GLOB SERPL: 1.1 {RATIO} (ref 0.8–1.7)
ALP SERPL-CCNC: 64 U/L (ref 45–117)
ALT SERPL-CCNC: 29 U/L (ref 13–56)
ANION GAP SERPL CALC-SCNC: 6 MMOL/L (ref 3–18)
AST SERPL-CCNC: 26 U/L (ref 10–38)
BASOPHILS # BLD: 0.1 K/UL (ref 0–0.1)
BASOPHILS NFR BLD: 1 % (ref 0–2)
BILIRUB SERPL-MCNC: 0.6 MG/DL (ref 0.2–1)
BUN SERPL-MCNC: 17 MG/DL (ref 7–18)
BUN/CREAT SERPL: 15 (ref 12–20)
CALCIUM SERPL-MCNC: 9.7 MG/DL (ref 8.5–10.1)
CHLORIDE SERPL-SCNC: 105 MMOL/L (ref 100–111)
CO2 SERPL-SCNC: 27 MMOL/L (ref 21–32)
CREAT SERPL-MCNC: 1.15 MG/DL (ref 0.6–1.3)
DIFFERENTIAL METHOD BLD: ABNORMAL
EOSINOPHIL # BLD: 0.2 K/UL (ref 0–0.4)
EOSINOPHIL NFR BLD: 2 % (ref 0–5)
ERYTHROCYTE [DISTWIDTH] IN BLOOD BY AUTOMATED COUNT: 14.2 % (ref 11.6–14.5)
GLOBULIN SER CALC-MCNC: 3.5 G/DL (ref 2–4)
GLUCOSE SERPL-MCNC: 81 MG/DL (ref 74–99)
HCT VFR BLD AUTO: 34.5 % (ref 35–45)
HGB BLD-MCNC: 11.3 G/DL (ref 12–16)
LYMPHOCYTES # BLD: 2.3 K/UL (ref 0.9–3.6)
LYMPHOCYTES NFR BLD: 30 % (ref 21–52)
MCH RBC QN AUTO: 27.8 PG (ref 24–34)
MCHC RBC AUTO-ENTMCNC: 32.8 G/DL (ref 31–37)
MCV RBC AUTO: 85 FL (ref 74–97)
MONOCYTES # BLD: 0.3 K/UL (ref 0.05–1.2)
MONOCYTES NFR BLD: 4 % (ref 3–10)
NEUTS SEG # BLD: 4.7 K/UL (ref 1.8–8)
NEUTS SEG NFR BLD: 63 % (ref 40–73)
PLATELET # BLD AUTO: 238 K/UL (ref 135–420)
PMV BLD AUTO: 10.4 FL (ref 9.2–11.8)
POTASSIUM SERPL-SCNC: 3.6 MMOL/L (ref 3.5–5.5)
PROT SERPL-MCNC: 7.3 G/DL (ref 6.4–8.2)
RBC # BLD AUTO: 4.06 M/UL (ref 4.2–5.3)
SODIUM SERPL-SCNC: 138 MMOL/L (ref 136–145)
WBC # BLD AUTO: 7.6 K/UL (ref 4.6–13.2)

## 2020-09-02 PROCEDURE — 85025 COMPLETE CBC W/AUTO DIFF WBC: CPT

## 2020-09-02 PROCEDURE — 86300 IMMUNOASSAY TUMOR CA 15-3: CPT

## 2020-09-02 PROCEDURE — 36415 COLL VENOUS BLD VENIPUNCTURE: CPT

## 2020-09-02 PROCEDURE — 80053 COMPREHEN METABOLIC PANEL: CPT

## 2020-09-02 RX ORDER — ATORVASTATIN CALCIUM 10 MG/1
TABLET, FILM COATED ORAL
COMMUNITY
Start: 2020-08-25 | End: 2021-01-13

## 2020-09-02 RX ORDER — TRAMADOL HYDROCHLORIDE 50 MG/1
50 TABLET ORAL
Qty: 25 TAB | Refills: 0 | Status: SHIPPED | OUTPATIENT
Start: 2020-09-02 | End: 2020-10-02

## 2020-09-02 NOTE — PROGRESS NOTES
Hematology/Oncology  Progress Note    Name: Marquis Camp  : 1955    PCP: Masha Bryan MD     Ms. Jaimie Barnes is a 60 y.o. -American woman with invasive ductal carcinoma the right breast.    Current therapy: Femara 2.5mg PO daily since       Subjective:     Mrs. Jaimie Barnes is a 51-year-old -American woman with invasive ductal carcinoma involving the right breast. She was diagnosed in 2018. She reports she continues to tolerate the Femara without significant side effects. She states she is doing her breast self exam on a monthly basis and has no new physical complaints or concerns to report. She denies any nipple discharge or any other breast issues. She still complains of some pain due to her surgery. She report that it tender and requesting some pain medication. The patient otherwise has no other complaints. Denied fever, chills, night sweat, unintentional weight loss, skin lumps or bumps, acute bleeding or bruising issues. Denied headache, acute vision change, dizziness, chest pain, worsen shortness of breath, palpitation, productive cough, nausea, vomiting, abdominal pain, altered bowel habits, dysuria, new bone pain or back pain, focal numbness or weakness. Past medical history, family history, and social history: these were reviewed and remains unchanged.     Past Medical History:   Diagnosis Date    Arthritis     Balance problems     patient states no further issues    Breast CA ([facility] [address] 2018    right    Bronchitis     Chronic pain     back    Fibromyalgia 2012    GERD (gastroesophageal reflux disease)     Hiatal Hernia    Hypertension     Kidney stones     Low back pain     Lumbar spinal stenosis 2012    Migraine headache     Nervousness     Neurological disease     Osteoarthritis 2012    Other ill-defined conditions(799.89)     neuropathy    Peripheral neuropathy 2012    Post laminectomy syndrome     Postoperative anemia due to acute blood loss 11/30/2012    Ringing in ears     Spinal stenosis     Thoracic spine pain     Vitamin D deficiency 11/30/2012     Past Surgical History:   Procedure Laterality Date    HX MASTECTOMY Right 5/4/2018    NEEDLE LOCALIZED (1000) LUMPECTOMY RIGHT BREAST AND SENTINEL LYMPH NODE BIOPSY RIGHT AXILLA performed by Cherelle Hart MD at [address] [place] [address] HX ORTHOPAEDIC      foot l heel spur    HX ORTHOPAEDIC      right knee sx    HX OTHER SURGICAL      non cancer cyst removed from right shoulder    NEUROLOGICAL PROCEDURE UNLISTED      Bilateral L3-4 hemilaminotomy and medial facetectomy by Dr. Hart Stage    ME BX/REMV,LYMPH NODE,DEEP AXILL Right 05/04/2018    Dr. Cal Louis History     Socioeconomic History    Marital status: SINGLE     Spouse name: Not on file    Number of children: Not on file    Years of education: Not on file    Highest education level: Not on file   Occupational History    Not on file   Social Needs    Financial resource strain: Not on file    Food insecurity     Worry: Not on file     Inability: Not on file    Transportation needs     Medical: Not on file     Non-medical: Not on file   Tobacco Use    Smoking status: Never Smoker    Smokeless tobacco: Never Used   Substance and Sexual Activity    Alcohol use: No    Drug use: No    Sexual activity: Not on file   Lifestyle    Physical activity     Days per week: Not on file     Minutes per session: Not on file    Stress: Not on file   Relationships    Social connections     Talks on phone: Not on file     Gets together: Not on file     Attends [employer] service: Not on file     Active member of club or organization: Not on file     Attends meetings of clubs or organizations: Not on file     Relationship status: Not on file    Intimate partner violence     Fear of current or ex partner: Not on file     Emotionally abused: Not on file     Physically abused: Not on file     Forced sexual activity: Not on file   Other Topics Concern    Not on file   Social History Narrative    Not on file     Family History   Problem Relation Age of Onset    Hypertension Mother    Avtar Dominguez Arthritis-osteo Mother     Neuropathy Mother     Hypertension Father     Cancer Father         colon    Hypertension Brother     Cancer Brother     Neuropathy Brother     Neuropathy Other     Arthritis-rheumatoid Other     GERD Other     Diabetes Other     Diabetes Maternal Aunt     Diabetes Maternal Uncle     Hypertension Maternal Grandfather      Current Outpatient Medications   Medication Sig Dispense Refill    atorvastatin (LIPITOR) 10 mg tablet take 1 tablet by mouth at bedtime      traMADoL (ULTRAM) 50 mg tablet Take 50 mg by mouth every six (6) hours as needed for Pain.  atorvastatin calcium (ATORVASTATIN PO) Take  by mouth.  gabapentin (Neurontin) 400 mg capsule Take 1 Cap by mouth three (3) times daily. Max Daily Amount: 1,200 mg. Indications: neuropathic pain 90 Cap 2    ibuprofen (MOTRIN) 800 mg tablet Take 1 Tab by mouth every eight (8) hours as needed for Pain. Indications: pain 90 Tab 0    ergocalciferol (ERGOCALCIFEROL) 1,250 mcg (50,000 unit) capsule Take 1 Cap by mouth every seven (7) days. 12 Cap 0    letrozole (FEMARA) 2.5 mg tablet Take 1 Tab by mouth daily. 90 Tab 3    gabapentin (NEURONTIN) 300 mg capsule Take 1 Cap by mouth three (3) times daily. Indications: neuropathic pain, sensory sensitivity 90 Cap 5    albuterol (ACCUNEB) 0.63 mg/3 mL nebulizer solution 0.63 mg by Nebulization route every six (6) hours as needed for Wheezing.  tiaGABine (GABITRIL) 4 mg tablet 1 tab every day x 1 week then 1 tab po bid 60 Tab 1    diclofenac EC (VOLTAREN) 75 mg EC tablet Take 1 Tab by mouth two (2) times daily as needed for Pain. Indications: joint damage causing pain and loss of function 60 Tab 5    acetaminophen (TYLENOL) 500 mg tablet Take 1 Tab by mouth every six (6) hours as needed for Pain. 120 Tab 0    pantoprazole (PROTONIX) 40 mg tablet Take 40 mg by mouth daily.  esomeprazole (NEXIUM) 40 mg capsule   0    amitriptyline (ELAVIL) 25 mg tablet Take 1 Tab by mouth nightly. Indications: MIGRAINE PREVENTION 90 Tab 1    fluticasone-vilanterol (BREO ELLIPTA) 200-25 mcg/dose inhaler Take 1 Puff by inhalation daily.  docusate sodium (COLACE) 100 mg capsule Take 1 Cap by mouth two (2) times a day. 30 Cap 0    ferrous sulfate 325 mg (65 mg iron) tablet Take 1 Tab by mouth two (2) times daily (with meals). 30 Tab 0    ascorbic acid (VITAMIN C) 250 mg tablet Take 1 Tab by mouth two (2) times daily (with meals). 30 Tab 0    losartan-hydrochlorothiazide (HYZAAR) 100-25 mg per tablet Take 1 Tab by mouth daily. Indications: HYPERTENSION         Review of Systems   Constitutional: Negative for chills, diaphoresis, fever, malaise/fatigue and weight loss. Respiratory: Negative for cough, hemoptysis, shortness of breath and wheezing. Cardiovascular: Negative for chest pain, palpitations and leg swelling. Gastrointestinal: Negative for abdominal pain, diarrhea, heartburn, nausea and vomiting. Genitourinary: Negative for dysuria, frequency, hematuria and urgency. Musculoskeletal: Negative for joint pain and myalgias. Skin: Negative for itching and rash. Neurological: Negative for dizziness, seizures, weakness and headaches. Psychiatric/Behavioral: Negative for depression. The patient does not have insomnia.              Objective:     Visit Vitals  /79 (BP Patient Position: Sitting)   Pulse 78   Temp 99.8 °F (37.7 °C) (Oral)   Resp 16   Wt 80.7 kg (178 lb)   SpO2 99%   BMI 27.88 kg/m²       ECOG Performance Status (grade): 0  0 - able to carry on all pre-disease activity w/out restriction  1 - restricted but able to carry out light work  2 - ambulatory and can self- care but unable to carry out work  3 - bed or chair >50% of waking hours  4 - completely disable, total care, confined to bed or chair    Physical Exam  Constitutional:       Appearance: Normal appearance. HENT:      Head: Normocephalic and atraumatic. Eyes:      Pupils: Pupils are equal, round, and reactive to light. Neck:      Musculoskeletal: Neck supple. Cardiovascular:      Rate and Rhythm: Normal rate and regular rhythm. Heart sounds: Normal heart sounds. Pulmonary:      Effort: Pulmonary effort is normal.      Breath sounds: Normal breath sounds. Abdominal:      General: Bowel sounds are normal.      Palpations: Abdomen is soft. Tenderness: There is no abdominal tenderness. There is no guarding. Musculoskeletal: Normal range of motion. Right lower leg: No edema. Left lower leg: No edema. Skin:     General: Skin is warm. Neurological:      General: No focal deficit present. Mental Status: She is alert and oriented to person, place, and time. Mental status is at baseline. Breast exam ( in presence of FILOMENA Reveles)  + Left: No dimpling, discoloration, nipple inversion or retractions. No axillary or supraclavicular lymphadenopathy. No mass  + Right: No dimpling, discoloration. No axillary or supraclavicular lymphadenopathy. No mass    Diagnostics:      No results found for this or any previous visit (from the past 96 hour(s)). Imaging:  No results found for this or any previous visit. Results for orders placed during the hospital encounter of 06/06/19   XR CHEST PA LAT    Narrative EXAM: Chest Radiographs    INDICATION: Shortness of breath    TECHNIQUE: PA and lateral views of the chest    COMPARISON: 5/1/2018, 11/6/2012 and 3/11/2010    FINDINGS: No pneumothorax identified. The lungs are hyperinflated no acute  infiltrates appreciated. . No effusions appreciated. The cardiomediastinal  silhouette is unremarkable. The pulmonary vascularity is unremarkable. The  osseous structures are unremarkable. Impression IMPRESSION:  1. No acute infiltrate or effusion.     2. Mildly hyperinflated lungs suggestive of COPD. Results for orders placed during the hospital encounter of 04/05/18   CT CHEST ABD PELV W CONT    Narrative HISTORY: Recent diagnosis of breast carcinoma. EXAM: CT chest, abdomen and pelvis. TECHNIQUE: Spiral images of the chest abdomen and pelvis were performed  according to routine protocol from thoracic inlet to the ischial tuberosities  after administration of 100 cc of Isovue-300 contrast media intravenously. Coronal and sagittal reconstructions were provided for evaluation. Oral contrast  was given. No prior studies for comparison. All CT scans at this facility are performed using dose optimization technique as  appropriate to a performed exam, to include automated exposure control,  adjustment of the mA and/or kV according to patient size (including appropriate  matching for site-specific examinations), or use of iterative reconstruction  technique. FINDINGS:    CT THORAX:    There is no pneumothorax, pneumonia or pleural effusions are seen. Trachea bronchial trees unremarkable. Heart is normal in size without pericardial effusions. Vascular pedicle is unremarkable. No enhancing mass lesion or pathologic lymphadenopathy is noted. Nonspecific bean shaped fatty hilum lymph nodes are noted in bilateral axillary  regions. No lytic or blastic lesions. CT ABDOMEN:    Liver is unremarkable. Portal vein is patent and unremarkable. Hepatic outflow is unremarkable. Gallbladder is unremarkable. Spleen is unremarkable. Pancreas is unremarkable. Adrenal glands are unremarkable bilaterally. Kidneys are unremarkable. No small or large bowel obstruction is seen. No ascites or pneumoperitoneum. Abdominal aorta and IVC are unremarkable. No enhancing mass lesion or pathologic lymphadenopathy seen. No lytic or blastic lesions. CT PELVIS:    Urinary bladder is unremarkable.     Uterus is atrophic but unremarkable. Rectus sigmoid region is unremarkable. No free pelvic fluid is seen. No enhancing mass lesion or pathologic lymphadenopathy. No lytic or blastic lesions. Impression IMPRESSION:    1. Unremarkable study of the chest abdomen and pelvis. Assessment:     1. Malignant neoplasm of lower-outer quadrant of right female breast, unspecified estrogen receptor status ([facility] [address]    2. Estrogen receptor positive    3. Malignant neoplasm of lower-outer quadrant of right breast of female, estrogen receptor positive ([address]    4. Chronic pain due to neoplasm    5. Long term current use of aromatase inhibitor    6. Vitamin D deficiency    7. Iron deficiency anemia secondary to inadequate dietary iron intake      Plan:     # Invasive ductal carcinoma the right breast  -- Patient was being followed by Dr. Adrian who just retired recently. -- Clinically the patient is doing well and has no evidence of disease recurrence. -- Recent mammogram on 01/10/2020 shows no evidence of malignancy. In absence of concerning physical findings, recommend annual follow-up. Plan:  -- The patient was instructed to continue doing her breast exams on a monthly basis. -- The patient was advised to notify us if any new breast pain, new palpable nodule, nipple inversion, spontaneous nipple discharge especially if bloody, breast skin changes, unintentional weight loss, worsen fatigue, new bone pain or back pain, or any concerns. -- I have advised her to follow up her PCP to continue age-appropriate cancer screening. -- I have educated her regarding lifestyle modifications, minimizing alcohol intake, refraining from smoking, healthy diet, and physical activity. -- Her next mammogram will be 1/2021  -- We will continue to monitor CBC, chemistry, CA 27-29. # Long term use of aromatase inhibitor:   -- The patient currently taking Femara daily.  She states she is tolerating the medication well and this will be continued. -- I have advised the patient to follow-up with her PCP for DEXA scan and checking vitamin D level.  + Advised on adequate amounts of calcium (at least 1,200 mg per day) and vitamin D (800-1,000 IU per day). The higher dose of vitamin D may be needed if vitamin D deficiency.  + Recommend regular muscle-strengthening exercise to reduce the risk of falls and fractures. + Advise avoidance of tobacco smoking and excessive alcohol intake. # Chronic breast pain s.p surgery and XRT   -- Ultram 50 mg to take every 8 hours as needed. Today medications was refilled with instructions to take for pain. -- Instructed the patient to have a follow-up with pain management clinic for long-term pain control. # Vitamin D Deficiency:   -- She will f/u with PCP for continuity management. # Chronic anemia, Hx of Iron Deficiency Anemia:   -- We will continue to monitor CBC.    -- We will see the patient back in clinic in about 4 months. Always sooner if required. The patient can have lab done prior to our next clinic visit. Orders Placed This Encounter    CBC WITH AUTOMATED DIFF     Standing Status:   Future     Number of Occurrences:   1     Standing Expiration Date:   0/5/8382    METABOLIC PANEL, COMPREHENSIVE     Standing Status:   Future     Number of Occurrences:   1     Standing Expiration Date:   9/3/2021    CA 27.29     Standing Status:   Future     Number of Occurrences:   1     Standing Expiration Date:   9/3/2021    atorvastatin (LIPITOR) 10 mg tablet     Sig: take 1 tablet by mouth at bedtime    traMADoL (ULTRAM) 50 mg tablet     Sig: Take 1 Tab by mouth every eight (8) hours as needed for Pain for up to 30 days. Max Daily Amount: 150 mg. Dispense:  25 Tab     Refill:  0           Ms. Leroy West has a reminder for a \"due or due soon\" health maintenance. I have asked that she contact her primary care provider for follow-up on this health maintenance.    All of patient's questions answered to their apparent satisfaction. They verbally show understanding and agreement with aforementioned plan. Talita Armenta MD  9/2/2020          About 25 minutes were spent for this encounter with more than 50% of the time spent in face-to-face counseling, discussing on diagnosis and management plan going forward, and co-ordination of care. Parts of this document has been produced using Dragon dictation system. Unrecognized errors in transcription may be present. Please do not hesitate to reach out for any questions or clarifications.       CC: Jarred Awad MD

## 2020-09-03 LAB — CANCER AG27-29 SERPL-ACNC: 28.4 U/ML (ref 0–38.6)

## 2020-09-16 ENCOUNTER — OFFICE VISIT (OUTPATIENT)
Dept: SURGERY | Age: 65
End: 2020-09-16

## 2020-09-16 VITALS
OXYGEN SATURATION: 99 % | BODY MASS INDEX: 27.94 KG/M2 | TEMPERATURE: 98 F | DIASTOLIC BLOOD PRESSURE: 84 MMHG | RESPIRATION RATE: 18 BRPM | SYSTOLIC BLOOD PRESSURE: 127 MMHG | HEART RATE: 74 BPM | HEIGHT: 67 IN | WEIGHT: 178 LBS

## 2020-09-16 DIAGNOSIS — Z92.3: ICD-10-CM

## 2020-09-16 DIAGNOSIS — Z12.31 BREAST CANCER SCREENING BY MAMMOGRAM: Primary | ICD-10-CM

## 2020-09-16 DIAGNOSIS — Z85.3 HISTORY OF RIGHT BREAST CANCER: ICD-10-CM

## 2020-09-16 DIAGNOSIS — Z98.890 STATUS POST RIGHT BREAST LUMPECTOMY: ICD-10-CM

## 2020-09-16 NOTE — PROGRESS NOTES
[facility]  General Surgery    Subjective:  Patient in our breast cancer surveillance program 2 years out from right lumpectomy with sentinel lymph node biopsy for invasive carcinoma ER [place] positive HER-2 negative. She states that she has been performing self breast exam.  She does have some tenderness in the upper outer quadrant of the right breast.  She denies any unintentional weight loss or nipple drainage or skin changes or masses within the breast.  She is due for bilateral 3D screening mammography in January 2021. Objective:  Vitals:    09/16/20 1348   BP: 127/84   Pulse: 74   Resp: 18   Temp: 98 °F (36.7 °C)   TempSrc: Temporal   SpO2: 99%   Weight: 80.7 kg (178 lb)   Height: 5' 7\" (1.702 m)       Physical Exam:    General: Awake and alert, oriented x4, no apparent distress   Breasts:    Left: No dimpling, discoloration, nipple inversion or retractions. No axillary or supraclavicular lymphadenopathy. No mass   Right: No dimpling, discoloration, nipple inversion or retractions. No axillary or supraclavicular lymphadenopathy. No mass. Tenderness to palpation along the lateral breast tissue to the nipple. No masses are appreciated. Current Medications:  Current Outpatient Medications   Medication Sig Dispense Refill    atorvastatin (LIPITOR) 10 mg tablet take 1 tablet by mouth at bedtime      traMADoL (ULTRAM) 50 mg tablet Take 1 Tab by mouth every eight (8) hours as needed for Pain for up to 30 days. Max Daily Amount: 150 mg. 25 Tab 0    atorvastatin calcium (ATORVASTATIN PO) Take  by mouth.  gabapentin (Neurontin) 400 mg capsule Take 1 Cap by mouth three (3) times daily. Max Daily Amount: 1,200 mg. Indications: neuropathic pain 90 Cap 2    ergocalciferol (ERGOCALCIFEROL) 1,250 mcg (50,000 unit) capsule Take 1 Cap by mouth every seven (7) days. 12 Cap 0    letrozole (FEMARA) 2.5 mg tablet Take 1 Tab by mouth daily.  90 Tab 3    gabapentin (NEURONTIN) 300 mg capsule Take 1 Cap by mouth three (3) times daily. Indications: neuropathic pain, sensory sensitivity 90 Cap 5    albuterol (ACCUNEB) 0.63 mg/3 mL nebulizer solution 0.63 mg by Nebulization route every six (6) hours as needed for Wheezing.  diclofenac EC (VOLTAREN) 75 mg EC tablet Take 1 Tab by mouth two (2) times daily as needed for Pain. Indications: joint damage causing pain and loss of function 60 Tab 5    acetaminophen (TYLENOL) 500 mg tablet Take 1 Tab by mouth every six (6) hours as needed for Pain. 120 Tab 0    pantoprazole (PROTONIX) 40 mg tablet Take 40 mg by mouth daily.  amitriptyline (ELAVIL) 25 mg tablet Take 1 Tab by mouth nightly. Indications: MIGRAINE PREVENTION 90 Tab 1    fluticasone-vilanterol (BREO ELLIPTA) 200-25 mcg/dose inhaler Take 1 Puff by inhalation daily.  docusate sodium (COLACE) 100 mg capsule Take 1 Cap by mouth two (2) times a day. 30 Cap 0    ferrous sulfate 325 mg (65 mg iron) tablet Take 1 Tab by mouth two (2) times daily (with meals). 30 Tab 0    ascorbic acid (VITAMIN C) 250 mg tablet Take 1 Tab by mouth two (2) times daily (with meals). 30 Tab 0    losartan-hydrochlorothiazide (HYZAAR) 100-25 mg per tablet Take 1 Tab by mouth daily. Indications: HYPERTENSION      ibuprofen (MOTRIN) 800 mg tablet Take 1 Tab by mouth every eight (8) hours as needed for Pain. Indications: pain 90 Tab 0    tiaGABine (GABITRIL) 4 mg tablet 1 tab every day x 1 week then 1 tab po bid 60 Tab 1    esomeprazole (NEXIUM) 40 mg capsule   0       Chart and notes reviewed. Data reviewed. I have evaluated and examined the patient.         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  Continue monthly self breast exam  Bilateral 3D screening mammography January 2021  Follow-up after the mammogram.  Clinical breast exam  Please call or visit with any questions or concerns.        Lucien Cloud MD

## 2020-09-16 NOTE — PATIENT INSTRUCTIONS

## 2020-12-21 ENCOUNTER — HOSPITAL ENCOUNTER (OUTPATIENT)
Dept: LAB | Age: 65
Discharge: HOME | End: 2020-12-21
Payer: MEDICARE

## 2020-12-21 ENCOUNTER — LAB ONLY (OUTPATIENT)
Dept: ONCOLOGY | Age: 65
End: 2020-12-21

## 2020-12-21 DIAGNOSIS — D50.8 IRON DEFICIENCY ANEMIA SECONDARY TO INADEQUATE DIETARY IRON INTAKE: ICD-10-CM

## 2020-12-21 DIAGNOSIS — C50.511 MALIGNANT NEOPLASM OF LOWER-OUTER QUADRANT OF RIGHT FEMALE BREAST, UNSPECIFIED ESTROGEN RECEPTOR STATUS (HCC): Primary | ICD-10-CM

## 2020-12-21 DIAGNOSIS — G89.3 CHRONIC PAIN DUE TO NEOPLASM: ICD-10-CM

## 2020-12-21 DIAGNOSIS — C50.511 MALIGNANT NEOPLASM OF LOWER-OUTER QUADRANT OF RIGHT BREAST OF FEMALE, ESTROGEN RECEPTOR POSITIVE (HCC): ICD-10-CM

## 2020-12-21 DIAGNOSIS — Z17.0 MALIGNANT NEOPLASM OF LOWER-OUTER QUADRANT OF RIGHT BREAST OF FEMALE, ESTROGEN RECEPTOR POSITIVE (HCC): ICD-10-CM

## 2020-12-21 DIAGNOSIS — Z17.0 ESTROGEN RECEPTOR POSITIVE: ICD-10-CM

## 2020-12-21 DIAGNOSIS — C50.511 MALIGNANT NEOPLASM OF LOWER-OUTER QUADRANT OF RIGHT FEMALE BREAST, UNSPECIFIED ESTROGEN RECEPTOR STATUS (HCC): ICD-10-CM

## 2020-12-21 LAB
ALBUMIN SERPL-MCNC: 3.8 G/DL (ref 3.4–5)
ALBUMIN/GLOB SERPL: 1 {RATIO} (ref 0.8–1.7)
ALP SERPL-CCNC: 65 U/L (ref 45–117)
ALT SERPL-CCNC: 23 U/L (ref 13–56)
ANION GAP SERPL CALC-SCNC: 3 MMOL/L (ref 3–18)
AST SERPL-CCNC: 25 U/L (ref 10–38)
BASOPHILS # BLD: 0.1 K/UL (ref 0–0.1)
BASOPHILS NFR BLD: 1 % (ref 0–2)
BILIRUB SERPL-MCNC: 0.4 MG/DL (ref 0.2–1)
BUN SERPL-MCNC: 13 MG/DL (ref 7–18)
BUN/CREAT SERPL: 13 (ref 12–20)
CALCIUM SERPL-MCNC: 10.2 MG/DL (ref 8.5–10.1)
CHLORIDE SERPL-SCNC: 106 MMOL/L (ref 100–111)
CO2 SERPL-SCNC: 32 MMOL/L (ref 21–32)
CREAT SERPL-MCNC: 1.02 MG/DL (ref 0.6–1.3)
DIFFERENTIAL METHOD BLD: ABNORMAL
EOSINOPHIL # BLD: 0.2 K/UL (ref 0–0.4)
EOSINOPHIL NFR BLD: 3 % (ref 0–5)
ERYTHROCYTE [DISTWIDTH] IN BLOOD BY AUTOMATED COUNT: 13.3 % (ref 11.6–14.5)
FERRITIN SERPL-MCNC: 69 NG/ML (ref 8–388)
GLOBULIN SER CALC-MCNC: 3.9 G/DL (ref 2–4)
GLUCOSE SERPL-MCNC: 84 MG/DL (ref 74–99)
HCT VFR BLD AUTO: 35.1 % (ref 35–45)
HGB BLD-MCNC: 11.3 G/DL (ref 12–16)
IRON SATN MFR SERPL: 30 % (ref 20–50)
IRON SERPL-MCNC: 96 UG/DL (ref 50–175)
LYMPHOCYTES # BLD: 2.6 K/UL (ref 0.9–3.6)
LYMPHOCYTES NFR BLD: 39 % (ref 21–52)
MCH RBC QN AUTO: 27.3 PG (ref 24–34)
MCHC RBC AUTO-ENTMCNC: 32.2 G/DL (ref 31–37)
MCV RBC AUTO: 84.8 FL (ref 74–97)
MONOCYTES # BLD: 0.3 K/UL (ref 0.05–1.2)
MONOCYTES NFR BLD: 5 % (ref 3–10)
NEUTS SEG # BLD: 3.6 K/UL (ref 1.8–8)
NEUTS SEG NFR BLD: 52 % (ref 40–73)
PLATELET # BLD AUTO: 257 K/UL (ref 135–420)
PMV BLD AUTO: 10.9 FL (ref 9.2–11.8)
POTASSIUM SERPL-SCNC: 3.4 MMOL/L (ref 3.5–5.5)
PROT SERPL-MCNC: 7.7 G/DL (ref 6.4–8.2)
RBC # BLD AUTO: 4.14 M/UL (ref 4.2–5.3)
SODIUM SERPL-SCNC: 141 MMOL/L (ref 136–145)
TIBC SERPL-MCNC: 320 UG/DL (ref 250–450)
WBC # BLD AUTO: 6.7 K/UL (ref 4.6–13.2)

## 2020-12-21 PROCEDURE — 80053 COMPREHEN METABOLIC PANEL: CPT

## 2020-12-21 PROCEDURE — 85025 COMPLETE CBC W/AUTO DIFF WBC: CPT

## 2020-12-21 PROCEDURE — 83540 ASSAY OF IRON: CPT

## 2020-12-21 PROCEDURE — 86300 IMMUNOASSAY TUMOR CA 15-3: CPT

## 2020-12-21 PROCEDURE — 82728 ASSAY OF FERRITIN: CPT

## 2020-12-23 LAB — CANCER AG27-29 SERPL-ACNC: 28.1 U/ML (ref 0–38.6)

## 2021-01-06 ENCOUNTER — OFFICE VISIT (OUTPATIENT)
Dept: ONCOLOGY | Age: 66
End: 2021-01-06
Payer: MEDICARE

## 2021-01-06 VITALS
HEIGHT: 67 IN | RESPIRATION RATE: 18 BRPM | OXYGEN SATURATION: 99 % | HEART RATE: 84 BPM | BODY MASS INDEX: 27.5 KG/M2 | DIASTOLIC BLOOD PRESSURE: 75 MMHG | WEIGHT: 175.2 LBS | SYSTOLIC BLOOD PRESSURE: 112 MMHG

## 2021-01-06 DIAGNOSIS — G89.3 CHRONIC PAIN DUE TO NEOPLASM: ICD-10-CM

## 2021-01-06 DIAGNOSIS — C50.511 MALIGNANT NEOPLASM OF LOWER-OUTER QUADRANT OF RIGHT FEMALE BREAST, UNSPECIFIED ESTROGEN RECEPTOR STATUS (HCC): Primary | ICD-10-CM

## 2021-01-06 DIAGNOSIS — E55.9 VITAMIN D DEFICIENCY: ICD-10-CM

## 2021-01-06 DIAGNOSIS — G89.3 CANCER ASSOCIATED PAIN: ICD-10-CM

## 2021-01-06 DIAGNOSIS — Z17.0 ESTROGEN RECEPTOR POSITIVE: ICD-10-CM

## 2021-01-06 DIAGNOSIS — D50.8 IRON DEFICIENCY ANEMIA SECONDARY TO INADEQUATE DIETARY IRON INTAKE: ICD-10-CM

## 2021-01-06 PROCEDURE — G8752 SYS BP LESS 140: HCPCS | Performed by: NURSE PRACTITIONER

## 2021-01-06 PROCEDURE — G8419 CALC BMI OUT NRM PARAM NOF/U: HCPCS | Performed by: NURSE PRACTITIONER

## 2021-01-06 PROCEDURE — G9899 SCRN MAM PERF RSLTS DOC: HCPCS | Performed by: NURSE PRACTITIONER

## 2021-01-06 PROCEDURE — G0463 HOSPITAL OUTPT CLINIC VISIT: HCPCS | Performed by: NURSE PRACTITIONER

## 2021-01-06 PROCEDURE — G8754 DIAS BP LESS 90: HCPCS | Performed by: NURSE PRACTITIONER

## 2021-01-06 PROCEDURE — 3017F COLORECTAL CA SCREEN DOC REV: CPT | Performed by: NURSE PRACTITIONER

## 2021-01-06 PROCEDURE — G8432 DEP SCR NOT DOC, RNG: HCPCS | Performed by: NURSE PRACTITIONER

## 2021-01-06 PROCEDURE — 99214 OFFICE O/P EST MOD 30 MIN: CPT | Performed by: NURSE PRACTITIONER

## 2021-01-06 PROCEDURE — G8427 DOCREV CUR MEDS BY ELIG CLIN: HCPCS | Performed by: NURSE PRACTITIONER

## 2021-01-06 NOTE — LETTER
1/6/2021 Patient: Neil Prior YOB: 1955 Date of Visit: 1/6/2021 Antwon Ruiz MD 
[place] [id number] [address] [address] Via Fax: [phone] Dear Antwon Ruiz MD, Thank you for referring Ms. Ortiz Aguirre to [facility] for evaluation. My notes for this consultation are attached. If you have questions, please do not hesitate to call me. I look forward to following your patient along with you. Sincerely, Cony Juarez NP

## 2021-01-06 NOTE — PATIENT INSTRUCTIONS
Learning About High-Potassium Foods  What foods are high in potassium? The foods you eat contain nutrients, such as vitamins and minerals. Potassium is a nutrient. Your body needs the right amount to stay healthy and work as it should. You can use the list below to help you make choices about which foods to eat. Foods are high in potassium if they have more than 200 mg per serving. Fruits  · Apricots, 2 raw  · Avocado, ½ fruit  · Banana, 1 medium  · Aydin, 1 fruit  · Nectarine, 1 fruit  · Orange, 1 fruit  · Prunes, 5 fruits  · Raisins, ¼ cup  Vegetables  · Artichoke, 1 medium  · Beets, ½ cup  · Broccoli, ½ cup  · Kale (raw), 1 cup  · Potato with skin, 1 medium  · Spinach, ½ cup  · Sweet potato, 1 medium  · Tomato sauce, ½ cup  · Zucchini, ½ cup  Dairy and dairy alternatives  · Milk, 1 cup  · Soy milk, 1 cup  · Yogurt, 6 oz  Meats and other protein foods  · Beans (lima, navy, white), ½ cup  · Beef, ground, 3 oz  · Chicken, 3 oz  · Fish (halibut, tuna, cod, snapper), 3 oz  · Nuts (almonds, hazelnuts, [place], cashew, pistachios), 1 oz  · Peanut butter, 2 Tbsp  · Peanuts, 1 oz  · [place] 3 oz  Seasonings  · Salt substitutes  Work with your doctor to find out how much of this nutrient you need. Depending on your health, you may need more or less of it in your diet. Where can you learn more? Go to http://www.gray.com/  Enter P450 in the search box to learn more about \"Learning About High-Potassium Foods. \"  Current as of: August 22, 2019               Content Version: 12.6  © 0088-2202 [employer]. Care instructions adapted under license by [employer] (which disclaims liability or warranty for this information). If you have questions about a medical condition or this instruction, always ask your healthcare professional. [facility] any warranty or liability for your use of this information.          Iron-Rich Diet: Care Instructions  Your Care Instructions     Your body needs iron to make hemoglobin. Hemoglobin is a substance in red blood cells that carries oxygen from the lungs to cells all through your body. If you do not get enough iron, your body makes fewer and smaller red blood cells. As a result, your body's cells may not get enough oxygen. Adult men need 8 milligrams of iron a day; adult women need 18 milligrams of iron a day. After menopause, women need 8 milligrams of iron a day. A pregnant woman needs 27 milligrams of iron a day. Infants and young children have higher iron needs relative to their size than other age groups. People who have lost blood because of ulcers or heavy menstrual periods may become very low in iron and may develop anemia. Most people can get the iron their bodies need by eating enough of certain iron-rich foods. Your doctor may recommend that you take an iron supplement along with eating an iron-rich diet. Follow-up care is a key part of your treatment and safety. Be sure to make and go to all appointments, and call your doctor if you are having problems. It's also a good idea to know your test results and keep a list of the medicines you take. How can you care for yourself at home? · Make iron-rich foods a part of your daily diet. Iron-rich foods include:  ? All meats, such as chicken, beef, lamb, pork, fish, and shellfish. Liver is especially high in iron. ? Leafy green vegetables. ? Raisins, peas, beans, lentils, barley, and eggs. ? Iron-fortified breakfast cereals. · Eat foods with vitamin C along with iron-rich foods. Vitamin C helps you absorb more iron from food. Drink a glass of orange juice or another citrus juice with your food. · Eat meat and vegetables or grains together. The iron in meat helps your body absorb the iron in other foods. Where can you learn more?   Go to http://www.gray.com/  Enter Z290 in the search box to learn more about \"Iron-Rich Diet: Care Instructions. \"  Current as of: August 22, 2019               Content Version: 12.6  © 2006-2020 [employer]. Care instructions adapted under license by [employer] (which disclaims liability or warranty for this information). If you have questions about a medical condition or this instruction, always ask your healthcare professional. [address] any warranty or liability for your use of this information. Learning About High-Iron Foods  What foods are high in iron? The foods you eat contain nutrients, such as vitamins and minerals. Iron is a nutrient. Your body needs the right amount to stay healthy and work as it should. You can use the list below to help you make choices about which foods to eat. Here are some foods that contain iron. They have 1 to 2 milligrams of iron per serving. Fruits  · Figs (dried), 5 figs  Vegetables  · Asparagus (canned), 6 walker  · [place], beet, Swiss chard, or turnip greens, 1 cup  · Dried peas, cooked, ½ cup  · Seaweed, spirulina (dried), ¼ cup  · Spinach, (cooked) ½ cup or (raw) 1 cup  Grains  · Cereals, fortified with iron, 1 cup  · Grits (instant, cooked), fortified with iron, ½ cup  Meats and other protein foods  · Beans (kidney, lima, navy, white), canned or cooked, ½ cup  · Beef or lamb, 3 oz  · Chicken giblets, 3 oz  · Chickpeas (garbanzo beans), ½ cup  · Liver of beef, lamb, or pork, 3 oz  · Oysters (cooked), 3 oz  · Sardines (canned), 3 oz  · Soybeans (boiled), ½ cup  · Tofu (firm), ½ cup  Work with your doctor to find out how much of this nutrient you need. Depending on your health, you may need more or less of it in your diet. Current as of: August 22, 2019               Content Version: 12.6  © 2006-2020 [employer]. Care instructions adapted under license by [employer] (which disclaims liability or warranty for this information).  If you have questions about a medical condition or this instruction, always ask your healthcare professional. [address] any warranty or liability for your use of this information. Letrozole (By mouth)   Letrozole (LET-ezequiel-zole)  Treats breast cancer. Brand Name(s): Polina Jimfrench Polina Co-Pack   There may be other brand names for this medicine. When This Medicine Should Not Be Used: This medicine is not right for everyone. Do not use it if you had an allergic reaction to letrozole, or if you are pregnant. How to Use This Medicine:   Tablet  · Your doctor will tell you how much medicine to use. Do not use more than directed. · Missed dose: This medicine needs to be given on a fixed schedule. If you miss a dose, call your doctor for instructions. · Store the medicine in a closed container at room temperature, away from heat, moisture, and direct light. Drugs and Foods to Avoid:   Ask your doctor or pharmacist before using any other medicine, including over-the-counter medicines, vitamins, and herbal products. · Some medicines can affect how letrozole works. Tell your doctor if you are also using tamoxifen. Warnings While Using This Medicine:   · It is not safe to take this medicine during pregnancy. It could harm an unborn baby. Tell your doctor right away if you become pregnant. Use an effective form of birth control during treatment with this medicine and for at least 3 weeks after the last dose. · Do not breastfeed while you are taking this medicine and for at least 3 weeks after your last dose. · Tell your doctor if you have liver disease (including cirrhosis), bone problems (including osteoporosis), or high cholesterol in the blood. · This medicine may cause the following problems:  ¨ Low bone mineral density  ¨ High cholesterol or fat levels in the blood  ¨ Liver problems  · This medicine may make you dizzy, drowsy, or tired.  Do not drive or do anything else that could be dangerous until you know how this medicine affects you.  · This medicine could cause infertility. Talk with your doctor before using this medicine if you plan to have children. · Medicines used to treat cancer are very strong and can have many side effects. Before receiving this medicine, make sure you understand all the risks and benefits. It is important for you to work closely with your doctor during your treatment. · Your doctor will do lab tests at regular visits to check on the effects of this medicine. Keep all appointments. · Keep all medicine out of the reach of children. Never share your medicine with anyone. Possible Side Effects While Using This Medicine:   Call your doctor right away if you notice any of these side effects:  · Allergic reaction: Itching or hives, swelling in your face or hands, swelling or tingling in your mouth or throat, chest tightness, trouble breathing  · Bone pain  · Chest pain, trouble breathing, coughing up blood  · Dark urine, pale stools, nausea, vomiting, loss of appetite, stomach pain, yellow skin or eyes  · Numbness or weakness on one side of your body, sudden or severe headache, problems with vision, speech, or walking  · Pain in your lower leg (calf)  · Swelling in your ankles or feet  · Unusual bleeding or bruising  · Unusual tiredness or weakness  If you notice these less serious side effects, talk with your doctor:   · Breast pain  · Diarrhea, constipation, stomach pain  · Headache  · Increased sweating  · Mild joint, back, or muscle pain  · Trouble sleeping  · Vaginal bleeding  · Warmth or redness in your face, neck, arms, or upper chest  · Weight gain or loss  If you notice other side effects that you think are caused by this medicine, tell your doctor. Call your doctor for medical advice about side effects.  You may report side effects to FDA at 1-369-FDA-9121  © 2017 [address] [employer] Information is for End User's use only and may not be sold, redistributed or otherwise used for commercial purposes. The above information is an  only. It is not intended as medical advice for individual conditions or treatments. Talk to your doctor, nurse or pharmacist before following any medical regimen to see if it is safe and effective for you. Breast Cancer: Care Instructions  Your Care Instructions     Breast cancer occurs when abnormal cells grow out of control in the breast. These cancer cells can spread within the breast, to nearby lymph nodes and other tissues, and to other parts of the body. Being treated for cancer can weaken your body, and you may feel very tired. Get the rest your body needs so you can feel better. Finding out that you have cancer is scary. You may feel many emotions and may need some help coping. Seek out family, friends, and counselors for support. You also can do things at home to make yourself feel better while you go through treatment. Call the [employer] ([phone]) or visit its website at 7973 "2,10E+07" for more information. Follow-up care is a key part of your treatment and safety. Be sure to make and go to all appointments, and call your doctor if you are having problems. It's also a good idea to know your test results and keep a list of the medicines you take. How can you care for yourself at home? · Take your medicines exactly as prescribed. Call your doctor if you think you are having a problem with your medicine. You may get medicine for nausea and vomiting if you have these side effects. · Follow your doctor's instructions to relieve pain. Pain from cancer and surgery can almost always be controlled. Use pain medicine when you first notice pain, before it becomes severe. · Eat healthy food. If you do not feel like eating, try to eat food that has protein and extra calories to keep up your strength and prevent weight loss. Drink liquid meal replacements for extra calories and protein. Try to eat your main meal early.   · Get some physical activity every day, but do not get too tired. Keep doing the hobbies you enjoy as your energy allows. · Do not smoke. Smoking can make your cancer worse. If you need help quitting, talk to your doctor about stop-smoking programs and medicines. These can increase your chances of quitting for good. · Take steps to control your stress and workload. Learn relaxation techniques. ? Share your feelings. Stress and tension affect our emotions. By expressing your feelings to others, you may be able to understand and cope with them. ? Consider joining a support group. Talking about a problem with your spouse, a good friend, or other people with similar problems is a good way to reduce tension and stress. ? Express yourself through art. Try writing, crafts, dance, or art to relieve stress. Some dance, writing, or art groups may be available just for people who have cancer. ? Be kind to your body and mind. Getting enough sleep, eating a healthy diet, and taking time to do things you enjoy can contribute to an overall feeling of balance in your life and can help reduce stress. ? Get help if you need it. Discuss your concerns with your doctor or counselor. · If you are vomiting or have diarrhea:  ? Drink plenty of fluids (enough so that your urine is light yellow or clear like water) to prevent dehydration. Choose water and other caffeine-free clear liquids. If you have kidney, heart, or liver disease and have to limit fluids, talk with your doctor before you increase the amount of fluids you drink. ? When you are able to eat, try clear soups, mild foods, and liquids until all symptoms are gone for 12 to 48 hours. Other good choices include dry toast, crackers, cooked cereal, and gelatin dessert, such as Jell-O.  · If you have not already done so, prepare a list of advance directives.  Advance directives are instructions to your doctor and family members about what kind of care you want if you become unable to speak or express yourself. When should you call for help? Call 911 anytime you think you may need emergency care. For example, call if:    · You passed out (lost consciousness). Call your doctor now or seek immediate medical care if:    · You have a fever.     · You have abnormal bleeding.     · You think you have an infection.     · You have new or worse pain.     · You have new symptoms, such as a cough, belly pain, vomiting, diarrhea, or a rash. Watch closely for changes in your health, and be sure to contact your doctor if:    · You are much more tired than usual.     · You have swollen glands in your armpits, groin, or neck.     · You do not get better as expected. Where can you learn more? Go to http://www.castle.com/  Enter V321 in the search box to learn more about \"Breast Cancer: Care Instructions. \"  Current as of: April 29, 2020               Content Version: 12.6  © 2006-2020 [employer]. Care instructions adapted under license by [employer] (which disclaims liability or warranty for this information). If you have questions about a medical condition or this instruction, always ask your healthcare professional. [address] any warranty or liability for your use of this information.

## 2021-01-06 NOTE — PROGRESS NOTES
Hematology/Oncology  Progress Note    Name: Tony Escobedo  : 1955    PCP: Christina Santiago MD     Ms. Tati Paris is a 63 y.o. -American woman with invasive ductal carcinoma the right breast.    Current therapy: Femara 2.5mg PO daily since       Subjective:     Mrs. Tati Paris is a 22-year-old -American woman with invasive ductal carcinoma involving the right breast. She was diagnosed in 2018. She reports she continues to tolerate the Femara without significant side effects. She states she is doing her breast self exam on a monthly basis and has no new physical complaints or concerns to report. She denies any nipple discharge or any other breast issues. Denied fever, chills, night sweat, unintentional weight loss, skin lumps or bumps, acute bleeding or bruising issues. Denied headache, acute vision change, dizziness, chest pain, worsen shortness of breath, palpitation, productive cough, nausea, vomiting, abdominal pain, altered bowel habits, dysuria, new bone pain or back pain, focal numbness or weakness. Past medical history, family history, and social history: these were reviewed and remains unchanged.     Past Medical History:   Diagnosis Date    Arthritis     Balance problems     patient states no further issues    Breast CA ([facility] [address] 2018    right    Bronchitis     Chronic pain     back    Fibromyalgia 2012    GERD (gastroesophageal reflux disease)     Hiatal Hernia    Hypercholesterolemia     Hypertension     Kidney stones     Low back pain     Lumbar spinal stenosis 2012    Migraine headache     Nervousness     Neurological disease     Osteoarthritis 2012    Other ill-defined conditions(799.89)     neuropathy    Peripheral neuropathy 2012    Post laminectomy syndrome     Postoperative anemia due to acute blood loss 2012    Ringing in ears     Rotator cuff syndrome     Spinal stenosis     Thoracic spine pain     Vitamin D deficiency 11/30/2012     Past Surgical History:   Procedure Laterality Date    HX MASTECTOMY Right 5/4/2018    NEEDLE LOCALIZED (1000) LUMPECTOMY RIGHT BREAST AND SENTINEL LYMPH NODE BIOPSY RIGHT AXILLA performed by Talya Benitez MD at [address] [place] [address] HX ORTHOPAEDIC      foot l heel spur    HX ORTHOPAEDIC      right knee sx    HX OTHER SURGICAL      non cancer cyst removed from right shoulder    NEUROLOGICAL PROCEDURE UNLISTED      Bilateral L3-4 hemilaminotomy and medial facetectomy by Dr. Madhavi Paz    [place] BX/REMV,LYMPH NODE,DEEP AXILL Right 05/04/2018    Dr. Trevin Merchant History     Socioeconomic History    Marital status: SINGLE     Spouse name: Not on file    Number of children: Not on file    Years of education: Not on file    Highest education level: Not on file   Occupational History    Not on file   Social Needs    Financial resource strain: Not on file    Food insecurity     Worry: Not on file     Inability: Not on file    Transportation needs     Medical: Not on file     Non-medical: Not on file   Tobacco Use    Smoking status: Never Smoker    Smokeless tobacco: Never Used   Substance and Sexual Activity    Alcohol use: No    Drug use: No    Sexual activity: Not on file   Lifestyle    Physical activity     Days per week: Not on file     Minutes per session: Not on file    Stress: Not on file   Relationships    Social connections     Talks on phone: Not on file     Gets together: Not on file     Attends Islam service: Not on file     Active member of club or organization: Not on file     Attends meetings of clubs or organizations: Not on file     Relationship status: Not on file    Intimate partner violence     Fear of current or ex partner: Not on file     Emotionally abused: Not on file     Physically abused: Not on file     Forced sexual activity: Not on file   Other Topics Concern    Not on file   Social History Narrative    Not on file     Family History   Problem Relation Age of Onset    Hypertension Mother     Arthritis-osteo Mother     Neuropathy Mother     Hypertension Father     Cancer Father         colon    Hypertension Brother     Cancer Brother     Neuropathy Brother     Neuropathy Other     Arthritis-rheumatoid Other     GERD Other     Diabetes Other     Diabetes Maternal Aunt     Diabetes Maternal Uncle     Hypertension Maternal Grandfather      Current Outpatient Medications   Medication Sig Dispense Refill    gabapentin (Neurontin) 400 mg capsule Take 1 Cap by mouth three (3) times daily. Max Daily Amount: 1,200 mg. Indications: neuropathic pain 90 Cap 0    ergocalciferol (ERGOCALCIFEROL) 1,250 mcg (50,000 unit) capsule Take 1 Cap by mouth every seven (7) days. 12 Cap 0    letrozole (FEMARA) 2.5 mg tablet Take 1 Tab by mouth daily. 90 Tab 3    albuterol (ACCUNEB) 0.63 mg/3 mL nebulizer solution 0.63 mg by Nebulization route every six (6) hours as needed for Wheezing.  diclofenac EC (VOLTAREN) 75 mg EC tablet Take 1 Tab by mouth two (2) times daily as needed for Pain. Indications: joint damage causing pain and loss of function 60 Tab 5    acetaminophen (TYLENOL) 500 mg tablet Take 1 Tab by mouth every six (6) hours as needed for Pain. 120 Tab 0    esomeprazole (NEXIUM) 40 mg capsule   0    amitriptyline (ELAVIL) 25 mg tablet Take 1 Tab by mouth nightly. Indications: MIGRAINE PREVENTION 90 Tab 1    docusate sodium (COLACE) 100 mg capsule Take 1 Cap by mouth two (2) times a day. 30 Cap 0    ascorbic acid (VITAMIN C) 250 mg tablet Take 1 Tab by mouth two (2) times daily (with meals). 30 Tab 0    losartan-hydrochlorothiazide (HYZAAR) 100-25 mg per tablet Take 1 Tab by mouth daily. Indications: HYPERTENSION      atorvastatin (LIPITOR) 10 mg tablet take 1 tablet by mouth at bedtime      atorvastatin calcium (ATORVASTATIN PO) Take  by mouth.       ibuprofen (MOTRIN) 800 mg tablet Take 1 Tab by mouth every eight (8) hours as needed for Pain. Indications: pain 90 Tab 0    gabapentin (NEURONTIN) 300 mg capsule Take 1 Cap by mouth three (3) times daily. Indications: neuropathic pain, sensory sensitivity 90 Cap 5    tiaGABine (GABITRIL) 4 mg tablet 1 tab every day x 1 week then 1 tab po bid 60 Tab 1    pantoprazole (PROTONIX) 40 mg tablet Take 40 mg by mouth daily.  fluticasone-vilanterol (BREO ELLIPTA) 200-25 mcg/dose inhaler Take 1 Puff by inhalation daily.  ferrous sulfate 325 mg (65 mg iron) tablet Take 1 Tab by mouth two (2) times daily (with meals). 30 Tab 0       Review of Systems   Constitutional: Negative for chills, diaphoresis, fever, malaise/fatigue and weight loss. Respiratory: Negative for cough, hemoptysis, shortness of breath and wheezing. Cardiovascular: Negative for chest pain, palpitations and leg swelling. Gastrointestinal: Negative for abdominal pain, diarrhea, heartburn, nausea and vomiting. Genitourinary: Negative for dysuria, frequency, hematuria and urgency. Musculoskeletal: Negative for joint pain and myalgias. Skin: Negative for itching and rash. Neurological: Negative for dizziness, seizures, weakness and headaches. Psychiatric/Behavioral: Negative for depression. The patient does not have insomnia. Objective:     Visit Vitals  /75   Pulse 84   Resp 18   Ht 5' 7\" (1.702 m)   Wt 79.5 kg (175 lb 3.2 oz)   SpO2 99%   BMI 27.44 kg/m²       ECOG Performance Status (grade): 0  0 - able to carry on all pre-disease activity w/out restriction  1 - restricted but able to carry out light work  2 - ambulatory and can self- care but unable to carry out work  3 - bed or chair >50% of waking hours  4 - completely disable, total care, confined to bed or chair    Physical Exam  Constitutional:       Appearance: Normal appearance. HENT:      Head: Normocephalic and atraumatic. Eyes:      Pupils: Pupils are equal, round, and reactive to light.    Neck: Musculoskeletal: Neck supple. Cardiovascular:      Rate and Rhythm: Normal rate and regular rhythm. Heart sounds: Normal heart sounds. Pulmonary:      Effort: Pulmonary effort is normal.      Breath sounds: Normal breath sounds. Chest:      Comments: Left: No dimpling, discoloration, nipple inversion or retractions. No axillary or supraclavicular lymphadenopathy. No mass   Right: No dimpling, discoloration. No axillary or supraclavicular lymphadenopathy. No mass  Abdominal:      General: Bowel sounds are normal.      Palpations: Abdomen is soft. Tenderness: There is no abdominal tenderness. There is no guarding. Musculoskeletal: Normal range of motion. Right lower leg: No edema. Left lower leg: No edema. Skin:     General: Skin is warm. Neurological:      General: No focal deficit present. Mental Status: She is alert and oriented to person, place, and time. Mental status is at baseline. Diagnostics:      No results found for this or any previous visit (from the past 96 hour(s)). Imaging:  No results found for this or any previous visit. Results for orders placed during the hospital encounter of 06/06/19   XR CHEST PA LAT    Narrative EXAM: Chest Radiographs    INDICATION: Shortness of breath    TECHNIQUE: PA and lateral views of the chest    COMPARISON: 5/1/2018, 11/6/2012 and 3/11/2010    FINDINGS: No pneumothorax identified. The lungs are hyperinflated no acute  infiltrates appreciated. . No effusions appreciated. The cardiomediastinal  silhouette is unremarkable. The pulmonary vascularity is unremarkable. The  osseous structures are unremarkable. Impression IMPRESSION:  1. No acute infiltrate or effusion. 2.  Mildly hyperinflated lungs suggestive of COPD. Results for orders placed during the hospital encounter of 04/05/18   CT CHEST ABD PELV W CONT    Narrative HISTORY: Recent diagnosis of breast carcinoma.     EXAM: CT chest, abdomen and pelvis. TECHNIQUE: Spiral images of the chest abdomen and pelvis were performed  according to routine protocol from thoracic inlet to the ischial tuberosities  after administration of 100 cc of Isovue-300 contrast media intravenously. Coronal and sagittal reconstructions were provided for evaluation. Oral contrast  was given. No prior studies for comparison. All CT scans at this facility are performed using dose optimization technique as  appropriate to a performed exam, to include automated exposure control,  adjustment of the mA and/or kV according to patient size (including appropriate  matching for site-specific examinations), or use of iterative reconstruction  technique. FINDINGS:    CT THORAX:    There is no pneumothorax, pneumonia or pleural effusions are seen. Trachea bronchial trees unremarkable. Heart is normal in size without pericardial effusions. Vascular pedicle is unremarkable. No enhancing mass lesion or pathologic lymphadenopathy is noted. Nonspecific bean shaped fatty hilum lymph nodes are noted in bilateral axillary  regions. No lytic or blastic lesions. CT ABDOMEN:    Liver is unremarkable. Portal vein is patent and unremarkable. Hepatic outflow is unremarkable. Gallbladder is unremarkable. Spleen is unremarkable. Pancreas is unremarkable. Adrenal glands are unremarkable bilaterally. Kidneys are unremarkable. No small or large bowel obstruction is seen. No ascites or pneumoperitoneum. Abdominal aorta and IVC are unremarkable. No enhancing mass lesion or pathologic lymphadenopathy seen. No lytic or blastic lesions. CT PELVIS:    Urinary bladder is unremarkable. Uterus is atrophic but unremarkable. Rectus sigmoid region is unremarkable. No free pelvic fluid is seen. No enhancing mass lesion or pathologic lymphadenopathy. No lytic or blastic lesions. Impression IMPRESSION:    1.  Unremarkable study of the chest abdomen and pelvis. Assessment:     1. Malignant neoplasm of lower-outer quadrant of right female breast, unspecified estrogen receptor status ([facility] [address]    2. Estrogen receptor positive    3. Chronic pain due to neoplasm    4. Iron deficiency anemia secondary to inadequate dietary iron intake    5. Vitamin D deficiency    6. Cancer associated pain      Plan:      Invasive ductal carcinoma the right breast  ---- Clinically the patient is doing well and has no evidence of disease recurrence. -- Recent mammogram on 01/10/2020 shows no evidence of malignancy. In absence of concerning physical findings, recommend annual follow-up. Plan:  -- The patient was instructed to continue doing her breast exams on a monthly basis. -- The patient was advised to notify us if any new breast pain, new palpable nodule, nipple inversion, spontaneous nipple discharge especially if bloody, breast skin changes, unintentional weight loss, worsen fatigue, new bone pain or back pain, or any concerns. -- I have advised her to follow up her PCP to continue age-appropriate cancer screening. -- I have educated her regarding lifestyle modifications, healthy diet, and physical activity. -- Her next mammogram will be 1/2021  -- We will continue to monitor CBC, chemistry, CA 27-29. Long term use of aromatase inhibitor:   -- The patient currently taking Femara daily. She states she is tolerating the medication well and this will be continued. --She report that she is up to date with her DEXA scan which was done by her rheumatologist .  + Advised on adequate amounts of calcium (at least 1,200 mg per day) and vitamin D (800-1,000 IU per day). The higher dose of vitamin D may be needed if vitamin D deficiency.  + Recommend regular muscle-strengthening exercise to reduce the risk of falls and fractures. Chronic breast pain s.p surgery and XRT   -- Ultram 50 mg to take every 8 hours as needed.    -- Instructed the patient to have a follow-up with pain management clinic for long-term pain control. Vitamin D Deficiency:   -- She will f/u with PCP for continuity management. Chronic anemia, Hx of Iron Deficiency Anemia:   -- We will continue to monitor CBC.  --CBC, iron and ferritin reviewed with patient today. .      -- We will see the patient back in clinic in about 4 months. Always sooner if required. The patient can have lab done prior to our next clinic visit. No orders of the defined types were placed in this encounter. Ms. Chiqui Arroyo has a reminder for a \"due or due soon\" health maintenance. I have asked that she contact her primary care provider for follow-up on this health maintenance. All of patient's questions answered to their apparent satisfaction. They verbally show understanding and agreement with aforementioned plan. Case Barros NP  1/6/2021          About 25 minutes were spent for this encounter with more than 50% of the time spent in face-to-face counseling, discussing on diagnosis and management plan going forward, and co-ordination of care. Parts of this document has been produced using Dragon dictation system. Unrecognized errors in transcription may be present. Please do not hesitate to reach out for any questions or clarifications.       CC: Rj Corcoran MD

## 2021-01-13 ENCOUNTER — OFFICE VISIT (OUTPATIENT)
Dept: ORTHOPEDIC SURGERY | Age: 66
End: 2021-01-13
Payer: MEDICARE

## 2021-01-13 VITALS
WEIGHT: 174.2 LBS | HEART RATE: 85 BPM | SYSTOLIC BLOOD PRESSURE: 116 MMHG | DIASTOLIC BLOOD PRESSURE: 79 MMHG | TEMPERATURE: 97.9 F | BODY MASS INDEX: 27.28 KG/M2

## 2021-01-13 DIAGNOSIS — M48.062 SPINAL STENOSIS OF LUMBAR REGION WITH NEUROGENIC CLAUDICATION: ICD-10-CM

## 2021-01-13 DIAGNOSIS — M51.36 DDD (DEGENERATIVE DISC DISEASE), LUMBAR: ICD-10-CM

## 2021-01-13 DIAGNOSIS — M47.899 FACET SYNDROME: ICD-10-CM

## 2021-01-13 PROCEDURE — 99214 OFFICE O/P EST MOD 30 MIN: CPT | Performed by: PHYSICAL MEDICINE & REHABILITATION

## 2021-01-13 RX ORDER — GABAPENTIN 400 MG/1
400 CAPSULE ORAL 3 TIMES DAILY
Qty: 90 CAP | Refills: 3 | Status: SHIPPED | OUTPATIENT
Start: 2021-01-13 | End: 2021-04-22 | Stop reason: SDUPTHER

## 2021-01-13 NOTE — PROGRESS NOTES
Chief complaint/History of Present Illness:  Back pain  HPI  Arnoldo Adam is a  63 y.o.  female      HISTORY OF PRESENT ILLNESS:  This is a patient of Tiny Allen NP who comes in today for followup of his chronic low back pain with bilateral lower extremity pain down to her feet. She gets numbness and tingling. She states her back pain is off and on. It is her legs that give her the most problems. Currently pain is a 5/10. She only takes the Diclofenac 75 mg as needed. She was recently increased to Gabapentin 400mg TID which is working well. We tried to switch her down to Gabitril; however, she could not tolerate it. She states it made her feel weird. So, the Gabapentin, although it does not give her complete relief, it does help. She reports she is no longer taking Elavil 25 mg for migraines. She is retired. She is a nonsmoker. She states her breast cancer is under control. PHYSICAL EXAM:  Ms. Kiley Cosby is a 73-year-old female. She is alert and oriented. Blood pressure 116/79, pulse 85, temperature 97.9 °F (36.6 °C), temperature source Temporal, weight 174 lb 3.2 oz (79 kg). She has a normal mood and affect. She has a full weightbearing, non-antalgic gait using a single prong cane. She has 4/5 strength of the bilateral lower extremities and negative straight leg raise. She has pain with hyperextension of the lumbar spine. ASSESSMENT/PLAN:  This is a patient who has had an L3-4 laminectomy in 2012. She has back pain and bilateral leg pain due to facet syndrome and spinal stenosis. I have refilled her Gabapentin 400mg TID. We will see her back in three months or sooner if needed.         Review of systems:    Past Medical History:   Diagnosis Date    Arthritis     Balance problems     patient states no further issues    Breast CA ([address] 03/2018    right    Bronchitis     Chronic pain     back    Fibromyalgia 11/29/2012    GERD (gastroesophageal reflux disease)     Hiatal Hernia  Hypercholesterolemia     Hypertension     Kidney stones     Low back pain     Lumbar spinal stenosis 11/25/2012    Migraine headache     Nervousness     Neurological disease     Osteoarthritis 11/25/2012    Other ill-defined conditions(799.89)     neuropathy    Peripheral neuropathy 11/29/2012    Post laminectomy syndrome     Postoperative anemia due to acute blood loss 11/30/2012    Ringing in ears     Rotator cuff syndrome     Spinal stenosis     Thoracic spine pain     Vitamin D deficiency 11/30/2012     Past Surgical History:   Procedure Laterality Date    HX MASTECTOMY Right 5/4/2018    NEEDLE LOCALIZED (1000) LUMPECTOMY RIGHT BREAST AND SENTINEL LYMPH NODE BIOPSY RIGHT AXILLA performed by Kamaljit Escobedo MD at [address] [place] [address] HX ORTHOPAEDIC      foot l heel spur    HX ORTHOPAEDIC      right knee sx    HX OTHER SURGICAL      non cancer cyst removed from right shoulder    NEUROLOGICAL PROCEDURE UNLISTED      Bilateral L3-4 hemilaminotomy and medial facetectomy by Dr. Estela Grimaldo BX/REMV,LYMPH NODE,DEEP AXILL Right 05/04/2018    Dr. Dain Devine History     Socioeconomic History    Marital status: SINGLE     Spouse name: Not on file    Number of children: Not on file    Years of education: Not on file    Highest education level: Not on file   Occupational History    Not on file   Social Needs    Financial resource strain: Not on file    Food insecurity     Worry: Not on file     Inability: Not on file    Transportation needs     Medical: Not on file     Non-medical: Not on file   Tobacco Use    Smoking status: Never Smoker    Smokeless tobacco: Never Used   Substance and Sexual Activity    Alcohol use: No    Drug use: No    Sexual activity: Not on file   Lifestyle    Physical activity     Days per week: Not on file     Minutes per session: Not on file    Stress: Not on file   Relationships    Social connections     Talks on phone: Not on file     Gets together: Not on file     Attends Jew service: Not on file     Active member of club or organization: Not on file     Attends meetings of clubs or organizations: Not on file     Relationship status: Not on file    Intimate partner violence     Fear of current or ex partner: Not on file     Emotionally abused: Not on file     Physically abused: Not on file     Forced sexual activity: Not on file   Other Topics Concern    Not on file   Social History Narrative    Not on file     Family History   Problem Relation Age of Onset    Hypertension Mother     Arthritis-osteo Mother     Neuropathy Mother     Hypertension Father     Cancer Father         colon    Hypertension Brother     Cancer Brother     Neuropathy Brother     Neuropathy Other     Arthritis-rheumatoid Other     GERD Other     Diabetes Other     Diabetes Maternal Aunt     Diabetes Maternal Uncle     Hypertension Maternal Grandfather        Physical Exam:  Visit Vitals  /79 (BP 1 Location: Left arm, BP Patient Position: Sitting)   Pulse 85   Temp 97.9 °F (36.6 °C) (Temporal)   Wt 174 lb 3.2 oz (79 kg)   BMI 27.28 kg/m²     Pain Scale: 5/10       has been . reviewed and is appropriate          Diagnoses and all orders for this visit:    1. Facet syndrome    2. Spinal stenosis of lumbar region with neurogenic claudication  -     gabapentin (NEURONTIN) 400 mg capsule; Take 1 Cap by mouth three (3) times daily. Max Daily Amount: 1,200 mg.    3. DDD (degenerative disc disease), lumbar            Follow-up and Dispositions    · Return in about 3 months (around 4/13/2021) for with NP Barnes or myelf.              We have informed Kari Quezada to notify us for immediate appointment if she has any worsening neurogical symptoms or if an emergency situation presents, then call 911

## 2021-03-15 ENCOUNTER — HOSPITAL ENCOUNTER (OUTPATIENT)
Dept: MAMMOGRAPHY | Age: 66
Discharge: HOME | End: 2021-03-15
Attending: SURGERY
Payer: MEDICARE

## 2021-03-15 DIAGNOSIS — Z12.31 BREAST CANCER SCREENING BY MAMMOGRAM: ICD-10-CM

## 2021-03-15 PROCEDURE — 77063 BREAST TOMOSYNTHESIS BI: CPT

## 2021-04-22 ENCOUNTER — OFFICE VISIT (OUTPATIENT)
Dept: ORTHOPEDIC SURGERY | Age: 66
End: 2021-04-22
Payer: MEDICARE

## 2021-04-22 VITALS
WEIGHT: 177 LBS | HEIGHT: 67 IN | OXYGEN SATURATION: 99 % | HEART RATE: 82 BPM | BODY MASS INDEX: 27.78 KG/M2 | TEMPERATURE: 97.9 F

## 2021-04-22 DIAGNOSIS — M51.36 DDD (DEGENERATIVE DISC DISEASE), LUMBAR: ICD-10-CM

## 2021-04-22 DIAGNOSIS — M48.062 SPINAL STENOSIS OF LUMBAR REGION WITH NEUROGENIC CLAUDICATION: ICD-10-CM

## 2021-04-22 DIAGNOSIS — M47.899 FACET SYNDROME: ICD-10-CM

## 2021-04-22 PROCEDURE — G8756 NO BP MEASURE DOC: HCPCS | Performed by: PHYSICAL MEDICINE & REHABILITATION

## 2021-04-22 PROCEDURE — G8419 CALC BMI OUT NRM PARAM NOF/U: HCPCS | Performed by: PHYSICAL MEDICINE & REHABILITATION

## 2021-04-22 PROCEDURE — G8510 SCR DEP NEG, NO PLAN REQD: HCPCS | Performed by: PHYSICAL MEDICINE & REHABILITATION

## 2021-04-22 PROCEDURE — G8427 DOCREV CUR MEDS BY ELIG CLIN: HCPCS | Performed by: PHYSICAL MEDICINE & REHABILITATION

## 2021-04-22 PROCEDURE — 3017F COLORECTAL CA SCREEN DOC REV: CPT | Performed by: PHYSICAL MEDICINE & REHABILITATION

## 2021-04-22 PROCEDURE — 99214 OFFICE O/P EST MOD 30 MIN: CPT | Performed by: PHYSICAL MEDICINE & REHABILITATION

## 2021-04-22 PROCEDURE — G9899 SCRN MAM PERF RSLTS DOC: HCPCS | Performed by: PHYSICAL MEDICINE & REHABILITATION

## 2021-04-22 RX ORDER — AZELASTINE HYDROCHLORIDE 0.5 MG/ML
SOLUTION/ DROPS OPHTHALMIC
COMMUNITY
Start: 2021-04-16 | End: 2021-10-27

## 2021-04-22 RX ORDER — GABAPENTIN 400 MG/1
400 CAPSULE ORAL 3 TIMES DAILY
Qty: 90 CAP | Refills: 4 | Status: SHIPPED | OUTPATIENT
Start: 2021-04-22 | End: 2021-10-27 | Stop reason: SDUPTHER

## 2021-04-22 RX ORDER — PRAVASTATIN SODIUM 10 MG/1
TABLET ORAL
COMMUNITY
Start: 2021-04-16 | End: 2021-10-27

## 2021-04-22 RX ORDER — BUTALBITAL, ACETAMINOPHEN AND CAFFEINE 50; 325; 40 MG/1; MG/1; MG/1
TABLET ORAL
COMMUNITY
Start: 2021-02-04

## 2021-04-22 NOTE — PROGRESS NOTES
Chief complaint/History of Present Illness:  Back pain  ANNA Subramanian is a  63 y.o.  female      HISTORY OF PRESENT ILLNESS:  This is a patient of Maritza Castañeda NP, who comes in today for followup of his chronic low back pain with bilateral lower extremity pain down to her feet. She gets numbness and tingling. She states her back pain is off and on. It is her legs that give her the most problems. Currently pain is a 7/10. She only takes the Diclofenac 75 mg as needed. She is now  taking Gabapentin 400mg TID which is working well (has tried higher dose but made her foggy, has tried gabitril but could not tolerate felt \"weird:)   She reports she is no longer taking Elavil 25 mg for migraines. She is retired. She is a nonsmoker. She states her breast cancer is under control. She is here for f/u. Her mother passed away last week      PHYSICAL EXAM:  Ms. Manjit Sky is a 82-year-old female. She is alert and oriented. Pulse 82, temperature 97.9 °F (36.6 °C), temperature source Tympanic, height 5' 7\" (1.702 m), weight 177 lb (80.3 kg), SpO2 99 %. She has a normal mood and affect. She has a full weightbearing, non-antalgic gait using a single prong cane. She has 4/5 strength of the bilateral lower extremities and negative straight leg raise. She has pain with hyperextension of the lumbar spine. ASSESSMENT/PLAN:  This is a patient who has had an L3-4 laminectomy in 2012. She has back pain and bilateral leg pain due to facet syndrome and spinal stenosis. I have refilled her Gabapentin 400mg TID x 4 months. We will see her back in 4 months or sooner if needed.         Review of systems:    Past Medical History:   Diagnosis Date    Arthritis     Balance problems     patient states no further issues    Breast CA ([address] 03/2018    right    Bronchitis     Chronic pain     back    Fibromyalgia 11/29/2012    GERD (gastroesophageal reflux disease)     Hiatal Hernia    Hypercholesterolemia     Hypertension     Kidney stones     Low back pain     Lumbar spinal stenosis 11/25/2012    Migraine headache     Nervousness     Neurological disease     Osteoarthritis 11/25/2012    Other ill-defined conditions(799.89)     neuropathy    Peripheral neuropathy 11/29/2012    Post laminectomy syndrome     Postoperative anemia due to acute blood loss 11/30/2012    Ringing in ears     Rotator cuff syndrome     Spinal stenosis     Thoracic spine pain     Vitamin D deficiency 11/30/2012     Past Surgical History:   Procedure Laterality Date    HX MASTECTOMY Right 5/4/2018    NEEDLE LOCALIZED (1000) LUMPECTOMY RIGHT BREAST AND SENTINEL LYMPH NODE BIOPSY RIGHT AXILLA performed by Rupert Renee MD at [address] [place] [address] HX ORTHOPAEDIC      foot l heel spur    HX ORTHOPAEDIC      right knee sx    HX OTHER SURGICAL      non cancer cyst removed from right shoulder    NEUROLOGICAL PROCEDURE UNLISTED      Bilateral L3-4 hemilaminotomy and medial facetectomy by Dr. Ranjit Pino BX/REMV,LYMPH NODE,DEEP AXILL Right 05/04/2018    Dr. Daniel Miller History     Socioeconomic History    Marital status: SINGLE     Spouse name: Not on file    Number of children: Not on file    Years of education: Not on file    Highest education level: Not on file   Occupational History    Not on file   Social Needs    Financial resource strain: Not on file    Food insecurity     Worry: Not on file     Inability: Not on file    Transportation needs     Medical: Not on file     Non-medical: Not on file   Tobacco Use    Smoking status: Never Smoker    Smokeless tobacco: Never Used   Substance and Sexual Activity    Alcohol use: No    Drug use: No    Sexual activity: Not on file   Lifestyle    Physical activity     Days per week: Not on file     Minutes per session: Not on file    Stress: Not on file   Relationships    Social connections     Talks on phone: Not on file     Gets together: Not on file     Attends Oriental orthodox service: Not on file     Active member of club or organization: Not on file     Attends meetings of clubs or organizations: Not on file     Relationship status: Not on file    Intimate partner violence     Fear of current or ex partner: Not on file     Emotionally abused: Not on file     Physically abused: Not on file     Forced sexual activity: Not on file   Other Topics Concern    Not on file   Social History Narrative    Not on file     Family History   Problem Relation Age of Onset    Hypertension Mother     Arthritis-osteo Mother     Neuropathy Mother     Hypertension Father     Cancer Father         colon    Hypertension Brother     Cancer Brother     Neuropathy Brother     Neuropathy Other     Arthritis-rheumatoid Other     GERD Other     Diabetes Other     Diabetes Maternal Aunt     Diabetes Maternal Uncle     Hypertension Maternal Grandfather        Physical Exam:  Visit Vitals  Pulse 82   Temp 97.9 °F (36.6 °C) (Tympanic)   Ht 5' 7\" (1.702 m)   Wt 177 lb (80.3 kg)   SpO2 99% Comment: RA   BMI 27.72 kg/m²     Pain Scale: 7/10       has been . reviewed and is appropriate                    We have informed Sakina Ponce to notify us for immediate appointment if she has any worsening neurogical symptoms or if an emergency situation presents, then call [phone]

## 2021-04-22 NOTE — PROGRESS NOTES
Ramya Daugherty presents today for   Chief Complaint   Patient presents with    Back Pain    Leg Pain     bilateral     Foot Pain     bilateral        Is someone accompanying this pt? no    Is the patient using any DME equipment during OV? no    Depression Screening:  3 most recent PHQ Screens 4/22/2021   Little interest or pleasure in doing things Not at all   Feeling down, depressed, irritable, or hopeless Several days   Total Score PHQ 2 1       Learning Assessment:  Learning Assessment 12/17/2015   PRIMARY LEARNER Patient   HIGHEST LEVEL OF EDUCATION - PRIMARY LEARNER  GRADUATED HIGH SCHOOL OR GED   PRIMARY LANGUAGE ENGLISH   LEARNER PREFERENCE PRIMARY OTHER (COMMENT)   ANSWERED BY patient   RELATIONSHIP SELF       Abuse Screening:  Abuse Screening Questionnaire 4/22/2021   Do you ever feel afraid of your partner? N   Are you in a relationship with someone who physically or mentally threatens you? N   Is it safe for you to go home? Y       Fall Risk  Fall Risk Assessment, last 12 mths 4/22/2021   Able to walk? Yes   Fall in past 12 months? 0   Do you feel unsteady? 0   Are you worried about falling 0       OPIOID RISK TOOL  Opioid Risk Tool 7/19/2018   Family history of alcohol abuse? 0   Family history of illegal drug abuse? 0   Family history of prescription drug abuse? 0   Personal history of alcohol abuse? 0   Personal history of illegal drug abuse? 0   Personal history of prescription drug abuse? 0   Age range between 17-45? 0   History of preadolescent sexual abuse? 3   ADD, OCD, bipolar, schizophrenia? 0   Depression? 1   Opioid Risk Total Score 4       Coordination of Care:  1. Have you been to the ER, urgent care clinic since your last visit? no  Hospitalized since your last visit? no    2. Have you seen or consulted any other health care providers outside of the [address] [place] [address] since your last visit? no Include any pap smears or colon screening.  no

## 2021-05-18 ENCOUNTER — LAB ONLY (OUTPATIENT)
Dept: ONCOLOGY | Age: 66
End: 2021-05-18

## 2021-05-18 ENCOUNTER — HOSPITAL ENCOUNTER (OUTPATIENT)
Dept: LAB | Age: 66
Discharge: HOME | End: 2021-05-18
Payer: MEDICARE

## 2021-05-18 DIAGNOSIS — Z17.0 MALIGNANT NEOPLASM OF LOWER-OUTER QUADRANT OF RIGHT BREAST OF FEMALE, ESTROGEN RECEPTOR POSITIVE (HCC): ICD-10-CM

## 2021-05-18 DIAGNOSIS — D50.8 IRON DEFICIENCY ANEMIA SECONDARY TO INADEQUATE DIETARY IRON INTAKE: ICD-10-CM

## 2021-05-18 DIAGNOSIS — G89.3 CHRONIC PAIN DUE TO NEOPLASM: ICD-10-CM

## 2021-05-18 DIAGNOSIS — C50.511 MALIGNANT NEOPLASM OF LOWER-OUTER QUADRANT OF RIGHT BREAST OF FEMALE, ESTROGEN RECEPTOR POSITIVE (HCC): ICD-10-CM

## 2021-05-18 DIAGNOSIS — C50.511 MALIGNANT NEOPLASM OF LOWER-OUTER QUADRANT OF RIGHT FEMALE BREAST, UNSPECIFIED ESTROGEN RECEPTOR STATUS (HCC): ICD-10-CM

## 2021-05-18 DIAGNOSIS — Z17.0 ESTROGEN RECEPTOR POSITIVE: ICD-10-CM

## 2021-05-18 DIAGNOSIS — Z79.811 LONG TERM CURRENT USE OF AROMATASE INHIBITOR: ICD-10-CM

## 2021-05-18 DIAGNOSIS — C50.511 MALIGNANT NEOPLASM OF LOWER-OUTER QUADRANT OF RIGHT FEMALE BREAST, UNSPECIFIED ESTROGEN RECEPTOR STATUS (HCC): Primary | ICD-10-CM

## 2021-05-18 LAB
ALBUMIN SERPL-MCNC: 3.8 G/DL (ref 3.4–5)
ALBUMIN/GLOB SERPL: 1 {RATIO} (ref 0.8–1.7)
ALP SERPL-CCNC: 67 U/L (ref 45–117)
ALT SERPL-CCNC: 22 U/L (ref 13–56)
ANION GAP SERPL CALC-SCNC: 6 MMOL/L (ref 3–18)
AST SERPL-CCNC: 25 U/L (ref 10–38)
BASOPHILS # BLD: 0.1 K/UL (ref 0–0.1)
BASOPHILS NFR BLD: 1 % (ref 0–2)
BILIRUB SERPL-MCNC: 0.5 MG/DL (ref 0.2–1)
BUN SERPL-MCNC: 14 MG/DL (ref 7–18)
BUN/CREAT SERPL: 14 (ref 12–20)
CALCIUM SERPL-MCNC: 9.3 MG/DL (ref 8.5–10.1)
CHLORIDE SERPL-SCNC: 105 MMOL/L (ref 100–111)
CO2 SERPL-SCNC: 28 MMOL/L (ref 21–32)
CREAT SERPL-MCNC: 0.98 MG/DL (ref 0.6–1.3)
DIFFERENTIAL METHOD BLD: ABNORMAL
EOSINOPHIL # BLD: 0.2 K/UL (ref 0–0.4)
EOSINOPHIL NFR BLD: 3 % (ref 0–5)
ERYTHROCYTE [DISTWIDTH] IN BLOOD BY AUTOMATED COUNT: 13.2 % (ref 11.6–14.5)
FERRITIN SERPL-MCNC: 46 NG/ML (ref 8–388)
GLOBULIN SER CALC-MCNC: 3.7 G/DL (ref 2–4)
GLUCOSE SERPL-MCNC: 81 MG/DL (ref 74–99)
HCT VFR BLD AUTO: 33.8 % (ref 35–45)
HGB BLD-MCNC: 11.2 G/DL (ref 12–16)
IRON SATN MFR SERPL: 31 % (ref 20–50)
IRON SERPL-MCNC: 111 UG/DL (ref 50–175)
LYMPHOCYTES # BLD: 2.1 K/UL (ref 0.9–3.6)
LYMPHOCYTES NFR BLD: 34 % (ref 21–52)
MCH RBC QN AUTO: 27.8 PG (ref 24–34)
MCHC RBC AUTO-ENTMCNC: 33.1 G/DL (ref 31–37)
MCV RBC AUTO: 83.9 FL (ref 74–97)
MONOCYTES # BLD: 0.4 K/UL (ref 0.05–1.2)
MONOCYTES NFR BLD: 6 % (ref 3–10)
NEUTS SEG # BLD: 3.4 K/UL (ref 1.8–8)
NEUTS SEG NFR BLD: 55 % (ref 40–73)
PLATELET # BLD AUTO: 234 K/UL (ref 135–420)
PMV BLD AUTO: 10.4 FL (ref 9.2–11.8)
POTASSIUM SERPL-SCNC: 3.6 MMOL/L (ref 3.5–5.5)
PROT SERPL-MCNC: 7.5 G/DL (ref 6.4–8.2)
RBC # BLD AUTO: 4.03 M/UL (ref 4.2–5.3)
SODIUM SERPL-SCNC: 139 MMOL/L (ref 136–145)
TIBC SERPL-MCNC: 360 UG/DL (ref 250–450)
WBC # BLD AUTO: 6.1 K/UL (ref 4.6–13.2)

## 2021-05-18 PROCEDURE — 85025 COMPLETE CBC W/AUTO DIFF WBC: CPT

## 2021-05-18 PROCEDURE — 80053 COMPREHEN METABOLIC PANEL: CPT

## 2021-05-18 PROCEDURE — 86300 IMMUNOASSAY TUMOR CA 15-3: CPT

## 2021-05-18 PROCEDURE — 83540 ASSAY OF IRON: CPT

## 2021-05-18 PROCEDURE — 82728 ASSAY OF FERRITIN: CPT

## 2021-05-20 LAB — CANCER AG27-29 SERPL-ACNC: 23.3 U/ML (ref 0–38.6)

## 2021-05-25 ENCOUNTER — OFFICE VISIT (OUTPATIENT)
Dept: ONCOLOGY | Age: 66
End: 2021-05-25
Payer: MEDICARE

## 2021-05-25 VITALS
DIASTOLIC BLOOD PRESSURE: 81 MMHG | BODY MASS INDEX: 28.35 KG/M2 | SYSTOLIC BLOOD PRESSURE: 131 MMHG | RESPIRATION RATE: 18 BRPM | OXYGEN SATURATION: 99 % | TEMPERATURE: 97.5 F | HEART RATE: 72 BPM | WEIGHT: 181 LBS

## 2021-05-25 DIAGNOSIS — D50.8 IRON DEFICIENCY ANEMIA SECONDARY TO INADEQUATE DIETARY IRON INTAKE: ICD-10-CM

## 2021-05-25 DIAGNOSIS — G89.3 CANCER ASSOCIATED PAIN: ICD-10-CM

## 2021-05-25 DIAGNOSIS — C50.511 MALIGNANT NEOPLASM OF LOWER-OUTER QUADRANT OF RIGHT BREAST OF FEMALE, ESTROGEN RECEPTOR POSITIVE (HCC): ICD-10-CM

## 2021-05-25 DIAGNOSIS — E55.9 VITAMIN D DEFICIENCY: ICD-10-CM

## 2021-05-25 DIAGNOSIS — Z17.0 ESTROGEN RECEPTOR POSITIVE: ICD-10-CM

## 2021-05-25 DIAGNOSIS — C50.511 MALIGNANT NEOPLASM OF LOWER-OUTER QUADRANT OF RIGHT FEMALE BREAST, UNSPECIFIED ESTROGEN RECEPTOR STATUS (HCC): Primary | ICD-10-CM

## 2021-05-25 DIAGNOSIS — Z17.0 MALIGNANT NEOPLASM OF LOWER-OUTER QUADRANT OF RIGHT BREAST OF FEMALE, ESTROGEN RECEPTOR POSITIVE (HCC): ICD-10-CM

## 2021-05-25 DIAGNOSIS — G89.3 CHRONIC PAIN DUE TO NEOPLASM: ICD-10-CM

## 2021-05-25 PROCEDURE — 3017F COLORECTAL CA SCREEN DOC REV: CPT | Performed by: NURSE PRACTITIONER

## 2021-05-25 PROCEDURE — G8427 DOCREV CUR MEDS BY ELIG CLIN: HCPCS | Performed by: NURSE PRACTITIONER

## 2021-05-25 PROCEDURE — G8419 CALC BMI OUT NRM PARAM NOF/U: HCPCS | Performed by: NURSE PRACTITIONER

## 2021-05-25 PROCEDURE — G9899 SCRN MAM PERF RSLTS DOC: HCPCS | Performed by: NURSE PRACTITIONER

## 2021-05-25 PROCEDURE — G8432 DEP SCR NOT DOC, RNG: HCPCS | Performed by: NURSE PRACTITIONER

## 2021-05-25 PROCEDURE — G8752 SYS BP LESS 140: HCPCS | Performed by: NURSE PRACTITIONER

## 2021-05-25 PROCEDURE — G8754 DIAS BP LESS 90: HCPCS | Performed by: NURSE PRACTITIONER

## 2021-05-25 PROCEDURE — G0463 HOSPITAL OUTPT CLINIC VISIT: HCPCS | Performed by: NURSE PRACTITIONER

## 2021-05-25 PROCEDURE — 99213 OFFICE O/P EST LOW 20 MIN: CPT | Performed by: NURSE PRACTITIONER

## 2021-05-25 NOTE — PROGRESS NOTES
Hematology/Oncology  Progress Note    Name: Whitley Li  : 1955    PCP: Onur Chaudhari MD     Ms. Garfield Trinidad is a 63 y.o. -American woman with invasive ductal carcinoma the right breast.    Current therapy: Femara 2.5mg PO daily since       Subjective:     Mrs. Garfield Trinidad is a 82-year-old -American woman with invasive ductal carcinoma involving the right breast. She was diagnosed in 2018. She reports she continues to tolerate the Femara without significant side effects. She states she is doing her breast self exam on a monthly basis and has no new physical complaints or concerns to report. Report chronic tenderness to the right breast. She denies any nipple discharge or any other breast issues. Denied fever, chills, night sweat, unintentional weight loss, skin lumps or bumps, acute bleeding or bruising issues. Denied headache, acute vision change, dizziness, chest pain, worsen shortness of breath, palpitation, productive cough, nausea, vomiting, abdominal pain, altered bowel habits, dysuria, new bone pain or back pain, focal numbness or weakness. Past medical history, family history, and social history: these were reviewed and remains unchanged.     Past Medical History:   Diagnosis Date    Arthritis     Balance problems     patient states no further issues    Breast CA ([facility] 2018    right    Bronchitis     Chronic pain     back    Fibromyalgia 2012    GERD (gastroesophageal reflux disease)     Hiatal Hernia    Hypercholesterolemia     Hypertension     Kidney stones     Low back pain     Lumbar spinal stenosis 2012    Migraine headache     Nervousness     Neurological disease     Osteoarthritis 2012    Other ill-defined conditions(799.89)     neuropathy    Peripheral neuropathy 2012    Post laminectomy syndrome     Postoperative anemia due to acute blood loss 2012    Ringing in ears     Rotator cuff syndrome     Spinal stenosis     Thoracic spine pain     Vitamin D deficiency 11/30/2012     Past Surgical History:   Procedure Laterality Date    HX MASTECTOMY Right 5/4/2018    NEEDLE LOCALIZED (1000) LUMPECTOMY RIGHT BREAST AND SENTINEL LYMPH NODE BIOPSY RIGHT AXILLA performed by Ester Paz MD at [address] [place] [address] HX ORTHOPAEDIC      foot l heel spur    HX ORTHOPAEDIC      right knee sx    HX OTHER SURGICAL      non cancer cyst removed from right shoulder    NEUROLOGICAL PROCEDURE UNLISTED      Bilateral L3-4 hemilaminotomy and medial facetectomy by Dr. Jeremías Cummings    MD BX/REMV,LYMPH NODE,DEEP AXILL Right 05/04/2018    Dr. Kylee Lord History     Socioeconomic History    Marital status: SINGLE     Spouse name: Not on file    Number of children: Not on file    Years of education: Not on file    Highest education level: Not on file   Occupational History    Not on file   Tobacco Use    Smoking status: Never Smoker    Smokeless tobacco: Never Used   Substance and Sexual Activity    Alcohol use: No    Drug use: No    Sexual activity: Not on file   Other Topics Concern    Not on file   Social History Narrative    Not on file     Social Determinants of Health     Financial Resource Strain:     Difficulty of Paying Living Expenses:    Food Insecurity:     Worried About Running Out of Food in the Last Year:     [address] in the Last Year:    Transportation Needs:     Lack of Transportation (Medical):      Lack of Transportation (Non-Medical):    Physical Activity:     Days of Exercise per Week:     Minutes of Exercise per Session:    Stress:     Feeling of Stress :    Social Connections:     Frequency of Communication with Friends and Family:     Frequency of Social Gatherings with Friends and Family:     Attends Baptism Services:     Active Member of Clubs or Organizations:     Attends Club or Organization Meetings:     Marital Status:    Intimate Partner Violence:     Fear of Current or Ex-Partner:     Emotionally Abused:     Physically Abused:     Sexually Abused:      Family History   Problem Relation Age of Onset    Hypertension Mother     Arthritis-osteo Mother     Neuropathy Mother     Hypertension Father     Cancer Father         colon    Hypertension Brother     Cancer Brother     Neuropathy Brother     Neuropathy Other     Arthritis-rheumatoid Other     GERD Other     Diabetes Other     Diabetes Maternal Aunt     Diabetes Maternal Uncle     Hypertension Maternal Grandfather      Current Outpatient Medications   Medication Sig Dispense Refill    azelastine (OPTIVAR) 0.05 % ophthalmic solution instill 1 drop INTO AFFECTED EYE(S) twice a day      butalbital-acetaminophen-caffeine (FIORICET, ESGIC) -40 mg per tablet take 1 tablet by mouth every 8 hours      pravastatin (PRAVACHOL) 10 mg tablet take 1 tablet by mouth once daily      gabapentin (Neurontin) 400 mg capsule Take 1 Cap by mouth three (3) times daily. Max Daily Amount: 1,200 mg. Indications: neuropathic pain 90 Cap 4    ibuprofen (MOTRIN) 800 mg tablet Take 1 Tab by mouth every eight (8) hours as needed for Pain. Indications: pain 90 Tab 0    ergocalciferol (ERGOCALCIFEROL) 1,250 mcg (50,000 unit) capsule Take 1 Cap by mouth every seven (7) days. 12 Cap 0    letrozole (FEMARA) 2.5 mg tablet Take 1 Tab by mouth daily. 90 Tab 3    albuterol (ACCUNEB) 0.63 mg/3 mL nebulizer solution 0.63 mg by Nebulization route every six (6) hours as needed for Wheezing.  acetaminophen (TYLENOL) 500 mg tablet Take 1 Tab by mouth every six (6) hours as needed for Pain. 120 Tab 0    pantoprazole (PROTONIX) 40 mg tablet Take 40 mg by mouth daily.  esomeprazole (NEXIUM) 40 mg capsule Take 40 mg by mouth daily. 0    fluticasone-vilanterol (BREO ELLIPTA) 200-25 mcg/dose inhaler Take 1 Puff by inhalation daily.       ascorbic acid (VITAMIN C) 250 mg tablet Take 1 Tab by mouth two (2) times daily (with meals). 30 Tab 0    losartan-hydrochlorothiazide (HYZAAR) 100-25 mg per tablet Take 1 Tab by mouth daily. Indications: HYPERTENSION         Review of Systems   Constitutional: Negative for chills, diaphoresis, fever, malaise/fatigue and weight loss. Respiratory: Negative for cough, hemoptysis, shortness of breath and wheezing. Cardiovascular: Negative for chest pain, palpitations and leg swelling. Gastrointestinal: Negative for abdominal pain, diarrhea, heartburn, nausea and vomiting. Genitourinary: Negative for dysuria, frequency, hematuria and urgency. Musculoskeletal: Negative for joint pain and myalgias. Skin: Negative for itching and rash. Neurological: Negative for dizziness, seizures, weakness and headaches. Psychiatric/Behavioral: Negative for depression. The patient does not have insomnia. Objective:     Visit Vitals  /81 (BP Patient Position: Sitting)   Pulse 72   Temp 97.5 °F (36.4 °C) (Temporal)   Resp 18   Wt 82.1 kg (181 lb)   SpO2 99%   BMI 28.35 kg/m²       ECOG Performance Status (grade): 0  0 - able to carry on all pre-disease activity w/out restriction  1 - restricted but able to carry out light work  2 - ambulatory and can self- care but unable to carry out work  3 - bed or chair >50% of waking hours  4 - completely disable, total care, confined to bed or chair    Physical Exam  Constitutional:       Appearance: Normal appearance. HENT:      Head: Normocephalic and atraumatic. Eyes:      Pupils: Pupils are equal, round, and reactive to light. Cardiovascular:      Rate and Rhythm: Normal rate and regular rhythm. Heart sounds: Normal heart sounds. Pulmonary:      Effort: Pulmonary effort is normal.      Breath sounds: Normal breath sounds. Chest:      Comments: Left: No dimpling, discoloration, nipple inversion or retractions. No axillary or supraclavicular lymphadenopathy. No mass   Right: No dimpling, discoloration.   No axillary or supraclavicular lymphadenopathy. No mass  Abdominal:      General: Bowel sounds are normal.      Palpations: Abdomen is soft. Tenderness: There is no abdominal tenderness. There is no guarding. Musculoskeletal:         General: Normal range of motion. Cervical back: Neck supple. Right lower leg: No edema. Left lower leg: No edema. Skin:     General: Skin is warm. Neurological:      General: No focal deficit present. Mental Status: She is alert and oriented to person, place, and time. Mental status is at baseline. Diagnostics:      No results found for this or any previous visit (from the past 96 hour(s)). Imaging:  No results found for this or any previous visit. Results for orders placed during the hospital encounter of 06/06/19    XR CHEST PA LAT    Narrative  EXAM: Chest Radiographs    INDICATION: Shortness of breath    TECHNIQUE: PA and lateral views of the chest    COMPARISON: 5/1/2018, 11/6/2012 and 3/11/2010    FINDINGS: No pneumothorax identified. The lungs are hyperinflated no acute  infiltrates appreciated. . No effusions appreciated. The cardiomediastinal  silhouette is unremarkable. The pulmonary vascularity is unremarkable. The  osseous structures are unremarkable. Impression  IMPRESSION:  1. No acute infiltrate or effusion. 2.  Mildly hyperinflated lungs suggestive of COPD. Results for orders placed during the hospital encounter of 04/05/18    CT CHEST ABD PELV W CONT    Narrative  HISTORY: Recent diagnosis of breast carcinoma. EXAM: CT chest, abdomen and pelvis. TECHNIQUE: Spiral images of the chest abdomen and pelvis were performed  according to routine protocol from thoracic inlet to the ischial tuberosities  after administration of 100 cc of Isovue-300 contrast media intravenously. Coronal and sagittal reconstructions were provided for evaluation. Oral contrast  was given. No prior studies for comparison.     All CT scans at this facility are performed using dose optimization technique as  appropriate to a performed exam, to include automated exposure control,  adjustment of the mA and/or kV according to patient size (including appropriate  matching for site-specific examinations), or use of iterative reconstruction  technique. FINDINGS:    CT THORAX:    There is no pneumothorax, pneumonia or pleural effusions are seen. Trachea bronchial trees unremarkable. Heart is normal in size without pericardial effusions. Vascular pedicle is unremarkable. No enhancing mass lesion or pathologic lymphadenopathy is noted. Nonspecific bean shaped fatty hilum lymph nodes are noted in bilateral axillary  regions. No lytic or blastic lesions. CT ABDOMEN:    Liver is unremarkable. Portal vein is patent and unremarkable. Hepatic outflow is unremarkable. Gallbladder is unremarkable. Spleen is unremarkable. Pancreas is unremarkable. Adrenal glands are unremarkable bilaterally. Kidneys are unremarkable. No small or large bowel obstruction is seen. No ascites or pneumoperitoneum. Abdominal aorta and IVC are unremarkable. No enhancing mass lesion or pathologic lymphadenopathy seen. No lytic or blastic lesions. CT PELVIS:    Urinary bladder is unremarkable. Uterus is atrophic but unremarkable. Rectus sigmoid region is unremarkable. No free pelvic fluid is seen. No enhancing mass lesion or pathologic lymphadenopathy. No lytic or blastic lesions. Impression  IMPRESSION:    1. Unremarkable study of the chest abdomen and pelvis. Assessment:     No diagnosis found. Plan:      Invasive ductal carcinoma the right breast  ---- Clinically the patient is doing well and has no evidence of disease recurrence. -- Recent mammogram on 03/15/2021 shows no evidence of malignancy. BIRADS 2: Benign  In absence of concerning physical findings, recommend annual follow-up.     Plan:  -- The patient was instructed to continue doing her breast exams on a monthly basis. -- The patient was advised to notify us if any new breast pain, new palpable nodule, nipple inversion, spontaneous nipple discharge especially if bloody, breast skin changes, unintentional weight loss, worsen fatigue, new bone pain or back pain, or any concerns. -- I have advised her to follow up her PCP to continue age-appropriate cancer screening. -- I have educated her regarding lifestyle modifications, healthy diet, and physical activity. -- Her next mammogram will be 3/2022  --  CBC, chemistry, CA 27-29 reviewed with patient today       Long term use of aromatase inhibitor:   -- The patient currently taking Femara daily. She states she is tolerating the medication well and this will be continued. --She report that she is up to date with her DEXA scan which was done by her rheumatologist .  + Advised on adequate amounts of calcium (at least 1,200 mg per day) and vitamin D (800-1,000 IU per day). The higher dose of vitamin D may be needed if vitamin D deficiency.  + Recommend regular muscle-strengthening exercise to reduce the risk of falls and fractures. Chronic breast pain s.p surgery and XRT   -- Ultram 50 mg to take every 8 hours as needed. -- Instructed the patient to have a follow-up with pain management clinic for long-term pain control. Vitamin D Deficiency:   -- She will f/u with PCP for continuity management. Chronic anemia, Hx of Iron Deficiency Anemia:   -- We will continue to monitor CBC.  --CBC, iron and ferritin reviewed with patient today. .      -- We will see the patient back in clinic in about 4 months. Always sooner if required. The patient can have lab done prior to our next clinic visit. No orders of the defined types were placed in this encounter. Ms. Lakeisha Ramsey has a reminder for a \"due or due soon\" health maintenance.  I have asked that she contact her primary care provider for follow-up on this health maintenance. All of patient's questions answered to their apparent satisfaction. They verbally show understanding and agreement with aforementioned plan. Rhonda Quigley NP  5/25/2021          About 25 minutes were spent for this encounter with more than 50% of the time spent in face-to-face counseling, discussing on diagnosis and management plan going forward, and co-ordination of care. Parts of this document has been produced using Dragon dictation system. Unrecognized errors in transcription may be present. Please do not hesitate to reach out for any questions or clarifications.       CC: Aamir Del Real MD

## 2021-05-25 NOTE — PATIENT INSTRUCTIONS
Learning About High-Iron Foods  What foods are high in iron? The foods you eat contain nutrients, such as vitamins and minerals. Iron is a nutrient. Your body needs the right amount to stay healthy and work as it should. You can use the list below to help you make choices about which foods to eat. Here are some foods that contain iron. They have 1 to 2 milligrams of iron per serving. Fruits  · Figs (dried), 5 figs  Vegetables  · Asparagus (canned), 6 walker  · Paloma, beet, Swiss chard, or turnip greens, 1 cup  · Dried peas, cooked, ½ cup  · Seaweed, spirulina (dried), ¼ cup  · Spinach, (cooked) ½ cup or (raw) 1 cup  Grains  · Cereals, fortified with iron, 1 cup  · Grits (instant, cooked), fortified with iron, ½ cup  Meats and other protein foods  · Beans (kidney, lima, navy, white), canned or cooked, ½ cup  · Beef or lamb, 3 oz  · Chicken giblets, 3 oz  · Chickpeas (garbanzo beans), ½ cup  · Liver of beef, lamb, or pork, 3 oz  · Oysters (cooked), 3 oz  · Sardines (canned), 3 oz  · Soybeans (boiled), ½ cup  · Tofu (firm), ½ cup  Work with your doctor to find out how much of this nutrient you need. Depending on your health, you may need more or less of it in your diet. Current as of: December 17, 2020               Content Version: 12.8  © 2006-2021 [employer]. Care instructions adapted under license by [employer] (which disclaims liability or warranty for this information). If you have questions about a medical condition or this instruction, always ask your healthcare professional. [place] [address] any warranty or liability for your use of this information. Iron-Rich Diet: Care Instructions  Your Care Instructions     Your body needs iron to make hemoglobin. Hemoglobin is a substance in red blood cells that carries oxygen from the lungs to cells all through your body. If you do not get enough iron, your body makes fewer and smaller red blood cells.  As a result, your body's cells may not get enough oxygen. Adult men need 8 milligrams of iron a day; adult women need 18 milligrams of iron a day. After menopause, women need 8 milligrams of iron a day. A pregnant woman needs 27 milligrams of iron a day. Infants and young children have higher iron needs relative to their size than other age groups. People who have lost blood because of ulcers or heavy menstrual periods may become very low in iron and may develop anemia. Most people can get the iron their bodies need by eating enough of certain iron-rich foods. Your doctor may recommend that you take an iron supplement along with eating an iron-rich diet. Follow-up care is a key part of your treatment and safety. Be sure to make and go to all appointments, and call your doctor if you are having problems. It's also a good idea to know your test results and keep a list of the medicines you take. How can you care for yourself at home? · Make iron-rich foods a part of your daily diet. Iron-rich foods include:  ? All meats, such as chicken, beef, lamb, pork, fish, and shellfish. Liver is especially high in iron. ? Leafy green vegetables. ? Raisins, peas, beans, lentils, barley, and eggs. ? Iron-fortified breakfast cereals. · Eat foods with vitamin C along with iron-rich foods. Vitamin C helps you absorb more iron from food. Drink a glass of orange juice or another citrus juice with your food. · Eat meat and vegetables or grains together. The iron in meat helps your body absorb the iron in other foods. Where can you learn more? Go to http://www.gray.com/  Enter Z290 in the search box to learn more about \"Iron-Rich Diet: Care Instructions. \"  Current as of: December 17, 2020               Content Version: 12.8  © 3238-3693 [employer]. Care instructions adapted under license by [employer] (which disclaims liability or warranty for this information).  If you have questions about a medical condition or this instruction, always ask your healthcare professional. [address] any warranty or liability for your use of this information. Breast Cancer: Care Instructions  Your Care Instructions     Breast cancer occurs when abnormal cells grow out of control in the breast. These cancer cells can spread within the breast, to nearby lymph nodes and other tissues, and to other parts of the body. Being treated for cancer can weaken your body, and you may feel very tired. Get the rest your body needs so you can feel better. Finding out that you have cancer is scary. You may feel many emotions and may need some help coping. Seek out family, friends, and counselors for support. You also can do things at home to make yourself feel better while you go through treatment. Call the [facility] ([phone]) or visit its website at 1818 [employer] tea for more information. Follow-up care is a key part of your treatment and safety. Be sure to make and go to all appointments, and call your doctor if you are having problems. It's also a good idea to know your test results and keep a list of the medicines you take. How can you care for yourself at home? · Take your medicines exactly as prescribed. Call your doctor if you think you are having a problem with your medicine. You may get medicine for nausea and vomiting if you have these side effects. · Follow your doctor's instructions to relieve pain. Pain from cancer and surgery can almost always be controlled. Use pain medicine when you first notice pain, before it becomes severe. · Eat healthy food. If you do not feel like eating, try to eat food that has protein and extra calories to keep up your strength and prevent weight loss. Drink liquid meal replacements for extra calories and protein. Try to eat your main meal early. · Get some physical activity every day, but do not get too tired.  Keep doing the hobbies you enjoy as your energy allows. · Do not smoke. Smoking can make your cancer worse. If you need help quitting, talk to your doctor about stop-smoking programs and medicines. These can increase your chances of quitting for good. · Take steps to control your stress and workload. Learn relaxation techniques. ? Share your feelings. Stress and tension affect our emotions. By expressing your feelings to others, you may be able to understand and cope with them. ? Consider joining a support group. Talking about a problem with your spouse, a good friend, or other people with similar problems is a good way to reduce tension and stress. ? Express yourself through art. Try writing, crafts, dance, or art to relieve stress. Some dance, writing, or art groups may be available just for people who have cancer. ? Be kind to your body and mind. Getting enough sleep, eating a healthy diet, and taking time to do things you enjoy can contribute to an overall feeling of balance in your life and can help reduce stress. ? Get help if you need it. Discuss your concerns with your doctor or counselor. · If you are vomiting or have diarrhea:  ? Drink plenty of fluids to prevent dehydration. Choose water and other caffeine-free clear liquids. If you have kidney, heart, or liver disease and have to limit fluids, talk with your doctor before you increase the amount of fluids you drink. ? When you are able to eat, try clear soups, mild foods, and liquids until all symptoms are gone for 12 to 48 hours. Other good choices include dry toast, crackers, cooked cereal, and gelatin dessert, such as Jell-O.  · If you have not already done so, prepare a list of advance directives. Advance directives are instructions to your doctor and family members about what kind of care you want if you become unable to speak or express yourself. When should you call for help? Call 911 anytime you think you may need emergency care.  For example, call if:    · You passed out (lost consciousness). Call your doctor now or seek immediate medical care if:    · You have a fever.     · You have abnormal bleeding.     · You think you have an infection.     · You have new or worse pain.     · You have new symptoms, such as a cough, belly pain, vomiting, diarrhea, or a rash. Watch closely for changes in your health, and be sure to contact your doctor if:    · You are much more tired than usual.     · You have swollen glands in your armpits, groin, or neck.     · You do not get better as expected. Where can you learn more? Go to http://www.[employer].com/  Enter V321 in the search box to learn more about \"Breast Cancer: Care Instructions. \"  Current as of: December 17, 2020               Content Version: 12.8  © 5383-0052 [employer]. Care instructions adapted under license by [employer] (which disclaims liability or warranty for this information). If you have questions about a medical condition or this instruction, always ask your healthcare professional. [place] [address] any warranty or liability for your use of this information.

## 2021-06-04 DIAGNOSIS — G89.3 CHRONIC PAIN DUE TO NEOPLASM: ICD-10-CM

## 2021-06-04 DIAGNOSIS — C50.511 MALIGNANT NEOPLASM OF LOWER-OUTER QUADRANT OF RIGHT FEMALE BREAST, UNSPECIFIED ESTROGEN RECEPTOR STATUS (HCC): ICD-10-CM

## 2021-06-04 DIAGNOSIS — Z17.0 ESTROGEN RECEPTOR POSITIVE: ICD-10-CM

## 2021-06-04 RX ORDER — LETROZOLE 2.5 MG/1
2.5 TABLET, FILM COATED ORAL DAILY
Qty: 90 TABLET | Refills: 3 | Status: SHIPPED | OUTPATIENT
Start: 2021-06-04 | End: 2021-09-29 | Stop reason: SDUPTHER

## 2021-09-15 ENCOUNTER — HOSPITAL ENCOUNTER (OUTPATIENT)
Dept: LAB | Age: 66
Discharge: HOME | End: 2021-09-15
Payer: MEDICARE

## 2021-09-15 ENCOUNTER — LAB ONLY (OUTPATIENT)
Dept: ONCOLOGY | Age: 66
End: 2021-09-15

## 2021-09-15 DIAGNOSIS — Z79.811 LONG TERM CURRENT USE OF AROMATASE INHIBITOR: ICD-10-CM

## 2021-09-15 DIAGNOSIS — D50.8 IRON DEFICIENCY ANEMIA SECONDARY TO INADEQUATE DIETARY IRON INTAKE: ICD-10-CM

## 2021-09-15 DIAGNOSIS — C50.511 MALIGNANT NEOPLASM OF LOWER-OUTER QUADRANT OF RIGHT FEMALE BREAST, UNSPECIFIED ESTROGEN RECEPTOR STATUS (HCC): Primary | ICD-10-CM

## 2021-09-15 DIAGNOSIS — Z17.0 ESTROGEN RECEPTOR POSITIVE: ICD-10-CM

## 2021-09-15 DIAGNOSIS — C50.511 MALIGNANT NEOPLASM OF LOWER-OUTER QUADRANT OF RIGHT FEMALE BREAST, UNSPECIFIED ESTROGEN RECEPTOR STATUS (HCC): ICD-10-CM

## 2021-09-15 LAB
ALBUMIN SERPL-MCNC: 3.6 G/DL (ref 3.4–5)
ALBUMIN/GLOB SERPL: 1 {RATIO} (ref 0.8–1.7)
ALP SERPL-CCNC: 57 U/L (ref 45–117)
ALT SERPL-CCNC: 20 U/L (ref 13–56)
ANION GAP SERPL CALC-SCNC: 6 MMOL/L (ref 3–18)
AST SERPL-CCNC: 22 U/L (ref 10–38)
BASOPHILS # BLD: 0.1 K/UL (ref 0–0.1)
BASOPHILS NFR BLD: 2 % (ref 0–2)
BILIRUB SERPL-MCNC: 0.4 MG/DL (ref 0.2–1)
BUN SERPL-MCNC: 14 MG/DL (ref 7–18)
BUN/CREAT SERPL: 15 (ref 12–20)
CALCIUM SERPL-MCNC: 9.2 MG/DL (ref 8.5–10.1)
CHLORIDE SERPL-SCNC: 106 MMOL/L (ref 100–111)
CO2 SERPL-SCNC: 27 MMOL/L (ref 21–32)
CREAT SERPL-MCNC: 0.94 MG/DL (ref 0.6–1.3)
DIFFERENTIAL METHOD BLD: ABNORMAL
EOSINOPHIL # BLD: 0.5 K/UL (ref 0–0.4)
EOSINOPHIL NFR BLD: 8 % (ref 0–5)
ERYTHROCYTE [DISTWIDTH] IN BLOOD BY AUTOMATED COUNT: 13.8 % (ref 11.6–14.5)
FERRITIN SERPL-MCNC: 49 NG/ML (ref 8–388)
GLOBULIN SER CALC-MCNC: 3.6 G/DL (ref 2–4)
GLUCOSE SERPL-MCNC: 79 MG/DL (ref 74–99)
HCT VFR BLD AUTO: 33.9 % (ref 35–45)
HGB BLD-MCNC: 10.8 G/DL (ref 12–16)
IRON SATN MFR SERPL: 31 % (ref 20–50)
IRON SERPL-MCNC: 99 UG/DL (ref 50–175)
LYMPHOCYTES # BLD: 1.9 K/UL (ref 0.9–3.6)
LYMPHOCYTES NFR BLD: 31 % (ref 21–52)
MCH RBC QN AUTO: 27.4 PG (ref 24–34)
MCHC RBC AUTO-ENTMCNC: 31.9 G/DL (ref 31–37)
MCV RBC AUTO: 86 FL (ref 78–100)
MONOCYTES # BLD: 0.4 K/UL (ref 0.05–1.2)
MONOCYTES NFR BLD: 6 % (ref 3–10)
NEUTS SEG # BLD: 3.3 K/UL (ref 1.8–8)
NEUTS SEG NFR BLD: 53 % (ref 40–73)
PLATELET # BLD AUTO: 231 K/UL (ref 135–420)
PMV BLD AUTO: 10.7 FL (ref 9.2–11.8)
POTASSIUM SERPL-SCNC: 3.6 MMOL/L (ref 3.5–5.5)
PROT SERPL-MCNC: 7.2 G/DL (ref 6.4–8.2)
RBC # BLD AUTO: 3.94 M/UL (ref 4.2–5.3)
SODIUM SERPL-SCNC: 139 MMOL/L (ref 136–145)
TIBC SERPL-MCNC: 323 UG/DL (ref 250–450)
WBC # BLD AUTO: 6.2 K/UL (ref 4.6–13.2)

## 2021-09-15 PROCEDURE — 36415 COLL VENOUS BLD VENIPUNCTURE: CPT

## 2021-09-15 PROCEDURE — 86300 IMMUNOASSAY TUMOR CA 15-3: CPT

## 2021-09-15 PROCEDURE — 83540 ASSAY OF IRON: CPT

## 2021-09-15 PROCEDURE — 82728 ASSAY OF FERRITIN: CPT

## 2021-09-15 PROCEDURE — 80053 COMPREHEN METABOLIC PANEL: CPT

## 2021-09-15 PROCEDURE — 85025 COMPLETE CBC W/AUTO DIFF WBC: CPT

## 2021-09-16 LAB — CANCER AG27-29 SERPL-ACNC: 23.3 U/ML (ref 0–38.6)

## 2021-09-29 ENCOUNTER — OFFICE VISIT (OUTPATIENT)
Dept: ONCOLOGY | Age: 66
End: 2021-09-29
Payer: MEDICARE

## 2021-09-29 VITALS
WEIGHT: 187 LBS | BODY MASS INDEX: 29.35 KG/M2 | RESPIRATION RATE: 18 BRPM | DIASTOLIC BLOOD PRESSURE: 82 MMHG | TEMPERATURE: 98.2 F | HEART RATE: 96 BPM | HEIGHT: 67 IN | OXYGEN SATURATION: 98 % | SYSTOLIC BLOOD PRESSURE: 122 MMHG

## 2021-09-29 DIAGNOSIS — Z79.811 LONG TERM CURRENT USE OF AROMATASE INHIBITOR: ICD-10-CM

## 2021-09-29 DIAGNOSIS — D50.8 IRON DEFICIENCY ANEMIA SECONDARY TO INADEQUATE DIETARY IRON INTAKE: ICD-10-CM

## 2021-09-29 DIAGNOSIS — M79.18 MYOFASCIAL PAIN: ICD-10-CM

## 2021-09-29 DIAGNOSIS — Z17.0 ESTROGEN RECEPTOR POSITIVE: ICD-10-CM

## 2021-09-29 DIAGNOSIS — C50.511 MALIGNANT NEOPLASM OF LOWER-OUTER QUADRANT OF RIGHT FEMALE BREAST, UNSPECIFIED ESTROGEN RECEPTOR STATUS (HCC): Primary | ICD-10-CM

## 2021-09-29 PROCEDURE — 99214 OFFICE O/P EST MOD 30 MIN: CPT | Performed by: INTERNAL MEDICINE

## 2021-09-29 PROCEDURE — G0463 HOSPITAL OUTPT CLINIC VISIT: HCPCS | Performed by: INTERNAL MEDICINE

## 2021-09-29 RX ORDER — LETROZOLE 2.5 MG/1
2.5 TABLET, FILM COATED ORAL DAILY
Qty: 90 TABLET | Refills: 3 | Status: SHIPPED | OUTPATIENT
Start: 2021-09-29 | End: 2022-01-04 | Stop reason: SDUPTHER

## 2021-09-29 NOTE — PROGRESS NOTES
Hematology/Oncology  Progress Note    Name: Iram Kaufman  : 1955    PCP: Jovita Jackson MD     Ms. Rudi Ragland is a 61 y.o. -American woman with invasive ductal carcinoma the right breast.    Current therapy: Femara 2.5mg PO daily since       Subjective:     Mrs. Rudi Ragland is a 72-year-old -American woman with invasive ductal carcinoma involving the right breast. She was diagnosed in 2018. She reports she continues to tolerate the Femara without significant side effects. She states she is doing her breast self exam on a monthly basis and has no new physical complaints or concerns to report. She continue to report chronic tenderness to the right breast which appeared after surgery. She denies any nipple discharge or worsening pain or any other breast issues. Denied fever, chills, night sweat, unintentional weight loss, skin lumps or bumps, acute bleeding or bruising issues. Denied headache, acute vision change, dizziness, chest pain, worsen shortness of breath, palpitation, productive cough, nausea, vomiting, abdominal pain, altered bowel habits, dysuria, new bone pain or back pain, focal numbness or weakness. Past medical history, family history, and social history: these were reviewed and remains unchanged.     Past Medical History:   Diagnosis Date    Arthritis     Balance problems     patient states no further issues    Breast CA ([facility] [address] 2018    right    Bronchitis     Chronic pain     back    Fibromyalgia 2012    GERD (gastroesophageal reflux disease)     Hiatal Hernia    Hypercholesterolemia     Hypertension     Kidney stones     Low back pain     Lumbar spinal stenosis 2012    Migraine headache     Nervousness     Neurological disease     Osteoarthritis 2012    Other ill-defined conditions(799.89)     neuropathy    Peripheral neuropathy 2012    Post laminectomy syndrome     Postoperative anemia due to acute blood loss 11/30/2012    Ringing in ears     Rotator cuff syndrome     Spinal stenosis     Thoracic spine pain     Vitamin D deficiency 11/30/2012     Past Surgical History:   Procedure Laterality Date    HX MASTECTOMY Right 5/4/2018    NEEDLE LOCALIZED (1000) LUMPECTOMY RIGHT BREAST AND SENTINEL LYMPH NODE BIOPSY RIGHT AXILLA performed by Idalia Lyon MD at [facility] HX ORTHOPAEDIC      foot l heel spur    HX ORTHOPAEDIC      right knee sx    HX OTHER SURGICAL      non cancer cyst removed from right shoulder    NEUROLOGICAL PROCEDURE UNLISTED      Bilateral L3-4 hemilaminotomy and medial facetectomy by Dr. Patrick Schaefer    GA BX/REMV,LYMPH NODE,DEEP AXILL Right 05/04/2018    Dr. Rafael Casas History     Socioeconomic History    Marital status: SINGLE     Spouse name: Not on file    Number of children: Not on file    Years of education: Not on file    Highest education level: Not on file   Occupational History    Not on file   Tobacco Use    Smoking status: Never Smoker    Smokeless tobacco: Never Used   Substance and Sexual Activity    Alcohol use: No    Drug use: No    Sexual activity: Not on file   Other Topics Concern    Not on file   Social History Narrative    Not on file     Social Determinants of Health     Financial Resource Strain:     Difficulty of Paying Living Expenses:    Food Insecurity:     Worried About Running Out of Food in the Last Year:     [address] in the Last Year:    Transportation Needs:     Lack of Transportation (Medical):      Lack of Transportation (Non-Medical):    Physical Activity:     Days of Exercise per Week:     Minutes of Exercise per Session:    Stress:     Feeling of Stress :    Social Connections:     Frequency of Communication with Friends and Family:     Frequency of Social Gatherings with Friends and Family:     Attends Mu-[employer] Services:     Active Member of Clubs or Organizations:     Attends Club or Organization Meetings:  Marital Status:    Intimate Partner Violence:     Fear of Current or Ex-Partner:     Emotionally Abused:     Physically Abused:     Sexually Abused:      Family History   Problem Relation Age of Onset    Hypertension Mother     Arthritis-osteo Mother     Neuropathy Mother     Hypertension Father     Cancer Father         colon    Hypertension Brother     Cancer Brother     Neuropathy Brother     Neuropathy Other     Arthritis-rheumatoid Other     GERD Other     Diabetes Other     Diabetes Maternal Aunt     Diabetes Maternal Uncle     Hypertension Maternal Grandfather      Current Outpatient Medications   Medication Sig Dispense Refill    letrozole (FEMARA) 2.5 mg tablet Take 1 Tablet by mouth daily. 90 Tablet 3    azelastine (OPTIVAR) 0.05 % ophthalmic solution instill 1 drop INTO AFFECTED EYE(S) twice a day      butalbital-acetaminophen-caffeine (FIORICET, ESGIC) -40 mg per tablet take 1 tablet by mouth every 8 hours      pravastatin (PRAVACHOL) 10 mg tablet take 1 tablet by mouth once daily      gabapentin (Neurontin) 400 mg capsule Take 1 Cap by mouth three (3) times daily. Max Daily Amount: 1,200 mg. Indications: neuropathic pain 90 Cap 4    ibuprofen (MOTRIN) 800 mg tablet Take 1 Tab by mouth every eight (8) hours as needed for Pain. Indications: pain 90 Tab 0    ergocalciferol (ERGOCALCIFEROL) 1,250 mcg (50,000 unit) capsule Take 1 Cap by mouth every seven (7) days. 12 Cap 0    albuterol (ACCUNEB) 0.63 mg/3 mL nebulizer solution 0.63 mg by Nebulization route every six (6) hours as needed for Wheezing.  acetaminophen (TYLENOL) 500 mg tablet Take 1 Tab by mouth every six (6) hours as needed for Pain. 120 Tab 0    pantoprazole (PROTONIX) 40 mg tablet Take 40 mg by mouth daily.  esomeprazole (NEXIUM) 40 mg capsule Take 40 mg by mouth daily. 0    fluticasone-vilanterol (BREO ELLIPTA) 200-25 mcg/dose inhaler Take 1 Puff by inhalation daily.       ascorbic acid (VITAMIN C) 250 mg tablet Take 1 Tab by mouth two (2) times daily (with meals). 30 Tab 0    losartan-hydrochlorothiazide (HYZAAR) 100-25 mg per tablet Take 1 Tab by mouth daily. Indications: HYPERTENSION         Review of Systems   Constitutional: Negative for chills, diaphoresis, fever, malaise/fatigue and weight loss. Respiratory: Negative for cough, hemoptysis, shortness of breath and wheezing. Cardiovascular: Negative for chest pain, palpitations and leg swelling. Gastrointestinal: Negative for abdominal pain, diarrhea, heartburn, nausea and vomiting. Genitourinary: Negative for dysuria, frequency, hematuria and urgency. Musculoskeletal: Negative for joint pain and myalgias. Skin: Negative for itching and rash. Neurological: Negative for dizziness, seizures, weakness and headaches. Psychiatric/Behavioral: Negative for depression. The patient does not have insomnia. Objective:     Visit Vitals  /82 (BP 1 Location: Left upper arm, BP Patient Position: Sitting, BP Cuff Size: Adult)   Pulse 96   Temp 98.2 °F (36.8 °C) (Temporal)   Resp 18   Ht 5' 7\" (1.702 m)   Wt 84.8 kg (187 lb)   SpO2 98%   BMI 29.29 kg/m²       ECOG Performance Status (grade): 0  0 - able to carry on all pre-disease activity w/out restriction  1 - restricted but able to carry out light work  2 - ambulatory and can self- care but unable to carry out work  3 - bed or chair >50% of waking hours  4 - completely disable, total care, confined to bed or chair    Physical Exam  Constitutional:       Appearance: Normal appearance. HENT:      Head: Normocephalic and atraumatic. Eyes:      Pupils: Pupils are equal, round, and reactive to light. Cardiovascular:      Rate and Rhythm: Normal rate and regular rhythm. Heart sounds: Normal heart sounds. Pulmonary:      Effort: Pulmonary effort is normal.      Breath sounds: Normal breath sounds.    Chest:      Comments: Left: No dimpling, discoloration, nipple inversion or retractions. No axillary or supraclavicular lymphadenopathy. No mass   Right: No dimpling, discoloration. No axillary or supraclavicular lymphadenopathy. No mass  Abdominal:      General: Bowel sounds are normal.      Palpations: Abdomen is soft. Tenderness: There is no abdominal tenderness. There is no guarding. Musculoskeletal:         General: Normal range of motion. Cervical back: Neck supple. Right lower leg: No edema. Left lower leg: No edema. Skin:     General: Skin is warm. Neurological:      General: No focal deficit present. Mental Status: She is alert and oriented to person, place, and time. Mental status is at baseline. Diagnostics:      No results found for this or any previous visit (from the past 96 hour(s)). Imaging:  No results found for this or any previous visit. Results for orders placed during the hospital encounter of 06/06/19    XR CHEST PA LAT    Narrative  EXAM: Chest Radiographs    INDICATION: Shortness of breath    TECHNIQUE: PA and lateral views of the chest    COMPARISON: 5/1/2018, 11/6/2012 and 3/11/2010    FINDINGS: No pneumothorax identified. The lungs are hyperinflated no acute  infiltrates appreciated. . No effusions appreciated. The cardiomediastinal  silhouette is unremarkable. The pulmonary vascularity is unremarkable. The  osseous structures are unremarkable. Impression  IMPRESSION:  1. No acute infiltrate or effusion. 2.  Mildly hyperinflated lungs suggestive of COPD. Results for orders placed during the hospital encounter of 04/05/18    CT CHEST ABD PELV W CONT    Narrative  HISTORY: Recent diagnosis of breast carcinoma. EXAM: CT chest, abdomen and pelvis. TECHNIQUE: Spiral images of the chest abdomen and pelvis were performed  according to routine protocol from thoracic inlet to the ischial tuberosities  after administration of 100 cc of Isovue-300 contrast media intravenously.   Coronal and sagittal reconstructions were provided for evaluation. Oral contrast  was given. No prior studies for comparison. All CT scans at this facility are performed using dose optimization technique as  appropriate to a performed exam, to include automated exposure control,  adjustment of the mA and/or kV according to patient size (including appropriate  matching for site-specific examinations), or use of iterative reconstruction  technique. FINDINGS:    CT THORAX:    There is no pneumothorax, pneumonia or pleural effusions are seen. Trachea bronchial trees unremarkable. Heart is normal in size without pericardial effusions. Vascular pedicle is unremarkable. No enhancing mass lesion or pathologic lymphadenopathy is noted. Nonspecific bean shaped fatty hilum lymph nodes are noted in bilateral axillary  regions. No lytic or blastic lesions. CT ABDOMEN:    Liver is unremarkable. Portal vein is patent and unremarkable. Hepatic outflow is unremarkable. Gallbladder is unremarkable. Spleen is unremarkable. Pancreas is unremarkable. Adrenal glands are unremarkable bilaterally. Kidneys are unremarkable. No small or large bowel obstruction is seen. No ascites or pneumoperitoneum. Abdominal aorta and IVC are unremarkable. No enhancing mass lesion or pathologic lymphadenopathy seen. No lytic or blastic lesions. CT PELVIS:    Urinary bladder is unremarkable. Uterus is atrophic but unremarkable. Rectus sigmoid region is unremarkable. No free pelvic fluid is seen. No enhancing mass lesion or pathologic lymphadenopathy. No lytic or blastic lesions. Impression  IMPRESSION:    1. Unremarkable study of the chest abdomen and pelvis. Assessment:     1. Malignant neoplasm of lower-outer quadrant of right female breast, unspecified estrogen receptor status ([address]    2. Estrogen receptor positive    3.  Iron deficiency anemia secondary to inadequate dietary iron intake    4. Long term current use of aromatase inhibitor    5. Myofascial pain      Plan:      Invasive ductal carcinoma the right breast  -- Patient was being followed by Dr. Cintia Cartagena who retired. She continue to report chronic tenderness to the right breast which appeared after surgery. She stated she did not have follow up with Dr. Patria Summers as scheduled. -- 03/15/2021 Mammogram showed no evidence of malignancy. BIRADS 2: Benign  In absence of concerning physical findings, recommend annual follow-up. -- Recent labs reviewed with the patient today. Plan:  -- We will refer her back to Dr. Patria Summers. -- The patient was instructed to continue doing her breast exams on a monthly basis. -- The patient was advised to notify us if any new breast pain, new palpable nodule, nipple inversion, spontaneous nipple discharge especially if bloody, breast skin changes, unintentional weight loss, worsen fatigue, new bone pain or back pain, or any concerns. -- I have advised her to follow up her PCP to continue age-appropriate cancer screening. -- I have educated her regarding lifestyle modifications, healthy diet, and physical activity. -- Her next mammogram will be 3/2022         Long term use of aromatase inhibitor:   -- The patient currently taking Femara daily. She states she is tolerating the medication well and this will be continued. --She report that she is up to date with her DEXA scan which was done by her rheumatologist .  -- Advised on adequate amounts of calcium (at least 1,200 mg per day) and vitamin D (800-1,000 IU per day). The higher dose of vitamin D may be needed if vitamin D deficiency. Chronic breast pain s.p surgery and XRT   -- Ultram 50 mg to take every 8 hours as needed. -- Instructed the patient to have a follow-up with pain management clinic for long-term pain control. We will refer her to pain management. -- Refer to Dr. Patria Summers as above.        Chronic anemia, Hx of Iron Deficiency Anemia:   -- We will continue to monitor CBC, Iron profile, ferritin  --CBC, iron and ferritin reviewed with patient today. --The patient will resume Iron pills as instructed. -- We will see the patient back in clinic in about 4 months. Always sooner if required. The patient can have lab done prior to our next clinic visit. Orders Placed This Encounter    letrozole (FEMARA) 2.5 mg tablet     Sig: Take 1 Tablet by mouth daily. Dispense:  90 Tablet     Refill:  3           Ms. Judy Narayan has a reminder for a \"due or due soon\" health maintenance. I have asked that she contact her primary care provider for follow-up on this health maintenance. All of patient's questions answered to their apparent satisfaction. They verbally show understanding and agreement with aforementioned plan. April Forbes MD  9/29/2021          Parts of this document has been produced using Dragon dictation system. Unrecognized errors in transcription may be present. Please do not hesitate to reach out for any questions or clarifications.       CC: Georgie Kearns MD

## 2021-10-27 ENCOUNTER — OFFICE VISIT (OUTPATIENT)
Dept: ORTHOPEDIC SURGERY | Age: 66
End: 2021-10-27
Payer: MEDICARE

## 2021-10-27 VITALS
OXYGEN SATURATION: 100 % | TEMPERATURE: 97.1 F | HEIGHT: 67 IN | WEIGHT: 189 LBS | SYSTOLIC BLOOD PRESSURE: 114 MMHG | HEART RATE: 77 BPM | BODY MASS INDEX: 29.66 KG/M2 | DIASTOLIC BLOOD PRESSURE: 76 MMHG

## 2021-10-27 DIAGNOSIS — M48.062 SPINAL STENOSIS OF LUMBAR REGION WITH NEUROGENIC CLAUDICATION: ICD-10-CM

## 2021-10-27 DIAGNOSIS — M51.36 DDD (DEGENERATIVE DISC DISEASE), LUMBAR: ICD-10-CM

## 2021-10-27 DIAGNOSIS — M47.899 FACET SYNDROME: ICD-10-CM

## 2021-10-27 DIAGNOSIS — M54.50 CHRONIC BILATERAL LOW BACK PAIN WITHOUT SCIATICA: ICD-10-CM

## 2021-10-27 DIAGNOSIS — G89.29 CHRONIC BILATERAL LOW BACK PAIN WITHOUT SCIATICA: Primary | ICD-10-CM

## 2021-10-27 DIAGNOSIS — G89.29 CHRONIC BILATERAL LOW BACK PAIN WITHOUT SCIATICA: ICD-10-CM

## 2021-10-27 DIAGNOSIS — M54.50 CHRONIC BILATERAL LOW BACK PAIN WITHOUT SCIATICA: Primary | ICD-10-CM

## 2021-10-27 PROCEDURE — G8419 CALC BMI OUT NRM PARAM NOF/U: HCPCS | Performed by: PHYSICAL MEDICINE & REHABILITATION

## 2021-10-27 PROCEDURE — G8754 DIAS BP LESS 90: HCPCS | Performed by: PHYSICAL MEDICINE & REHABILITATION

## 2021-10-27 PROCEDURE — 99214 OFFICE O/P EST MOD 30 MIN: CPT | Performed by: PHYSICAL MEDICINE & REHABILITATION

## 2021-10-27 PROCEDURE — G8752 SYS BP LESS 140: HCPCS | Performed by: PHYSICAL MEDICINE & REHABILITATION

## 2021-10-27 PROCEDURE — G8427 DOCREV CUR MEDS BY ELIG CLIN: HCPCS | Performed by: PHYSICAL MEDICINE & REHABILITATION

## 2021-10-27 PROCEDURE — G9899 SCRN MAM PERF RSLTS DOC: HCPCS | Performed by: PHYSICAL MEDICINE & REHABILITATION

## 2021-10-27 PROCEDURE — G8432 DEP SCR NOT DOC, RNG: HCPCS | Performed by: PHYSICAL MEDICINE & REHABILITATION

## 2021-10-27 PROCEDURE — 3017F COLORECTAL CA SCREEN DOC REV: CPT | Performed by: PHYSICAL MEDICINE & REHABILITATION

## 2021-10-27 RX ORDER — ATORVASTATIN CALCIUM 10 MG/1
10 TABLET, FILM COATED ORAL DAILY
COMMUNITY
End: 2022-08-22

## 2021-10-27 RX ORDER — GABAPENTIN 400 MG/1
400 CAPSULE ORAL 3 TIMES DAILY
Qty: 90 CAPSULE | Refills: 4 | Status: SHIPPED | OUTPATIENT
Start: 2021-10-27 | End: 2022-01-24 | Stop reason: SDUPTHER

## 2021-10-27 NOTE — PROGRESS NOTES
[address]  [address], [address]  [address]  Phone: [phone]  Fax: [phone]      Patient: Kari Quezada                                                                              MRN: [id number]        YOB: 1955          AGE: 61 y.o. PCP: Pillo Pearce MD  Date:  10/27/21    Reason for Consultation: Back Pain      HPI:  Kari Quezada is a 61 y.o. female with relevant PMH of L3-4 laminectomy in 2021, and breast CA (right) 2016 s/p lumpectomy and radiation and hormone treatment  who has been followed here for low back pain radiating down bilateral legs. Pain is mainly in her low back. She does have pain radiating into both of her legs but she reports a history of neuropathy. She is taking gabapentin 400mg TID. She has a long history of back pain but reports it has become worse over the past 3-4 months. Denies any precipitating incident or trauma but has had a lot of recent stress. Mother passed away April 2021. Donald Wiley Neurologic symptoms:+ numbness, tingling b/l legs/ No weakness, bowel or bladder changes. No recent falls, ambulates with single prong cane      Location: The pain is located in the low back (90%)  Radiation: The pain does radiate bilateral legs (related to neuropathy per patient ). Pain Score: Currently: 8/10    Quality: Pain is of a Achy, Dull and Tight quality. Aggravating: Pain is exacerbated by walking  Alleviating: The pain is alleviated by lying down    Prior Treatments:   Injections (prior to surgery in 2012)  Physical therapy many years ago  Previous Medications:   Current Medications: gabapentin 400mg TID, diclofenac  Previous work-up has included:   MRI lumbar spine 2015  There are subtle anterolisthesis at L3-4 and L4-5 broad-based disc protrusions as well as posterior facet and ligamentous hypertrophy, left somewhat greater than right at L4-5.   Facet effusions are seen at L3-4. There is associated mild-to-moderate canal stenosis at L3-4 and mild stenosis at L4-5. Moderate  bilateral foraminal stenosis is present at both levels. Possible postoperative changes at L3-4. There is probably little significant interval change.     There is subtle canal encroachment from facet and ligamentous hypertrophy at L2-3 but there is no significant central disc protrusion. Small bilateral foraminal protrusions are present but the exit foramina are patent.     There are moderate-to-severe disc degenerative changes at L5-S1 but canal and foramina are patent. Canal and foramina are patent at the remaining levels.     No cord lesions are seen.   Past Medical History:   Past Medical History:   Diagnosis Date    Arthritis     Balance problems     patient states no further issues    Breast CA ([address] 03/2018    right    Bronchitis     Chronic pain     back    Fibromyalgia 11/29/2012    GERD (gastroesophageal reflux disease)     Hiatal Hernia    Hypercholesterolemia     Hypertension     Kidney stones     Low back pain     Lumbar spinal stenosis 11/25/2012    Migraine headache     Nervousness     Neurological disease     Osteoarthritis 11/25/2012    Other ill-defined conditions(799.89)     neuropathy    Peripheral neuropathy 11/29/2012    Post laminectomy syndrome     Postoperative anemia due to acute blood loss 11/30/2012    Ringing in ears     Rotator cuff syndrome     Spinal stenosis     Thoracic spine pain     Vitamin D deficiency 11/30/2012      Past Surgical History:   Past Surgical History:   Procedure Laterality Date    HX MASTECTOMY Right 5/4/2018    NEEDLE LOCALIZED (1000) LUMPECTOMY RIGHT BREAST AND SENTINEL LYMPH NODE BIOPSY RIGHT AXILLA performed by Alessandro Pineda MD at [address] [place] [address] HX ORTHOPAEDIC      foot l heel spur    HX ORTHOPAEDIC      right knee sx    HX OTHER SURGICAL      non cancer cyst removed from right shoulder    NEUROLOGICAL PROCEDURE UNLISTED      Bilateral L3-4 hemilaminotomy and medial facetectomy by Dr. Albert Strauss    ME BX/REMV,LYMPH Joseph Nails Right 05/04/2018    Dr. Martinez Ranks      SocHx:   Social History     Tobacco Use    Smoking status: Never Smoker    Smokeless tobacco: Never Used   Substance Use Topics    Alcohol use: No      FamHx:? Family History   Problem Relation Age of Onset    Hypertension Mother     Arthritis-osteo Mother     Neuropathy Mother     Hypertension Father     Cancer Father         colon    Hypertension Brother     Cancer Brother     Neuropathy Brother     Neuropathy Other     Arthritis-rheumatoid Other     GERD Other     Diabetes Other     Diabetes Maternal Aunt     Diabetes Maternal Uncle     Hypertension Maternal Grandfather        Current Medications:    Current Outpatient Medications   Medication Sig Dispense Refill    atorvastatin (Lipitor) 10 mg tablet Take 10 mg by mouth daily.  letrozole (FEMARA) 2.5 mg tablet Take 1 Tablet by mouth daily. 90 Tablet 3    butalbital-acetaminophen-caffeine (FIORICET, ESGIC) -40 mg per tablet take 1 tablet by mouth every 8 hours      pravastatin (PRAVACHOL) 10 mg tablet take 1 tablet by mouth once daily      gabapentin (Neurontin) 400 mg capsule Take 1 Cap by mouth three (3) times daily. Max Daily Amount: 1,200 mg. Indications: neuropathic pain 90 Cap 4    ibuprofen (MOTRIN) 800 mg tablet Take 1 Tab by mouth every eight (8) hours as needed for Pain. Indications: pain 90 Tab 0    ergocalciferol (ERGOCALCIFEROL) 1,250 mcg (50,000 unit) capsule Take 1 Cap by mouth every seven (7) days. 12 Cap 0    albuterol (ACCUNEB) 0.63 mg/3 mL nebulizer solution 0.63 mg by Nebulization route every six (6) hours as needed for Wheezing.  acetaminophen (TYLENOL) 500 mg tablet Take 1 Tab by mouth every six (6) hours as needed for Pain. 120 Tab 0    esomeprazole (NEXIUM) 40 mg capsule Take 40 mg by mouth daily.   0    fluticasone-vilanterol (BREO ELLIPTA) 200-25 mcg/dose inhaler Take 1 Puff by inhalation daily.  ascorbic acid (VITAMIN C) 250 mg tablet Take 1 Tab by mouth two (2) times daily (with meals). 30 Tab 0    losartan-hydrochlorothiazide (HYZAAR) 100-25 mg per tablet Take 1 Tab by mouth daily. Indications: HYPERTENSION      azelastine (OPTIVAR) 0.05 % ophthalmic solution instill 1 drop INTO AFFECTED EYE(S) twice a day      pantoprazole (PROTONIX) 40 mg tablet Take 40 mg by mouth daily. Allergies: Allergies   Allergen Reactions    Aspirin Other (comments)     GI Pain    Adhesive Contact Dermatitis    Adhesive Tape-Silicones Other (comments)     blisters    Duloxetine Other (comments)        Review of Systems:   Gen:    Denied fevers, chills, malaise, fatigue, weight changes   Resp: Denied shortness of breath, cough, wheezing   CVS: Denied chest pain, palpitations   : Denied urinary urgency, frequency, incontinence   GI: Denied nausea, vomiting, constipation, diarrhea   Skin: Denied rashes, wounds   Psych: Denied anxiety, depression   Vasc: Denied claudication, ulcers   Hem: Denied easy bruising/bleeding   MSK: See HPI   Neuro: See HPI         Physical Exam     Vital Signs:   Visit Vitals  /76 (BP 1 Location: Left upper arm, BP Patient Position: Sitting, BP Cuff Size: Adult)   Pulse 77   Temp 97.1 °F (36.2 °C) (Skin)   Ht 5' 7\" (1.702 m)   Wt 189 lb (85.7 kg)   SpO2 100% Comment: RA   BMI 29.60 kg/m²      General: ??????? Well nourished and well developed female without any acute distress   Psychiatric: ?  Alert and oriented x 3 with normal mood    HEENT: ???????? Atraumatic   Respiratory:   Breathing non-labored and non dyspneic   CV: ???????????????? Peripheral pulses intact, no peripheral edema   Skin: ????????????? No rashes       Neurologic: ??       Sensation: normal and grossly intact thebilateral, lower extremity(s) except reports diminished below b/l knees  Strength: 5/5 in the b/l LE  Reflexes: reveals 2+ symmetric DTRs throughout except absent b/l achilles  Gait: normal    ?  Tight hamstrings, and hip flexors b/l  Limited lumbar ROM pain with flexion and extension worse with flexion  Seated hip ROM pain free  Pain in low back with JESSICA and FADIR  Negative log roll  Negative straight leg and seated slump     Medical Decision Making:    Images: The imaging results as well as the actual images of the studies below were reviewed and visualized. Labs: The results below were reviewed. MRI lumbar spine 2015  L3/4 laminotomy disc protrusion, facet hypertrophy b/l effusion, mild to moderate central stenosis , moderate b/l foraminal stenosis  L4/5 disc protrusion facet arthritis, LF hypertrophy, moderate b/l foraminal narrowing   L5-S1 DDD      Assessment:   - s/p L3/4 laminectomy in 2012  - low back pain- spondylosis facet arthrosis  -  Peripheral neuropathy      Plan:      -Physical therapy -  Discussed PT, she would prefer to do exercises on her own  -Medications - renew gabapentin 400mg TID. Counseled regarding side effects and appropriate administration of medications.    -Diagnostics/Imaging - x-ray lumbar spine- outside facility.   -Injections - NA  -Lifestyle -Encouraged regular aerobic exercise  -Education - The patient's diagnosis, prognosis and treatment options were discussed today. All questions were answered. F/U - in 12 week(s) or sooner if needed.   Consider MRI lumbar spine          [facility]

## 2021-10-27 NOTE — PROGRESS NOTES
Nickolas Lazo presents today for   Chief Complaint   Patient presents with    Back Pain       Is someone accompanying this pt? no    Is the patient using any DME equipment during OV? no    Depression Screening:  3 most recent PHQ Screens 5/25/2021   Little interest or pleasure in doing things Several days   Feeling down, depressed, irritable, or hopeless Several days   Total Score PHQ 2 2       Learning Assessment:  Learning Assessment 12/17/2015   PRIMARY LEARNER Patient   HIGHEST LEVEL OF EDUCATION - PRIMARY LEARNER  GRADUATED HIGH SCHOOL OR GED   PRIMARY LANGUAGE ENGLISH   LEARNER PREFERENCE PRIMARY OTHER (COMMENT)   ANSWERED BY patient   RELATIONSHIP SELF       Abuse Screening:  Abuse Screening Questionnaire 4/22/2021   Do you ever feel afraid of your partner? N   Are you in a relationship with someone who physically or mentally threatens you? N   Is it safe for you to go home? Y       Fall Risk  Fall Risk Assessment, last 12 mths 4/22/2021   Able to walk? Yes   Fall in past 12 months? 0   Do you feel unsteady? 0   Are you worried about falling 0       OPIOID RISK TOOL  Opioid Risk Tool 7/19/2018   Family history of alcohol abuse? 0   Family history of illegal drug abuse? 0   Family history of prescription drug abuse? 0   Personal history of alcohol abuse? 0   Personal history of illegal drug abuse? 0   Personal history of prescription drug abuse? 0   Age range between 17-45? 0   History of preadolescent sexual abuse? 3   ADD, OCD, bipolar, schizophrenia? 0   Depression? 1   Opioid Risk Total Score 4       Coordination of Care:  1. Have you been to the ER, urgent care clinic since your last visit? no  Hospitalized since your last visit? no    2. Have you seen or consulted any other health care providers outside of the [address] [place] [address] since your last visit? no Include any pap smears or colon screening.  no

## 2021-10-27 NOTE — PATIENT INSTRUCTIONS
Back Stretches: Exercises  Introduction  Here are some examples of exercises for stretching your back. Start each exercise slowly. Ease off the exercise if you start to have pain. Your doctor or physical therapist will tell you when you can start these exercises and which ones will work best for you. How to do the exercises  Overhead stretch    1. Stand comfortably with your feet shoulder-width apart. 2. Looking straight ahead, raise both arms over your head and reach toward the ceiling. Do not allow your head to tilt back. 3. Hold for 15 to 30 seconds, then lower your arms to your sides. 4. Repeat 2 to 4 times. Side stretch    1. Stand comfortably with your feet shoulder-width apart. 2. Raise one arm over your head, and then lean to the other side. 3. Slide your hand down your leg as you let the weight of your arm gently stretch your side muscles. Hold for 15 to 30 seconds. 4. Repeat 2 to 4 times on each side. Press-up    1. Lie on your stomach, supporting your body with your forearms. 2. Press your elbows down into the floor to raise your upper back. As you do this, relax your stomach muscles and allow your back to arch without using your back muscles. As your press up, do not let your hips or pelvis come off the floor. 3. Hold for 15 to 30 seconds, then relax. 4. Repeat 2 to 4 times. Relax and rest    1. Lie on your back with a rolled towel under your neck and a pillow under your knees. Extend your arms comfortably to your sides. 2. Relax and breathe normally. 3. Remain in this position for about 10 minutes. 4. If you can, do this 2 or 3 times each day. Follow-up care is a key part of your treatment and safety. Be sure to make and go to all appointments, and call your doctor if you are having problems. It's also a good idea to know your test results and keep a list of the medicines you take. Where can you learn more?   Go to http://www.gray.com/  Enter Y090 in the search box to learn more about \"Back Stretches: Exercises. \"  Current as of: July 1, 2021               Content Version: 13.0  © 2006-2021 [employer]. Care instructions adapted under license by [employer] (which disclaims liability or warranty for this information). If you have questions about a medical condition or this instruction, always ask your healthcare professional. [address] any warranty or liability for your use of this information. Acute Low Back Pain: Exercises  Introduction  Here are some examples of typical rehabilitation exercises for your condition. Start each exercise slowly. Ease off the exercise if you start to have pain. Your doctor or physical therapist will tell you when you can start these exercises and which ones will work best for you. When you are not being active, find a comfortable position for rest. Some people are comfortable on the floor or a medium-firm bed with a small pillow under their head and another under their knees. Some people prefer to lie on their side with a pillow between their knees. Don't stay in one position for too long. Take short walks (10 to 20 minutes) every 2 to 3 hours. Avoid slopes, hills, and stairs until you feel better. Walk only distances you can manage without pain, especially leg pain. How to do the exercises  Back stretches    1. Get down on your hands and knees on the floor. 2. Relax your head and allow it to droop. Round your back up toward the ceiling until you feel a nice stretch in your upper, middle, and lower back. Hold this stretch for as long as it feels comfortable, or about 15 to 30 seconds. 3. Return to the starting position with a flat back while you are on your hands and knees. 4. Let your back sway by pressing your stomach toward the floor. Lift your buttocks toward the ceiling. 5. Hold this position for 15 to 30 seconds. 6. Repeat 2 to 4 times.   Follow-up care is a key part of your treatment and safety. Be sure to make and go to all appointments, and call your doctor if you are having problems. It's also a good idea to know your test results and keep a list of the medicines you take. Where can you learn more? Go to http://www.gray.com/  Enter Z071 in the search box to learn more about \"Acute Low Back Pain: Exercises. \"  Current as of: December 17, 2020               Content Version: 13.0  © 2006-2021 [employer]. Care instructions adapted under license by [employer] (which disclaims liability or warranty for this information). If you have questions about a medical condition or this instruction, always ask your healthcare professional. [address] any warranty or liability for your use of this information. Low Back Arthritis: Exercises  Introduction  Here are some examples of typical rehabilitation exercises for your condition. Start each exercise slowly. Ease off the exercise if you start to have pain. Your doctor or physical therapist will tell you when you can start these exercises and which ones will work best for you. When you are not being active, find a comfortable position for rest. Some people are comfortable on the floor or a medium-firm bed with a small pillow under their head and another under their knees. Some people prefer to lie on their side with a pillow between their knees. Don't stay in one position for too long. Take short walks (10 to 20 minutes) every 2 to 3 hours. Avoid slopes, hills, and stairs until you feel better. Walk only distances you can manage without pain, especially leg pain. How to do the exercises  Pelvic tilt    1. Lie on your back with your knees bent. 2. \"Brace\" your stomach--tighten your muscles by pulling in and imagining your belly button moving toward your spine. 3. Press your lower back into the floor.  You should feel your hips and pelvis rock back.  4. Hold for 6 seconds while breathing smoothly. 5. Relax and allow your pelvis and hips to rock forward. 6. Repeat 8 to 12 times. Back stretches    1. Get down on your hands and knees on the floor. 2. Relax your head and allow it to droop. Round your back up toward the ceiling until you feel a nice stretch in your upper, middle, and lower back. Hold this stretch for as long as it feels comfortable, or about 15 to 30 seconds. 3. Return to the starting position with a flat back while you are on your hands and knees. 4. Let your back sway by pressing your stomach toward the floor. Lift your buttocks toward the ceiling. 5. Hold this position for 15 to 30 seconds. 6. Repeat 2 to 4 times. Follow-up care is a key part of your treatment and safety. Be sure to make and go to all appointments, and call your doctor if you are having problems. It's also a good idea to know your test results and keep a list of the medicines you take. Where can you learn more? Go to http://www.castle.com/  Enter T094 in the search box to learn more about \"Low Back Arthritis: Exercises. \"  Current as of: July 1, 2021               Content Version: 13.0  © 2006-2021 [employer]. Care instructions adapted under license by [employer] (which disclaims liability or warranty for this information). If you have questions about a medical condition or this instruction, always ask your healthcare professional. [address] any warranty or liability for your use of this information.

## 2021-10-28 ENCOUNTER — HOSPITAL ENCOUNTER (OUTPATIENT)
Dept: GENERAL RADIOLOGY | Age: 66
Discharge: HOME | End: 2021-10-28
Payer: MEDICARE

## 2021-10-28 PROCEDURE — 72100 X-RAY EXAM L-S SPINE 2/3 VWS: CPT

## 2021-11-01 ENCOUNTER — TELEPHONE (OUTPATIENT)
Dept: ORTHOPEDIC SURGERY | Age: 66
End: 2021-11-01

## 2021-11-01 DIAGNOSIS — M51.36 DDD (DEGENERATIVE DISC DISEASE), LUMBAR: ICD-10-CM

## 2021-11-01 DIAGNOSIS — M47.899 FACET SYNDROME: ICD-10-CM

## 2021-11-01 DIAGNOSIS — M48.062 SPINAL STENOSIS OF LUMBAR REGION WITH NEUROGENIC CLAUDICATION: ICD-10-CM

## 2021-11-01 DIAGNOSIS — G89.29 CHRONIC BILATERAL LOW BACK PAIN WITHOUT SCIATICA: Primary | ICD-10-CM

## 2021-11-01 DIAGNOSIS — M54.50 CHRONIC BILATERAL LOW BACK PAIN WITHOUT SCIATICA: Primary | ICD-10-CM

## 2021-11-01 NOTE — TELEPHONE ENCOUNTER
Patient called for Maribell Resendiz. Patient said her back is really bothering her. Patient is asking if she can get a Back Brace until she see Maribell Resendiz at her next appt in Jan.   Patient said she had her xray done. Patient tel. [phone]. Note : patient last seen 10/27/21. Next appt on 01824/2022.

## 2021-11-01 NOTE — TELEPHONE ENCOUNTER
I called and spoke to the pt. The pt was identified using 2 pt identifiers. She was informed that Dr. Herbert Palmer is not in the office today but will be back tomorrow. Her message will be forwarded to her for review. Pt's new x-ray results are in connect care for the provider to review. The pt will be contacted by me or the provider once she has responded to the message. She verbalized understanding and has no questions or concerns at this time.

## 2021-11-02 NOTE — TELEPHONE ENCOUNTER
Returned call. She would like a brace, if we take her insurance can you help her get an appointment to come and and we can fit it?   If we do not take her insurance I will order the brace and we can send her a list of places to try

## 2021-11-03 ENCOUNTER — OFFICE VISIT (OUTPATIENT)
Dept: SURGERY | Age: 66
End: 2021-11-03
Payer: MEDICARE

## 2021-11-03 VITALS
BODY MASS INDEX: 29.51 KG/M2 | HEART RATE: 100 BPM | DIASTOLIC BLOOD PRESSURE: 76 MMHG | SYSTOLIC BLOOD PRESSURE: 130 MMHG | OXYGEN SATURATION: 100 % | HEIGHT: 67 IN | WEIGHT: 188 LBS | RESPIRATION RATE: 18 BRPM | TEMPERATURE: 97.8 F

## 2021-11-03 DIAGNOSIS — Z12.39 ENCOUNTER FOR BREAST CANCER SCREENING USING NON-MAMMOGRAM MODALITY: ICD-10-CM

## 2021-11-03 DIAGNOSIS — Z12.31 BREAST CANCER SCREENING BY MAMMOGRAM: ICD-10-CM

## 2021-11-03 DIAGNOSIS — Z85.3 HISTORY OF RIGHT BREAST CANCER: Primary | ICD-10-CM

## 2021-11-03 PROCEDURE — 99213 OFFICE O/P EST LOW 20 MIN: CPT | Performed by: SURGERY

## 2021-11-03 PROCEDURE — G8754 DIAS BP LESS 90: HCPCS | Performed by: SURGERY

## 2021-11-03 PROCEDURE — G8432 DEP SCR NOT DOC, RNG: HCPCS | Performed by: SURGERY

## 2021-11-03 PROCEDURE — G8427 DOCREV CUR MEDS BY ELIG CLIN: HCPCS | Performed by: SURGERY

## 2021-11-03 PROCEDURE — G8752 SYS BP LESS 140: HCPCS | Performed by: SURGERY

## 2021-11-03 PROCEDURE — 3017F COLORECTAL CA SCREEN DOC REV: CPT | Performed by: SURGERY

## 2021-11-03 PROCEDURE — G9899 SCRN MAM PERF RSLTS DOC: HCPCS | Performed by: SURGERY

## 2021-11-03 PROCEDURE — G8419 CALC BMI OUT NRM PARAM NOF/U: HCPCS | Performed by: SURGERY

## 2021-11-03 RX ORDER — IBUPROFEN 800 MG/1
800 TABLET ORAL
Qty: 40 TABLET | Refills: 2 | Status: SHIPPED | OUTPATIENT
Start: 2021-11-03

## 2021-11-03 NOTE — TELEPHONE ENCOUNTER
I called and spoke to the pt. The pt was identified using 2 pt identifiers. She was made aware that the provider can do an order for the back brace. With her insurance, we will give her a list of places that are covered. The pt will need to come by the office to pick this up. She verbalized understanding and has no other questions or concerns. Order for LSO placed per provider's verbal order. The order was VORB'd.

## 2021-11-03 NOTE — PROGRESS NOTES
Dianelys Vallejo is a 61 y.o. female  Chief Complaint   Patient presents with    Follow-up     right breast tenderness. Last mammo bilat screen 3/15/2021 Bir[place] 2 Benign. 1. Have you been to the ER, urgent care clinic since your last visit? Hospitalized since your last visit? No    2. Have you seen or consulted any other health care providers outside of the [address] [place] [address] since your last visit? Include any pap smears or colon screening.  No

## 2021-11-05 NOTE — PATIENT INSTRUCTIONS

## 2021-11-05 NOTE — PROGRESS NOTES
[facility]  General Surgery    Subjective:  Patient presents today with complaints of tenderness in the right lateral breast.  Her last mammogram was in March 2021. She is due for mammogram March 16, 2022. She has a history of invasive ductal adenocarcinoma ER/ND positive HER-2 negative right breast 3 years ago. She denies any unintentional weight loss or nipple drainage or discharge or skin changes or masses in the breast.  She states that she used ibuprofen previously when she had tenderness in the breast which gave relief. She has not been using the this time. Objective:  Vitals:    11/03/21 1434   BP: 130/76   Pulse: 100   Resp: 18   Temp: 97.8 °F (36.6 °C)   TempSrc: Temporal   SpO2: 100%   Weight: 85.3 kg (188 lb)   Height: 5' 7\" (1.702 m)       Physical Exam:    General: Awake and alert, oriented x4, no apparent distress   Breasts:    Left: No dimpling, discoloration, nipple inversion or retractions. No axillary or supraclavicular lymphadenopathy. No mass   Right: No dimpling, discoloration, nipple inversion or retractions. No axillary or supraclavicular lymphadenopathy. No mass. Tenderness of the upper and lower outer periphery of the breast without any masses. Current Medications:  Current Outpatient Medications   Medication Sig Dispense Refill    ibuprofen (MOTRIN) 800 mg tablet Take 1 Tablet by mouth three (3) times daily as needed for Pain. 40 Tablet 2    atorvastatin (Lipitor) 10 mg tablet Take 10 mg by mouth daily.  gabapentin (Neurontin) 400 mg capsule Take 1 Capsule by mouth three (3) times daily. Max Daily Amount: 1,200 mg. Indications: neuropathic pain 90 Capsule 4    letrozole (FEMARA) 2.5 mg tablet Take 1 Tablet by mouth daily.  90 Tablet 3    butalbital-acetaminophen-caffeine (FIORICET, ESGIC) -40 mg per tablet take 1 tablet by mouth every 8 hours      ergocalciferol (ERGOCALCIFEROL) 1,250 mcg (50,000 unit) capsule Take 1 Cap by mouth every seven (7) days. 12 Cap 0    albuterol (ACCUNEB) 0.63 mg/3 mL nebulizer solution 0.63 mg by Nebulization route every six (6) hours as needed for Wheezing.  acetaminophen (TYLENOL) 500 mg tablet Take 1 Tab by mouth every six (6) hours as needed for Pain. 120 Tab 0    esomeprazole (NEXIUM) 40 mg capsule Take 40 mg by mouth daily. 0    fluticasone-vilanterol (BREO ELLIPTA) 200-25 mcg/dose inhaler Take 1 Puff by inhalation daily.  ascorbic acid (VITAMIN C) 250 mg tablet Take 1 Tab by mouth two (2) times daily (with meals). 30 Tab 0    losartan-hydrochlorothiazide (HYZAAR) 100-25 mg per tablet Take 1 Tab by mouth daily. Indications: HYPERTENSION         Chart and notes reviewed. Data reviewed. I have evaluated and examined the patient. Impression and plan:  Patient is 3 years out from right lumpectomy and sentinel lymph node biopsy for ER/OK positive HER-2 negative right breast cancer. Continue monthly self breast exam  Bilateral 3D screening mammography March 16, 2022  Ibuprofen 800 mg 3 times daily as needed for breast pain  I encouraged the patient to get fitted for a bra to make sure that her pain is not mechanical from her bra.      Terrall Dakin, MD

## 2022-01-04 DIAGNOSIS — C50.511 MALIGNANT NEOPLASM OF LOWER-OUTER QUADRANT OF RIGHT FEMALE BREAST, UNSPECIFIED ESTROGEN RECEPTOR STATUS (HCC): ICD-10-CM

## 2022-01-04 DIAGNOSIS — Z17.0 ESTROGEN RECEPTOR POSITIVE: ICD-10-CM

## 2022-01-05 RX ORDER — LETROZOLE 2.5 MG/1
2.5 TABLET, FILM COATED ORAL DAILY
Qty: 90 TABLET | Refills: 3 | Status: SHIPPED | OUTPATIENT
Start: 2022-01-05 | End: 2022-02-02 | Stop reason: SDUPTHER

## 2022-01-19 ENCOUNTER — LAB ONLY (OUTPATIENT)
Dept: ONCOLOGY | Age: 67
End: 2022-01-19

## 2022-01-19 ENCOUNTER — HOSPITAL ENCOUNTER (OUTPATIENT)
Dept: LAB | Age: 67
Discharge: HOME | End: 2022-01-19
Payer: MEDICARE

## 2022-01-19 DIAGNOSIS — D50.8 IRON DEFICIENCY ANEMIA SECONDARY TO INADEQUATE DIETARY IRON INTAKE: ICD-10-CM

## 2022-01-19 DIAGNOSIS — C50.511 MALIGNANT NEOPLASM OF LOWER-OUTER QUADRANT OF RIGHT BREAST OF FEMALE, ESTROGEN RECEPTOR POSITIVE (HCC): ICD-10-CM

## 2022-01-19 DIAGNOSIS — Z17.0 MALIGNANT NEOPLASM OF LOWER-OUTER QUADRANT OF RIGHT BREAST OF FEMALE, ESTROGEN RECEPTOR POSITIVE (HCC): ICD-10-CM

## 2022-01-19 DIAGNOSIS — Z79.811 LONG TERM CURRENT USE OF AROMATASE INHIBITOR: ICD-10-CM

## 2022-01-19 DIAGNOSIS — C50.511 MALIGNANT NEOPLASM OF LOWER-OUTER QUADRANT OF RIGHT FEMALE BREAST, UNSPECIFIED ESTROGEN RECEPTOR STATUS (HCC): ICD-10-CM

## 2022-01-19 DIAGNOSIS — Z17.0 ESTROGEN RECEPTOR POSITIVE: ICD-10-CM

## 2022-01-19 DIAGNOSIS — C50.511 MALIGNANT NEOPLASM OF LOWER-OUTER QUADRANT OF RIGHT FEMALE BREAST, UNSPECIFIED ESTROGEN RECEPTOR STATUS (HCC): Primary | ICD-10-CM

## 2022-01-19 LAB
ALBUMIN SERPL-MCNC: 3.7 G/DL (ref 3.4–5)
ALBUMIN/GLOB SERPL: 1 {RATIO} (ref 0.8–1.7)
ALP SERPL-CCNC: 68 U/L (ref 45–117)
ALT SERPL-CCNC: 19 U/L (ref 13–56)
ANION GAP SERPL CALC-SCNC: 6 MMOL/L (ref 3–18)
AST SERPL-CCNC: 19 U/L (ref 10–38)
BASOPHILS # BLD: 0.1 K/UL (ref 0–0.1)
BASOPHILS NFR BLD: 1 % (ref 0–2)
BILIRUB SERPL-MCNC: 0.5 MG/DL (ref 0.2–1)
BUN SERPL-MCNC: 12 MG/DL (ref 7–18)
BUN/CREAT SERPL: 12 (ref 12–20)
CALCIUM SERPL-MCNC: 9.5 MG/DL (ref 8.5–10.1)
CHLORIDE SERPL-SCNC: 106 MMOL/L (ref 100–111)
CO2 SERPL-SCNC: 29 MMOL/L (ref 21–32)
CREAT SERPL-MCNC: 0.98 MG/DL (ref 0.6–1.3)
DIFFERENTIAL METHOD BLD: ABNORMAL
EOSINOPHIL # BLD: 0.2 K/UL (ref 0–0.4)
EOSINOPHIL NFR BLD: 3 % (ref 0–5)
ERYTHROCYTE [DISTWIDTH] IN BLOOD BY AUTOMATED COUNT: 13.4 % (ref 11.6–14.5)
FERRITIN SERPL-MCNC: 50 NG/ML (ref 8–388)
GLOBULIN SER CALC-MCNC: 3.6 G/DL (ref 2–4)
GLUCOSE SERPL-MCNC: 77 MG/DL (ref 74–99)
HCT VFR BLD AUTO: 33.3 % (ref 35–45)
HGB BLD-MCNC: 10.8 G/DL (ref 12–16)
IMM GRANULOCYTES # BLD AUTO: 0 K/UL (ref 0–0.04)
IMM GRANULOCYTES NFR BLD AUTO: 0 % (ref 0–0.5)
IRON SATN MFR SERPL: 27 % (ref 20–50)
IRON SERPL-MCNC: 86 UG/DL (ref 50–175)
LYMPHOCYTES # BLD: 2.4 K/UL (ref 0.9–3.6)
LYMPHOCYTES NFR BLD: 38 % (ref 21–52)
MCH RBC QN AUTO: 27.3 PG (ref 24–34)
MCHC RBC AUTO-ENTMCNC: 32.4 G/DL (ref 31–37)
MCV RBC AUTO: 84.1 FL (ref 78–100)
MONOCYTES # BLD: 0.4 K/UL (ref 0.05–1.2)
MONOCYTES NFR BLD: 6 % (ref 3–10)
NEUTS SEG # BLD: 3.3 K/UL (ref 1.8–8)
NEUTS SEG NFR BLD: 52 % (ref 40–73)
NRBC # BLD: 0 K/UL (ref 0–0.01)
NRBC BLD-RTO: 0 PER 100 WBC
PLATELET # BLD AUTO: 234 K/UL (ref 135–420)
PMV BLD AUTO: 10.9 FL (ref 9.2–11.8)
POTASSIUM SERPL-SCNC: 3.5 MMOL/L (ref 3.5–5.5)
PROT SERPL-MCNC: 7.3 G/DL (ref 6.4–8.2)
RBC # BLD AUTO: 3.96 M/UL (ref 4.2–5.3)
SODIUM SERPL-SCNC: 141 MMOL/L (ref 136–145)
TIBC SERPL-MCNC: 319 UG/DL (ref 250–450)
WBC # BLD AUTO: 6.3 K/UL (ref 4.6–13.2)

## 2022-01-19 PROCEDURE — 85025 COMPLETE CBC W/AUTO DIFF WBC: CPT

## 2022-01-19 PROCEDURE — 80053 COMPREHEN METABOLIC PANEL: CPT

## 2022-01-19 PROCEDURE — 86300 IMMUNOASSAY TUMOR CA 15-3: CPT

## 2022-01-19 PROCEDURE — 83540 ASSAY OF IRON: CPT

## 2022-01-19 PROCEDURE — 36415 COLL VENOUS BLD VENIPUNCTURE: CPT

## 2022-01-19 PROCEDURE — 82728 ASSAY OF FERRITIN: CPT

## 2022-01-20 LAB — CANCER AG27-29 SERPL-ACNC: 26.9 U/ML (ref 0–38.6)

## 2022-01-24 ENCOUNTER — OFFICE VISIT (OUTPATIENT)
Dept: ORTHOPEDIC SURGERY | Age: 67
End: 2022-01-24
Payer: MEDICARE

## 2022-01-24 VITALS
WEIGHT: 188 LBS | HEART RATE: 78 BPM | OXYGEN SATURATION: 97 % | BODY MASS INDEX: 29.51 KG/M2 | TEMPERATURE: 97.7 F | HEIGHT: 67 IN

## 2022-01-24 DIAGNOSIS — M47.899 FACET SYNDROME: ICD-10-CM

## 2022-01-24 DIAGNOSIS — M48.062 SPINAL STENOSIS OF LUMBAR REGION WITH NEUROGENIC CLAUDICATION: ICD-10-CM

## 2022-01-24 DIAGNOSIS — M51.36 DDD (DEGENERATIVE DISC DISEASE), LUMBAR: ICD-10-CM

## 2022-01-24 PROBLEM — E78.5 HYPERLIPIDEMIA: Status: ACTIVE | Noted: 2018-04-18

## 2022-01-24 PROBLEM — G43.709 NON-REFRACTORY CHRONIC MIGRAINE WITHOUT AURA: Status: ACTIVE | Noted: 2017-04-26

## 2022-01-24 PROBLEM — R07.9 CHEST PAIN: Status: ACTIVE | Noted: 2022-01-24

## 2022-01-24 PROBLEM — R53.83 FATIGUE: Status: ACTIVE | Noted: 2019-11-26

## 2022-01-24 PROBLEM — R42 DIZZINESS AND GIDDINESS: Status: ACTIVE | Noted: 2017-04-26

## 2022-01-24 PROBLEM — M89.9 DISORDER OF BONE: Status: ACTIVE | Noted: 2018-03-01

## 2022-01-24 PROBLEM — M47.817 LUMBOSACRAL SPONDYLOSIS WITHOUT MYELOPATHY: Status: ACTIVE | Noted: 2018-03-01

## 2022-01-24 PROBLEM — R73.03 PREDIABETES: Status: ACTIVE | Noted: 2017-08-30

## 2022-01-24 PROBLEM — M25.562 PAIN IN LEFT KNEE: Status: ACTIVE | Noted: 2022-01-24

## 2022-01-24 PROBLEM — L90.5 SCAR CONDITIONS AND FIBROSIS OF SKIN: Status: ACTIVE | Noted: 2018-02-01

## 2022-01-24 PROBLEM — G60.9 HEREDITARY AND IDIOPATHIC NEUROPATHY, UNSPECIFIED: Status: ACTIVE | Noted: 2018-03-01

## 2022-01-24 PROBLEM — R30.0 DYSURIA: Status: ACTIVE | Noted: 2022-01-24

## 2022-01-24 PROCEDURE — 3017F COLORECTAL CA SCREEN DOC REV: CPT | Performed by: PHYSICAL MEDICINE & REHABILITATION

## 2022-01-24 PROCEDURE — G8427 DOCREV CUR MEDS BY ELIG CLIN: HCPCS | Performed by: PHYSICAL MEDICINE & REHABILITATION

## 2022-01-24 PROCEDURE — G8756 NO BP MEASURE DOC: HCPCS | Performed by: PHYSICAL MEDICINE & REHABILITATION

## 2022-01-24 PROCEDURE — G8432 DEP SCR NOT DOC, RNG: HCPCS | Performed by: PHYSICAL MEDICINE & REHABILITATION

## 2022-01-24 PROCEDURE — G9899 SCRN MAM PERF RSLTS DOC: HCPCS | Performed by: PHYSICAL MEDICINE & REHABILITATION

## 2022-01-24 PROCEDURE — G8419 CALC BMI OUT NRM PARAM NOF/U: HCPCS | Performed by: PHYSICAL MEDICINE & REHABILITATION

## 2022-01-24 PROCEDURE — 99214 OFFICE O/P EST MOD 30 MIN: CPT | Performed by: PHYSICAL MEDICINE & REHABILITATION

## 2022-01-24 RX ORDER — GABAPENTIN 400 MG/1
400 CAPSULE ORAL 3 TIMES DAILY
Qty: 90 CAPSULE | Refills: 4 | Status: SHIPPED | OUTPATIENT
Start: 2022-01-24 | End: 2022-04-25 | Stop reason: SDUPTHER

## 2022-01-24 RX ORDER — RIZATRIPTAN BENZOATE 10 MG/1
TABLET ORAL
COMMUNITY
Start: 2021-09-09 | End: 2022-04-25

## 2022-01-24 RX ORDER — PRAVASTATIN SODIUM 10 MG/1
10 TABLET ORAL DAILY
COMMUNITY
Start: 2022-01-06 | End: 2022-04-25

## 2022-01-24 RX ORDER — OMEPRAZOLE 20 MG/1
CAPSULE, DELAYED RELEASE ORAL
COMMUNITY
Start: 2022-01-10

## 2022-01-24 RX ORDER — AZELASTINE HYDROCHLORIDE 0.5 MG/ML
SOLUTION/ DROPS OPHTHALMIC
COMMUNITY
Start: 2021-12-12

## 2022-01-24 NOTE — PROGRESS NOTES
[address]  [address], [address]  [place], [address] [place] [address] Street  Phone: [phone]  Fax: [phone]      Patient: Guadalupe Ackerman                                                                              MRN: [id number]        YOB: 1955          AGE: 61 y.o. PCP: Michelle Farfan MD  Date:  01/24/22    Reason for Consultation: Back Pain      HPI:  Guadalupe Sender is a 61 y.o. female with relevant PMH of L3-4 laminectomy in 2021, and breast CA (right) 2016 s/p lumpectomy and radiation and hormone treatment  who has been followed here for low back pain radiating down bilateral legs. Pain is mainly in her low back. She does have pain radiating into both of her legs but she reports a history of neuropathy. She is taking gabapentin 400mg TID. She has a long history of back pain but reports it has become worse over the past 6 months. X-rays demonstrate L3/4 anterolisthesis with facet arthropathy, with DDD L5/S1. Pain is unchanged    Denies any precipitating incident or trauma but has had a lot of recent stress. Mother passed away April 2021. Neurologic symptoms:+ numbness, tingling b/l legs/ No weakness, bowel or bladder changes. No recent falls, ambulates with single prong cane      Location: The pain is located in the low back (90%)  Radiation: The pain does radiate bilateral legs (related to neuropathy per patient ). Pain Score: Currently: 7/10    Quality: Pain is of a Achy, Dull and Tight quality. Aggravating: Pain is exacerbated by walking  Alleviating:  The pain is alleviated by lying down    Prior Treatments:   Injections (prior to surgery in 2012)  Physical therapy many years ago  Previous Medications:   Current Medications: gabapentin 400mg TID, diclofenac  Previous work-up has included:   MRI lumbar spine 2015  There are subtle anterolisthesis at L3-4 and L4-5 broad-based disc protrusions as well as posterior facet and ligamentous hypertrophy, left somewhat greater than right at L4-5. Facet effusions are seen at L3-4. There is associated mild-to-moderate canal stenosis at L3-4 and mild stenosis at L4-5. Moderate  bilateral foraminal stenosis is present at both levels. Possible postoperative changes at L3-4. There is probably little significant interval change.     There is subtle canal encroachment from facet and ligamentous hypertrophy at L2-3 but there is no significant central disc protrusion. Small bilateral foraminal protrusions are present but the exit foramina are patent.     There are moderate-to-severe disc degenerative changes at L5-S1 but canal and foramina are patent. Canal and foramina are patent at the remaining levels.     No cord lesions are seen. XR Results (most recent):  Results from Orders Only encounter on 10/27/21    XR SPINE LUMB 2 OR 3 V    Narrative  Lumbar spine -3 Views    HISTORY: Chronic bilateral low back pain without sciatica. History of lumbar  spine surgery with pain. COMPARISON: Radiographs 11/16/2009, MR lumbar spine 8/24/2015. FINDINGS: Since 2009, L3-L4 grade I anterolisthesis has developed. It is present  and is similar versus mildly worsened compared to 2015 MRI comparison. Its disc  space is maintained. Chronic diffuse diffuse disc space narrowing at L5-S1. Chronic left greater than right lower lumbar facet hypertrophy. Impression  1. Progressing grade I anterolisthesis at L3-L4 since remote studies. 2. Chronic L5-S1 degenerative disc disease and lower lumbar facet hypertrophy.       Past Medical History:   Past Medical History:   Diagnosis Date    Arthritis     Balance problems     patient states no further issues    Breast CA ([address] 03/2018    right    Bronchitis     Chronic pain     back    Fibromyalgia 11/29/2012    GERD (gastroesophageal reflux disease)     Hiatal Hernia    Hypercholesterolemia     Hypertension     Kidney stones     Low back pain  Lumbar spinal stenosis 11/25/2012    Migraine headache     Nervousness     Neurological disease     Osteoarthritis 11/25/2012    Other ill-defined conditions(799.89)     neuropathy    Peripheral neuropathy 11/29/2012    Post laminectomy syndrome     Postoperative anemia due to acute blood loss 11/30/2012    Ringing in ears     Rotator cuff syndrome     Spinal stenosis     Thoracic spine pain     Vitamin D deficiency 11/30/2012      Past Surgical History:   Past Surgical History:   Procedure Laterality Date    HX MASTECTOMY Right 5/4/2018    NEEDLE LOCALIZED (1000) LUMPECTOMY RIGHT BREAST AND SENTINEL LYMPH NODE BIOPSY RIGHT AXILLA performed by Bethanie Limon MD at [address] [place] [address] HX ORTHOPAEDIC      foot l heel spur    HX ORTHOPAEDIC      right knee sx    HX OTHER SURGICAL      non cancer cyst removed from right shoulder    NEUROLOGICAL PROCEDURE UNLISTED      Bilateral L3-4 hemilaminotomy and medial facetectomy by Dr. Felisha Berrios BX/REMV,LYMPH NODE,DEEP AXILL Right 05/04/2018    Dr. Toño Martin      SocHx:   Social History     Tobacco Use    Smoking status: Never Smoker    Smokeless tobacco: Never Used   Substance Use Topics    Alcohol use: No      FamHx:?    Family History   Problem Relation Age of Onset    Hypertension Mother     OSTEOARTHRITIS Mother     Neuropathy Mother     Hypertension Father     Cancer Father         colon    Hypertension Brother     Cancer Brother     Neuropathy Brother     Neuropathy Other     Arthritis-rheumatoid Other     GERD Other     Diabetes Other     Diabetes Maternal Aunt     Diabetes Maternal Uncle     Hypertension Maternal Grandfather        Current Medications:    Current Outpatient Medications   Medication Sig Dispense Refill    azelastine (OPTIVAR) 0.05 % ophthalmic solution instill 1 drop INTO AFFECTED EYE(S) twice a day      omeprazole (PRILOSEC) 20 mg capsule TAKE 1 CAPSULE BY MOUTH IN THE MORNING 30 MINUTES PRIOR TO BREAKFAST      pravastatin (PRAVACHOL) 10 mg tablet Take 10 mg by mouth daily.  letrozole (FEMARA) 2.5 mg tablet Take 1 Tablet by mouth daily. 90 Tablet 3    ibuprofen (MOTRIN) 800 mg tablet Take 1 Tablet by mouth three (3) times daily as needed for Pain. 40 Tablet 2    atorvastatin (Lipitor) 10 mg tablet Take 10 mg by mouth daily.  gabapentin (Neurontin) 400 mg capsule Take 1 Capsule by mouth three (3) times daily. Max Daily Amount: 1,200 mg. Indications: neuropathic pain 90 Capsule 4    butalbital-acetaminophen-caffeine (FIORICET, ESGIC) -40 mg per tablet take 1 tablet by mouth every 8 hours      ergocalciferol (ERGOCALCIFEROL) 1,250 mcg (50,000 unit) capsule Take 1 Cap by mouth every seven (7) days. 12 Cap 0    albuterol (ACCUNEB) 0.63 mg/3 mL nebulizer solution 0.63 mg by Nebulization route every six (6) hours as needed for Wheezing.  acetaminophen (TYLENOL) 500 mg tablet Take 1 Tab by mouth every six (6) hours as needed for Pain. 120 Tab 0    fluticasone-vilanterol (BREO ELLIPTA) 200-25 mcg/dose inhaler Take 1 Puff by inhalation daily.  ascorbic acid (VITAMIN C) 250 mg tablet Take 1 Tab by mouth two (2) times daily (with meals). 30 Tab 0    losartan-hydrochlorothiazide (HYZAAR) 100-25 mg per tablet Take 1 Tab by mouth daily. Indications: HYPERTENSION      rizatriptan (MAXALT) 10 mg tablet 1 tablet , can take another tablet after 2 hours, 2 tabs only in 24 hours      esomeprazole (NEXIUM) 40 mg capsule Take 40 mg by mouth daily. (Patient not taking: Reported on 1/24/2022)  0      Allergies:     Allergies   Allergen Reactions    Aspirin Other (comments)     GI Pain    Adhesive Contact Dermatitis    Adhesive Tape-Silicones Other (comments)     blisters    Duloxetine Other (comments)        Review of Systems:   Gen:    Denied fevers, chills, malaise, fatigue, weight changes   Resp: Denied shortness of breath, cough, wheezing   CVS: Denied chest pain, palpitations : Denied urinary urgency, frequency, incontinence   GI: Denied nausea, vomiting, constipation, diarrhea   Skin: Denied rashes, wounds   Psych: Denied anxiety, depression   Vasc: Denied claudication, ulcers   Hem: Denied easy bruising/bleeding   MSK: See HPI   Neuro: See HPI         Physical Exam     Vital Signs:   Visit Vitals  Pulse 78   Temp 97.7 °F (36.5 °C) (Temporal)   Ht 5' 7\" (1.702 m)   Wt 188 lb (85.3 kg)   SpO2 97%   BMI 29.44 kg/m²      General: ??????? Well nourished and well developed female without any acute distress   Psychiatric: ?  Alert and oriented x 3 with normal mood    HEENT: ???????? Atraumatic   Respiratory:   Breathing non-labored and non dyspneic   CV: ???????????????? Peripheral pulses intact, no peripheral edema   Skin: ????????????? No rashes       Neurologic: ?? Sensation: normal and grossly intact thebilateral, lower extremity(s) except reports diminished below b/l knees  Strength: 5/5 in the b/l LE  Reflexes: reveals 2+ symmetric DTRs throughout except absent b/l achilles  Gait: normal    ?  Tight hamstrings, and hip flexors b/l  Limited lumbar ROM pain with flexion and extension worse with flexion  Seated hip ROM pain free  Pain in low back with JESSICA and FADIR  Negative log roll  Negative straight leg and seated slump     Medical Decision Making:    Images: The imaging results as well as the actual images of the studies below were reviewed, interpreted and visualized. Labs: The results below were reviewed. X-ray lumbar spine 10/28/2021 reviewed with L5/S1 DDD, L3/4 anterolisthesis and facet arthritis    Assessment:   - s/p L3/4 laminectomy in 2012  - low back pain- spondylosis facet arthrosis, DDD  -  Peripheral neuropathy      Plan:      -Physical therapy -  Discussed PT, she would prefer to do exercises on her own for now until the spring 2021  -Medications - renew gabapentin 400mg TID. Counseled regarding side effects and appropriate administration of medications. -Diagnostics/Imaging - x-ray lumbar spine-reviewed  -Injections - NA  -Lifestyle -Encouraged regular aerobic exercise  -Education - The patient's diagnosis, prognosis and treatment options were discussed today. All questions were answered. F/U - in 12 week(s) or sooner if needed.   At that point she thinks she would be interested in starting PT         [address] [facility]

## 2022-01-24 NOTE — PROGRESS NOTES
Rhonda Martinez presents today for   Chief Complaint   Patient presents with    Back Pain       Is someone accompanying this pt? no    Is the patient using any DME equipment during OV? no    Depression Screening:  3 most recent PHQ Screens 5/25/2021   Little interest or pleasure in doing things Several days   Feeling down, depressed, irritable, or hopeless Several days   Total Score PHQ 2 2       Learning Assessment:  Learning Assessment 12/17/2015   PRIMARY LEARNER Patient   HIGHEST LEVEL OF EDUCATION - PRIMARY LEARNER  GRADUATED HIGH SCHOOL OR GED   PRIMARY LANGUAGE ENGLISH   LEARNER PREFERENCE PRIMARY OTHER (COMMENT)   ANSWERED BY patient   RELATIONSHIP SELF       Abuse Screening:  Abuse Screening Questionnaire 4/22/2021   Do you ever feel afraid of your partner? N   Are you in a relationship with someone who physically or mentally threatens you? N   Is it safe for you to go home? Y       Fall Risk  Fall Risk Assessment, last 12 mths 4/22/2021   Able to walk? Yes   Fall in past 12 months? 0   Do you feel unsteady? 0   Are you worried about falling 0       OPIOID RISK TOOL  Opioid Risk Tool 7/19/2018   Family history of alcohol abuse? 0   Family history of illegal drug abuse? 0   Family history of prescription drug abuse? 0   Personal history of alcohol abuse? 0   Personal history of illegal drug abuse? 0   Personal history of prescription drug abuse? 0   Age range between 17-45? 0   History of preadolescent sexual abuse? 3   ADD, OCD, bipolar, schizophrenia? 0   Depression? 1   Opioid Risk Total Score 4       Coordination of Care:  1. Have you been to the ER, urgent care clinic since your last visit? no  Hospitalized since your last visit? no    2. Have you seen or consulted any other health care providers outside of the [address] [place] [address] since your last visit? no Include any pap smears or colon screening.  no

## 2022-02-02 ENCOUNTER — OFFICE VISIT (OUTPATIENT)
Dept: ONCOLOGY | Age: 67
End: 2022-02-02
Payer: MEDICARE

## 2022-02-02 VITALS
BODY MASS INDEX: 29.19 KG/M2 | RESPIRATION RATE: 18 BRPM | WEIGHT: 186 LBS | HEIGHT: 67 IN | OXYGEN SATURATION: 98 % | DIASTOLIC BLOOD PRESSURE: 80 MMHG | HEART RATE: 75 BPM | TEMPERATURE: 98.2 F | SYSTOLIC BLOOD PRESSURE: 124 MMHG

## 2022-02-02 DIAGNOSIS — Z17.0 ESTROGEN RECEPTOR POSITIVE: ICD-10-CM

## 2022-02-02 DIAGNOSIS — D50.8 IRON DEFICIENCY ANEMIA SECONDARY TO INADEQUATE DIETARY IRON INTAKE: ICD-10-CM

## 2022-02-02 DIAGNOSIS — C50.511 MALIGNANT NEOPLASM OF LOWER-OUTER QUADRANT OF RIGHT BREAST OF FEMALE, ESTROGEN RECEPTOR POSITIVE (HCC): ICD-10-CM

## 2022-02-02 DIAGNOSIS — E55.9 VITAMIN D DEFICIENCY: Primary | ICD-10-CM

## 2022-02-02 DIAGNOSIS — Z79.811 LONG TERM CURRENT USE OF AROMATASE INHIBITOR: ICD-10-CM

## 2022-02-02 DIAGNOSIS — C50.511 MALIGNANT NEOPLASM OF LOWER-OUTER QUADRANT OF RIGHT FEMALE BREAST, UNSPECIFIED ESTROGEN RECEPTOR STATUS (HCC): ICD-10-CM

## 2022-02-02 DIAGNOSIS — Z17.0 MALIGNANT NEOPLASM OF LOWER-OUTER QUADRANT OF RIGHT BREAST OF FEMALE, ESTROGEN RECEPTOR POSITIVE (HCC): ICD-10-CM

## 2022-02-02 PROCEDURE — G9899 SCRN MAM PERF RSLTS DOC: HCPCS | Performed by: INTERNAL MEDICINE

## 2022-02-02 PROCEDURE — 99213 OFFICE O/P EST LOW 20 MIN: CPT | Performed by: INTERNAL MEDICINE

## 2022-02-02 PROCEDURE — 3017F COLORECTAL CA SCREEN DOC REV: CPT | Performed by: INTERNAL MEDICINE

## 2022-02-02 PROCEDURE — G8432 DEP SCR NOT DOC, RNG: HCPCS | Performed by: INTERNAL MEDICINE

## 2022-02-02 PROCEDURE — G8427 DOCREV CUR MEDS BY ELIG CLIN: HCPCS | Performed by: INTERNAL MEDICINE

## 2022-02-02 PROCEDURE — G8754 DIAS BP LESS 90: HCPCS | Performed by: INTERNAL MEDICINE

## 2022-02-02 PROCEDURE — G0463 HOSPITAL OUTPT CLINIC VISIT: HCPCS | Performed by: INTERNAL MEDICINE

## 2022-02-02 PROCEDURE — G8419 CALC BMI OUT NRM PARAM NOF/U: HCPCS | Performed by: INTERNAL MEDICINE

## 2022-02-02 PROCEDURE — G8752 SYS BP LESS 140: HCPCS | Performed by: INTERNAL MEDICINE

## 2022-02-02 RX ORDER — LETROZOLE 2.5 MG/1
2.5 TABLET, FILM COATED ORAL DAILY
Qty: 90 TABLET | Refills: 3 | Status: SHIPPED | OUTPATIENT
Start: 2022-02-02 | End: 2022-06-16 | Stop reason: SDUPTHER

## 2022-02-02 NOTE — PROGRESS NOTES
Hematology/Oncology  Progress Note    Name: Lee Beaulieu  : 1955    PCP: Nayeli Jimenez MD     Ms. Jerry Harris is a 61 y.o. -American woman with invasive ductal carcinoma the right breast.    Current therapy: Femara 2.5mg PO daily since       Subjective:     Mrs. Jerry Harris is a 79-year-old -American woman with invasive ductal carcinoma involving the right breast. diagnosed in 2018. She reports tolerance and adherence to Femara without significant side effects. She states she is doing her breast self exam on a monthly basis and has no new physical complaints or concerns to report. She continue to report chronic tenderness to the right breast which appeared after surgery. She denies any nipple discharge or worsening pain or any other breast issues. Denied fever, chills, night sweat, unintentional weight loss, skin lumps or bumps, acute bleeding or bruising issues. Denied headache, acute vision change, dizziness, chest pain, worsen shortness of breath, palpitation, productive cough, nausea, vomiting, abdominal pain, altered bowel habits, dysuria, new bone pain or back pain, focal numbness or weakness. Past medical history, family history, and social history: these were reviewed and remains unchanged.     Past Medical History:   Diagnosis Date    Arthritis     Balance problems     patient states no further issues    Breast CA ([facility] [address] 2018    right    Bronchitis     Chronic pain     back    Fibromyalgia 2012    GERD (gastroesophageal reflux disease)     Hiatal Hernia    Hypercholesterolemia     Hypertension     Kidney stones     Low back pain     Lumbar spinal stenosis 2012    Migraine headache     Nervousness     Neurological disease     Osteoarthritis 2012    Other ill-defined conditions(799.89)     neuropathy    Peripheral neuropathy 2012    Post laminectomy syndrome     Postoperative anemia due to acute blood loss 2012    Ringing in ears     Rotator cuff syndrome     Spinal stenosis     Thoracic spine pain     Vitamin D deficiency 11/30/2012     Past Surgical History:   Procedure Laterality Date    HX MASTECTOMY Right 5/4/2018    NEEDLE LOCALIZED (1000) LUMPECTOMY RIGHT BREAST AND SENTINEL LYMPH NODE BIOPSY RIGHT AXILLA performed by Mag Gould MD at [address] [place] [address] HX ORTHOPAEDIC      foot l heel spur    HX ORTHOPAEDIC      right knee sx    HX OTHER SURGICAL      non cancer cyst removed from right shoulder    NEUROLOGICAL PROCEDURE UNLISTED      Bilateral L3-4 hemilaminotomy and medial facetectomy by Dr. Kourtney Fairbanks    DE BX/REMV,LYMPH NODE,DEEP AXILL Right 05/04/2018    Dr. Opal Marti History     Socioeconomic History    Marital status: SINGLE     Spouse name: Not on file    Number of children: Not on file    Years of education: Not on file    Highest education level: Not on file   Occupational History    Not on file   Tobacco Use    Smoking status: Never Smoker    Smokeless tobacco: Never Used   Vaping Use    Vaping Use: Never used   Substance and Sexual Activity    Alcohol use: No    Drug use: No    Sexual activity: Not on file   Other Topics Concern    Not on file   Social History Narrative    Not on file     Social Determinants of Health     Financial Resource Strain:     Difficulty of Paying Living Expenses: Not on file   Food Insecurity:     Worried About Running Out of Food in the Last Year: Not on file    Senthil of Food in the Last Year: Not on file   Transportation Needs:     Lack of Transportation (Medical): Not on file    Lack of Transportation (Non-Medical):  Not on file   Physical Activity:     Days of Exercise per Week: Not on file    Minutes of Exercise per Session: Not on file   Stress:     Feeling of Stress : Not on file   Social Connections:     Frequency of Communication with Friends and Family: Not on file    Frequency of Social Gatherings with Friends and Family: Not on file    Attends Rastafarian Services: Not on file    Active Member of Clubs or Organizations: Not on file    Attends Club or Organization Meetings: Not on file    Marital Status: Not on file   Intimate Partner Violence:     Fear of Current or Ex-Partner: Not on file    Emotionally Abused: Not on file    Physically Abused: Not on file    Sexually Abused: Not on file   Housing Stability:     Unable to Pay for Housing in the Last Year: Not on file    Number of [place] in the Last Year: Not on file    Unstable Housing in the Last Year: Not on file     Family History   Problem Relation Age of Onset    Hypertension Mother    Jamas Roys OSTEOARTHRITIS Mother     Neuropathy Mother     Hypertension Father     Cancer Father         colon    Hypertension Brother     Cancer Brother     Neuropathy Brother     Neuropathy Other     Arthritis-rheumatoid Other     GERD Other     Diabetes Other     Diabetes Maternal Aunt     Diabetes Maternal Uncle     Hypertension Maternal Grandfather      Current Outpatient Medications   Medication Sig Dispense Refill    letrozole (FEMARA) 2.5 mg tablet Take 1 Tablet by mouth daily. 90 Tablet 3    ferrous sulfate (SLOW FE) 142 mg (45 mg iron) ER tablet Take 1 Tablet by mouth Daily (before breakfast). 90 Tablet 2    azelastine (OPTIVAR) 0.05 % ophthalmic solution instill 1 drop INTO AFFECTED EYE(S) twice a day      omeprazole (PRILOSEC) 20 mg capsule TAKE 1 CAPSULE BY MOUTH IN THE MORNING 30 MINUTES PRIOR TO BREAKFAST      pravastatin (PRAVACHOL) 10 mg tablet Take 10 mg by mouth daily.  rizatriptan (MAXALT) 10 mg tablet 1 tablet , can take another tablet after 2 hours, 2 tabs only in 24 hours      gabapentin (Neurontin) 400 mg capsule Take 1 Capsule by mouth three (3) times daily. Max Daily Amount: 1,200 mg. Indications: neuropathic pain 90 Capsule 4    ibuprofen (MOTRIN) 800 mg tablet Take 1 Tablet by mouth three (3) times daily as needed for Pain.  40 Tablet 2  atorvastatin (Lipitor) 10 mg tablet Take 10 mg by mouth daily.  butalbital-acetaminophen-caffeine (FIORICET, ESGIC) -40 mg per tablet take 1 tablet by mouth every 8 hours      ergocalciferol (ERGOCALCIFEROL) 1,250 mcg (50,000 unit) capsule Take 1 Cap by mouth every seven (7) days. 12 Cap 0    albuterol (ACCUNEB) 0.63 mg/3 mL nebulizer solution 0.63 mg by Nebulization route every six (6) hours as needed for Wheezing.  acetaminophen (TYLENOL) 500 mg tablet Take 1 Tab by mouth every six (6) hours as needed for Pain. 120 Tab 0    esomeprazole (NEXIUM) 40 mg capsule Take 40 mg by mouth daily. (Patient not taking: Reported on 1/24/2022)  0    fluticasone-vilanterol (BREO ELLIPTA) 200-25 mcg/dose inhaler Take 1 Puff by inhalation daily.  ascorbic acid (VITAMIN C) 250 mg tablet Take 1 Tab by mouth two (2) times daily (with meals). 30 Tab 0    losartan-hydrochlorothiazide (HYZAAR) 100-25 mg per tablet Take 1 Tab by mouth daily. Indications: HYPERTENSION         Review of Systems   Constitutional: Negative for chills, diaphoresis, fever, malaise/fatigue and weight loss. Respiratory: Negative for cough, hemoptysis, shortness of breath and wheezing. Cardiovascular: Negative for chest pain, palpitations and leg swelling. Gastrointestinal: Negative for abdominal pain, diarrhea, heartburn, nausea and vomiting. Genitourinary: Negative for dysuria, frequency, hematuria and urgency. Musculoskeletal: Negative for joint pain and myalgias. Skin: Negative for itching and rash. Neurological: Negative for dizziness, seizures, weakness and headaches. Psychiatric/Behavioral: Negative for depression. The patient does not have insomnia.              Objective:     Visit Vitals  /80   Pulse 75   Temp 98.2 °F (36.8 °C) (Temporal)   Resp 18   Ht 5' 7\" (1.702 m)   Wt 84.4 kg (186 lb)   SpO2 98%   BMI 29.13 kg/m²       ECOG Performance Status (grade): 0  0 - able to carry on all pre-disease activity w/out restriction  1 - restricted but able to carry out light work  2 - ambulatory and can self- care but unable to carry out work  3 - bed or chair >50% of waking hours  4 - completely disable, total care, confined to bed or chair    Physical Exam  Constitutional:       Appearance: Normal appearance. HENT:      Head: Normocephalic and atraumatic. Eyes:      Pupils: Pupils are equal, round, and reactive to light. Cardiovascular:      Rate and Rhythm: Normal rate and regular rhythm. Heart sounds: Normal heart sounds. Pulmonary:      Effort: Pulmonary effort is normal.      Breath sounds: Normal breath sounds. Chest:      Comments: Left: No dimpling, discoloration, nipple inversion or retractions. No axillary or supraclavicular lymphadenopathy. No mass   Right: No dimpling, discoloration. No axillary or supraclavicular lymphadenopathy. No mass  Abdominal:      General: Bowel sounds are normal.      Palpations: Abdomen is soft. Tenderness: There is no abdominal tenderness. There is no guarding. Musculoskeletal:         General: Normal range of motion. Cervical back: Neck supple. Right lower leg: No edema. Left lower leg: No edema. Skin:     General: Skin is warm. Neurological:      General: No focal deficit present. Mental Status: She is alert and oriented to person, place, and time. Mental status is at baseline. Diagnostics:      No results found for this or any previous visit (from the past 96 hour(s)). Imaging:  No results found for this or any previous visit. Results for orders placed in visit on 10/27/21    XR SPINE LUMB 2 OR 3 V    Narrative  Lumbar spine -3 Views    HISTORY: Chronic bilateral low back pain without sciatica. History of lumbar  spine surgery with pain. COMPARISON: Radiographs 11/16/2009, MR lumbar spine 8/24/2015. FINDINGS: Since 2009, L3-L4 grade I anterolisthesis has developed.  It is present  and is similar versus mildly worsened compared to 2015 MRI comparison. Its disc  space is maintained. Chronic diffuse diffuse disc space narrowing at L5-S1. Chronic left greater than right lower lumbar facet hypertrophy. Impression  1. Progressing grade I anterolisthesis at L3-L4 since remote studies. 2. Chronic L5-S1 degenerative disc disease and lower lumbar facet hypertrophy. Results for orders placed during the hospital encounter of 04/05/18    CT CHEST ABD PELV W CONT    Narrative  HISTORY: Recent diagnosis of breast carcinoma. EXAM: CT chest, abdomen and pelvis. TECHNIQUE: Spiral images of the chest abdomen and pelvis were performed  according to routine protocol from thoracic inlet to the ischial tuberosities  after administration of 100 cc of Isovue-300 contrast media intravenously. Coronal and sagittal reconstructions were provided for evaluation. Oral contrast  was given. No prior studies for comparison. All CT scans at this facility are performed using dose optimization technique as  appropriate to a performed exam, to include automated exposure control,  adjustment of the mA and/or kV according to patient size (including appropriate  matching for site-specific examinations), or use of iterative reconstruction  technique. FINDINGS:    CT THORAX:    There is no pneumothorax, pneumonia or pleural effusions are seen. Trachea bronchial trees unremarkable. Heart is normal in size without pericardial effusions. Vascular pedicle is unremarkable. No enhancing mass lesion or pathologic lymphadenopathy is noted. Nonspecific bean shaped fatty hilum lymph nodes are noted in bilateral axillary  regions. No lytic or blastic lesions. CT ABDOMEN:    Liver is unremarkable. Portal vein is patent and unremarkable. Hepatic outflow is unremarkable. Gallbladder is unremarkable. Spleen is unremarkable. Pancreas is unremarkable. Adrenal glands are unremarkable bilaterally.     Kidneys are unremarkable. No small or large bowel obstruction is seen. No ascites or pneumoperitoneum. Abdominal aorta and IVC are unremarkable. No enhancing mass lesion or pathologic lymphadenopathy seen. No lytic or blastic lesions. CT PELVIS:    Urinary bladder is unremarkable. Uterus is atrophic but unremarkable. Rectus sigmoid region is unremarkable. No free pelvic fluid is seen. No enhancing mass lesion or pathologic lymphadenopathy. No lytic or blastic lesions. Impression  IMPRESSION:    1. Unremarkable study of the chest abdomen and pelvis. Assessment:     1. Malignant neoplasm of lower-outer quadrant of right female breast, unspecified estrogen receptor status ([facility] [address]    2. Estrogen receptor positive      Plan:      Invasive ductal carcinoma the right breast  -- Patient was being followed by Dr. Marce Limon who retired. S/P tight lumpectomy and sentinel lymph node biopsy for ER/IN positive HER-2 negative right breast cancer    She continue to report chronic tenderness to the right breast which appeared after surgery. She continues to take ibuprofen as needed. -- she recently followed up with Dr. Oz Martins   -- 03/15/2021 Mammogram showed no evidence of malignancy. BIRADS 2: Benign  In absence of concerning physical findings, recommend annual follow-up. -- Clinical stable. Recent labs reviewed with the patient today. Plan:  --  The patient was instructed to continue doing her breast exams on a monthly basis. -- The patient was advised to notify us if any new breast pain, new palpable nodule, nipple inversion, spontaneous nipple discharge especially if bloody, breast skin changes, unintentional weight loss, worsen fatigue, new bone pain or back pain, or any concerns. -- I have advised her to follow up her PCP to continue age-appropriate cancer screening. -- I have educated her regarding lifestyle modifications, healthy diet, and physical activity.   -- Her next mammogram will be 3/2022         Long term use of aromatase inhibitor:   -- The patient currently taking Femara daily. She states she is tolerating the medication well and this will be continued. --She report that she is up to date with her DEXA scan which was done by her rheumatologist .  -- Advised on adequate amounts of calcium (at least 1,200 mg per day) and vitamin D (800-1,000 IU per day). The higher dose of vitamin D may be needed if vitamin D deficiency. Chronic breast pain s.p surgery and XRT   -- Continue Ibuprofen as needed. -- Instructed the patient to have a follow-up with pain management clinic for long-term pain control. Chronic anemia, Hx of Iron Deficiency Anemia:   -- We will continue to monitor CBC, Iron profile, ferritin  --CBC, iron and ferritin reviewed with patient today. --The patient has been advise to resume Iron pills as instructed. Medication ordered today       -- We will see the patient back in clinic in about 4 months. Always sooner if required. Orders Placed This Encounter    letrozole (FEMARA) 2.5 mg tablet     Sig: Take 1 Tablet by mouth daily. Dispense:  90 Tablet     Refill:  3    ferrous sulfate (SLOW FE) 142 mg (45 mg iron) ER tablet     Sig: Take 1 Tablet by mouth Daily (before breakfast). Dispense:  90 Tablet     Refill:  2           Ms. Violet Vargas has a reminder for a \"due or due soon\" health maintenance. I have asked that she contact her primary care provider for follow-up on this health maintenance. All of patient's questions answered to their apparent satisfaction. They verbally show understanding and agreement with aforementioned plan. Dustin Dean DNP  2/2/2022          Parts of this document has been produced using Dragon dictation system. Unrecognized errors in transcription may be present. Please do not hesitate to reach out for any questions or clarifications.       CC: Ambar Gorman MD

## 2022-03-17 ENCOUNTER — HOSPITAL ENCOUNTER (OUTPATIENT)
Dept: MAMMOGRAPHY | Age: 67
Discharge: HOME | End: 2022-03-17
Attending: SURGERY
Payer: MEDICARE

## 2022-03-17 DIAGNOSIS — Z12.31 BREAST CANCER SCREENING BY MAMMOGRAM: ICD-10-CM

## 2022-03-17 PROCEDURE — 77063 BREAST TOMOSYNTHESIS BI: CPT

## 2022-03-18 PROBLEM — M89.9 DISORDER OF BONE: Status: ACTIVE | Noted: 2018-03-01

## 2022-03-18 PROBLEM — R07.9 CHEST PAIN: Status: ACTIVE | Noted: 2022-01-24

## 2022-03-18 PROBLEM — E78.5 HYPERLIPIDEMIA: Status: ACTIVE | Noted: 2018-04-18

## 2022-03-18 PROBLEM — M25.562 PAIN IN LEFT KNEE: Status: ACTIVE | Noted: 2022-01-24

## 2022-03-18 PROBLEM — N91.2 AMENORRHEA: Status: ACTIVE | Noted: 2019-10-28

## 2022-03-19 PROBLEM — R53.83 FATIGUE: Status: ACTIVE | Noted: 2019-11-26

## 2022-03-19 PROBLEM — Z78.9 NON-SMOKER: Status: ACTIVE | Noted: 2019-10-28

## 2022-03-19 PROBLEM — C50.311 MALIGNANT NEOPLASM OF LOWER-INNER QUADRANT OF RIGHT BREAST OF FEMALE, ESTROGEN RECEPTOR POSITIVE (HCC): Status: ACTIVE | Noted: 2018-04-02

## 2022-03-19 PROBLEM — M47.817 LUMBOSACRAL SPONDYLOSIS WITHOUT MYELOPATHY: Status: ACTIVE | Noted: 2018-03-01

## 2022-03-19 PROBLEM — M25.519 SHOULDER JOINT PAIN: Status: ACTIVE | Noted: 2019-10-28

## 2022-03-19 PROBLEM — R42 DIZZINESS AND GIDDINESS: Status: ACTIVE | Noted: 2017-04-26

## 2022-03-19 PROBLEM — G60.9 HEREDITARY AND IDIOPATHIC NEUROPATHY, UNSPECIFIED: Status: ACTIVE | Noted: 2018-03-01

## 2022-03-19 PROBLEM — Z86.39 HISTORY OF NUTRITIONAL DEFICIENCY: Status: ACTIVE | Noted: 2019-10-28

## 2022-03-19 PROBLEM — R30.0 DYSURIA: Status: ACTIVE | Noted: 2022-01-24

## 2022-03-19 PROBLEM — R73.03 PREDIABETES: Status: ACTIVE | Noted: 2017-08-30

## 2022-03-19 PROBLEM — L90.5 SCAR CONDITIONS AND FIBROSIS OF SKIN: Status: ACTIVE | Noted: 2018-02-01

## 2022-03-19 PROBLEM — G43.709 NON-REFRACTORY CHRONIC MIGRAINE WITHOUT AURA: Status: ACTIVE | Noted: 2017-04-26

## 2022-03-19 PROBLEM — Z17.0 MALIGNANT NEOPLASM OF LOWER-INNER QUADRANT OF RIGHT BREAST OF FEMALE, ESTROGEN RECEPTOR POSITIVE (HCC): Status: ACTIVE | Noted: 2018-04-02

## 2022-03-19 PROBLEM — C50.911 BREAST CANCER, STAGE 1, RIGHT (HCC): Status: ACTIVE | Noted: 2018-05-04

## 2022-03-19 PROBLEM — D50.8 IRON DEFICIENCY ANEMIA SECONDARY TO INADEQUATE DIETARY IRON INTAKE: Status: ACTIVE | Noted: 2018-06-18

## 2022-03-19 PROBLEM — Z85.3 PERSONAL HISTORY OF MALIGNANT NEOPLASM OF BREAST: Status: ACTIVE | Noted: 2019-10-28

## 2022-03-20 PROBLEM — N95.2 ATROPHIC VAGINITIS: Status: ACTIVE | Noted: 2019-10-28

## 2022-03-20 PROBLEM — Z79.891 USE OF OPIATES FOR THERAPEUTIC PURPOSES: Status: ACTIVE | Noted: 2018-07-19

## 2022-03-29 ENCOUNTER — OFFICE VISIT (OUTPATIENT)
Dept: SURGERY | Age: 67
End: 2022-03-29
Payer: MEDICARE

## 2022-03-29 VITALS
TEMPERATURE: 98.4 F | HEART RATE: 76 BPM | RESPIRATION RATE: 15 BRPM | HEIGHT: 67 IN | SYSTOLIC BLOOD PRESSURE: 136 MMHG | WEIGHT: 184 LBS | OXYGEN SATURATION: 98 % | DIASTOLIC BLOOD PRESSURE: 90 MMHG | BODY MASS INDEX: 28.88 KG/M2

## 2022-03-29 DIAGNOSIS — Z12.31 SCREENING MAMMOGRAM FOR HIGH-RISK PATIENT: ICD-10-CM

## 2022-03-29 DIAGNOSIS — Z98.890 STATUS POST RIGHT BREAST LUMPECTOMY: ICD-10-CM

## 2022-03-29 DIAGNOSIS — Z85.3 HISTORY OF RIGHT BREAST CANCER: Primary | ICD-10-CM

## 2022-03-29 PROCEDURE — G8427 DOCREV CUR MEDS BY ELIG CLIN: HCPCS | Performed by: SURGERY

## 2022-03-29 PROCEDURE — G8432 DEP SCR NOT DOC, RNG: HCPCS | Performed by: SURGERY

## 2022-03-29 PROCEDURE — 99213 OFFICE O/P EST LOW 20 MIN: CPT | Performed by: SURGERY

## 2022-03-29 PROCEDURE — G8755 DIAS BP > OR = 90: HCPCS | Performed by: SURGERY

## 2022-03-29 PROCEDURE — G8752 SYS BP LESS 140: HCPCS | Performed by: SURGERY

## 2022-03-29 PROCEDURE — 3017F COLORECTAL CA SCREEN DOC REV: CPT | Performed by: SURGERY

## 2022-03-29 PROCEDURE — G8419 CALC BMI OUT NRM PARAM NOF/U: HCPCS | Performed by: SURGERY

## 2022-03-29 PROCEDURE — G9899 SCRN MAM PERF RSLTS DOC: HCPCS | Performed by: SURGERY

## 2022-03-29 NOTE — PATIENT INSTRUCTIONS

## 2022-03-29 NOTE — PROGRESS NOTES
[facility]  General Surgery    Subjective:  Patient with history of stage I ER/RI positive HER-2 negative right breast cancer in base of ductal adenocarcinoma nearly 3-1/2 years out. She endorses self breast exam.  She denies any skin changes breast masses nipple drainage or discharge or unintentional weight loss. Mammogram was performed March 17, 2022. It is a BI-RADS 1. Objective:  Vitals:    03/29/22 1515   BP: (!) 136/90   Pulse: 76   Resp: 15   Temp: 98.4 °F (36.9 °C)   TempSrc: Temporal   SpO2: 98%   Weight: 83.5 kg (184 lb)   Height: 5' 7\" (1.702 m)       Physical Exam:    General: Awake and alert, oriented x4, no apparent distress    Breasts:    Left: No dimpling, discoloration, nipple inversion or retractions. No axillary or supraclavicular lymphadenopathy. No mass   Right: No dimpling, discoloration, nipple inversion or retractions. No axillary or supraclavicular lymphadenopathy. [place]    Current Medications:  Current Outpatient Medications   Medication Sig Dispense Refill    letrozole (FEMARA) 2.5 mg tablet Take 1 Tablet by mouth daily. 90 Tablet 3    ferrous sulfate (SLOW FE) 142 mg (45 mg iron) ER tablet Take 1 Tablet by mouth Daily (before breakfast). 90 Tablet 2    azelastine (OPTIVAR) 0.05 % ophthalmic solution instill 1 drop INTO AFFECTED EYE(S) twice a day      omeprazole (PRILOSEC) 20 mg capsule TAKE 1 CAPSULE BY MOUTH IN THE MORNING 30 MINUTES PRIOR TO BREAKFAST      pravastatin (PRAVACHOL) 10 mg tablet Take 10 mg by mouth daily.  rizatriptan (MAXALT) 10 mg tablet 1 tablet , can take another tablet after 2 hours, 2 tabs only in 24 hours      gabapentin (Neurontin) 400 mg capsule Take 1 Capsule by mouth three (3) times daily. Max Daily Amount: 1,200 mg. Indications: neuropathic pain 90 Capsule 4    ibuprofen (MOTRIN) 800 mg tablet Take 1 Tablet by mouth three (3) times daily as needed for Pain.  40 Tablet 2    atorvastatin (Lipitor) 10 mg tablet Take 10 mg by mouth daily.  butalbital-acetaminophen-caffeine (FIORICET, ESGIC) -40 mg per tablet take 1 tablet by mouth every 8 hours      ergocalciferol (ERGOCALCIFEROL) 1,250 mcg (50,000 unit) capsule Take 1 Cap by mouth every seven (7) days. 12 Cap 0    albuterol (ACCUNEB) 0.63 mg/3 mL nebulizer solution 0.63 mg by Nebulization route every six (6) hours as needed for Wheezing.  acetaminophen (TYLENOL) 500 mg tablet Take 1 Tab by mouth every six (6) hours as needed for Pain. 120 Tab 0    fluticasone-vilanterol (BREO ELLIPTA) 200-25 mcg/dose inhaler Take 1 Puff by inhalation daily.  ascorbic acid (VITAMIN C) 250 mg tablet Take 1 Tab by mouth two (2) times daily (with meals). 30 Tab 0    losartan-hydrochlorothiazide (HYZAAR) 100-25 mg per tablet Take 1 Tab by mouth daily. Indications: HYPERTENSION      esomeprazole (NEXIUM) 40 mg capsule Take 40 mg by mouth daily. (Patient not taking: Reported on 1/24/2022)  0       Chart and notes reviewed. Data reviewed. I have evaluated and examined the patient. Impression and plan:  Patient with history of stage I ER/CO positive HER-2 negative right breast cancer status post lumpectomy with sentinel lymph node biopsy. Continue monthly self breast exam  Clinical breast exam in 6 months  Bilateral 3D screening mammography in April of next year  Please call or visit with any questions or concerns.        Phyllis España MD

## 2022-04-25 ENCOUNTER — OFFICE VISIT (OUTPATIENT)
Dept: ORTHOPEDIC SURGERY | Age: 67
End: 2022-04-25
Payer: MEDICARE

## 2022-04-25 VITALS
OXYGEN SATURATION: 99 % | WEIGHT: 191 LBS | TEMPERATURE: 98 F | HEIGHT: 67 IN | HEART RATE: 81 BPM | BODY MASS INDEX: 29.98 KG/M2

## 2022-04-25 DIAGNOSIS — M51.36 DDD (DEGENERATIVE DISC DISEASE), LUMBAR: ICD-10-CM

## 2022-04-25 DIAGNOSIS — M48.062 SPINAL STENOSIS OF LUMBAR REGION WITH NEUROGENIC CLAUDICATION: ICD-10-CM

## 2022-04-25 DIAGNOSIS — M47.899 FACET SYNDROME: ICD-10-CM

## 2022-04-25 PROCEDURE — G8427 DOCREV CUR MEDS BY ELIG CLIN: HCPCS | Performed by: PHYSICAL MEDICINE & REHABILITATION

## 2022-04-25 PROCEDURE — 3017F COLORECTAL CA SCREEN DOC REV: CPT | Performed by: PHYSICAL MEDICINE & REHABILITATION

## 2022-04-25 PROCEDURE — G8432 DEP SCR NOT DOC, RNG: HCPCS | Performed by: PHYSICAL MEDICINE & REHABILITATION

## 2022-04-25 PROCEDURE — G8756 NO BP MEASURE DOC: HCPCS | Performed by: PHYSICAL MEDICINE & REHABILITATION

## 2022-04-25 PROCEDURE — 99214 OFFICE O/P EST MOD 30 MIN: CPT | Performed by: PHYSICAL MEDICINE & REHABILITATION

## 2022-04-25 PROCEDURE — G9899 SCRN MAM PERF RSLTS DOC: HCPCS | Performed by: PHYSICAL MEDICINE & REHABILITATION

## 2022-04-25 PROCEDURE — G8419 CALC BMI OUT NRM PARAM NOF/U: HCPCS | Performed by: PHYSICAL MEDICINE & REHABILITATION

## 2022-04-25 RX ORDER — GABAPENTIN 400 MG/1
400 CAPSULE ORAL 3 TIMES DAILY
Qty: 90 CAPSULE | Refills: 3 | Status: SHIPPED | OUTPATIENT
Start: 2022-04-25 | End: 2022-08-22 | Stop reason: SDUPTHER

## 2022-04-25 NOTE — PROGRESS NOTES
Abhijeet Ríos presents today for   Chief Complaint   Patient presents with    Back Pain       Is someone accompanying this pt? no    Is the patient using any DME equipment during OV? no    Depression Screening:  3 most recent PHQ Screens 5/25/2021   Little interest or pleasure in doing things Several days   Feeling down, depressed, irritable, or hopeless Several days   Total Score PHQ 2 2       Learning Assessment:  Learning Assessment 12/17/2015   PRIMARY LEARNER Patient   HIGHEST LEVEL OF EDUCATION - PRIMARY LEARNER  GRADUATED HIGH SCHOOL OR GED   PRIMARY LANGUAGE ENGLISH   LEARNER PREFERENCE PRIMARY OTHER (COMMENT)   ANSWERED BY patient   RELATIONSHIP SELF       Abuse Screening:  Abuse Screening Questionnaire 4/25/2022   Do you ever feel afraid of your partner? N   Are you in a relationship with someone who physically or mentally threatens you? N   Is it safe for you to go home? Y       Fall Risk  Fall Risk Assessment, last 12 mths 4/25/2022   Able to walk? Yes   Fall in past 12 months? 0   Do you feel unsteady? 0   Are you worried about falling 0       OPIOID RISK TOOL  Opioid Risk Tool 7/19/2018   Family history of alcohol abuse? 0   Family history of illegal drug abuse? 0   Family history of prescription drug abuse? 0   Personal history of alcohol abuse? 0   Personal history of illegal drug abuse? 0   Personal history of prescription drug abuse? 0   Age range between 17-45? 0   History of preadolescent sexual abuse? 3   ADD, OCD, bipolar, schizophrenia? 0   Depression? 1   Opioid Risk Total Score 4       Coordination of Care:  1. Have you been to the ER, urgent care clinic since your last visit? no  Hospitalized since your last visit? no    2. Have you seen or consulted any other health care providers outside of the [address] [place] [address] since your last visit? no Include any pap smears or colon screening.  no

## 2022-04-25 NOTE — PROGRESS NOTES
[address]  [address], [address]  [place], [address] [place] [address] Street  Phone: [phone]  Fax: [phone]      Patient: Alie Betts                                                                              MRN: [id number]        YOB: 1955          AGE: 61 y.o. PCP: Gege Devine MD  Date:  04/25/22    Reason for Consultation: Back Pain      HPI:  Alie Betts is a 61 y.o. female with relevant PMH of L3-4 laminectomy in 2021, and breast CA (right) 2016 s/p lumpectomy and radiation and hormone treatment  who has been followed here for low back pain radiating down bilateral legs. Pain is mainly in her low back. She does have pain radiating into both of her legs but she reports a history of neuropathy. She is taking gabapentin 400mg TID. She has a long history of back pain but reports it has become worse over the past 6 months. X-rays demonstrate L3/4 anterolisthesis with facet arthropathy, with DDD L5/S1. Pain is unchanged. She is taking gabapentin 400mg tid which helps     Denies any precipitating incident or trauma but has had a lot of recent stress. Mother passed away April 2021. Neurologic symptoms:+ numbness, tingling b/l legs/ No weakness, bowel or bladder changes. No recent falls, ambulates with single prong cane      Location: The pain is located in the low back (90%)  Radiation: The pain does radiate bilateral legs (related to neuropathy per patient ). Pain Score: Currently: 6/10    Quality: Pain is of a Achy, Dull and Tight quality. Aggravating: Pain is exacerbated by walking  Alleviating:  The pain is alleviated by lying down    Prior Treatments:   Injections (prior to surgery in 2012)  Physical therapy many years ago  Previous Medications:   Current Medications: gabapentin 400mg TID, diclofenac  Previous work-up has included:   MRI lumbar spine 2015  There are subtle anterolisthesis at L3-4 and L4-5 broad-based disc protrusions as well as posterior facet and ligamentous hypertrophy, left somewhat greater than right at L4-5. Facet effusions are seen at L3-4. There is associated mild-to-moderate canal stenosis at L3-4 and mild stenosis at L4-5. Moderate  bilateral foraminal stenosis is present at both levels. Possible postoperative changes at L3-4. There is probably little significant interval change.     There is subtle canal encroachment from facet and ligamentous hypertrophy at L2-3 but there is no significant central disc protrusion. Small bilateral foraminal protrusions are present but the exit foramina are patent.     There are moderate-to-severe disc degenerative changes at L5-S1 but canal and foramina are patent. Canal and foramina are patent at the remaining levels.     No cord lesions are seen. XR Results (most recent):  Results from Orders Only encounter on 10/27/21    XR SPINE LUMB 2 OR 3 V    Narrative  Lumbar spine -3 Views    HISTORY: Chronic bilateral low back pain without sciatica. History of lumbar  spine surgery with pain. COMPARISON: Radiographs 11/16/2009, MR lumbar spine 8/24/2015. FINDINGS: Since 2009, L3-L4 grade I anterolisthesis has developed. It is present  and is similar versus mildly worsened compared to 2015 MRI comparison. Its disc  space is maintained. Chronic diffuse diffuse disc space narrowing at L5-S1. Chronic left greater than right lower lumbar facet hypertrophy. Impression  1. Progressing grade I anterolisthesis at L3-L4 since remote studies. 2. Chronic L5-S1 degenerative disc disease and lower lumbar facet hypertrophy.       Past Medical History:   Past Medical History:   Diagnosis Date    Arthritis     Balance problems     patient states no further issues    Breast CA ([address] 03/2018    right    Bronchitis     Chronic pain     back    Fibromyalgia 11/29/2012    GERD (gastroesophageal reflux disease)     Hiatal Hernia    Hypercholesterolemia     Hypertension     Kidney stones     Low back pain     Lumbar spinal stenosis 11/25/2012    Migraine headache     Nervousness     Neurological disease     Osteoarthritis 11/25/2012    Other ill-defined conditions(799.89)     neuropathy    Peripheral neuropathy 11/29/2012    Post laminectomy syndrome     Postoperative anemia due to acute blood loss 11/30/2012    Ringing in ears     Rotator cuff syndrome     Spinal stenosis     Thoracic spine pain     Vitamin D deficiency 11/30/2012      Past Surgical History:   Past Surgical History:   Procedure Laterality Date    HX MASTECTOMY Right 5/4/2018    NEEDLE LOCALIZED (1000) LUMPECTOMY RIGHT BREAST AND SENTINEL LYMPH NODE BIOPSY RIGHT AXILLA performed by Gali Medrano MD at [address] [place] [address] HX ORTHOPAEDIC      foot l heel spur    HX ORTHOPAEDIC      right knee sx    HX OTHER SURGICAL      non cancer cyst removed from right shoulder    NEUROLOGICAL PROCEDURE UNLISTED      Bilateral L3-4 hemilaminotomy and medial facetectomy by Dr. Alexus Collazo BX/REMV,LYMPH NODE,DEEP AXILL Right 05/04/2018    Dr. Alexis Peter      SocHx:   Social History     Tobacco Use    Smoking status: Never Smoker    Smokeless tobacco: Never Used   Substance Use Topics    Alcohol use: No      FamHx:? Family History   Problem Relation Age of Onset    Hypertension Mother     OSTEOARTHRITIS Mother     Neuropathy Mother     Hypertension Father     Cancer Father         colon    Hypertension Brother     Cancer Brother     Neuropathy Brother     Neuropathy Other     Arthritis-rheumatoid Other     GERD Other     Diabetes Other     Diabetes Maternal Aunt     Diabetes Maternal Uncle     Hypertension Maternal Grandfather        Current Medications:    Current Outpatient Medications   Medication Sig Dispense Refill    letrozole (FEMARA) 2.5 mg tablet Take 1 Tablet by mouth daily.  90 Tablet 3    ferrous sulfate (SLOW FE) 142 mg (45 mg iron) ER tablet Take 1 Tablet by mouth Daily (before breakfast). 90 Tablet 2    azelastine (OPTIVAR) 0.05 % ophthalmic solution instill 1 drop INTO AFFECTED EYE(S) twice a day      omeprazole (PRILOSEC) 20 mg capsule TAKE 1 CAPSULE BY MOUTH IN THE MORNING 30 MINUTES PRIOR TO BREAKFAST      gabapentin (Neurontin) 400 mg capsule Take 1 Capsule by mouth three (3) times daily. Max Daily Amount: 1,200 mg. Indications: neuropathic pain 90 Capsule 4    ibuprofen (MOTRIN) 800 mg tablet Take 1 Tablet by mouth three (3) times daily as needed for Pain. 40 Tablet 2    atorvastatin (Lipitor) 10 mg tablet Take 10 mg by mouth daily.  butalbital-acetaminophen-caffeine (FIORICET, ESGIC) -40 mg per tablet take 1 tablet by mouth every 8 hours      ergocalciferol (ERGOCALCIFEROL) 1,250 mcg (50,000 unit) capsule Take 1 Cap by mouth every seven (7) days. 12 Cap 0    albuterol (ACCUNEB) 0.63 mg/3 mL nebulizer solution 0.63 mg by Nebulization route every six (6) hours as needed for Wheezing.  acetaminophen (TYLENOL) 500 mg tablet Take 1 Tab by mouth every six (6) hours as needed for Pain. 120 Tab 0    ascorbic acid (VITAMIN C) 250 mg tablet Take 1 Tab by mouth two (2) times daily (with meals). 30 Tab 0    losartan-hydrochlorothiazide (HYZAAR) 100-25 mg per tablet Take 1 Tab by mouth daily. Indications: HYPERTENSION      pravastatin (PRAVACHOL) 10 mg tablet Take 10 mg by mouth daily.  rizatriptan (MAXALT) 10 mg tablet 1 tablet , can take another tablet after 2 hours, 2 tabs only in 24 hours      esomeprazole (NEXIUM) 40 mg capsule Take 40 mg by mouth daily. (Patient not taking: Reported on 1/24/2022)  0    fluticasone-vilanterol (BREO ELLIPTA) 200-25 mcg/dose inhaler Take 1 Puff by inhalation daily. Allergies:     Allergies   Allergen Reactions    Aspirin Other (comments)     GI Pain    Adhesive Contact Dermatitis    Adhesive Tape-Silicones Other (comments)     blisters    Duloxetine Other (comments)        Review of Systems: Gen:    Denied fevers, chills, malaise, fatigue, weight changes   Resp: Denied shortness of breath, cough, wheezing   CVS: Denied chest pain, palpitations   : Denied urinary urgency, frequency, incontinence   GI: Denied nausea, vomiting, constipation, diarrhea   Skin: Denied rashes, wounds   Psych: Denied anxiety, depression   Vasc: Denied claudication, ulcers   Hem: Denied easy bruising/bleeding   MSK: See HPI   Neuro: See HPI         Physical Exam     Vital Signs:   Visit Vitals  Pulse 81   Temp 98 °F (36.7 °C) (Tympanic)   Ht 5' 7\" (1.702 m)   Wt 191 lb (86.6 kg)   SpO2 99% Comment: RA   BMI 29.91 kg/m²      General: ??????? Well nourished and well developed female without any acute distress   Psychiatric: ?  Alert and oriented x 3 with normal mood    HEENT: ???????? Atraumatic   Respiratory:   Breathing non-labored and non dyspneic   CV: ???????????????? Peripheral pulses intact, no peripheral edema   Skin: ????????????? No rashes       Neurologic: ?? Sensation: normal and grossly intact thebilateral, lower extremity(s) except reports diminished below b/l knees  Strength: 5/5 in the b/l LE  Reflexes: reveals 2+ symmetric DTRs throughout except absent b/l achilles  Gait: normal    ?  Tight hamstrings, and hip flexors b/l  Limited lumbar ROM pain with flexion and extension worse with flexion  Seated hip ROM pain free  Pain in low back with JESSICA and FADIR  Negative log roll  Negative straight leg and seated slump     Medical Decision Making:    Images: The imaging results as well as the actual images of the studies below were reviewed, interpreted and visualized. Labs: The results below were reviewed.    X-ray lumbar spine 10/28/2021 reviewed with L5/S1 DDD, L3/4 anterolisthesis and facet arthritis    Assessment:   - s/p L3/4 laminectomy in 2012  - low back pain- spondylosis facet arthrosis, DDD  -  Peripheral neuropathy      Plan:      -Physical therapy -  Discussed PT, she would prefer to do exercises on her own for now . She is considering aquatic therapy   -Medications - renew gabapentin 400mg TID. Counseled regarding side effects and appropriate administration of medications.    -Diagnostics/Imaging - x-ray lumbar spine-reviewed  -Injections - NA  -Lifestyle -Encouraged regular aerobic exercise  -Education - The patient's diagnosis, prognosis and treatment options were discussed today. All questions were answered.    F/U - in 4 months, she will let me know if she would like to start Aquatic PT        [address] [facility]

## 2022-06-02 ENCOUNTER — APPOINTMENT (OUTPATIENT)
Dept: ONCOLOGY | Age: 67
End: 2022-06-02

## 2022-06-02 ENCOUNTER — HOSPITAL ENCOUNTER (OUTPATIENT)
Dept: LAB | Age: 67
Discharge: HOME | End: 2022-06-02
Payer: MEDICARE

## 2022-06-02 DIAGNOSIS — D50.8 IRON DEFICIENCY ANEMIA SECONDARY TO INADEQUATE DIETARY IRON INTAKE: ICD-10-CM

## 2022-06-02 DIAGNOSIS — E55.9 VITAMIN D DEFICIENCY: ICD-10-CM

## 2022-06-02 DIAGNOSIS — Z17.0 MALIGNANT NEOPLASM OF LOWER-OUTER QUADRANT OF RIGHT BREAST OF FEMALE, ESTROGEN RECEPTOR POSITIVE (HCC): ICD-10-CM

## 2022-06-02 DIAGNOSIS — Z17.0 ESTROGEN RECEPTOR POSITIVE: ICD-10-CM

## 2022-06-02 DIAGNOSIS — C50.511 MALIGNANT NEOPLASM OF LOWER-OUTER QUADRANT OF RIGHT FEMALE BREAST, UNSPECIFIED ESTROGEN RECEPTOR STATUS (HCC): ICD-10-CM

## 2022-06-02 DIAGNOSIS — Z79.811 LONG TERM CURRENT USE OF AROMATASE INHIBITOR: ICD-10-CM

## 2022-06-02 DIAGNOSIS — C50.511 MALIGNANT NEOPLASM OF LOWER-OUTER QUADRANT OF RIGHT BREAST OF FEMALE, ESTROGEN RECEPTOR POSITIVE (HCC): ICD-10-CM

## 2022-06-02 LAB
25(OH)D3 SERPL-MCNC: 78.6 NG/ML (ref 30–100)
ALBUMIN SERPL-MCNC: 3.8 G/DL (ref 3.4–5)
ALBUMIN/GLOB SERPL: 1.2 {RATIO} (ref 0.8–1.7)
ALP SERPL-CCNC: 66 U/L (ref 45–117)
ALT SERPL-CCNC: 22 U/L (ref 13–56)
ANION GAP SERPL CALC-SCNC: 3 MMOL/L (ref 3–18)
AST SERPL-CCNC: 26 U/L (ref 10–38)
BASOPHILS # BLD: 0.1 K/UL (ref 0–0.1)
BASOPHILS NFR BLD: 2 % (ref 0–2)
BILIRUB SERPL-MCNC: 0.3 MG/DL (ref 0.2–1)
BUN SERPL-MCNC: 14 MG/DL (ref 7–18)
BUN/CREAT SERPL: 14 (ref 12–20)
CALCIUM SERPL-MCNC: 9.4 MG/DL (ref 8.5–10.1)
CHLORIDE SERPL-SCNC: 107 MMOL/L (ref 100–111)
CO2 SERPL-SCNC: 30 MMOL/L (ref 21–32)
CREAT SERPL-MCNC: 0.98 MG/DL (ref 0.6–1.3)
DIFFERENTIAL METHOD BLD: ABNORMAL
EOSINOPHIL # BLD: 0.2 K/UL (ref 0–0.4)
EOSINOPHIL NFR BLD: 5 % (ref 0–5)
ERYTHROCYTE [DISTWIDTH] IN BLOOD BY AUTOMATED COUNT: 14.2 % (ref 11.6–14.5)
FERRITIN SERPL-MCNC: 54 NG/ML (ref 8–388)
GLOBULIN SER CALC-MCNC: 3.3 G/DL (ref 2–4)
GLUCOSE SERPL-MCNC: 81 MG/DL (ref 74–99)
HCT VFR BLD AUTO: 34 % (ref 35–45)
HGB BLD-MCNC: 10.7 G/DL (ref 12–16)
IMM GRANULOCYTES # BLD AUTO: 0 K/UL (ref 0–0.04)
IMM GRANULOCYTES NFR BLD AUTO: 0 % (ref 0–0.5)
IRON SATN MFR SERPL: 35 % (ref 20–50)
IRON SERPL-MCNC: 108 UG/DL (ref 50–175)
LYMPHOCYTES # BLD: 2.2 K/UL (ref 0.9–3.6)
LYMPHOCYTES NFR BLD: 42 % (ref 21–52)
MCH RBC QN AUTO: 27.3 PG (ref 24–34)
MCHC RBC AUTO-ENTMCNC: 31.5 G/DL (ref 31–37)
MCV RBC AUTO: 86.7 FL (ref 78–100)
MONOCYTES # BLD: 0.3 K/UL (ref 0.05–1.2)
MONOCYTES NFR BLD: 7 % (ref 3–10)
NEUTS SEG # BLD: 2.3 K/UL (ref 1.8–8)
NEUTS SEG NFR BLD: 45 % (ref 40–73)
NRBC # BLD: 0 K/UL (ref 0–0.01)
NRBC BLD-RTO: 0 PER 100 WBC
PLATELET # BLD AUTO: 225 K/UL (ref 135–420)
PMV BLD AUTO: 10.9 FL (ref 9.2–11.8)
POTASSIUM SERPL-SCNC: 3.8 MMOL/L (ref 3.5–5.5)
PROT SERPL-MCNC: 7.1 G/DL (ref 6.4–8.2)
RBC # BLD AUTO: 3.92 M/UL (ref 4.2–5.3)
SODIUM SERPL-SCNC: 140 MMOL/L (ref 136–145)
TIBC SERPL-MCNC: 308 UG/DL (ref 250–450)
WBC # BLD AUTO: 5.1 K/UL (ref 4.6–13.2)

## 2022-06-02 PROCEDURE — 82306 VITAMIN D 25 HYDROXY: CPT

## 2022-06-02 PROCEDURE — 36415 COLL VENOUS BLD VENIPUNCTURE: CPT

## 2022-06-02 PROCEDURE — 83540 ASSAY OF IRON: CPT

## 2022-06-02 PROCEDURE — 86300 IMMUNOASSAY TUMOR CA 15-3: CPT

## 2022-06-02 PROCEDURE — 82728 ASSAY OF FERRITIN: CPT

## 2022-06-02 PROCEDURE — 85025 COMPLETE CBC W/AUTO DIFF WBC: CPT

## 2022-06-02 PROCEDURE — 80053 COMPREHEN METABOLIC PANEL: CPT

## 2022-06-04 LAB — CANCER AG27-29 SERPL-ACNC: 26.2 U/ML (ref 0–38.6)

## 2022-06-07 ENCOUNTER — TELEPHONE (OUTPATIENT)
Dept: ORTHOPEDIC SURGERY | Age: 67
End: 2022-06-07

## 2022-06-07 NOTE — TELEPHONE ENCOUNTER
Patient called stating she has a torn rotator cuff in her left shoulder, and is needing to do physical therapy. Patient wanted to know if she can get an MRI of the left shoulder before starting physical therapy so she can know the severity of her injury. Please advise patient at [phone].

## 2022-06-08 NOTE — TELEPHONE ENCOUNTER
I called and spoke to the pt. The pt was identified using 2 pt identifiers. She was made aware of the provider's message. The pt will contact Dr. Cruz Comp office to schedule an appt for her shoulder. I provided the number to his office for her to contact.

## 2022-06-08 NOTE — TELEPHONE ENCOUNTER
Marcus Sullivan MD  You 2 hours ago (8:52 AM)     AB    It looks like I have seen here only for her spine. Yiseljacinto Estella has seen Dr. Harley Holden for her shoulder in 2020.  If she wants I would be happy to see her for her shoulder or she can see Dr. Harley Holden again     Thanks

## 2022-06-15 ENCOUNTER — OFFICE VISIT (OUTPATIENT)
Dept: ORTHOPEDIC SURGERY | Age: 67
End: 2022-06-15
Payer: MEDICARE

## 2022-06-15 VITALS — HEIGHT: 67 IN | TEMPERATURE: 97.1 F | WEIGHT: 196.6 LBS | BODY MASS INDEX: 30.86 KG/M2

## 2022-06-15 DIAGNOSIS — M19.012 GLENOHUMERAL ARTHRITIS, LEFT: Primary | ICD-10-CM

## 2022-06-15 PROCEDURE — 20611 DRAIN/INJ JOINT/BURSA W/US: CPT | Performed by: ORTHOPAEDIC SURGERY

## 2022-06-15 RX ORDER — PRAVASTATIN SODIUM 10 MG/1
10 TABLET ORAL
COMMUNITY
Start: 2022-06-05

## 2022-06-15 RX ORDER — TRIAMCINOLONE ACETONIDE 40 MG/ML
40 INJECTION, SUSPENSION INTRA-ARTICULAR; INTRAMUSCULAR ONCE
Status: COMPLETED | OUTPATIENT
Start: 2022-06-15 | End: 2022-06-15

## 2022-06-15 RX ORDER — TRAMADOL HYDROCHLORIDE 50 MG/1
50 TABLET ORAL
COMMUNITY
Start: 2022-05-03

## 2022-06-15 RX ADMIN — TRIAMCINOLONE ACETONIDE 40 MG: 40 INJECTION, SUSPENSION INTRA-ARTICULAR; INTRAMUSCULAR at 13:43

## 2022-06-15 NOTE — PROGRESS NOTES
Sharyle Slay  1955   Chief Complaint   Patient presents with    Shoulder Pain     lt         HISTORY OF PRESENT ILLNESS  Sharyle Slay is a 61 y.o. female who presents today for reevaluation of left shoulder. Patient rates pain as 10/10 today. She was last seen in the office for this on 8/17/2020 at which time she received a left glenohumeral joint injection which provided relief for 2-3 months. She notes her symptoms have been worsening and feels like her shoulder wants to \"come out of the joint\". She endorses difficulty with sleeping. Patient denies any fever, chills, chest pain, shortness of breath or calf pain. The remainder of the review of systems is negative. There are no new illness or injuries to report since last seen in the office. There are no changes to medications, allergies, family or social history. PHYSICAL EXAM:   Visit Vitals  Temp 97.1 °F (36.2 °C) (Temporal)   Ht 5' 7\" (1.702 m)   Wt 196 lb 9.6 oz (89.2 kg)   BMI 30.79 kg/m²     The patient is a well-developed, well-nourished female   in no acute distress. The patient is alert and oriented times three. The patient is alert and oriented times three. Mood and affect are normal.  LYMPHATIC: lymph nodes are not enlarged and are within normal limits  SKIN: normal in color and non tender to palpation. There are no bruises or abrasions noted. NEUROLOGICAL: Motor sensory exam is within normal limits. Reflexes are equal bilaterally.  There is normal sensation to pinprick and light touch  MUSCULOSKELETAL:  Examination Left shoulder   Skin Intact   AC joint tenderness -   Biceps tenderness -   Forward flexion/Elevation    Active abduction    Glenohumeral abduction 45   External rotation ROM 0   Internal rotation ROM 30   Apprehension -   Baldevs Relocation -   Jerk -   Load and Shift -   Obriens -   Speeds -   Impingement sign +   Supraspinatus/Empty Can +   External Rotation Strength -, 5/5   Lift Off/Belly Press -, 5/5 Neurovascular Intact         PROCEDURE:   Left Shoulder Injection with Ultrasound Guidance    Indication:Left Shoulder pain/swelling    After sterile prep, 6 cc of Xylocaine and 1 cc of Kenalog were injected into the left Shoulder. Intra-articular Ultrasound images captured using [facility] [address] Ultrasound machine using a frequency of 10 MHz with a linear transducer and scanned into patient's chart. [facility]  OFFICE PROCEDURE PROGRESS NOTE        Chart reviewed for the following:  Rei Figueroa M.D, have reviewed the History, Physical and updated the Allergic reactions for [facility] [address] [facility] performed immediately prior to start of procedure:  Rei Figueroa M.D, have performed the following reviews on [place] prior to the start of the procedure:            * Patient was identified by name and date of birth   * Agreement on procedure being performed was verified  * Risks and Benefits explained to the patient  * Procedure site verified and marked as necessary  * Patient was positioned for comfort  * Needle placement confirmed by ultrasound  * Consent was signed and verified     Time: 1:37 PM     Date of procedure: 6/15/2022    Procedure performed by:  Seng Mckeon M.D    Provider assisted by: (see medication administration)    How tolerated by patient: tolerated the procedure well with no complications    Comments: none      IMAGING: XR of left shoulder obtained in the office dated 8/17/2020 was reviewed and read by Dr. Harley Holden: Proximal migration of the humeral head with severe degenerative changes in the glenohumeral joint.         XR of left shoulder dated 8/26/19 was reviewed and read: Marked degenerative arthritis of glenohumeral joint      IMPRESSION:      ICD-10-CM ICD-9-CM    1. Glenohumeral arthritis, left  M19.012 716.91         PLAN:   1.  Pt presents today with left shoulder pain due to glenohumeral arthritis and I am hopeful a cortisone injection will provide relief. Pt is also interested in obtaining MRI and order was placed for left shoulder MRI. Risk factors include: htn  2. No ultrasound exam indicated today  3. Yes cortisone injection indicated today L SHOULDER US  4. No Physical/Occupational Therapy indicated today  5. Yes diagnostic test indicated today: MRI L SHOULDER  6. No durable medical equipment indicated today  7. No referral indicated today   8. No medications indicated today:   9. No Narcotic indicated today       RTC following MRI      Scribed by [address]) as dictated by Margarita Reyes MD    I, Dr. Margarita Reyes, confirm that all documentation is accurate.     Margarita Reyes M.D.   [facility]

## 2022-06-15 NOTE — PATIENT INSTRUCTIONS
Shoulder Arthritis: Exercises  Introduction  Here are some examples of exercises for you to try. The exercises may be suggested for a condition or for rehabilitation. Start each exercise slowly. Ease off the exercises if you start to have pain. You will be told when to start these exercises and which ones will work best for you. How to do the exercises  Shoulder flexion (lying down)    To make a wand for this exercise, use a piece of PVC pipe or a broom handle with the broom removed. Make the wand about a foot wider than your shoulders. 1. Lie on your back, holding a wand with both hands. Your palms should face down as you hold the wand. 2. Keeping your elbows straight, slowly raise your arms over your head. Raise them until you feel a stretch in your shoulders, upper back, and chest.  3. Hold for 15 to 30 seconds. 4. Repeat 2 to 4 times. Shoulder rotation (lying down)    To make a wand for this exercise, use a piece of PVC pipe or a broom handle with the broom removed. Make the wand about a foot wider than your shoulders. 1. Lie on your back. Hold a wand with both hands with your elbows bent and palms up. 2. Keep your elbows close to your body, and move the wand across your body toward the sore arm. 3. Hold for 8 to 12 seconds. 4. Repeat 2 to 4 times. Shoulder internal rotation with towel    1. Hold a towel above and behind your head with the arm that is not sore. 2. With your sore arm, reach behind your back and grasp the towel. 3. With the arm above your head, pull the towel upward. Do this until you feel a stretch on the front and outside of your sore shoulder. 4. Hold 15 to 30 seconds. 5. Repeat 2 to 4 times. Shoulder blade squeeze    1. Stand with your arms at your sides, and squeeze your shoulder blades together. Do not raise your shoulders up as you squeeze. 2. Hold 6 seconds. 3. Repeat 8 to 12 times.   Resisted rows    For this exercise, you will need elastic exercise material, such as surgical tubing or Thera-Band. 1. Put the band around a solid object at about waist level. (A bedpost will work well.) Each hand should hold an end of the band. 2. With your elbows at your sides and bent to 90 degrees, pull the band back. Your shoulder blades should move toward each other. Return to the starting position. 3. Repeat 8 to 12 times. External rotator strengthening exercise    1. Start by tying a piece of elastic exercise material to a doorknob. You can use surgical tubing or Thera-Band. (You may also hold one end of the band in each hand.)  2. Stand or sit with your shoulder relaxed and your elbow bent 90 degrees. Your upper arm should rest comfortably against your side. Squeeze a rolled towel between your elbow and your body for comfort. This will help keep your arm at your side. 3. Hold one end of the elastic band with the hand of the painful arm. 4. Start with your forearm across your belly. Slowly rotate the forearm out away from your body. Keep your elbow and upper arm tucked against the towel roll or the side of your body until you begin to feel tightness in your shoulder. Slowly move your arm back to where you started. 5. Repeat 8 to 12 times. Internal rotator strengthening exercise    1. Start by tying a piece of elastic exercise material to a doorknob. You can use surgical tubing or Thera-Band. 2. Stand or sit with your shoulder relaxed and your elbow bent 90 degrees. Your upper arm should rest comfortably against your side. Squeeze a rolled towel between your elbow and your body for comfort. This will help keep your arm at your side. 3. Hold one end of the elastic band in the hand of the painful arm. 4. Slowly rotate your forearm toward your body until it touches your belly. Slowly move it back to where you started. 5. Keep your elbow and upper arm firmly tucked against the towel roll or at your side. 6. Repeat 8 to 12 times.   Pendulum swing    If you have pain in your back, do not do this exercise. 1. Hold on to a table or the back of a chair with your good arm. Then bend forward a little and let your sore arm hang straight down. This exercise does not use the arm muscles. Rather, use your legs and your hips to create movement that makes your arm swing freely. 2. Use the movement from your hips and legs to guide the slightly swinging arm back and forth like a pendulum (or elephant trunk). Then guide it in circles that start small (about the size of a dinner plate). Make the circles a bit larger each day, as your pain allows. 3. Do this exercise for 5 minutes, 5 to 7 times each day. 4. As you have less pain, try bending over a little farther to do this exercise. This will increase the amount of movement at your shoulder. Follow-up care is a key part of your treatment and safety. Be sure to make and go to all appointments, and call your doctor if you are having problems. It's also a good idea to know your test results and keep a list of the medicines you take. Where can you learn more? Go to http://www.gray.com/  Enter H562 in the search box to learn more about \"Shoulder Arthritis: Exercises. \"  Current as of: July 1, 2021               Content Version: 13.2  © 2006-2022 [employer]. Care instructions adapted under license by [employer] (which disclaims liability or warranty for this information). If you have questions about a medical condition or this instruction, always ask your healthcare professional. [place] [address] any warranty or liability for your use of this information.

## 2022-06-16 ENCOUNTER — OFFICE VISIT (OUTPATIENT)
Dept: ONCOLOGY | Age: 67
End: 2022-06-16
Payer: MEDICARE

## 2022-06-16 VITALS
SYSTOLIC BLOOD PRESSURE: 133 MMHG | OXYGEN SATURATION: 99 % | WEIGHT: 195 LBS | HEIGHT: 67 IN | DIASTOLIC BLOOD PRESSURE: 80 MMHG | HEART RATE: 79 BPM | RESPIRATION RATE: 18 BRPM | BODY MASS INDEX: 30.61 KG/M2

## 2022-06-16 DIAGNOSIS — Z17.0 ESTROGEN RECEPTOR POSITIVE: ICD-10-CM

## 2022-06-16 DIAGNOSIS — Z79.811 LONG TERM CURRENT USE OF AROMATASE INHIBITOR: ICD-10-CM

## 2022-06-16 DIAGNOSIS — E55.9 VITAMIN D DEFICIENCY: Primary | ICD-10-CM

## 2022-06-16 DIAGNOSIS — D50.8 IRON DEFICIENCY ANEMIA SECONDARY TO INADEQUATE DIETARY IRON INTAKE: ICD-10-CM

## 2022-06-16 DIAGNOSIS — C50.511 MALIGNANT NEOPLASM OF LOWER-OUTER QUADRANT OF RIGHT FEMALE BREAST, UNSPECIFIED ESTROGEN RECEPTOR STATUS (HCC): ICD-10-CM

## 2022-06-16 PROCEDURE — 99442 PR PHYS/QHP TELEPHONE EVALUATION 11-20 MIN: CPT | Performed by: NURSE PRACTITIONER

## 2022-06-16 RX ORDER — CETIRIZINE HCL 10 MG
TABLET ORAL
COMMUNITY
Start: 2022-05-06

## 2022-06-16 RX ORDER — RIZATRIPTAN BENZOATE 10 MG/1
TABLET ORAL
COMMUNITY
Start: 2021-09-09

## 2022-06-16 RX ORDER — LETROZOLE 2.5 MG/1
2.5 TABLET, FILM COATED ORAL DAILY
Qty: 90 TABLET | Refills: 3 | Status: SHIPPED | OUTPATIENT
Start: 2022-06-16

## 2022-06-16 NOTE — PROGRESS NOTES
Hematology/Oncology  Progress Note    Name: Ailyn García  : 1955    PCP: Harvey Danielle MD     Ms. Nina Green is a 61 y.o. -American woman with invasive ductal carcinoma the right breast.    Current therapy: Femara 2.5mg PO daily since       Subjective:     Mrs. Nina Green is a 21-year-old -American woman with invasive ductal carcinoma involving the right breast. diagnosed in 2018. She reports tolerance and adherence to Femara without significant side effects. She states she does her breast self exam on a monthly basis. She continue to report chronic tenderness to the right breast which appeared after surgery. She denies any nipple discharge or worsening pain or any other breast issues. She is seeing orthopaedic for management of left shoulder pain. She denied fever, chills, night sweat, unintentional weight loss, skin lumps or bumps, acute bleeding or bruising issues. Denied headache, acute vision change, dizziness, chest pain, worsen shortness of breath, palpitation, productive cough, nausea, vomiting, abdominal pain, altered bowel habits, dysuria,     Past medical history, family history, and social history: these were reviewed and remains unchanged.     Past Medical History:   Diagnosis Date    Arthritis     Balance problems     patient states no further issues    Breast CA ([facility] [address] 2018    right    Bronchitis     Chronic pain     back    Fibromyalgia 2012    GERD (gastroesophageal reflux disease)     Hiatal Hernia    Hypercholesterolemia     Hypertension     Kidney stones     Low back pain     Lumbar spinal stenosis 2012    Migraine headache     Nervousness     Neurological disease     Osteoarthritis 2012    Other ill-defined conditions(799.89)     neuropathy    Peripheral neuropathy 2012    Post laminectomy syndrome     Postoperative anemia due to acute blood loss 2012    Ringing in ears     Rotator cuff syndrome     Spinal stenosis     Thoracic spine pain     Vitamin D deficiency 11/30/2012     Past Surgical History:   Procedure Laterality Date    HX MASTECTOMY Right 5/4/2018    NEEDLE LOCALIZED (1000) LUMPECTOMY RIGHT BREAST AND SENTINEL LYMPH NODE BIOPSY RIGHT AXILLA performed by Shy Guzman MD at [address] [place] [address] HX ORTHOPAEDIC      foot l heel spur    HX ORTHOPAEDIC      right knee sx    HX OTHER SURGICAL      non cancer cyst removed from right shoulder    NEUROLOGICAL PROCEDURE UNLISTED      Bilateral L3-4 hemilaminotomy and medial facetectomy by Dr. Corbett Began    IA BX/REMV,LYMPH NODE,DEEP AXILL Right 05/04/2018    Dr. Jeri Rao History     Socioeconomic History    Marital status: SINGLE     Spouse name: Not on file    Number of children: Not on file    Years of education: Not on file    Highest education level: Not on file   Occupational History    Not on file   Tobacco Use    Smoking status: Never Smoker    Smokeless tobacco: Never Used   Vaping Use    Vaping Use: Never used   Substance and Sexual Activity    Alcohol use: No    Drug use: No    Sexual activity: Not on file   Other Topics Concern    Not on file   Social History Narrative    Not on file     Social Determinants of Health     Financial Resource Strain:     Difficulty of Paying Living Expenses: Not on file   Food Insecurity:     Worried About Running Out of Food in the Last Year: Not on file    Senthil of Food in the Last Year: Not on file   Transportation Needs:     Lack of Transportation (Medical): Not on file    Lack of Transportation (Non-Medical):  Not on file   Physical Activity:     Days of Exercise per Week: Not on file    Minutes of Exercise per Session: Not on file   Stress:     Feeling of Stress : Not on file   Social Connections:     Frequency of Communication with Friends and Family: Not on file    Frequency of Social Gatherings with Friends and Family: Not on file    Attends [employer] Services: Not on file   [facility] or Organizations: Not on file    Attends Club or Organization Meetings: Not on file    Marital Status: Not on file   Intimate Partner Violence:     Fear of Current or Ex-Partner: Not on file    Emotionally Abused: Not on file    Physically Abused: Not on file    Sexually Abused: Not on file   Housing Stability:     Unable to Pay for Housing in the Last Year: Not on file    Number of [place] in the Last Year: Not on file    Unstable Housing in the Last Year: Not on file     Family History   Problem Relation Age of Onset    Hypertension Mother    Real OSTEOARTHRITIS Mother     Neuropathy Mother     Hypertension Father     Cancer Father         colon    Hypertension Brother     Cancer Brother     Neuropathy Brother     Neuropathy Other     Arthritis-rheumatoid Other     GERD Other     Diabetes Other     Diabetes Maternal Aunt     Diabetes Maternal Uncle     Hypertension Maternal Grandfather      Current Outpatient Medications   Medication Sig Dispense Refill    rizatriptan (MAXALT) 10 mg tablet 1 tablet at onset of headache , can take another tablet after 2 hours      letrozole (FEMARA) 2.5 mg tablet Take 1 Tablet by mouth daily. 90 Tablet 3    ferrous sulfate (SLOW FE) 142 mg (45 mg iron) ER tablet Take 1 Tablet by mouth Daily (before breakfast). 90 Tablet 2    cetirizine (ZYRTEC) 10 mg tablet take 1 tablet by mouth once daily if needed      traMADoL (ULTRAM) 50 mg tablet Take 50 mg by mouth nightly.  pravastatin (PRAVACHOL) 10 mg tablet Take 10 mg by mouth nightly.  gabapentin (Neurontin) 400 mg capsule Take 1 Capsule by mouth three (3) times daily. Max Daily Amount: 1,200 mg.  Indications: neuropathic pain 90 Capsule 3    azelastine (OPTIVAR) 0.05 % ophthalmic solution instill 1 drop INTO AFFECTED EYE(S) twice a day      omeprazole (PRILOSEC) 20 mg capsule TAKE 1 CAPSULE BY MOUTH IN THE MORNING 30 MINUTES PRIOR TO BREAKFAST      ibuprofen (MOTRIN) 800 mg tablet Take 1 Tablet by mouth three (3) times daily as needed for Pain. 40 Tablet 2    atorvastatin (Lipitor) 10 mg tablet Take 10 mg by mouth daily. (Patient not taking: Reported on 6/15/2022)      butalbital-acetaminophen-caffeine (FIORICET, ESGIC) -40 mg per tablet take 1 tablet by mouth every 8 hours      ergocalciferol (ERGOCALCIFEROL) 1,250 mcg (50,000 unit) capsule Take 1 Cap by mouth every seven (7) days. 12 Cap 0    albuterol (ACCUNEB) 0.63 mg/3 mL nebulizer solution 0.63 mg by Nebulization route every six (6) hours as needed for Wheezing.  acetaminophen (TYLENOL) 500 mg tablet Take 1 Tab by mouth every six (6) hours as needed for Pain. 120 Tab 0    ascorbic acid (VITAMIN C) 250 mg tablet Take 1 Tab by mouth two (2) times daily (with meals). 30 Tab 0    losartan-hydrochlorothiazide (HYZAAR) 100-25 mg per tablet Take 1 Tab by mouth daily. Indications: HYPERTENSION         Review of Systems   Constitutional: Negative for chills, diaphoresis, fever, malaise/fatigue and weight loss. Respiratory: Negative for cough, hemoptysis, shortness of breath and wheezing. Cardiovascular: Negative for chest pain, palpitations and leg swelling. Gastrointestinal: Negative for abdominal pain, diarrhea, heartburn, nausea and vomiting. Genitourinary: Negative for dysuria, frequency, hematuria and urgency. Musculoskeletal: Negative for joint pain and myalgias. Skin: Negative for itching and rash. Neurological: Negative for dizziness, seizures, weakness and headaches. Psychiatric/Behavioral: Negative for depression. The patient does not have insomnia.              Objective:     Visit Vitals  /80   Pulse 79   Resp 18   Ht 5' 7\" (1.702 m)   Wt 88.5 kg (195 lb)   SpO2 99%   BMI 30.54 kg/m²       ECOG Performance Status (grade): 0  0 - able to carry on all pre-disease activity w/out restriction  1 - restricted but able to carry out light work  2 - ambulatory and can self- care but unable to carry out work  3 - bed or chair >50% of waking hours  4 - completely disable, total care, confined to bed or chair    Physical Exam  Constitutional:       Appearance: Normal appearance. HENT:      Head: Normocephalic and atraumatic. Eyes:      Pupils: Pupils are equal, round, and reactive to light. Cardiovascular:      Rate and Rhythm: Normal rate and regular rhythm. Heart sounds: Normal heart sounds. Pulmonary:      Effort: Pulmonary effort is normal.      Breath sounds: Normal breath sounds. Chest:      Comments: Left: No dimpling, discoloration, nipple inversion or retractions. No axillary or supraclavicular lymphadenopathy. No mass   Right: No dimpling, discoloration. No axillary or supraclavicular lymphadenopathy. No mass  Abdominal:      General: Bowel sounds are normal.      Palpations: Abdomen is soft. Tenderness: There is no abdominal tenderness. There is no guarding. Musculoskeletal:         General: Normal range of motion. Cervical back: Neck supple. Right lower leg: No edema. Left lower leg: No edema. Skin:     General: Skin is warm. Neurological:      General: No focal deficit present. Mental Status: She is alert and oriented to person, place, and time. Mental status is at baseline. Diagnostics:      No results found for this or any previous visit (from the past 96 hour(s)). Imaging:  No results found for this or any previous visit. Results for orders placed in visit on 10/27/21    XR SPINE LUMB 2 OR 3 V    Narrative  Lumbar spine -3 Views    HISTORY: Chronic bilateral low back pain without sciatica. History of lumbar  spine surgery with pain. COMPARISON: Radiographs 11/16/2009, MR lumbar spine 8/24/2015. FINDINGS: Since 2009, L3-L4 grade I anterolisthesis has developed. It is present  and is similar versus mildly worsened compared to 2015 MRI comparison. Its disc  space is maintained.  Chronic diffuse diffuse disc space narrowing at L5-S1. Chronic left greater than right lower lumbar facet hypertrophy. Impression  1. Progressing grade I anterolisthesis at L3-L4 since remote studies. 2. Chronic L5-S1 degenerative disc disease and lower lumbar facet hypertrophy. Results for orders placed during the hospital encounter of 04/05/18    CT CHEST ABD PELV W CONT    Narrative  HISTORY: Recent diagnosis of breast carcinoma. EXAM: CT chest, abdomen and pelvis. TECHNIQUE: Spiral images of the chest abdomen and pelvis were performed  according to routine protocol from thoracic inlet to the ischial tuberosities  after administration of 100 cc of Isovue-300 contrast media intravenously. Coronal and sagittal reconstructions were provided for evaluation. Oral contrast  was given. No prior studies for comparison. All CT scans at this facility are performed using dose optimization technique as  appropriate to a performed exam, to include automated exposure control,  adjustment of the mA and/or kV according to patient size (including appropriate  matching for site-specific examinations), or use of iterative reconstruction  technique. FINDINGS:    CT THORAX:    There is no pneumothorax, pneumonia or pleural effusions are seen. Trachea bronchial trees unremarkable. Heart is normal in size without pericardial effusions. Vascular pedicle is unremarkable. No enhancing mass lesion or pathologic lymphadenopathy is noted. Nonspecific bean shaped fatty hilum lymph nodes are noted in bilateral axillary  regions. No lytic or blastic lesions. CT ABDOMEN:    Liver is unremarkable. Portal vein is patent and unremarkable. Hepatic outflow is unremarkable. Gallbladder is unremarkable. Spleen is unremarkable. Pancreas is unremarkable. Adrenal glands are unremarkable bilaterally. Kidneys are unremarkable. No small or large bowel obstruction is seen.     No ascites or pneumoperitoneum. Abdominal aorta and IVC are unremarkable. No enhancing mass lesion or pathologic lymphadenopathy seen. No lytic or blastic lesions. CT PELVIS:    Urinary bladder is unremarkable. Uterus is atrophic but unremarkable. Rectus sigmoid region is unremarkable. No free pelvic fluid is seen. No enhancing mass lesion or pathologic lymphadenopathy. No lytic or blastic lesions. Impression  IMPRESSION:    1. Unremarkable study of the chest abdomen and pelvis. Assessment:     1. Malignant neoplasm of lower-outer quadrant of right female breast, unspecified estrogen receptor status ([facility] [address]    2. Estrogen receptor positive      Plan:      Invasive ductal carcinoma the right breast  -- Patient was being followed by Dr. Priscila Yadav who retired. S/P rtight lumpectomy and sentinel lymph node biopsy for ER/W[place] positive HER-2 negative right breast cancer    She continue to report chronic tenderness to the right breast which appeared after surgery. She continues to take ibuprofen as needed. -- she recently followed up with Dr. Ashleigh Limon   -- 03/17/2022 Mammogram showed no evidence of malignancy. BIRADS 1: Benign  In absence of concerning physical findings, recommend annual follow-up. -- Clinical stable. Recent labs reviewed with the patient today. Plan:  --  The patient was instructed to continue doing her breast exams on a monthly basis. -- The patient was advised to notify us if any new breast pain, new palpable nodule, nipple inversion, spontaneous nipple discharge especially if bloody, breast skin changes, unintentional weight loss, worsen fatigue, new bone pain or back pain, or any concerns. -- I have advised her to follow up her PCP to continue age-appropriate cancer screening. -- I have educated her regarding lifestyle modifications, healthy diet, and physical activity.   -- Her next mammogram will be 3/2022       Long term use of aromatase inhibitor:   -- The patient currently taking Femara daily. She states she is tolerating the medication well and this will be continued. --She report that she is up to date with her DEXA scan which was done by her rheumatologist .  -- Advised on adequate amounts of calcium (at least 1,200 mg per day) and vitamin D (800-1,000 IU per day). The higher dose of vitamin D may be needed if vitamin D deficiency        Chronic breast pain s.p surgery and XRT   -- Continue Ibuprofen as needed. -- Instructed the patient to have a follow-up with pain management clinic for long-term pain control. Chronic anemia, Hx of Iron Deficiency Anemia:   -- We will continue to monitor CBC, Iron profile, ferritin  --CBC, iron and ferritin reviewed with patient today. --The patient has been advise to continue daily iron pills as instructed. Medication ordered today       -- We will see the patient back in clinic in about 4-6 months. Always sooner if required. Orders Placed This Encounter    cetirizine (ZYRTEC) 10 mg tablet     Sig: take 1 tablet by mouth once daily if needed    rizatriptan (MAXALT) 10 mg tablet     Si tablet at onset of headache , can take another tablet after 2 hours    letrozole (FEMARA) 2.5 mg tablet     Sig: Take 1 Tablet by mouth daily. Dispense:  90 Tablet     Refill:  3    ferrous sulfate (SLOW FE) 142 mg (45 mg iron) ER tablet     Sig: Take 1 Tablet by mouth Daily (before breakfast). Dispense:  90 Tablet     Refill:  2           Ms. Jaleel Bliss has a reminder for a \"due or due soon\" health maintenance. I have asked that she contact her primary care provider for follow-up on this health maintenance. All of patient's questions answered to their apparent satisfaction. They verbally show understanding and agreement with aforementioned plan. Uziel Alcantar, ERNESTO  2022          Parts of this document has been produced using Dragon dictation system. Unrecognized errors in transcription may be present.  Please do not hesitate to reach out for any questions or clarifications.       CC: Odin Gant MD

## 2022-07-01 ENCOUNTER — HOSPITAL ENCOUNTER (OUTPATIENT)
Age: 67
Discharge: HOME | End: 2022-07-01
Attending: ORTHOPAEDIC SURGERY
Payer: MEDICARE

## 2022-07-01 DIAGNOSIS — M19.012 GLENOHUMERAL ARTHRITIS, LEFT: ICD-10-CM

## 2022-07-01 PROCEDURE — 73221 MRI JOINT UPR EXTREM W/O DYE: CPT

## 2022-07-12 ENCOUNTER — OFFICE VISIT (OUTPATIENT)
Dept: ORTHOPEDIC SURGERY | Age: 67
End: 2022-07-12
Payer: MEDICARE

## 2022-07-12 VITALS
BODY MASS INDEX: 29.98 KG/M2 | HEIGHT: 67 IN | TEMPERATURE: 97.5 F | HEART RATE: 84 BPM | WEIGHT: 191 LBS | OXYGEN SATURATION: 98 %

## 2022-07-12 DIAGNOSIS — M19.012 GLENOHUMERAL ARTHRITIS, LEFT: Primary | ICD-10-CM

## 2022-07-12 DIAGNOSIS — M75.112 INCOMPLETE TEAR OF LEFT ROTATOR CUFF, UNSPECIFIED WHETHER TRAUMATIC: ICD-10-CM

## 2022-07-12 PROCEDURE — G8427 DOCREV CUR MEDS BY ELIG CLIN: HCPCS | Performed by: ORTHOPAEDIC SURGERY

## 2022-07-12 PROCEDURE — G8756 NO BP MEASURE DOC: HCPCS | Performed by: ORTHOPAEDIC SURGERY

## 2022-07-12 PROCEDURE — G9899 SCRN MAM PERF RSLTS DOC: HCPCS | Performed by: ORTHOPAEDIC SURGERY

## 2022-07-12 PROCEDURE — 3017F COLORECTAL CA SCREEN DOC REV: CPT | Performed by: ORTHOPAEDIC SURGERY

## 2022-07-12 PROCEDURE — G8432 DEP SCR NOT DOC, RNG: HCPCS | Performed by: ORTHOPAEDIC SURGERY

## 2022-07-12 PROCEDURE — G8417 CALC BMI ABV UP PARAM F/U: HCPCS | Performed by: ORTHOPAEDIC SURGERY

## 2022-07-12 PROCEDURE — 99213 OFFICE O/P EST LOW 20 MIN: CPT | Performed by: ORTHOPAEDIC SURGERY

## 2022-07-12 RX ORDER — CELECOXIB 200 MG/1
200 CAPSULE ORAL DAILY
Qty: 60 CAPSULE | Refills: 0 | Status: CANCELLED | OUTPATIENT
Start: 2022-07-12

## 2022-07-12 NOTE — PROGRESS NOTES
Zoila Peters  1955   Chief Complaint   Patient presents with    Shoulder Pain     left shoulder pain        HISTORY OF PRESENT ILLNESS  Zoila Peters is a 61 y.o. female who presents today for reevaluation of left shoulder and MRI review. Patient rates pain as 5/10 today. At last OV on 6/15/2022, patient had a left glenohumeral joint cortisone injection. She was seen in the office for this on 8/17/2020 at which time she received a left glenohumeral joint injection which provided relief for 2-3 months. She notes her symptoms have been worsening and feels like her shoulder wants to \"come out of the joint\". She endorses difficulty with sleeping. Patient denies any fever, chills, chest pain, shortness of breath or calf pain. The remainder of the review of systems is negative. There are no new illness or injuries to report since last seen in the office. There are no changes to medications, allergies, family or social history. PHYSICAL EXAM:   Visit Vitals  Pulse 84   Temp 97.5 °F (36.4 °C) (Temporal)   Ht 5' 7\" (1.702 m)   Wt 191 lb (86.6 kg)   SpO2 98%   BMI 29.91 kg/m²     The patient is a well-developed, well-nourished female   in no acute distress. The patient is alert and oriented times three. The patient is alert and oriented times three. Mood and affect are normal.  LYMPHATIC: lymph nodes are not enlarged and are within normal limits  SKIN: normal in color and non tender to palpation. There are no bruises or abrasions noted. NEUROLOGICAL: Motor sensory exam is within normal limits. Reflexes are equal bilaterally.  There is normal sensation to pinprick and light touch  MUSCULOSKELETAL:  Examination Left shoulder   Skin Intact   AC joint tenderness -   Biceps tenderness -   Forward flexion/Elevation    Active abduction    Glenohumeral abduction 45   External rotation ROM 0   Internal rotation ROM 30   Apprehension -   Baldevs Relocation -   Jerk -   Load and Shift -   [place] - Speeds -   Impingement sign +   Supraspinatus/Empty Can +   External Rotation Strength -, 5/5   Lift Off/Belly Press -, 5/5   Neurovascular Intact         IMAGING:  MRI of left shoulder dated 7/01/2022 was reviewed and read by Dr. Jerald Alexis:   IMPRESSION:  1. Stable severe partial thickness tear of the supraspinatus and infraspinatus  tendons as described. No gaps or retraction. Subscapularis  tendinosis/partial-thickness tear also grossly stable. Severe degeneration of  the labrum  2. Advanced DJD at the glenohumeral joint. 3. Type II acromion. AC joint hypertrophy indenting supraspinatus myotendinous  junction. XR of left shoulder obtained in the office dated 8/17/2020 was reviewed and read by Dr. Jerald Alexis: Proximal migration of the humeral head with severe degenerative changes in the glenohumeral joint.         XR of left shoulder dated 8/26/19 was reviewed and read: Marked degenerative arthritis of glenohumeral joint      IMPRESSION:      ICD-10-CM ICD-9-CM    1. Glenohumeral arthritis, left  M19.012 716.91    2. Incomplete tear of left rotator cuff, unspecified whether traumatic  M75.112 840.4         PLAN:   1. Pt presents today with left shoulder pain due to MRI-documented glenohumeral arthritis and partial RCT. We discussed surgical intervention including shoulder replacement however patient would like to wait on surgery. Was provided with prescription for Celebrex. Risk factors include: htn  2. No ultrasound exam indicated today  3. No cortisone injection indicated today   4. No Physical/Occupational Therapy indicated today  5. No diagnostic test indicated today  6. No durable medical equipment indicated today  7. No referral indicated today   8. Yes medications indicated today: CELEBREX  9.  No Narcotic indicated today       RTC 1-2 months if symptoms persist      Scribed by Jareth Canales) as dictated by Jaden Lucero MD    I, Dr. Jaden Lucero, confirm that all documentation is accurate.     Meet Lozada M.D.   [facility]

## 2022-07-13 ENCOUNTER — TELEPHONE (OUTPATIENT)
Dept: ORTHOPEDIC SURGERY | Age: 67
End: 2022-07-13

## 2022-07-13 RX ORDER — CELECOXIB 200 MG/1
200 CAPSULE ORAL 2 TIMES DAILY WITH MEALS
Qty: 60 CAPSULE | Refills: 2 | Status: SHIPPED | OUTPATIENT
Start: 2022-07-13 | End: 2022-08-22

## 2022-07-13 NOTE — TELEPHONE ENCOUNTER
Patient states her pharmacy has not received her prescription for Celebrex yet.  Patient uses [facility] on [address]     Patient can be reached at [phone]

## 2022-08-22 ENCOUNTER — OFFICE VISIT (OUTPATIENT)
Dept: ORTHOPEDIC SURGERY | Age: 67
End: 2022-08-22
Payer: MEDICARE

## 2022-08-22 VITALS
TEMPERATURE: 98.5 F | HEART RATE: 85 BPM | HEIGHT: 67 IN | BODY MASS INDEX: 29.03 KG/M2 | OXYGEN SATURATION: 100 % | WEIGHT: 185 LBS

## 2022-08-22 DIAGNOSIS — M51.36 DDD (DEGENERATIVE DISC DISEASE), LUMBAR: ICD-10-CM

## 2022-08-22 DIAGNOSIS — M47.899 FACET SYNDROME: ICD-10-CM

## 2022-08-22 DIAGNOSIS — M48.062 SPINAL STENOSIS OF LUMBAR REGION WITH NEUROGENIC CLAUDICATION: ICD-10-CM

## 2022-08-22 PROCEDURE — 99213 OFFICE O/P EST LOW 20 MIN: CPT | Performed by: PHYSICAL MEDICINE & REHABILITATION

## 2022-08-22 PROCEDURE — 3017F COLORECTAL CA SCREEN DOC REV: CPT | Performed by: PHYSICAL MEDICINE & REHABILITATION

## 2022-08-22 PROCEDURE — G8756 NO BP MEASURE DOC: HCPCS | Performed by: PHYSICAL MEDICINE & REHABILITATION

## 2022-08-22 PROCEDURE — G8427 DOCREV CUR MEDS BY ELIG CLIN: HCPCS | Performed by: PHYSICAL MEDICINE & REHABILITATION

## 2022-08-22 PROCEDURE — G8417 CALC BMI ABV UP PARAM F/U: HCPCS | Performed by: PHYSICAL MEDICINE & REHABILITATION

## 2022-08-22 PROCEDURE — G9899 SCRN MAM PERF RSLTS DOC: HCPCS | Performed by: PHYSICAL MEDICINE & REHABILITATION

## 2022-08-22 PROCEDURE — G8432 DEP SCR NOT DOC, RNG: HCPCS | Performed by: PHYSICAL MEDICINE & REHABILITATION

## 2022-08-22 RX ORDER — GABAPENTIN 400 MG/1
400 CAPSULE ORAL 3 TIMES DAILY
Qty: 90 CAPSULE | Refills: 3 | Status: SHIPPED | OUTPATIENT
Start: 2022-08-22 | End: 2022-08-29 | Stop reason: DRUGHIGH

## 2022-08-22 NOTE — PROGRESS NOTES
[address]  [address], [address]  [address]  Phone: [phone]  Fax: [phone]      Patient: Kain Hernandez                                                                              MRN: [id number]        YOB: 1955          AGE: 61 y.o. PCP: Miguel Woods MD  Date:  08/22/22    Reason for Consultation: Back Pain (Lower/)      HPI:  Kain Hernandez is a 61 y.o. female with relevant PMH of L3-4 laminectomy in 2021, and breast CA (right) 2016 s/p lumpectomy and radiation and hormone treatment  who has been followed here for low back pain radiating down bilateral legs. Pain is mainly in her low back. She does have pain radiating into both of her legs but she reports a history of neuropathy. She has a long history of back pain but reports it has become worse over the past 6 months. X-rays demonstrate L3/4 anterolisthesis with facet arthropathy, with DDD L5/S1. Pain is unchanged. She is taking gabapentin 400mg tid which helps    Neurologic symptoms:+ numbness, tingling b/l legs/ No weakness, bowel or bladder changes. No recent falls, ambulates with single prong cane      Location: The pain is located in the low back (90%)  Radiation: The pain does radiate bilateral legs (related to neuropathy per patient ). Pain Score: Currently: 5/10    Quality: Pain is of a Achy, Dull and Tight quality. Aggravating: Pain is exacerbated by walking  Alleviating: The pain is alleviated by lying down    Prior Treatments:   Injections (prior to surgery in 2012)  Physical therapy many years ago  Previous Medications:   Current Medications: gabapentin 400mg TID, diclofenac  Previous work-up has included:   MRI lumbar spine 2015  There are subtle anterolisthesis at L3-4 and L4-5 broad-based disc protrusions as well as posterior facet and ligamentous hypertrophy, left somewhat greater than right at L4-5.   Facet effusions are seen at L3-4. There is associated mild-to-moderate canal stenosis at L3-4 and mild stenosis at L4-5. Moderate  bilateral foraminal stenosis is present at both levels. Possible postoperative changes at L3-4. There is probably little significant interval change. There is subtle canal encroachment from facet and ligamentous hypertrophy at L2-3 but there is no significant central disc protrusion. Small bilateral foraminal protrusions are present but the exit foramina are patent. There are moderate-to-severe disc degenerative changes at L5-S1 but canal and foramina are patent. Canal and foramina are patent at the remaining levels. No cord lesions are seen. XR Results (most recent):  Results from Orders Only encounter on 10/27/21    XR SPINE LUMB 2 OR 3 V    Narrative  Lumbar spine -3 Views    HISTORY: Chronic bilateral low back pain without sciatica. History of lumbar  spine surgery with pain. COMPARISON: Radiographs 11/16/2009, MR lumbar spine 8/24/2015. FINDINGS: Since 2009, L3-L4 grade I anterolisthesis has developed. It is present  and is similar versus mildly worsened compared to 2015 MRI comparison. Its disc  space is maintained. Chronic diffuse diffuse disc space narrowing at L5-S1. Chronic left greater than right lower lumbar facet hypertrophy. Impression  1. Progressing grade I anterolisthesis at L3-L4 since remote studies. 2. Chronic L5-S1 degenerative disc disease and lower lumbar facet hypertrophy.       Past Medical History:   Past Medical History:   Diagnosis Date    Arthritis     Balance problems     patient states no further issues    Breast CA ([address] 03/2018    right    Bronchitis     Chronic pain     back    Fibromyalgia 11/29/2012    GERD (gastroesophageal reflux disease)     Hiatal Hernia    Hypercholesterolemia     Hypertension     Kidney stones     Low back pain     Lumbar spinal stenosis 11/25/2012    Migraine headache     Nervousness     Neurological disease Osteoarthritis 11/25/2012    Other ill-defined conditions(799.89)     neuropathy    Peripheral neuropathy 11/29/2012    Post laminectomy syndrome     Postoperative anemia due to acute blood loss 11/30/2012    Ringing in ears     Rotator cuff syndrome     Spinal stenosis     Thoracic spine pain     Vitamin D deficiency 11/30/2012      Past Surgical History:   Past Surgical History:   Procedure Laterality Date    HX MASTECTOMY Right 5/4/2018    NEEDLE LOCALIZED (1000) LUMPECTOMY RIGHT BREAST AND SENTINEL LYMPH NODE BIOPSY RIGHT AXILLA performed by Kristel Cartagena MD at [facility] MAIN OR    HX ORTHOPAEDIC      foot l heel spur    HX ORTHOPAEDIC      right knee sx    HX OTHER SURGICAL      non cancer cyst removed from right shoulder    NEUROLOGICAL PROCEDURE UNLISTED      Bilateral L3-4 hemilaminotomy and medial facetectomy by Dr. Jaja Mccauley BX/REMV,LYMPH NODE,DEEP AXILL Right 05/04/2018    Dr. Gibbs       SocHx:   Social History     Tobacco Use    Smoking status: Never    Smokeless tobacco: Never   Substance Use Topics    Alcohol use: No      FamHx:? Family History   Problem Relation Age of Onset    Hypertension Mother     OSTEOARTHRITIS Mother     Neuropathy Mother     Hypertension Father     Cancer Father         colon    Hypertension Brother     Cancer Brother     Neuropathy Brother     Neuropathy Other     Arthritis-rheumatoid Other     GERD Other     Diabetes Other     Diabetes Maternal Aunt     Diabetes Maternal Uncle     Hypertension Maternal Grandfather        Current Medications:    Current Outpatient Medications   Medication Sig Dispense Refill    cetirizine (ZYRTEC) 10 mg tablet take 1 tablet by mouth once daily if needed      rizatriptan (MAXALT) 10 mg tablet 1 tablet at onset of headache , can take another tablet after 2 hours      letrozole (FEMARA) 2.5 mg tablet Take 1 Tablet by mouth daily.  90 Tablet 3    ferrous sulfate (SLOW FE) 142 mg (45 mg iron) ER tablet Take 1 Tablet by mouth Daily (before breakfast). 90 Tablet 2    traMADoL (ULTRAM) 50 mg tablet Take 50 mg by mouth nightly. pravastatin (PRAVACHOL) 10 mg tablet Take 10 mg by mouth nightly.        gabapentin (Neurontin) 400 mg capsule Take 1 Capsule by mouth three (3) times daily. Max Daily Amount: 1,200 mg. Indications: neuropathic pain 90 Capsule 3    azelastine (OPTIVAR) 0.05 % ophthalmic solution instill 1 drop INTO AFFECTED EYE(S) twice a day      omeprazole (PRILOSEC) 20 mg capsule TAKE 1 CAPSULE BY MOUTH IN THE MORNING 30 MINUTES PRIOR TO BREAKFAST      ibuprofen (MOTRIN) 800 mg tablet Take 1 Tablet by mouth three (3) times daily as needed for Pain. 40 Tablet 2    butalbital-acetaminophen-caffeine (FIORICET, ESGIC) -40 mg per tablet take 1 tablet by mouth every 8 hours      ergocalciferol (ERGOCALCIFEROL) 1,250 mcg (50,000 unit) capsule Take 1 Cap by mouth every seven (7) days. 12 Cap 0    albuterol (ACCUNEB) 0.63 mg/3 mL nebulizer solution 0.63 mg by Nebulization route every six (6) hours as needed for Wheezing. acetaminophen (TYLENOL) 500 mg tablet Take 1 Tab by mouth every six (6) hours as needed for Pain. 120 Tab 0    ascorbic acid (VITAMIN C) 250 mg tablet Take 1 Tab by mouth two (2) times daily (with meals). 30 Tab 0    losartan-hydrochlorothiazide (HYZAAR) 100-25 mg per tablet Take 1 Tab by mouth daily. Indications: HYPERTENSION      celecoxib (CeleBREX) 200 mg capsule Take 1 Capsule by mouth two (2) times daily (with meals). (Patient not taking: Reported on 8/22/2022) 60 Capsule 2    atorvastatin (LIPITOR) 10 mg tablet Take 10 mg by mouth daily. Allergies:     Allergies   Allergen Reactions    Aspirin Other (comments)     GI Pain    Adhesive Contact Dermatitis    Adhesive Tape-Silicones Other (comments)     blisters    Duloxetine Other (comments)        Review of Systems:   Gen:    Denied fevers, chills, malaise, fatigue, weight changes   Resp: Denied shortness of breath, cough, wheezing   CVS: Denied chest pain, palpitations   : Denied urinary urgency, frequency, incontinence   GI: Denied nausea, vomiting, constipation, diarrhea   Skin: Denied rashes, wounds   Psych: Denied anxiety, depression   Vasc: Denied claudication, ulcers   Hem: Denied easy bruising/bleeding   MSK: See HPI   Neuro: See HPI         Physical Exam     Vital Signs:   Visit Vitals  Pulse 85   Temp 98.5 °F (36.9 °C) (Oral)   Ht 5' 7\" (1.702 m)   Wt 185 lb (83.9 kg)   SpO2 100% Comment: RA   BMI 28.98 kg/m²      General: ??????? Well nourished and well developed female without any acute distress   Psychiatric: ?  Alert and oriented x 3 with normal mood    HEENT: ???????? Atraumatic   Respiratory:   Breathing non-labored and non dyspneic   CV: ???????????????? Peripheral pulses intact, no peripheral edema   Skin: ????????????? No rashes       Neurologic: ?? Sensation: normal and grossly intact thebilateral, lower extremity(s) except reports diminished below b/l knees  Strength: 5/5 in the b/l LE  Reflexes: reveals 2+ symmetric DTRs throughout except absent b/l achilles  Gait: normal    ?  Tight hamstrings, and hip flexors b/l  Limited lumbar ROM pain with flexion and extension worse with flexion  Seated hip ROM pain free      Medical Decision Making:    Images: The imaging results as well as the actual images of the studies below were reviewed, interpreted and visualized. Labs: The results below were reviewed. X-ray lumbar spine 10/28/2021 reviewed with L5/S1 DDD, L3/4 anterolisthesis and facet arthritis    Assessment:   - s/p L3/4 laminectomy in 2012  - low back pain- spondylosis facet arthrosis, DDD  -  Peripheral neuropathy      Plan:      -Physical therapy -  Discussed PT, she would prefer to do exercises on her own for now . She is considering aquatic therapy   -Medications - renew gabapentin 400mg TID.  Counseled regarding side effects and appropriate administration of medications.    -Diagnostics/Imaging - x-ray lumbar spine-reviewed  -Injections - NA  -Lifestyle -Encouraged regular aerobic exercise  -Education - The patient's diagnosis, prognosis and treatment options were discussed today. All questions were answered.    F/U - in 6 months, she will let me know if she would like to start Aquatic PT        [address] [facility]

## 2022-08-22 NOTE — PROGRESS NOTES
Isauro Altamirano presents today for   Chief Complaint   Patient presents with    Back Pain     Lower         Is someone accompanying this pt? no    Is the patient using any DME equipment during OV? no    Depression Screening:  3 most recent PHQ Screens 5/25/2021   Little interest or pleasure in doing things Several days   Feeling down, depressed, irritable, or hopeless Several days   Total Score PHQ 2 2       Learning Assessment:  Learning Assessment 12/17/2015   PRIMARY LEARNER Patient   HIGHEST LEVEL OF EDUCATION - PRIMARY LEARNER  GRADUATED HIGH SCHOOL OR GED   PRIMARY LANGUAGE ENGLISH   LEARNER PREFERENCE PRIMARY OTHER (COMMENT)   ANSWERED BY patient   RELATIONSHIP SELF       Abuse Screening:  Abuse Screening Questionnaire 4/25/2022   Do you ever feel afraid of your partner? N   Are you in a relationship with someone who physically or mentally threatens you? N   Is it safe for you to go home? Y       Fall Risk  Fall Risk Assessment, last 12 mths 4/25/2022   Able to walk? Yes   Fall in past 12 months? 0   Do you feel unsteady? 0   Are you worried about falling 0       OPIOID RISK TOOL  Opioid Risk Tool 7/19/2018   Family history of alcohol abuse? 0   Family history of illegal drug abuse? 0   Family history of prescription drug abuse? 0   Personal history of alcohol abuse? 0   Personal history of illegal drug abuse? 0   Personal history of prescription drug abuse? 0   Age range between 17-45? 0   History of preadolescent sexual abuse? 3   ADD, OCD, bipolar, schizophrenia? 0   Depression? 1   Opioid Risk Total Score 4       Coordination of Care:  1. Have you been to the ER, urgent care clinic since your last visit? no  Hospitalized since your last visit? no    2. Have you seen or consulted any other health care providers outside of the [address] [place] [address] since your last visit? Yes, oncology Include any pap smears or colon screening.  no

## 2022-08-24 ENCOUNTER — TELEPHONE (OUTPATIENT)
Dept: ORTHOPEDIC SURGERY | Age: 67
End: 2022-08-24

## 2022-08-24 NOTE — TELEPHONE ENCOUNTER
Patient called stating she has realized her Celebrex interacts with her blood thinner Losartan, and wants to know if she can get another prescription. Patient also stated that she is allergic to Aspirin. Patient confirmed she uses [facility] on [address]    Patient can be reached at [phone].

## 2022-08-25 NOTE — TELEPHONE ENCOUNTER
Per Dr Jimenez Walden celebrex and start OTC voltaren gel. Attempted to contact patient. Had to leave generic VM due to generic greeting.

## 2022-08-29 ENCOUNTER — TELEPHONE (OUTPATIENT)
Dept: ORTHOPEDIC SURGERY | Age: 67
End: 2022-08-29

## 2022-08-29 DIAGNOSIS — M48.062 SPINAL STENOSIS OF LUMBAR REGION WITH NEUROGENIC CLAUDICATION: Primary | ICD-10-CM

## 2022-08-29 RX ORDER — GABAPENTIN 600 MG/1
600 TABLET ORAL 3 TIMES DAILY
Qty: 90 TABLET | Refills: 0 | Status: SHIPPED | OUTPATIENT
Start: 2022-08-29 | End: 2022-08-31 | Stop reason: DRUGHIGH

## 2022-08-29 NOTE — TELEPHONE ENCOUNTER
Patient called and requested for Dr. Tian Mohr to increase her dosage of medication (Gabapentin) to 600mg instead of 400mg. Please advise.

## 2022-08-29 NOTE — TELEPHONE ENCOUNTER
I called and spoke to the pt. The pt was identified using 2 pt identifiers. She was notified that Dr. Olegario Madsen is not in the office this week. However, her request will be sent to her for review. The provider may review her messages from time to time. She will be contacted once the provider responds to her request. The pt verbalized understanding and is ok with this. She reports no issues with the current dose.

## 2022-08-29 NOTE — TELEPHONE ENCOUNTER
I contacted Ms. Genao. The pt was identified using 2 pt identifiers. She was notified of the new prescription and dose change for the gabapentin. I also made her aware of which pharmacy the medication was sent to. She verbalized understanding and will call the office if she has an issues or concerns.

## 2022-08-29 NOTE — TELEPHONE ENCOUNTER
Patient called back to see if the dosage on her Gabapentin medication could be increased to a higher dosage. Patient uses [facility] on [address] Please advise. Patient can be reached at [phone].

## 2022-08-31 DIAGNOSIS — M48.062 SPINAL STENOSIS OF LUMBAR REGION WITH NEUROGENIC CLAUDICATION: Primary | ICD-10-CM

## 2022-08-31 RX ORDER — GABAPENTIN 400 MG/1
400 CAPSULE ORAL 3 TIMES DAILY
Qty: 90 CAPSULE | Refills: 0 | Status: SHIPPED | OUTPATIENT
Start: 2022-08-31 | End: 2022-09-26 | Stop reason: SDUPTHER

## 2022-08-31 NOTE — TELEPHONE ENCOUNTER
Patient called again for [facility]. Patient said that she saw her PCP yesterday , and was told that since she is on Anti-depression medication , that it is best that the patient stay on Gabapentin 400 mg medication, and not increased to 600 mg. Patient is requesting if [facility] could send a new prescription for Gabapentin 400 mg medication to her pharmacy. [facility] on [address]  In [place]  Tel. [phone]. Patient tel. [phone].

## 2022-09-01 NOTE — TELEPHONE ENCOUNTER
The pt was contacted and notified of the 400 mg dose prescription being sent over to the pharmacy. She verbalized understanding and states that she picked it up last night. The pt was identified using 2 pt identifiers.

## 2022-09-06 ENCOUNTER — HOSPITAL ENCOUNTER (EMERGENCY)
Age: 67
Discharge: HOME | End: 2022-09-06
Attending: EMERGENCY MEDICINE
Payer: MEDICARE

## 2022-09-06 ENCOUNTER — APPOINTMENT (OUTPATIENT)
Dept: GENERAL RADIOLOGY | Age: 67
End: 2022-09-06
Attending: EMERGENCY MEDICINE
Payer: MEDICARE

## 2022-09-06 VITALS
DIASTOLIC BLOOD PRESSURE: 68 MMHG | RESPIRATION RATE: 16 BRPM | HEART RATE: 88 BPM | HEIGHT: 67 IN | TEMPERATURE: 98 F | BODY MASS INDEX: 29.03 KG/M2 | OXYGEN SATURATION: 100 % | SYSTOLIC BLOOD PRESSURE: 120 MMHG | WEIGHT: 185 LBS

## 2022-09-06 DIAGNOSIS — G62.9 NEUROPATHY: Primary | ICD-10-CM

## 2022-09-06 PROCEDURE — 99283 EMERGENCY DEPT VISIT LOW MDM: CPT

## 2022-09-06 PROCEDURE — 73630 X-RAY EXAM OF FOOT: CPT

## 2022-09-06 NOTE — ED TRIAGE NOTES
Patient c/o vague symptoms of pain, numbness, and burning to left foot x 1.5 weeks. She voices concerns that spray by termite company might be causing her foot pain. Denies injury.

## 2022-09-07 NOTE — ED PROVIDER NOTES
Extremity Pain      Patient is a 72-year-old female who presents to ER with complaint of having left foot pain. She has had a neuropathy in her legs x 12 yrs. She states she got nervous and came to ER. She also has seen a just placed on gabapentin for neuropathy.     Past Medical History:   Diagnosis Date    Arthritis     Balance problems     patient states no further issues    Breast CA ([address] 03/2018    right    Bronchitis     Chronic pain     back    Fibromyalgia 11/29/2012    GERD (gastroesophageal reflux disease)     Hiatal Hernia    Hypercholesterolemia     Hypertension     Kidney stones     Low back pain     Lumbar spinal stenosis 11/25/2012    Migraine headache     Nervousness     Neurological disease     Osteoarthritis 11/25/2012    Other ill-defined conditions(799.89)     neuropathy    Peripheral neuropathy 11/29/2012    Post laminectomy syndrome     Postoperative anemia due to acute blood loss 11/30/2012    Ringing in ears     Rotator cuff syndrome     Spinal stenosis     Thoracic spine pain     Vitamin D deficiency 11/30/2012       Past Surgical History:   Procedure Laterality Date    HX MASTECTOMY Right 5/4/2018    NEEDLE LOCALIZED (1000) LUMPECTOMY RIGHT BREAST AND SENTINEL LYMPH NODE BIOPSY RIGHT AXILLA performed by Chon Lee MD at [address] [place] [address] HX ORTHOPAEDIC      foot l heel spur    HX ORTHOPAEDIC      right knee sx    HX OTHER SURGICAL      non cancer cyst removed from right shoulder    NEUROLOGICAL PROCEDURE UNLISTED      Bilateral L3-4 hemilaminotomy and medial facetectomy by Dr. Madhavi Paz    [place] BX/REMV,LYMPH NODE,DEEP AXILL Right 05/04/2018    Dr. Aditi Burdick         Family History:   Problem Relation Age of Onset    Hypertension Mother     OSTEOARTHRITIS Mother     Neuropathy Mother     Hypertension Father     Cancer Father         colon    Hypertension Brother     Cancer Brother     Neuropathy Brother     Neuropathy Other     Arthritis-rheumatoid Other     GERD Other     Diabetes Other     Diabetes Maternal Aunt     Diabetes Maternal Uncle     Hypertension Maternal Grandfather        Social History     Socioeconomic History    Marital status: SINGLE     Spouse name: Not on file    Number of children: Not on file    Years of education: Not on file    Highest education level: Not on file   Occupational History    Not on file   Tobacco Use    Smoking status: Never    Smokeless tobacco: Never   Vaping Use    Vaping Use: Never used   Substance and Sexual Activity    Alcohol use: No    Drug use: No    Sexual activity: Not on file   Other Topics Concern    Not on file   Social History Narrative    Not on file     Social Determinants of Health     Financial Resource Strain: Not on file   Food Insecurity: Not on file   Transportation Needs: Not on file   Physical Activity: Not on file   Stress: Not on file   Social Connections: Not on file   Intimate Partner Violence: Not on file   Housing Stability: Not on file         ALLERGIES: Aspirin, Adhesive, Adhesive tape-silicones, and Duloxetine    Review of Systems   Constitutional: Negative. HENT: Negative. Eyes: Negative. Respiratory: Negative. Cardiovascular: Negative. Gastrointestinal: Negative. Endocrine: Negative. Genitourinary: Negative. Musculoskeletal:  Positive for arthralgias (led lower leg and foot). Skin: Negative. Allergic/Immunologic: Negative. Neurological: Negative. Hematological: Negative. Psychiatric/Behavioral: Negative. All other systems reviewed and are negative. Vitals:    09/06/22 1757   BP: 116/68   Pulse: 89   Resp: 16   Temp: 98.8 °F (37.1 °C)   SpO2: 100%   Weight: 83.9 kg (185 lb)   Height: 5' 7\" (1.702 m)            Physical Exam  Vitals and nursing note reviewed. Constitutional:       General: She is not in acute distress. Appearance: She is well-developed. She is not diaphoretic.    HENT:      Head: Normocephalic. Right Ear: External ear normal.      Left Ear: External ear normal.      Mouth/Throat:      Pharynx: No oropharyngeal exudate. Eyes:      General: No scleral icterus. Right eye: No discharge. Left eye: No discharge. Conjunctiva/sclera: Conjunctivae normal.      Pupils: Pupils are equal, round, and reactive to light. Neck:      Thyroid: No thyromegaly. Vascular: No JVD. Trachea: No tracheal deviation. Cardiovascular:      Rate and Rhythm: Normal rate and regular rhythm. Heart sounds: Normal heart sounds. No murmur heard. No friction rub. No gallop. Pulmonary:      Effort: Pulmonary effort is normal. No respiratory distress. Breath sounds: Normal breath sounds. No stridor. No wheezing or rales. Chest:      Chest wall: No tenderness. Abdominal:      General: Bowel sounds are normal. There is no distension. Palpations: Abdomen is soft. There is no mass. Tenderness: no abdominal tenderness There is no guarding or rebound. Musculoskeletal:         General: No tenderness. Normal range of motion. Cervical back: Normal range of motion and neck supple. Lymphadenopathy:      Cervical: No cervical adenopathy. Skin:     General: Skin is warm and dry. Coloration: Skin is not pale. Findings: No erythema or rash. Neurological:      Mental Status: She is alert and oriented to person, place, and time. Cranial Nerves: No cranial nerve deficit. Motor: No abnormal muscle tone. Coordination: Coordination normal.      Deep Tendon Reflexes: Reflexes normal.        MDM  Number of Diagnoses or Management Options         Procedures    Dx: neuropathy    D/C: f/U PCP in 5 days. Continue current medications. Return to ER prn. .Dictation disclaimer:  Please note that this dictation was completed with [employer], the [employer] voice recognition software.   Quite often unanticipated grammatical, syntax, homophones, and other interpretive errors are inadvertently transcribed by the computer software. Please disregard these errors. Please excuse any errors that have escaped final proofreading.

## 2022-09-26 DIAGNOSIS — M48.062 SPINAL STENOSIS OF LUMBAR REGION WITH NEUROGENIC CLAUDICATION: ICD-10-CM

## 2022-09-26 RX ORDER — GABAPENTIN 400 MG/1
400 CAPSULE ORAL 3 TIMES DAILY
Qty: 90 CAPSULE | Refills: 5 | Status: SHIPPED | OUTPATIENT
Start: 2022-09-26

## 2022-09-26 NOTE — TELEPHONE ENCOUNTER
Patient is requesting this be sent to [facility] now due to her new insurance. Last Visit: 8/22/22 with MD Herbert Palmer  Next Appointment: 2/22/23 with MD Herbert Palmer  Previous Refill Encounter(s): 8/31/22 #90    Requested Prescriptions     Pending Prescriptions Disp Refills    gabapentin (NEURONTIN) 400 mg capsule 90 Capsule 5     Sig: Take 1 Capsule by mouth three (3) times daily. Max Daily Amount: 1,200 mg. For [address] [facility] in place:   Recommendation Provided To:    Intervention Detail: New Rx: 1, reason: Patient Preference  Gap Closed?:   Intervention Accepted By:   Time Spent (min): 5

## 2023-01-05 ENCOUNTER — LAB ONLY (OUTPATIENT)
Dept: ONCOLOGY | Age: 68
End: 2023-01-05

## 2023-01-05 DIAGNOSIS — C50.511 MALIGNANT NEOPLASM OF LOWER-OUTER QUADRANT OF RIGHT FEMALE BREAST, UNSPECIFIED ESTROGEN RECEPTOR STATUS (HCC): Primary | ICD-10-CM

## 2023-01-05 DIAGNOSIS — Z17.0 MALIGNANT NEOPLASM OF LOWER-OUTER QUADRANT OF RIGHT BREAST OF FEMALE, ESTROGEN RECEPTOR POSITIVE (HCC): ICD-10-CM

## 2023-01-05 DIAGNOSIS — C50.511 MALIGNANT NEOPLASM OF LOWER-OUTER QUADRANT OF RIGHT BREAST OF FEMALE, ESTROGEN RECEPTOR POSITIVE (HCC): ICD-10-CM

## 2023-01-05 DIAGNOSIS — Z17.0 ESTROGEN RECEPTOR POSITIVE: ICD-10-CM

## 2023-01-05 DIAGNOSIS — D50.8 IRON DEFICIENCY ANEMIA SECONDARY TO INADEQUATE DIETARY IRON INTAKE: ICD-10-CM

## 2023-01-05 DIAGNOSIS — C50.511 MALIGNANT NEOPLASM OF LOWER-OUTER QUADRANT OF RIGHT FEMALE BREAST, UNSPECIFIED ESTROGEN RECEPTOR STATUS (HCC): ICD-10-CM

## 2023-01-06 LAB
ALBUMIN SERPL-MCNC: 4.6 G/DL (ref 3.8–4.8)
ALBUMIN/GLOB SERPL: 1.6 {RATIO} (ref 1.2–2.2)
ALP SERPL-CCNC: 76 IU/L (ref 44–121)
ALT SERPL-CCNC: 18 IU/L (ref 0–32)
AST SERPL-CCNC: 27 IU/L (ref 0–40)
BASOPHILS # BLD AUTO: 0.1 X10E3/UL (ref 0–0.2)
BASOPHILS NFR BLD AUTO: 1 %
BILIRUB SERPL-MCNC: 0.2 MG/DL (ref 0–1.2)
BUN SERPL-MCNC: 25 MG/DL (ref 8–27)
BUN/CREAT SERPL: 23 (ref 12–28)
CALCIUM SERPL-MCNC: 9.6 MG/DL (ref 8.7–10.3)
CHLORIDE SERPL-SCNC: 101 MMOL/L (ref 96–106)
CO2 SERPL-SCNC: 23 MMOL/L (ref 20–29)
CREAT SERPL-MCNC: 1.07 MG/DL (ref 0.57–1)
EGFR: 58 ML/MIN/1.73
EOSINOPHIL # BLD AUTO: 0.2 X10E3/UL (ref 0–0.4)
EOSINOPHIL NFR BLD AUTO: 4 %
ERYTHROCYTE [DISTWIDTH] IN BLOOD BY AUTOMATED COUNT: 13.5 % (ref 11.7–15.4)
FERRITIN SERPL-MCNC: 97 NG/ML (ref 15–150)
GLOBULIN SER CALC-MCNC: 2.9 G/DL (ref 1.5–4.5)
GLUCOSE SERPL-MCNC: 120 MG/DL (ref 70–99)
HCT VFR BLD AUTO: 33.5 % (ref 34–46.6)
HGB BLD-MCNC: 11.3 G/DL (ref 11.1–15.9)
IMM GRANULOCYTES # BLD AUTO: 0 X10E3/UL (ref 0–0.1)
IMM GRANULOCYTES NFR BLD AUTO: 0 %
IRON SATN MFR SERPL: 16 % (ref 15–55)
IRON SERPL-MCNC: 52 UG/DL (ref 27–139)
LYMPHOCYTES # BLD AUTO: 1.9 X10E3/UL (ref 0.7–3.1)
LYMPHOCYTES NFR BLD AUTO: 34 %
MCH RBC QN AUTO: 28.3 PG (ref 26.6–33)
MCHC RBC AUTO-ENTMCNC: 33.7 G/DL (ref 31.5–35.7)
MCV RBC AUTO: 84 FL (ref 79–97)
MONOCYTES # BLD AUTO: 0.4 X10E3/UL (ref 0.1–0.9)
MONOCYTES NFR BLD AUTO: 7 %
NEUTROPHILS # BLD AUTO: 3 X10E3/UL (ref 1.4–7)
NEUTROPHILS NFR BLD AUTO: 54 %
PLATELET # BLD AUTO: 227 X10E3/UL (ref 150–450)
POTASSIUM SERPL-SCNC: 3.7 MMOL/L (ref 3.5–5.2)
PROT SERPL-MCNC: 7.5 G/DL (ref 6–8.5)
RBC # BLD AUTO: 4 X10E6/UL (ref 3.77–5.28)
SODIUM SERPL-SCNC: 139 MMOL/L (ref 134–144)
TIBC SERPL-MCNC: 326 UG/DL (ref 250–450)
UIBC SERPL-MCNC: 274 UG/DL (ref 118–369)
WBC # BLD AUTO: 5.6 X10E3/UL (ref 3.4–10.8)

## 2023-01-19 ENCOUNTER — OFFICE VISIT (OUTPATIENT)
Dept: ONCOLOGY | Age: 68
End: 2023-01-19
Payer: MEDICARE

## 2023-01-19 VITALS
SYSTOLIC BLOOD PRESSURE: 133 MMHG | OXYGEN SATURATION: 96 % | RESPIRATION RATE: 16 BRPM | WEIGHT: 196 LBS | HEIGHT: 67 IN | DIASTOLIC BLOOD PRESSURE: 80 MMHG | BODY MASS INDEX: 30.76 KG/M2 | HEART RATE: 84 BPM

## 2023-01-19 DIAGNOSIS — C50.511 MALIGNANT NEOPLASM OF LOWER-OUTER QUADRANT OF RIGHT FEMALE BREAST, UNSPECIFIED ESTROGEN RECEPTOR STATUS (HCC): Primary | ICD-10-CM

## 2023-01-19 DIAGNOSIS — Z17.0 ESTROGEN RECEPTOR POSITIVE: ICD-10-CM

## 2023-01-19 DIAGNOSIS — E55.9 VITAMIN D DEFICIENCY: ICD-10-CM

## 2023-01-19 DIAGNOSIS — D50.8 IRON DEFICIENCY ANEMIA SECONDARY TO INADEQUATE DIETARY IRON INTAKE: ICD-10-CM

## 2023-01-19 DIAGNOSIS — C50.511 MALIGNANT NEOPLASM OF LOWER-OUTER QUADRANT OF RIGHT BREAST OF FEMALE, ESTROGEN RECEPTOR POSITIVE (HCC): ICD-10-CM

## 2023-01-19 DIAGNOSIS — Z17.0 MALIGNANT NEOPLASM OF LOWER-OUTER QUADRANT OF RIGHT BREAST OF FEMALE, ESTROGEN RECEPTOR POSITIVE (HCC): ICD-10-CM

## 2023-01-19 DIAGNOSIS — C50.511 MALIGNANT NEOPLASM OF LOWER-OUTER QUADRANT OF RIGHT FEMALE BREAST, UNSPECIFIED ESTROGEN RECEPTOR STATUS (HCC): ICD-10-CM

## 2023-01-19 NOTE — PROGRESS NOTES
Hematology/Oncology  Progress Note    Name: Jayleen Cardenas  : 1955    PCP: Domonique Foster MD     Ms. Inez Jaquez is a 74 y.o. -American woman with invasive ductal carcinoma the right breast.    Current therapy: Femara 2.5mg PO daily since       Subjective:     Mrs. Inez Jaquez is a 72-year-old -American woman with invasive ductal carcinoma involving the right breast. She was diagnosed in 2018. She reports tolerance and adherence to Femara without significant side effects. She states she does her breast self exam on a monthly basis. She continue to report chronic tenderness to the right breast which appeared after surgery. She denies any nipple discharge or worsening pain or any other breast issues. She is seeing orthopaedic for management of left shoulder pain and now planing on surgery . She denied fever, chills, night sweat, unintentional weight loss, skin lumps or bumps, acute bleeding or bruising issues. Denied headache, acute vision change, dizziness, chest pain, shortness of breath, palpitation, productive cough, nausea, vomiting, abdominal pain, altered bowel habits, dysuria,     Past medical history, family history, and social history: these were reviewed and remains unchanged.     Past Medical History:   Diagnosis Date    Arthritis     Balance problems     patient states no further issues    Breast CA ([facility] [address] 2018    right    Bronchitis     Chronic pain     back    Fibromyalgia 2012    GERD (gastroesophageal reflux disease)     Hiatal Hernia    Hypercholesterolemia     Hypertension     Kidney stones     Low back pain     Lumbar spinal stenosis 2012    Migraine headache     Nervousness     Neurological disease     Osteoarthritis 2012    Other ill-defined conditions(799.89)     neuropathy    Peripheral neuropathy 2012    Post laminectomy syndrome     Postoperative anemia due to acute blood loss 2012    Ringing in ears     Rotator cuff syndrome Spinal stenosis     Thoracic spine pain     Vitamin D deficiency 11/30/2012     Past Surgical History:   Procedure Laterality Date    HX MASTECTOMY Right 5/4/2018    NEEDLE LOCALIZED (1000) LUMPECTOMY RIGHT BREAST AND SENTINEL LYMPH NODE BIOPSY RIGHT AXILLA performed by Delvis Vazquez MD at [facility] MAIN OR    HX ORTHOPAEDIC      foot l heel spur    HX ORTHOPAEDIC      right knee sx    HX OTHER SURGICAL      non cancer cyst removed from right shoulder    PA BX/EXC LYMPH NODE OPEN DEEP AXILLARY NODE Right 05/04/2018    Dr. Ankita Cedillo NEUROLOGICAL/NEUROMUSCULAR DX PX      Bilateral L3-4 hemilaminotomy and medial facetectomy by Dr. Rashid Jennifer History     Socioeconomic History    Marital status: SINGLE     Spouse name: Not on file    Number of children: Not on file    Years of education: Not on file    Highest education level: Not on file   Occupational History    Not on file   Tobacco Use    Smoking status: Never    Smokeless tobacco: Never   Vaping Use    Vaping Use: Never used   Substance and Sexual Activity    Alcohol use: No    Drug use: No    Sexual activity: Not on file   Other Topics Concern    Not on file   Social History Narrative    Not on file     Social Determinants of Health     Financial Resource Strain: Not on file   Food Insecurity: Not on file   Transportation Needs: Not on file   Physical Activity: Not on file   Stress: Not on file   Social Connections: Not on file   Intimate Partner Violence: Not on file   Housing Stability: Not on file     Family History   Problem Relation Age of Onset    Hypertension Mother     OSTEOARTHRITIS Mother     Neuropathy Mother     Hypertension Father     Cancer Father         colon    Hypertension Brother     Cancer Brother     Neuropathy Brother     Neuropathy Other     Arthritis-rheumatoid Other     GERD Other     Diabetes Other     Diabetes Maternal Aunt     Diabetes Maternal Uncle     Hypertension Maternal Grandfather      Current Outpatient Medications   Medication Sig Dispense Refill    gabapentin (NEURONTIN) 400 mg capsule Take 1 Capsule by mouth three (3) times daily. Max Daily Amount: 1,200 mg. 90 Capsule 5    cetirizine (ZYRTEC) 10 mg tablet take 1 tablet by mouth once daily if needed      rizatriptan (MAXALT) 10 mg tablet 1 tablet at onset of headache , can take another tablet after 2 hours      letrozole (FEMARA) 2.5 mg tablet Take 1 Tablet by mouth daily. 90 Tablet 3    ferrous sulfate (SLOW FE) 142 mg (45 mg iron) ER tablet Take 1 Tablet by mouth Daily (before breakfast). 90 Tablet 2    traMADoL (ULTRAM) 50 mg tablet Take 50 mg by mouth nightly. pravastatin (PRAVACHOL) 10 mg tablet Take 10 mg by mouth nightly. azelastine (OPTIVAR) 0.05 % ophthalmic solution instill 1 drop INTO AFFECTED EYE(S) twice a day      omeprazole (PRILOSEC) 20 mg capsule TAKE 1 CAPSULE BY MOUTH IN THE MORNING 30 MINUTES PRIOR TO BREAKFAST      ibuprofen (MOTRIN) 800 mg tablet Take 1 Tablet by mouth three (3) times daily as needed for Pain. 40 Tablet 2    butalbital-acetaminophen-caffeine (FIORICET, ESGIC) -40 mg per tablet take 1 tablet by mouth every 8 hours      ergocalciferol (ERGOCALCIFEROL) 1,250 mcg (50,000 unit) capsule Take 1 Cap by mouth every seven (7) days. 12 Cap 0    albuterol (ACCUNEB) 0.63 mg/3 mL nebulizer solution 0.63 mg by Nebulization route every six (6) hours as needed for Wheezing. acetaminophen (TYLENOL) 500 mg tablet Take 1 Tab by mouth every six (6) hours as needed for Pain. 120 Tab 0    ascorbic acid (VITAMIN C) 250 mg tablet Take 1 Tab by mouth two (2) times daily (with meals). 30 Tab 0    losartan-hydrochlorothiazide (HYZAAR) 100-25 mg per tablet Take 1 Tab by mouth daily. Indications: HYPERTENSION         Review of Systems   Constitutional:  Negative for chills, diaphoresis, fever, malaise/fatigue and weight loss. Respiratory:  Negative for cough, hemoptysis, shortness of breath and wheezing. Cardiovascular:  Negative for chest pain, palpitations and leg swelling. Gastrointestinal:  Negative for abdominal pain, diarrhea, heartburn, nausea and vomiting. Genitourinary:  Negative for dysuria, frequency, hematuria and urgency. Musculoskeletal:  Negative for joint pain and myalgias. Skin:  Negative for itching and rash. Neurological:  Negative for dizziness, seizures, weakness and headaches. Psychiatric/Behavioral:  Negative for depression. The patient does not have insomnia. Objective:     Visit Vitals  /80   Pulse 84   Resp 16   Ht 5' 7\" (1.702 m)   Wt 88.9 kg (196 lb)   SpO2 96%   BMI 30.70 kg/m²       ECOG Performance Status (grade): 0  0 - able to carry on all pre-disease activity w/out restriction  1 - restricted but able to carry out light work  2 - ambulatory and can self- care but unable to carry out work  3 - bed or chair >50% of waking hours  4 - completely disable, total care, confined to bed or chair    Physical Exam  Constitutional:       Appearance: Normal appearance. HENT:      Head: Normocephalic and atraumatic. Eyes:      Pupils: Pupils are equal, round, and reactive to light. Cardiovascular:      Rate and Rhythm: Normal rate and regular rhythm. Heart sounds: Normal heart sounds. Pulmonary:      Effort: Pulmonary effort is normal.      Breath sounds: Normal breath sounds. Chest:      Comments: Left: No dimpling, discoloration, nipple inversion or retractions. No axillary or supraclavicular lymphadenopathy. No mass   Right: No dimpling, discoloration. No axillary or supraclavicular lymphadenopathy. No mass  Abdominal:      General: Bowel sounds are normal.      Palpations: Abdomen is soft. Tenderness: There is no abdominal tenderness. There is no guarding. Musculoskeletal:         General: Normal range of motion. Cervical back: Neck supple. Right lower leg: No edema. Left lower leg: No edema.    Skin:     General: Skin is warm.   Neurological:      General: No focal deficit present. Mental Status: She is alert and oriented to person, place, and time. Mental status is at baseline. Diagnostics:      No results found for this or any previous visit (from the past 96 hour(s)). Imaging:  No results found for this or any previous visit. Results for orders placed during the hospital encounter of 09/06/22    XR FOOT LT MIN 3 V    Narrative  EXAM: Left Foot X-ray    INDICATION:  foot pain and numbness x 1.5 weeks. TECHNIQUE: 3 views of the left foot obtained. COMPARISON: 5/28/2008    FINDINGS: The alignment of the foot is unremarkable. No evidence of acute  fracture or dislocation. The joint spaces are preserved. There is no evidence of  erosions or periostitis. No lytic or blastic focus appreciated. The soft tissues  are unremarkable. Impression  1. No acute pathology appreciated in the left foot. Results for orders placed during the hospital encounter of 04/05/18    CT CHEST ABD PELV W CONT    Narrative  HISTORY: Recent diagnosis of breast carcinoma. EXAM: CT chest, abdomen and pelvis. TECHNIQUE: Spiral images of the chest abdomen and pelvis were performed  according to routine protocol from thoracic inlet to the ischial tuberosities  after administration of 100 cc of Isovue-300 contrast media intravenously. Coronal and sagittal reconstructions were provided for evaluation. Oral contrast  was given. No prior studies for comparison. All CT scans at this facility are performed using dose optimization technique as  appropriate to a performed exam, to include automated exposure control,  adjustment of the mA and/or kV according to patient size (including appropriate  matching for site-specific examinations), or use of iterative reconstruction  technique. FINDINGS:    CT THORAX:    There is no pneumothorax, pneumonia or pleural effusions are seen. Trachea bronchial trees unremarkable.     Heart is normal in size without pericardial effusions. Vascular pedicle is unremarkable. No enhancing mass lesion or pathologic lymphadenopathy is noted. Nonspecific bean shaped fatty hilum lymph nodes are noted in bilateral axillary  regions. No lytic or blastic lesions. CT ABDOMEN:    Liver is unremarkable. Portal vein is patent and unremarkable. Hepatic outflow is unremarkable. Gallbladder is unremarkable. Spleen is unremarkable. Pancreas is unremarkable. Adrenal glands are unremarkable bilaterally. Kidneys are unremarkable. No small or large bowel obstruction is seen. No ascites or pneumoperitoneum. Abdominal aorta and IVC are unremarkable. No enhancing mass lesion or pathologic lymphadenopathy seen. No lytic or blastic lesions. CT PELVIS:    Urinary bladder is unremarkable. Uterus is atrophic but unremarkable. Rectus sigmoid region is unremarkable. No free pelvic fluid is seen. No enhancing mass lesion or pathologic lymphadenopathy. No lytic or blastic lesions. Impression  IMPRESSION:    1. Unremarkable study of the chest abdomen and pelvis. Assessment:     1. Malignant neoplasm of lower-outer quadrant of right female breast, unspecified estrogen receptor status ([address]    2. Estrogen receptor positive    3. Vitamin D deficiency    4. Iron deficiency anemia secondary to inadequate dietary iron intake    5. Malignant neoplasm of lower-outer quadrant of right breast of female, estrogen receptor positive ([address]      Plan:      Invasive ductal carcinoma the right breast  -- Patient was being followed by Dr. Lore Felton who retired. S/P rtight lumpectomy and sentinel lymph node biopsy for ER/IN positive HER-2 negative right breast cancer    She continue to report chronic tenderness to the right breast which appeared after surgery. She continues to take ibuprofen as needed. --  03/17/2022 Mammogram showed no evidence of malignancy.  BIRADS 1: Benign -- Clinical stable. Recent labs on  1/5/2023 reviewed with the patient today. Plan:  --  The patient was instructed to continue doing her breast exams on a monthly basis. -- The patient was advised to notify us if any new breast pain, new palpable nodule, nipple inversion, spontaneous nipple discharge especially if bloody, breast skin changes, unintentional weight loss, worsen fatigue, new bone pain or back pain, or any concerns. -- I have advised her to follow up her PCP to continue age-appropriate cancer screening. -- I have educated her regarding lifestyle modifications, healthy diet, and physical activity. -- Her next mammogram will be 3/2023       Long term use of aromatase inhibitor:   -- The patient currently taking Femara daily. She states she is tolerating the medication well and this will be continued. --She report that she is up to date with her DEXA scan which was done by her Rheumatologist .  -- Advised on adequate amounts of calcium (at least 1,200 mg per day) and vitamin D (800-1,000 IU per day). The higher dose of vitamin D may be needed if vitamin D deficiency        Chronic breast pain s.p surgery and XRT   -- Continue Ibuprofen as needed. -- Instructed the patient to have a follow-up with pain management clinic for long-term pain control. Chronic anemia, Hx of Iron Deficiency Anemia:   -- We will continue to monitor CBC, Iron profile, ferritin  --CBC, iron and ferritin reviewed with patient today. --The patient has been advise to continue daily iron pills as instructed. Medication ordered today       -- We will see the patient back in clinic in about 6 months. Always sooner if required.         Orders Placed This Encounter    METABOLIC PANEL, COMPREHENSIVE     Standing Status:   Future     Standing Expiration Date:   7/19/2024    FERRITIN     Standing Status:   Future     Standing Expiration Date:   1/19/2024    IRON PROFILE     Standing Status:   Future     Standing Expiration Date:   1/19/2024    CA 27.29     Standing Status:   Future     Standing Expiration Date:   1/19/2024    CBC WITH AUTOMATED DIFF     Standing Status:   Future     Standing Expiration Date:   7/19/2024    VITAMIN D, 25 HYDROXY     Standing Status:   Future     Standing Expiration Date:   7/19/2024           Ms. Dee Adjutant has a reminder for a \"due or due soon\" health maintenance. I have asked that she contact her primary care provider for follow-up on this health maintenance. All of patient's questions answered to their apparent satisfaction. They verbally show understanding and agreement with aforementioned plan. Yoli Coleman, ERNESTO  1/19/2023          Parts of this document has been produced using Dragon dictation system. Unrecognized errors in transcription may be present. Please do not hesitate to reach out for any questions or clarifications.       CC: Obdulio Ordonez MD

## 2023-01-30 DIAGNOSIS — Z85.3 HISTORY OF RIGHT BREAST CANCER: Primary | ICD-10-CM

## 2023-01-30 DIAGNOSIS — Z12.31 SCREENING MAMMOGRAM FOR HIGH-RISK PATIENT: ICD-10-CM

## 2023-02-03 DIAGNOSIS — Z85.3 HISTORY OF RIGHT BREAST CANCER: Primary | ICD-10-CM

## 2023-02-03 DIAGNOSIS — Z12.31 SCREENING MAMMOGRAM FOR HIGH-RISK PATIENT: ICD-10-CM

## 2023-02-09 DIAGNOSIS — Z12.39 ENCOUNTER FOR OTHER SCREENING FOR MALIGNANT NEOPLASM OF BREAST: Primary | ICD-10-CM

## 2023-02-09 DIAGNOSIS — Z85.3 PERSONAL HISTORY OF MALIGNANT NEOPLASM OF BREAST: Primary | ICD-10-CM

## 2023-02-09 DIAGNOSIS — Z85.3 PERSONAL HISTORY OF MALIGNANT NEOPLASM OF BREAST: ICD-10-CM

## 2023-02-22 ENCOUNTER — OFFICE VISIT (OUTPATIENT)
Age: 68
End: 2023-02-22
Payer: MEDICARE

## 2023-02-22 VITALS
SYSTOLIC BLOOD PRESSURE: 124 MMHG | OXYGEN SATURATION: 100 % | TEMPERATURE: 97.6 F | HEIGHT: 67 IN | HEART RATE: 76 BPM | WEIGHT: 185 LBS | DIASTOLIC BLOOD PRESSURE: 78 MMHG | BODY MASS INDEX: 29.03 KG/M2 | RESPIRATION RATE: 18 BRPM

## 2023-02-22 DIAGNOSIS — M51.36 OTHER INTERVERTEBRAL DISC DEGENERATION, LUMBAR REGION: ICD-10-CM

## 2023-02-22 DIAGNOSIS — M48.062 SPINAL STENOSIS, LUMBAR REGION WITH NEUROGENIC CLAUDICATION: Primary | ICD-10-CM

## 2023-02-22 DIAGNOSIS — M47.899 OTHER SPONDYLOSIS, SITE UNSPECIFIED: ICD-10-CM

## 2023-02-22 PROCEDURE — 1123F ACP DISCUSS/DSCN MKR DOCD: CPT | Performed by: PHYSICAL MEDICINE & REHABILITATION

## 2023-02-22 PROCEDURE — 3078F DIAST BP <80 MM HG: CPT | Performed by: PHYSICAL MEDICINE & REHABILITATION

## 2023-02-22 PROCEDURE — 99214 OFFICE O/P EST MOD 30 MIN: CPT | Performed by: PHYSICAL MEDICINE & REHABILITATION

## 2023-02-22 PROCEDURE — 3074F SYST BP LT 130 MM HG: CPT | Performed by: PHYSICAL MEDICINE & REHABILITATION

## 2023-02-22 RX ORDER — DICLOFENAC SODIUM 75 MG/1
75 TABLET, DELAYED RELEASE ORAL PRN
COMMUNITY
End: 2023-02-22

## 2023-02-22 RX ORDER — CLONIDINE HYDROCHLORIDE 0.1 MG/1
TABLET ORAL
COMMUNITY
Start: 2023-01-04

## 2023-02-22 RX ORDER — GABAPENTIN 400 MG/1
400 CAPSULE ORAL 3 TIMES DAILY
Qty: 270 CAPSULE | Refills: 0 | Status: SHIPPED | OUTPATIENT
Start: 2023-02-22 | End: 2023-05-23

## 2023-02-22 RX ORDER — PROPRANOLOL HYDROCHLORIDE 80 MG/1
CAPSULE, EXTENDED RELEASE ORAL
COMMUNITY
Start: 2023-02-17

## 2023-02-22 RX ORDER — CITALOPRAM 10 MG/1
TABLET ORAL
COMMUNITY
Start: 2023-01-18

## 2023-02-22 RX ORDER — NAPROXEN 250 MG/1
500 TABLET ORAL 2 TIMES DAILY WITH MEALS
COMMUNITY
End: 2023-02-22

## 2023-02-22 RX ORDER — AMITRIPTYLINE HYDROCHLORIDE 25 MG/1
TABLET, FILM COATED ORAL
COMMUNITY

## 2023-02-22 NOTE — PROGRESS NOTES
[address]  [address], [address]   [facility], [address]   Phone: [phone]   Fax: [phone]         Patient: Agnes Espinoza                                                                               MRN: [id number]         YOB: 1955           AGE: 61 y.o. PCP: Abhinav Al MD   Date:  08/22/22      Reason for Consultation: Back Pain (Lower/)         HPI:   Agnes Espinoza is a 61 y.o.  female with relevant PMH of L3-4 laminectomy in 2021, and breast CA (right) 2016 s/p lumpectomy and radiation and hormone treatment  who has been followed here for low back pain radiating down bilateral  legs. Pain is mainly in her low back. She does have pain radiating into both of her legs but she reports a history of neuropathy. She has a long history of back pain. X-rays demonstrate L3/4  anterolisthesis with facet arthropathy, with DDD L5/S1. Pain is unchanged. She is taking gabapentin 400mg tid which helps      Neurologic symptoms:+ numbness, tingling b/l legs/ No weakness, bowel or bladder changes. No recent falls, ambulates with single prong cane        Location: The pain is located in the low back (90%)   Radiation: The pain does radiate bilateral legs (related to neuropathy per patient ). Pain Score: Currently: 8/10     Quality: Pain is of a Achy, Dull and Tight quality. Aggravating: Pain is exacerbated by walking   Alleviating: The pain is alleviated by lying down      Prior Treatments:    Injections (prior to surgery in 2012)   Physical therapy many years ago   Previous Medications: elavil   Current Medications: gabapentin 400mg TID, diclofenac   Previous work-up has included:    MRI lumbar spine 2015   There are subtle anterolisthesis at L3-4 and L4-5 broad-based disc protrusions as well as posterior facet and ligamentous hypertrophy, left somewhat greater than right at L4-5.   Facet effusions are  seen at L3-4.  There is associated mild-to-moderate canal stenosis at L3-4 and mild stenosis at L4-5. Moderate   bilateral foraminal stenosis is present at both levels. Possible postoperative changes at L3-4. There is probably little significant interval change. There is subtle canal encroachment from facet and ligamentous hypertrophy at L2-3 but there is no significant central disc protrusion. Small bilateral foraminal protrusions are present but the exit foramina are patent. There are moderate-to-severe disc degenerative changes at L5-S1 but canal and foramina are patent. Canal and foramina are patent at the remaining levels. No cord lesions are seen. XR Results (most recent):   Results from Orders Only encounter on 10/27/21      XR SPINE LUMB 2 OR 3 V      Narrative   Lumbar spine -3 Views      HISTORY: Chronic bilateral low back pain without sciatica. History of lumbar   spine surgery with pain. COMPARISON: Radiographs 11/16/2009, MR lumbar spine 8/24/2015. FINDINGS: Since 2009, L3-L4 grade I anterolisthesis has developed. It is present   and is similar versus mildly worsened compared to 2015 MRI comparison. Its disc   space is maintained. Chronic diffuse diffuse disc space narrowing at L5-S1. Chronic left greater than right lower lumbar facet hypertrophy. Impression   1. Progressing grade I anterolisthesis at L3-L4 since remote studies. 2. Chronic L5-S1 degenerative disc disease and lower lumbar facet hypertrophy.          Past Medical History:       Past Medical History:   Diagnosis Date    Arthritis     Balance problems     patient states no further issues    Breast CA ([address] 03/2018    right    Bronchitis     Chronic pain     back    Fibromyalgia 11/29/2012    GERD (gastroesophageal reflux disease)     Hiatal Hernia    Hypercholesterolemia     Hypertension     Kidney stones     Low back pain     Lumbar spinal stenosis 11/25/2012    Migraine headache     Nervousness Neurological disease     Osteoarthritis 11/25/2012    Other ill-defined conditions(799.89)     neuropathy    Peripheral neuropathy 11/29/2012    Post laminectomy syndrome     Postoperative anemia due to acute blood loss 11/30/2012    Ringing in ears     Rotator cuff syndrome     Spinal stenosis     Thoracic spine pain     Vitamin D deficiency 11/30/2012      Past Surgical History:   Procedure Laterality Date    BX/REMV,LYMPH NODE,DEEP AXILL Right 05/04/2018    Dr. Ana Ward Right 5/4/2018    NEEDLE LOCALIZED (1000) LUMPECTOMY RIGHT BREAST AND SENTINEL LYMPH NODE BIOPSY RIGHT AXILLA performed by Colletta Couch, MD at [address]      Bilateral L3-4 hemilaminotomy and medial facetectomy by Dr. Bulmaro Lindsey      right knee sx    ORTHOPEDIC SURGERY      foot l heel spur    OTHER SURGICAL HISTORY      non cancer cyst removed from right shoulder    US BREAST BIOPSY W LOC DEVICE 1ST LESION RIGHT Right 3/14/2018    US BREAST NEEDLE BIOPSY RIGHT 3/14/2018 [facility] RAD US      Social History     Tobacco Use   Smoking Status Never   Smokeless Tobacco Never      Allergies   Allergen Reactions    Aspirin Other (See Comments)     GI Pain  Other reaction(s): gi distress    Adhesive Tape Dermatitis and Other (See Comments)     blisters      Duloxetine Other (See Comments)      Current Outpatient Medications   Medication Sig Dispense Refill    ascorbic acid (VITAMIN C) 250 MG tablet Take 250 mg by mouth 2 times daily (with meals)      butalbital-acetaminophen-caffeine (FIORICET, ESGIC) -40 MG per tablet take 1 tablet by mouth every 8 hours      ergocalciferol (ERGOCALCIFEROL) 1.25 MG (43740 UT) capsule Take 50,000 Units by mouth every 7 days      ferrous sulfate (SLOW FE) 142 (45 Fe) MG extended release tablet Take 142 mg by mouth every morning (before breakfast)      gabapentin (NEURONTIN) 400 MG capsule Take 400 mg by mouth 3 times daily. losartan-hydroCHLOROthiazide (HYZAAR) 100-25 MG per tablet Take 1 tablet by mouth daily      omeprazole (PRILOSEC) 20 MG delayed release capsule TAKE 1 CAPSULE BY MOUTH IN THE MORNING 30 MINUTES PRIOR TO BREAKFAST      pravastatin (PRAVACHOL) 10 MG tablet Take 10 mg by mouth      rizatriptan (MAXALT) 10 MG tablet 1 tablet at onset of headache , can take another tablet after 2 hours      amitriptyline (ELAVIL) 25 MG tablet amitriptyline 25 mg tablet   Take 1 tablet every day by oral route. (Patient not taking: Reported on 2/22/2023)      citalopram (CELEXA) 10 MG tablet       cloNIDine (CATAPRES) 0.1 MG tablet  (Patient not taking: Reported on 2/22/2023)      diclofenac (VOLTAREN) 75 MG EC tablet Take 75 mg by mouth as needed      naproxen (NAPROSYN) 250 MG tablet Take 500 mg by mouth 2 times daily (with meals) (Patient not taking: Reported on 2/22/2023)      propranolol (INDERAL LA) 80 MG extended release capsule       acetaminophen (TYLENOL) 500 MG tablet Take 500 mg by mouth every 6 hours as needed (Patient not taking: Reported on 2/22/2023)      albuterol (ACCUNEB) 0.63 MG/3ML nebulizer solution Inhale 0.63 mg into the lungs every 6 hours as needed (Patient not taking: Reported on 2/22/2023)      azelastine (OPTIVAR) 0.05 % ophthalmic solution instill 1 drop INTO AFFECTED EYE(S) twice a day (Patient not taking: Reported on 2/22/2023)      cetirizine (ZYRTEC) 10 MG tablet take 1 tablet by mouth once daily if needed (Patient not taking: Reported on 2/22/2023)      ibuprofen (ADVIL;MOTRIN) 800 MG tablet Take 800 mg by mouth 3 times daily as needed (Patient not taking: Reported on 2/22/2023)      letrozole (FEMARA) 2.5 MG tablet Take 2.5 mg by mouth daily      traMADol (ULTRAM) 50 MG tablet Take 50 mg by mouth. (Patient not taking: Reported on 2/22/2023)       No current facility-administered medications for this visit.        Review of Systems:    Gen:    Denied fevers, chills, malaise, fatigue, weight changes Resp: Denied shortness of breath, cough, wheezing    CVS: Denied chest pain, palpitations    : Denied urinary urgency, frequency, incontinence    GI: Denied nausea, vomiting, constipation, diarrhea    Skin: Denied rashes, wounds    Psych: Denied anxiety, depression    Vasc: Denied claudication, ulcers    Hem: Denied easy bruising/bleeding    MSK: See HPI    Neuro: See HPI            Physical Exam       Vital Signs:    Visit Vitals        Pulse  85     Temp  98.5 °F (36.9 °C) (Oral)     Ht  5' 7\" (1.702 m)     Wt  185 lb (83.9 kg)         SpO2        BMI         General: ??????? Well nourished and well developed female without any acute distress    Psychiatric: ?  Alert and oriented x 3 with normal mood     HEENT: ???????? Atraumatic    Respiratory:   Breathing non-labored and non dyspneic    CV: ???????????????? Peripheral pulses intact, no peripheral edema    Skin: ????????????? No rashes          Neurologic: ?? Sensation: normal and grossly intact thebilateral, lower extremity(s) except reports diminished below b/l knees   Strength: 5/5 in the b/l LE   Reflexes: reveals 2+ symmetric DTRs throughout except absent b/l achilles   Gait: normal     ?   Tight hamstrings, and hip flexors b/l   Limited lumbar ROM pain with flexion and extension worse with flexion   Seated hip ROM pain free         Medical Decision Making:     Images: The imaging results as well as the actual images of the studies below were reviewed, interpreted and visualized. Labs: The results below were reviewed. X-ray lumbar spine 10/28/2021 reviewed with L5/S1 DDD, L3/4 anterolisthesis and facet arthritis      Assessment:    - s/p L3/4 laminectomy in 2012   - low back pain- spondylosis facet arthrosis, DDD   -  Peripheral neuropathy         Plan:        -Physical therapy -  Discussed PT, she would prefer to do exercises on her own for now . She is considering aquatic therapy    -Medications - renew gabapentin 400mg TID.  Counseled regarding side effects and appropriate administration of medications.     -Diagnostics/Imaging -   -Injections - NA   -Lifestyle -Encouraged regular aerobic exercise   -Education - The patient's diagnosis, prognosis and treatment options were discussed today. All questions were answered. F/U - in 3 months, she will let me know if she would like to start Aquatic PT.  Consider repeat MRI lumbar spine if symptoms worsen            [facility]

## 2023-02-22 NOTE — PROGRESS NOTES
Maria Luz Garcia presents today for   Chief Complaint   Patient presents with    Back Pain       Is someone accompanying this pt? no    Is the patient using any DME equipment during OV? no    Depression Screening:  No flowsheet data found. Learning Assessment:  No flowsheet data found. Abuse Screening:  No flowsheet data found. Fall Risk  No flowsheet data found. OPIOID RISK TOOL  No flowsheet data found. Coordination of Care:  1. Have you been to the ER, urgent care clinic since your last visit? no  Hospitalized since your last visit? no    2. Have you seen or consulted any other health care providers outside of the [address] [place] [address] since your last visit? no Include any pap smears or colon screening.  no

## 2023-03-03 ENCOUNTER — HOSPITAL ENCOUNTER (OUTPATIENT)
Facility: HOSPITAL | Age: 68
End: 2023-03-03
Payer: MEDICARE

## 2023-03-03 DIAGNOSIS — Z85.3 PERSONAL HISTORY OF MALIGNANT NEOPLASM OF BREAST: ICD-10-CM

## 2023-03-03 PROCEDURE — 77066 DX MAMMO INCL CAD BI: CPT

## 2023-03-08 ENCOUNTER — HOSPITAL ENCOUNTER (OUTPATIENT)
Facility: HOSPITAL | Age: 68
Discharge: HOME | End: 2023-03-11
Payer: MEDICARE

## 2023-03-08 DIAGNOSIS — Q89.2 THYROGLOSSAL DUCT CYST: ICD-10-CM

## 2023-03-08 PROCEDURE — 76536 US EXAM OF HEAD AND NECK: CPT

## 2023-03-09 ENCOUNTER — TELEPHONE (OUTPATIENT)
Age: 68
End: 2023-03-09

## 2023-03-09 NOTE — TELEPHONE ENCOUNTER
Left patient message to get her scheduled for appointment for Dr. Maribell Ross to establish care for breast care in April

## 2023-05-05 ENCOUNTER — HOSPITAL ENCOUNTER (OUTPATIENT)
Facility: HOSPITAL | Age: 68
End: 2023-05-05
Payer: MEDICARE

## 2023-05-05 ENCOUNTER — HOSPITAL ENCOUNTER (OUTPATIENT)
Facility: HOSPITAL | Age: 68
Discharge: HOME | End: 2023-05-08

## 2023-05-05 DIAGNOSIS — Q89.2 CONGENITAL MALFORMATIONS OF OTHER ENDOCRINE GLANDS: ICD-10-CM

## 2023-05-05 LAB
CREAT UR-MCNC: 1 MG/DL (ref 0.6–1.3)
LABCORP SPECIMEN COLLECTION: NORMAL

## 2023-05-05 PROCEDURE — 6360000004 HC RX CONTRAST MEDICATION: Performed by: PHYSICIAN ASSISTANT

## 2023-05-05 PROCEDURE — 70492 CT SFT TSUE NCK W/O & W/DYE: CPT

## 2023-05-05 PROCEDURE — 99001 SPECIMEN HANDLING PT-LAB: CPT

## 2023-05-05 PROCEDURE — 82565 ASSAY OF CREATININE: CPT

## 2023-05-05 RX ADMIN — IOPAMIDOL 80 ML: 612 INJECTION, SOLUTION INTRAVENOUS at 13:29

## 2023-05-19 ENCOUNTER — HOSPITAL ENCOUNTER (OUTPATIENT)
Facility: HOSPITAL | Age: 68
End: 2023-05-19
Payer: MEDICARE

## 2023-05-19 DIAGNOSIS — G90.01 CAROTID SINUS SYNCOPE: ICD-10-CM

## 2023-05-19 LAB
VAS LEFT BULB EDV: 18.9 CM/S
VAS LEFT BULB PSV: 59 CM/S
VAS LEFT CCA DIST EDV: 17.6 CM/S
VAS LEFT CCA DIST PSV: 69.4 CM/S
VAS LEFT CCA MID EDV: 13.72 CM/S
VAS LEFT CCA MID PSV: 86.19 CM/S
VAS LEFT CCA PROX EDV: 16.3 CM/S
VAS LEFT CCA PROX PSV: 64.2 CM/S
VAS LEFT ECA EDV: 11.13 CM/S
VAS LEFT ECA PSV: 66.8 CM/S
VAS LEFT ICA DIST EDV: 22.7 CM/S
VAS LEFT ICA DIST PSV: 62.4 CM/S
VAS LEFT ICA MID EDV: 24.9 CM/S
VAS LEFT ICA MID PSV: 73.4 CM/S
VAS LEFT ICA PROX EDV: 19.4 CM/S
VAS LEFT ICA PROX PSV: 72.1 CM/S
VAS LEFT ICA/CCA PSV: 1.06 NO UNITS
VAS LEFT SUBCLAVIAN PROX EDV: 0 CM/S
VAS LEFT SUBCLAVIAN PROX PSV: 112.9 CM/S
VAS LEFT VERTEBRAL EDV: 12.78 CM/S
VAS LEFT VERTEBRAL PSV: 41.4 CM/S
VAS RIGHT BULB EDV: 12.9 CM/S
VAS RIGHT BULB PSV: 50.6 CM/S
VAS RIGHT CCA DIST EDV: 15.8 CM/S
VAS RIGHT CCA DIST PSV: 74.8 CM/S
VAS RIGHT CCA MID EDV: 13.83 CM/S
VAS RIGHT CCA MID PSV: 61.21 CM/S
VAS RIGHT CCA PROX EDV: 13.8 CM/S
VAS RIGHT CCA PROX PSV: 74.8 CM/S
VAS RIGHT ECA EDV: 8.99 CM/S
VAS RIGHT ECA PSV: 64.1 CM/S
VAS RIGHT ICA DIST EDV: 20.3 CM/S
VAS RIGHT ICA DIST PSV: 73.3 CM/S
VAS RIGHT ICA MID EDV: 14.5 CM/S
VAS RIGHT ICA MID PSV: 38 CM/S
VAS RIGHT ICA PROX EDV: 14.8 CM/S
VAS RIGHT ICA PROX PSV: 73.8 CM/S
VAS RIGHT ICA/CCA PSV: 1 NO UNITS
VAS RIGHT SUBCLAVIAN PROX EDV: 0 CM/S
VAS RIGHT SUBCLAVIAN PROX PSV: 122.4 CM/S
VAS RIGHT VERTEBRAL EDV: 14.53 CM/S
VAS RIGHT VERTEBRAL PSV: 47.7 CM/S

## 2023-05-19 PROCEDURE — 93880 EXTRACRANIAL BILAT STUDY: CPT

## 2023-05-19 PROCEDURE — 93880 EXTRACRANIAL BILAT STUDY: CPT | Performed by: STUDENT IN AN ORGANIZED HEALTH CARE EDUCATION/TRAINING PROGRAM

## 2023-05-22 ENCOUNTER — OFFICE VISIT (OUTPATIENT)
Age: 68
End: 2023-05-22
Payer: MEDICARE

## 2023-05-22 VITALS
WEIGHT: 210.5 LBS | TEMPERATURE: 97.5 F | BODY MASS INDEX: 33.04 KG/M2 | HEART RATE: 80 BPM | HEIGHT: 67 IN | OXYGEN SATURATION: 100 % | RESPIRATION RATE: 18 BRPM

## 2023-05-22 DIAGNOSIS — M48.062 SPINAL STENOSIS, LUMBAR REGION WITH NEUROGENIC CLAUDICATION: ICD-10-CM

## 2023-05-22 PROCEDURE — 99214 OFFICE O/P EST MOD 30 MIN: CPT | Performed by: PHYSICAL MEDICINE & REHABILITATION

## 2023-05-22 PROCEDURE — 1123F ACP DISCUSS/DSCN MKR DOCD: CPT | Performed by: PHYSICAL MEDICINE & REHABILITATION

## 2023-05-22 RX ORDER — PANTOPRAZOLE SODIUM 40 MG/1
TABLET, DELAYED RELEASE ORAL
COMMUNITY
Start: 2023-04-20

## 2023-05-22 RX ORDER — CEPHALEXIN 500 MG/1
CAPSULE ORAL
COMMUNITY
Start: 2023-04-14 | End: 2023-05-22

## 2023-05-22 RX ORDER — GABAPENTIN 400 MG/1
400 CAPSULE ORAL 3 TIMES DAILY
Qty: 270 CAPSULE | Refills: 0 | Status: SHIPPED | OUTPATIENT
Start: 2023-05-22 | End: 2023-08-20

## 2023-05-22 RX ORDER — FLUTICASONE PROPIONATE 50 MCG
SPRAY, SUSPENSION (ML) NASAL
COMMUNITY
Start: 2023-02-27

## 2023-05-22 NOTE — PROGRESS NOTES
[address]  [address], [address]   [place], [address] [place] [address] Street   Phone: [phone]   Fax: [phone]         Patient: Aleta Waddell                                                                               MRN: [id number]         YOB: 1955           AGE: 61 y.o. PCP: Angelica Simeon MD   Date:  08/22/22      Reason for Consultation: Back Pain (Lower/)         HPI:   Aleta Waddell is a 61 y.o.  female with relevant PMH of L3-4 laminectomy in 2021, and breast CA (right) 2016 s/p lumpectomy and radiation and hormone treatment  who has been followed here for low back pain radiating down bilateral  legs. Pain is mainly in her low back. She does have pain radiating into both of her legs but she reports a history of neuropathy. She has a long history of back pain. X-rays demonstrate L3/4  anterolisthesis with facet arthropathy, with DDD L5/S1. Pain is unchanged. She is taking gabapentin 400mg tid which helps      Neurologic symptoms:+ numbness, tingling b/l legs/ No weakness, bowel or bladder changes. No recent falls, ambulates with single prong cane        Location: The pain is located in the low back (90%)   Radiation: The pain does radiate bilateral legs (related to neuropathy per patient ). Pain Score: Currently: 7/10     Quality: Pain is of a Achy, Dull and Tight quality. Aggravating: Pain is exacerbated by walking   Alleviating: The pain is alleviated by lying down      Prior Treatments:    Injections (prior to surgery in 2012)   Physical therapy many years ago   Previous Medications: elavil   Current Medications: gabapentin 400mg TID, diclofenac   Previous work-up has included:    MRI lumbar spine 2015   There are subtle anterolisthesis at L3-4 and L4-5 broad-based disc protrusions as well as posterior facet and ligamentous hypertrophy, left somewhat greater than right at L4-5.   Facet effusions are  seen at

## 2023-05-22 NOTE — PROGRESS NOTES
Laura Aponte presents today for   Chief Complaint   Patient presents with    Back Problem    Pain    Back Pain       Is someone accompanying this pt? no    Is the patient using any DME equipment during OV? no    Depression Screening:  No flowsheet data found. Learning Assessment:  No flowsheet data found. Abuse Screening:  No flowsheet data found. Fall Risk  No flowsheet data found. OPIOID RISK TOOL  No flowsheet data found. Coordination of Care:  1. Have you been to the ER, urgent care clinic since your last visit? no  Hospitalized since your last visit? no    2. Have you seen or consulted any other health care providers outside of the [address] [place] [address] since your last visit? no Include any pap smears or colon screening.  no

## 2023-07-09 DIAGNOSIS — C50.511 MALIGNANT NEOPLASM OF LOWER-OUTER QUADRANT OF RIGHT FEMALE BREAST, UNSPECIFIED ESTROGEN RECEPTOR STATUS (HCC): Primary | ICD-10-CM

## 2023-07-09 DIAGNOSIS — E55.9 VITAMIN D DEFICIENCY, UNSPECIFIED: ICD-10-CM

## 2023-07-09 DIAGNOSIS — Z17.0 ESTROGEN RECEPTOR POSITIVE STATUS (ER+): ICD-10-CM

## 2023-07-12 DIAGNOSIS — Z17.0 ESTROGEN RECEPTOR POSITIVE STATUS (ER+): ICD-10-CM

## 2023-07-12 DIAGNOSIS — C50.511 MALIGNANT NEOPLASM OF LOWER-OUTER QUADRANT OF RIGHT FEMALE BREAST, UNSPECIFIED ESTROGEN RECEPTOR STATUS (HCC): Primary | ICD-10-CM

## 2023-07-12 RX ORDER — LETROZOLE 2.5 MG/1
TABLET, FILM COATED ORAL
Qty: 30 TABLET | OUTPATIENT
Start: 2023-07-12

## 2023-07-12 RX ORDER — LETROZOLE 2.5 MG/1
2.5 TABLET, FILM COATED ORAL DAILY
Qty: 90 TABLET | Refills: 2 | Status: SHIPPED | OUTPATIENT
Start: 2023-07-12

## 2023-07-19 ENCOUNTER — OFFICE VISIT (OUTPATIENT)
Age: 68
End: 2023-07-19
Payer: MEDICARE

## 2023-07-19 VITALS
BODY MASS INDEX: 33.9 KG/M2 | WEIGHT: 216 LBS | DIASTOLIC BLOOD PRESSURE: 76 MMHG | HEIGHT: 67 IN | SYSTOLIC BLOOD PRESSURE: 120 MMHG | HEART RATE: 82 BPM | RESPIRATION RATE: 16 BRPM | OXYGEN SATURATION: 99 %

## 2023-07-19 DIAGNOSIS — D50.8 OTHER IRON DEFICIENCY ANEMIAS: ICD-10-CM

## 2023-07-19 DIAGNOSIS — E55.9 VITAMIN D DEFICIENCY, UNSPECIFIED: ICD-10-CM

## 2023-07-19 DIAGNOSIS — D50.8 IRON DEFICIENCY ANEMIA SECONDARY TO INADEQUATE DIETARY IRON INTAKE: ICD-10-CM

## 2023-07-19 DIAGNOSIS — C50.511 MALIGNANT NEOPLASM OF LOWER-OUTER QUADRANT OF RIGHT FEMALE BREAST, UNSPECIFIED ESTROGEN RECEPTOR STATUS (HCC): Primary | ICD-10-CM

## 2023-07-19 DIAGNOSIS — Z79.811 LONG TERM (CURRENT) USE OF AROMATASE INHIBITORS: ICD-10-CM

## 2023-07-19 DIAGNOSIS — Z17.0 ESTROGEN RECEPTOR POSITIVE: ICD-10-CM

## 2023-07-19 DIAGNOSIS — C50.511 MALIGNANT NEOPLASM OF LOWER-OUTER QUADRANT OF RIGHT FEMALE BREAST, UNSPECIFIED ESTROGEN RECEPTOR STATUS (HCC): ICD-10-CM

## 2023-07-19 DIAGNOSIS — E55.9 VITAMIN D DEFICIENCY: ICD-10-CM

## 2023-07-19 DIAGNOSIS — Z17.0 ESTROGEN RECEPTOR POSITIVE STATUS (ER+): ICD-10-CM

## 2023-07-19 DIAGNOSIS — Z17.0 MALIGNANT NEOPLASM OF LOWER-OUTER QUADRANT OF RIGHT BREAST OF FEMALE, ESTROGEN RECEPTOR POSITIVE (HCC): ICD-10-CM

## 2023-07-19 DIAGNOSIS — C50.511 MALIGNANT NEOPLASM OF LOWER-OUTER QUADRANT OF RIGHT BREAST OF FEMALE, ESTROGEN RECEPTOR POSITIVE (HCC): ICD-10-CM

## 2023-07-19 PROCEDURE — 3074F SYST BP LT 130 MM HG: CPT | Performed by: INTERNAL MEDICINE

## 2023-07-19 PROCEDURE — 99213 OFFICE O/P EST LOW 20 MIN: CPT | Performed by: INTERNAL MEDICINE

## 2023-07-19 PROCEDURE — 1123F ACP DISCUSS/DSCN MKR DOCD: CPT | Performed by: INTERNAL MEDICINE

## 2023-07-19 PROCEDURE — 3078F DIAST BP <80 MM HG: CPT | Performed by: INTERNAL MEDICINE

## 2023-07-19 ASSESSMENT — ENCOUNTER SYMPTOMS
DIARRHEA: 0
COUGH: 0
VOMITING: 0
NAUSEA: 0
ABDOMINAL PAIN: 0
SHORTNESS OF BREATH: 0
BACK PAIN: 0

## 2023-07-19 NOTE — PROGRESS NOTES
Hematology/Oncology Note      Date: 2023     Name: Tomasa Farrell  : 1955     Carl Ashford MD      Subjective:     Chief complaint: Right breast cancer     History of Present Illness:   Ms. Hailee Foley is a most pleasant 74y.o. year old female who was seen for invasive ductal carcinoma involving the right breast. She was diagnosed in 2018. She reports well tolerance and adherence to Femara without significant side effects. She states she does her breast self exam on a monthly basis. She continue to report chronic tenderness to the right breast which appeared after surgery. She denies any nipple discharge or worsening pain or any other breast issues. She is seeing orthopaedic for management of left shoulder pain and now planing on surgery . She denied fever, chills, night sweat, unintentional weight loss, skin lumps or bumps, acute bleeding or bruising issues. Denied headache, acute vision change, dizziness, chest pain, shortness of breath, palpitation, productive cough, nausea, vomiting, abdominal pain, altered bowel habits, dysuria, .          Past Medical History, Family History, and Social History:    Past Medical History:   Diagnosis Date    Arthritis     Balance problems     patient states no further issues    Breast CA ([address]) 2018    right    Bronchitis     Chronic pain     back    Fibromyalgia 2012    GERD (gastroesophageal reflux disease)     Hiatal Hernia    Hypercholesterolemia     Hypertension     Kidney stones     Low back pain     Lumbar spinal stenosis 2012    Migraine headache     Nervousness     Neurological disease     Osteoarthritis 2012    Other ill-defined conditions(799.89)     neuropathy    Peripheral neuropathy 2012    Post laminectomy syndrome     Postoperative anemia due to acute blood loss 2012    Ringing in ears     Rotator cuff syndrome     Spinal stenosis     Thoracic spine pain     Vitamin D deficiency 2012     Past

## 2023-07-31 ENCOUNTER — OFFICE VISIT (OUTPATIENT)
Age: 68
End: 2023-07-31
Payer: MEDICARE

## 2023-07-31 VITALS
DIASTOLIC BLOOD PRESSURE: 75 MMHG | TEMPERATURE: 97.9 F | HEART RATE: 86 BPM | SYSTOLIC BLOOD PRESSURE: 124 MMHG | BODY MASS INDEX: 33.9 KG/M2 | HEIGHT: 67 IN | RESPIRATION RATE: 16 BRPM | OXYGEN SATURATION: 99 % | WEIGHT: 216 LBS

## 2023-07-31 DIAGNOSIS — Z17.0 MALIGNANT NEOPLASM OF RIGHT BREAST IN FEMALE, ESTROGEN RECEPTOR POSITIVE, UNSPECIFIED SITE OF BREAST (HCC): Primary | ICD-10-CM

## 2023-07-31 DIAGNOSIS — N64.4 MASTODYNIA OF RIGHT BREAST: ICD-10-CM

## 2023-07-31 DIAGNOSIS — Z91.89 AT HIGH RISK FOR BREAST CANCER: ICD-10-CM

## 2023-07-31 DIAGNOSIS — C50.911 MALIGNANT NEOPLASM OF RIGHT BREAST IN FEMALE, ESTROGEN RECEPTOR POSITIVE, UNSPECIFIED SITE OF BREAST (HCC): Primary | ICD-10-CM

## 2023-07-31 PROCEDURE — 1123F ACP DISCUSS/DSCN MKR DOCD: CPT | Performed by: SURGERY

## 2023-07-31 PROCEDURE — 99214 OFFICE O/P EST MOD 30 MIN: CPT | Performed by: SURGERY

## 2023-07-31 PROCEDURE — 3078F DIAST BP <80 MM HG: CPT | Performed by: SURGERY

## 2023-07-31 PROCEDURE — 3074F SYST BP LT 130 MM HG: CPT | Performed by: SURGERY

## 2023-07-31 RX ORDER — IBUPROFEN 800 MG/1
800 TABLET ORAL 2 TIMES DAILY PRN
Qty: 180 TABLET | Refills: 1 | Status: SHIPPED | OUTPATIENT
Start: 2023-07-31

## 2023-07-31 ASSESSMENT — ENCOUNTER SYMPTOMS
CHEST TIGHTNESS: 0
SHORTNESS OF BREATH: 0

## 2023-07-31 NOTE — PROGRESS NOTES
Con Valdez is a 74 y.o. female (: 1955)     Chief Complaint   Patient presents with    New Patient     Follow up on the mammogram 3/3/23        Medication list and allergies have been reviewed with Con Valdez and updated as of today's date. I have gone over all Medical, Surgical and Social History with Con Valdez and updated/added the information accordingly.
0.0 - 0.4 x10E3/uL Final    Basophils Absolute 07/05/2023 0.1  0.0 - 0.2 x10E3/uL Final    Immature Granulocytes 07/05/2023 0  Not Estab. % Final    Immature Grans (Abs) 07/05/2023 0.0  0.0 - 0.1 x10E3/uL Final   Hospital Outpatient Visit on 05/19/2023   Component Date Value Ref Range Status    Right cca dist PSV 05/19/2023 74.8  cm/s Final    Right CCA dist EDV 05/19/2023 15.8  cm/s Final    Right CCA mid PSV 05/19/2023 61.21  cm/s Final    Right CCA mid EDV 05/19/2023 13.83  cm/s Final    Right CCA prox PSV 05/19/2023 74.8  cm/s Final    Right CCA prox EDV 05/19/2023 13.8  cm/s Final    Right ICA dist PSV 05/19/2023 73.3  cm/s Final    Right ICA dist EDV 05/19/2023 20.3  cm/s Final    Right ICA mid PSV 05/19/2023 38.0  cm/s Final    Right ICA mid EDV 05/19/2023 14.5  cm/s Final    Right ICA prox PSV 05/19/2023 73.8  cm/s Final    Right ICA prox EDV 05/19/2023 14.8  cm/s Final    Right bulb PSV 05/19/2023 50.6  cm/s Final    Right bulb EDV 05/19/2023 12.9  cm/s Final    Right ECA PSV 05/19/2023 64.1  cm/s Final    Right ECA EDV 05/19/2023 8.99  cm/s Final    Right vertebral PSV 05/19/2023 47.7  cm/s Final    Right vertebral EDV 05/19/2023 14.53  cm/s Final    Right ICA/CCA PSV 05/19/2023 1.0  no units Final    Left CCA dist PSV 05/19/2023 69.4  cm/s Final    Left CCA dist EDV 05/19/2023 17.6  cm/s Final    Left CCA mid PSV 05/19/2023 86.19  cm/s Final    Left CCA mid EDV 05/19/2023 13.72  cm/s Final    Left CCA prox PSV 05/19/2023 64.2  cm/s Final    Left CCA prox EDV 05/19/2023 16.3  cm/s Final    Left ICA dist PSV 05/19/2023 62.4  cm/s Final    Left ICA dist EDV 05/19/2023 22.7  cm/s Final    Left ICA mid PSV 05/19/2023 73.4  cm/s Final    Left ICA mid EDV 05/19/2023 24.9  cm/s Final    Left ICA prox PSV 05/19/2023 72.1  cm/s Final    Left ICA prox EDV 05/19/2023 19.4  cm/s Final    Left bulb PSV 05/19/2023 59.0  cm/s Final    Left bulb EDV 05/19/2023 18.9  cm/s Final    Left ECA PSV 05/19/2023 66.8  cm/s Final

## 2023-10-04 ENCOUNTER — TELEPHONE (OUTPATIENT)
Age: 68
End: 2023-10-04

## 2023-10-04 DIAGNOSIS — M48.062 SPINAL STENOSIS, LUMBAR REGION WITH NEUROGENIC CLAUDICATION: ICD-10-CM

## 2023-10-04 RX ORDER — GABAPENTIN 400 MG/1
400 CAPSULE ORAL 3 TIMES DAILY
Qty: 270 CAPSULE | Refills: 0 | Status: SHIPPED | OUTPATIENT
Start: 2023-10-04 | End: 2024-01-02

## 2023-10-04 NOTE — TELEPHONE ENCOUNTER
Patient is requesting a refill of Gabapentin 400 mg to be sent to the [facility],. Patient can be reached at [phone].

## 2023-10-05 NOTE — TELEPHONE ENCOUNTER
Princess Zamora MD  You 15 hours ago (5:24 PM)       Refill sent to pharmacy she should keep her follow up appointment

## 2023-10-05 NOTE — TELEPHONE ENCOUNTER
Called patient identified by two verifiers.  Explained  Refill sent to pharmacy she should keep her follow up appointment

## 2023-11-06 ENCOUNTER — OFFICE VISIT (OUTPATIENT)
Age: 68
End: 2023-11-06
Payer: MEDICARE

## 2023-11-06 VITALS
HEIGHT: 67 IN | OXYGEN SATURATION: 99 % | WEIGHT: 225.4 LBS | HEART RATE: 91 BPM | BODY MASS INDEX: 35.38 KG/M2 | RESPIRATION RATE: 18 BRPM | TEMPERATURE: 98.6 F

## 2023-11-06 DIAGNOSIS — M48.062 SPINAL STENOSIS, LUMBAR REGION WITH NEUROGENIC CLAUDICATION: ICD-10-CM

## 2023-11-06 PROCEDURE — 1123F ACP DISCUSS/DSCN MKR DOCD: CPT | Performed by: PHYSICAL MEDICINE & REHABILITATION

## 2023-11-06 PROCEDURE — 99214 OFFICE O/P EST MOD 30 MIN: CPT | Performed by: PHYSICAL MEDICINE & REHABILITATION

## 2023-11-06 RX ORDER — GABAPENTIN 400 MG/1
400 CAPSULE ORAL 3 TIMES DAILY
Qty: 270 CAPSULE | Refills: 0 | Status: SHIPPED | OUTPATIENT
Start: 2023-11-06 | End: 2024-02-04

## 2023-11-06 NOTE — PROGRESS NOTES
Ringing in ears    HNP (herniated nucleus pulposus), lumbar    Malignant neoplasm of lower-inner quadrant of right breast of female, estrogen receptor positive (HCC)    Fatigue    Shoulder joint pain    Chronic pain    Breast cancer, stage 1, right (HCC)    GERD (gastroesophageal reflux disease)    Hereditary and idiopathic neuropathy, unspecified    Prediabetes    Dizziness and giddiness    DDD (degenerative disc disease), lumbar    Non-refractory chronic migraine without aura    Postoperative anemia due to acute blood loss    History of nutritional deficiency    Fibromyalgia    Vitamin D deficiency    Use of opiates for therapeutic purposes    Migraines    Atrophic vaginitis       Social History     Tobacco Use    Smoking status: Never    Smokeless tobacco: Never   Substance Use Topics    Alcohol use: No    Drug use: No        Allergies   Allergen Reactions    Aspirin Other (See Comments)     GI Pain  Other reaction(s): gi distress    Adhesive Tape Dermatitis and Other (See Comments)     blisters      Duloxetine Other (See Comments)        Current Outpatient Medications   Medication Sig    gabapentin (NEURONTIN) 400 MG capsule Take 1 capsule by mouth 3 times daily for 90 days.  Max Daily Amount: 1,200 mg    ibuprofen (ADVIL;MOTRIN) 800 MG tablet Take 1 tablet by mouth 2 times daily as needed for Pain    letrozole (FEMARA) 2.5 MG tablet Take 1 tablet by mouth daily    fluticasone (FLONASE) 50 MCG/ACT nasal spray     pantoprazole (PROTONIX) 40 MG tablet     amitriptyline (ELAVIL) 25 MG tablet     propranolol (INDERAL LA) 80 MG extended release capsule     ascorbic acid (VITAMIN C) 250 MG tablet Take 1 tablet by mouth 2 times daily (with meals)    butalbital-acetaminophen-caffeine (FIORICET, ESGIC) -40 MG per tablet take 1 tablet by mouth every 8 hours    ergocalciferol (ERGOCALCIFEROL) 1.25 MG (82166 UT) capsule Take 1 capsule by mouth every 7 days    ferrous sulfate (SLOW FE) 142 (45 Fe) MG extended release

## 2023-11-06 NOTE — PROGRESS NOTES
[facility]  [address], [address] [place] [address]    Phone: [phone]   Fax: [phone]         Patient: Maribel King                                                                               MRN: [id number]         YOB: 1955           AGE: 61 y.o. PCP: Angeline Fontaine MD   Date:  08/22/22      Reason for Consultation: Back Pain (Lower/)         HPI:   Maribel King is a 61 y.o.  female with relevant PMH of L3-4 laminectomy in 2012, and breast CA (right) 2016 s/p lumpectomy and radiation and hormone treatment  who has been followed here for low back pain radiating down bilateral  legs. Pain is mainly in her low back. She does have pain radiating into both of her legs but she reports a history of neuropathy. She has a long history of back pain. X-rays demonstrate L3/4  anterolisthesis with facet arthropathy, with DDD L5/S1. Pain is unchanged. She is taking gabapentin 400mg tid which helps      Neurologic symptoms:+ numbness, tingling b/l legs/ No weakness, bowel or bladder changes. No recent falls, ambulates with single prong cane        Location: The pain is located in the low back (90%)   Radiation: The pain does radiate bilateral legs (related to neuropathy per patient ). Pain Score: Currently: 7/10     Quality: Pain is of a Achy, Dull and Tight quality. Aggravating: Pain is exacerbated by walking   Alleviating: The pain is alleviated by lying down      Prior Treatments:    Injections (prior to surgery in 2012)   Physical therapy many years ago   Previous Medications: elavil   Current Medications: gabapentin 400mg TID, diclofenac   Previous work-up has included:    MRI lumbar spine 2015   There are subtle anterolisthesis at L3-4 and L4-5 broad-based disc protrusions as well as posterior facet and ligamentous hypertrophy, left somewhat greater than right at L4-5.   Facet effusions are  seen at

## 2023-11-06 NOTE — PROGRESS NOTES
Marquise Ma presents today for   Chief Complaint   Patient presents with    Back Problem    Pain    Back Pain       Is someone accompanying this pt? no    Is the patient using any DME equipment during OV? no    Depression Screening:       No data to display                Learning Assessment:  No flowsheet data found. Abuse Screening:       No data to display                Fall Risk  No flowsheet data found. OPIOID RISK TOOL  No flowsheet data found. Coordination of Care:  1. Have you been to the ER, urgent care clinic since your last visit? no  Hospitalized since your last visit? no    2. Have you seen or consulted any other health care providers outside of the [address] [place] [address] since your last visit? no Include any pap smears or colon screening.  no

## 2023-11-08 ENCOUNTER — OFFICE VISIT (OUTPATIENT)
Age: 68
End: 2023-11-08

## 2023-11-08 VITALS — TEMPERATURE: 98.2 F | HEIGHT: 67 IN | BODY MASS INDEX: 35.31 KG/M2 | WEIGHT: 225 LBS

## 2023-11-08 DIAGNOSIS — M65.4 DE QUERVAIN'S SYNDROME (TENOSYNOVITIS): ICD-10-CM

## 2023-11-08 DIAGNOSIS — M25.531 RIGHT WRIST PAIN: Primary | ICD-10-CM

## 2023-11-08 DIAGNOSIS — M19.031 PRIMARY OSTEOARTHRITIS OF RIGHT WRIST: ICD-10-CM

## 2023-11-08 RX ORDER — BETAMETHASONE SODIUM PHOSPHATE AND BETAMETHASONE ACETATE 3; 3 MG/ML; MG/ML
3 INJECTION, SUSPENSION INTRA-ARTICULAR; INTRALESIONAL; INTRAMUSCULAR; SOFT TISSUE ONCE
Status: COMPLETED | OUTPATIENT
Start: 2023-11-08 | End: 2023-11-08

## 2023-11-08 RX ADMIN — BETAMETHASONE SODIUM PHOSPHATE AND BETAMETHASONE ACETATE 3 MG: 3; 3 INJECTION, SUSPENSION INTRA-ARTICULAR; INTRALESIONAL; INTRAMUSCULAR; SOFT TISSUE at 10:53

## 2024-03-04 ENCOUNTER — OFFICE VISIT (OUTPATIENT)
Age: 69
End: 2024-03-04
Payer: MEDICARE

## 2024-03-04 VITALS
WEIGHT: 229 LBS | TEMPERATURE: 97.1 F | DIASTOLIC BLOOD PRESSURE: 82 MMHG | SYSTOLIC BLOOD PRESSURE: 132 MMHG | BODY MASS INDEX: 35.94 KG/M2 | OXYGEN SATURATION: 97 % | HEART RATE: 93 BPM | HEIGHT: 67 IN

## 2024-03-04 DIAGNOSIS — M48.062 SPINAL STENOSIS, LUMBAR REGION WITH NEUROGENIC CLAUDICATION: ICD-10-CM

## 2024-03-04 PROCEDURE — 3079F DIAST BP 80-89 MM HG: CPT | Performed by: PHYSICAL MEDICINE & REHABILITATION

## 2024-03-04 PROCEDURE — 1123F ACP DISCUSS/DSCN MKR DOCD: CPT | Performed by: PHYSICAL MEDICINE & REHABILITATION

## 2024-03-04 PROCEDURE — 99214 OFFICE O/P EST MOD 30 MIN: CPT | Performed by: PHYSICAL MEDICINE & REHABILITATION

## 2024-03-04 PROCEDURE — 3075F SYST BP GE 130 - 139MM HG: CPT | Performed by: PHYSICAL MEDICINE & REHABILITATION

## 2024-03-04 RX ORDER — OMEPRAZOLE 20 MG/1
20 CAPSULE, DELAYED RELEASE ORAL DAILY
COMMUNITY
Start: 2024-02-27

## 2024-03-04 RX ORDER — ALBUTEROL SULFATE 90 UG/1
1 AEROSOL, METERED RESPIRATORY (INHALATION) 4 TIMES DAILY
COMMUNITY
Start: 2024-02-12

## 2024-03-04 RX ORDER — GABAPENTIN 400 MG/1
400 CAPSULE ORAL 3 TIMES DAILY
Qty: 270 CAPSULE | Refills: 0 | Status: SHIPPED | OUTPATIENT
Start: 2024-03-04 | End: 2024-06-02

## 2024-03-04 ASSESSMENT — PATIENT HEALTH QUESTIONNAIRE - PHQ9
SUM OF ALL RESPONSES TO PHQ QUESTIONS 1-9: 0
2. FEELING DOWN, DEPRESSED OR HOPELESS: 0
SUM OF ALL RESPONSES TO PHQ9 QUESTIONS 1 & 2: 0
1. LITTLE INTEREST OR PLEASURE IN DOING THINGS: 0
SUM OF ALL RESPONSES TO PHQ QUESTIONS 1-9: 0

## 2024-03-04 NOTE — PROGRESS NOTES
[facility]  [address] [place] [address], [address]   [place] [address]   Phone: [phone]   Fax: [phone]         Patient: Joselyn Ham                                                                               MRN: [id number]         YOB: 1955           AGE: 66 y.o.               PCP: Theresa Lomas MD   Date:  08/22/22      Reason for Consultation: Back Pain (Lower/)         HPI:   Joselyn Ham is a 66 y.o.  female with relevant PMH of L3-4 laminectomy in 2012, and breast CA (right) 2016 s/p lumpectomy and radiation and hormone treatment  who has been followed here for low back pain radiating down bilateral  legs.  Pain is mainly in her low back.  She does have pain radiating into both of her legs but she reports a history of neuropathy.    She has a long history of back pain.  X-rays demonstrate L3/4  anterolisthesis with facet arthropathy, with DDD L5/S1.  Pain is unchanged. She is taking gabapentin 400mg tid which helps.   Today she also reports bilateral knee pain      Neurologic symptoms:+ numbness, tingling b/l legs/ No weakness, bowel or bladder changes.  No recent falls, ambulates with single prong cane        Location: The pain is located in the low back (90%)   Radiation: The pain does radiate bilateral legs (related to neuropathy per patient ).    Pain Score: Currently: 5/10     Quality: Pain is of a Achy, Dull and Tight quality.     Aggravating: Pain is exacerbated by walking   Alleviating: The pain is alleviated by lying down      Prior Treatments:    Injections (prior to surgery in 2012)   Physical therapy many years ago   Previous Medications: elavil   Current Medications: gabapentin 400mg TID, diclofenac   Previous work-up has included:    MRI lumbar spine 2015   There are subtle anterolisthesis at L3-4 and L4-5 broad-based disc protrusions as well as posterior facet and ligamentous hypertrophy, left somewhat greater than

## 2024-03-04 NOTE — PROGRESS NOTES
Joselyn Ham presents today for   Chief Complaint   Patient presents with    Back Pain     Lower back    Foot Pain     Bilateral feet pain     Leg Pain     Bilateral leg pain        Is someone accompanying this pt? no    Is the patient using any DME equipment during OV? no    Coordination of Care:  1. Have you been to the ER, urgent care clinic since your last visit? Urgent care pt had the flu  Hospitalized since your last visit? no    2. Have you seen or consulted any other health care providers outside of the [facility] since your last visit? ENT, PCP Include any pap smears or colon screening. no

## 2024-05-08 ENCOUNTER — TRANSCRIBE ORDERS (OUTPATIENT)
Facility: HOSPITAL | Age: 69
End: 2024-05-08

## 2024-05-08 DIAGNOSIS — Z12.31 VISIT FOR SCREENING MAMMOGRAM: Primary | ICD-10-CM

## 2024-05-17 ENCOUNTER — HOSPITAL ENCOUNTER (OUTPATIENT)
Facility: HOSPITAL | Age: 69
End: 2024-05-17
Attending: SURGERY
Payer: MEDICARE

## 2024-05-17 DIAGNOSIS — Z12.31 VISIT FOR SCREENING MAMMOGRAM: ICD-10-CM

## 2024-05-17 PROCEDURE — 77063 BREAST TOMOSYNTHESIS BI: CPT

## 2024-06-05 ENCOUNTER — OFFICE VISIT (OUTPATIENT)
Age: 69
End: 2024-06-05
Payer: MEDICARE

## 2024-06-05 VITALS
HEART RATE: 89 BPM | WEIGHT: 229 LBS | BODY MASS INDEX: 35.94 KG/M2 | OXYGEN SATURATION: 97 % | TEMPERATURE: 97.8 F | HEIGHT: 67 IN

## 2024-06-05 DIAGNOSIS — M48.062 SPINAL STENOSIS, LUMBAR REGION WITH NEUROGENIC CLAUDICATION: Primary | ICD-10-CM

## 2024-06-05 PROCEDURE — 99214 OFFICE O/P EST MOD 30 MIN: CPT | Performed by: PHYSICAL MEDICINE & REHABILITATION

## 2024-06-05 PROCEDURE — 1123F ACP DISCUSS/DSCN MKR DOCD: CPT | Performed by: PHYSICAL MEDICINE & REHABILITATION

## 2024-06-05 RX ORDER — GABAPENTIN 600 MG/1
600 TABLET ORAL 3 TIMES DAILY
Qty: 90 TABLET | Refills: 0 | Status: SHIPPED | OUTPATIENT
Start: 2024-06-05 | End: 2024-07-05

## 2024-06-05 NOTE — PROGRESS NOTES
Joselyn Ham presents today for   Chief Complaint   Patient presents with    Lower Back Pain       Is someone accompanying this pt? no    Is the patient using any DME equipment during OV? no      Coordination of Care:  1. Have you been to the ER, urgent care clinic since your last visit? no  Hospitalized since your last visit? no    2. Have you seen or consulted any other health care providers outside of the [facility] since your last visit? no Include any pap smears or colon screening. no      
than right at L4-5.  Facet effusions are  seen at L3-4.  There is associated mild-to-moderate canal stenosis at L3-4 and mild stenosis at L4-5.  Moderate   bilateral foraminal stenosis is present at both levels.  Possible postoperative changes at L3-4.  There is probably little significant interval change.       There is subtle canal encroachment from facet and ligamentous hypertrophy at L2-3 but there is no significant central disc protrusion.  Small bilateral foraminal protrusions are present but the exit foramina are patent.       There are moderate-to-severe disc degenerative changes at L5-S1 but canal and foramina are patent.  Canal and foramina are patent at the remaining levels.       No cord lesions are seen.      XR Results (most recent):   Results from Orders Only encounter on 10/27/21      XR SPINE LUMB 2 OR 3 V      Narrative   Lumbar spine -3 Views      HISTORY: Chronic bilateral low back pain without sciatica. History of lumbar   spine surgery with pain.      COMPARISON: Radiographs 11/16/2009, MR lumbar spine 8/24/2015.      FINDINGS: Since 2009, L3-L4 grade I anterolisthesis has developed. It is present   and is similar versus mildly worsened compared to 2015 MRI comparison. Its disc   space is maintained. Chronic diffuse diffuse disc space narrowing at L5-S1.   Chronic left greater than right lower lumbar facet hypertrophy.      Impression   1. Progressing grade I anterolisthesis at L3-L4 since remote studies.   2. Chronic L5-S1 degenerative disc disease and lower lumbar facet hypertrophy.         Past Medical History:       Past Medical History:   Diagnosis Date    Arthritis     Balance problems     patient states no further issues    Breast CA (HCC) 03/2018    right    Bronchitis     Chronic pain     back    Fibromyalgia 11/29/2012    GERD (gastroesophageal reflux disease)     Hiatal Hernia    Hypercholesterolemia     Hypertension     Kidney stones     Low back pain     Lumbar spinal stenosis

## 2024-07-02 DIAGNOSIS — M48.062 SPINAL STENOSIS, LUMBAR REGION WITH NEUROGENIC CLAUDICATION: ICD-10-CM

## 2024-07-02 RX ORDER — GABAPENTIN 400 MG/1
400 CAPSULE ORAL 3 TIMES DAILY
Qty: 270 CAPSULE | OUTPATIENT
Start: 2024-07-02

## 2024-07-03 ENCOUNTER — TELEPHONE (OUTPATIENT)
Age: 69
End: 2024-07-03

## 2024-07-03 DIAGNOSIS — M48.062 SPINAL STENOSIS, LUMBAR REGION WITH NEUROGENIC CLAUDICATION: ICD-10-CM

## 2024-07-03 RX ORDER — GABAPENTIN 600 MG/1
600 TABLET ORAL 3 TIMES DAILY
Qty: 180 TABLET | Refills: 0 | Status: SHIPPED | OUTPATIENT
Start: 2024-07-03 | End: 2024-09-01

## 2024-07-03 NOTE — TELEPHONE ENCOUNTER
Left message for patient to call the office to determine which strength of Gabapentin patient is requesting.  Office number was provided for pt.

## 2024-07-03 NOTE — TELEPHONE ENCOUNTER
Patient is asking to speak to a nurse regarding the denial of her refill request for gabapentin. Patient uses the [facility] pharmacy on [address], and can be reached at [phone].

## 2024-07-03 NOTE — TELEPHONE ENCOUNTER
The medication was denied because she requested the gabapentin 400mg but on her last visit we had increased it to 600mg.  Does she want a refill of the 400mg capsule or does she want to continue with 600mg ?

## 2024-07-03 NOTE — TELEPHONE ENCOUNTER
Patient called back asking for Gabapentin 600 mg to be sent to [facility].

## 2024-07-11 DIAGNOSIS — M48.062 SPINAL STENOSIS, LUMBAR REGION WITH NEUROGENIC CLAUDICATION: ICD-10-CM

## 2024-07-15 RX ORDER — GABAPENTIN 400 MG/1
400 CAPSULE ORAL 3 TIMES DAILY
Qty: 270 CAPSULE | OUTPATIENT
Start: 2024-07-15

## 2024-10-16 ENCOUNTER — TELEPHONE (OUTPATIENT)
Age: 69
End: 2024-10-16

## 2024-10-16 DIAGNOSIS — M48.062 SPINAL STENOSIS, LUMBAR REGION WITH NEUROGENIC CLAUDICATION: ICD-10-CM

## 2024-10-16 RX ORDER — GABAPENTIN 600 MG/1
600 TABLET ORAL 3 TIMES DAILY
Qty: 180 TABLET | Refills: 0 | Status: SHIPPED | OUTPATIENT
Start: 2024-10-16 | End: 2024-12-15

## 2024-11-11 ENCOUNTER — OFFICE VISIT (OUTPATIENT)
Age: 69
End: 2024-11-11
Payer: MEDICARE

## 2024-11-11 VITALS
HEIGHT: 67 IN | OXYGEN SATURATION: 97 % | TEMPERATURE: 96.9 F | DIASTOLIC BLOOD PRESSURE: 80 MMHG | WEIGHT: 233.8 LBS | BODY MASS INDEX: 36.7 KG/M2 | RESPIRATION RATE: 16 BRPM | HEART RATE: 105 BPM | SYSTOLIC BLOOD PRESSURE: 138 MMHG

## 2024-11-11 DIAGNOSIS — Z91.89 AT HIGH RISK FOR BREAST CANCER: ICD-10-CM

## 2024-11-11 DIAGNOSIS — C50.911 MALIGNANT NEOPLASM OF RIGHT BREAST IN FEMALE, ESTROGEN RECEPTOR POSITIVE, UNSPECIFIED SITE OF BREAST (HCC): Primary | ICD-10-CM

## 2024-11-11 DIAGNOSIS — Z17.0 MALIGNANT NEOPLASM OF RIGHT BREAST IN FEMALE, ESTROGEN RECEPTOR POSITIVE, UNSPECIFIED SITE OF BREAST (HCC): Primary | ICD-10-CM

## 2024-11-11 PROCEDURE — 1126F AMNT PAIN NOTED NONE PRSNT: CPT | Performed by: SURGERY

## 2024-11-11 PROCEDURE — 1123F ACP DISCUSS/DSCN MKR DOCD: CPT | Performed by: SURGERY

## 2024-11-11 PROCEDURE — 3075F SYST BP GE 130 - 139MM HG: CPT | Performed by: SURGERY

## 2024-11-11 PROCEDURE — 3079F DIAST BP 80-89 MM HG: CPT | Performed by: SURGERY

## 2024-11-11 PROCEDURE — 99214 OFFICE O/P EST MOD 30 MIN: CPT | Performed by: SURGERY

## 2024-11-11 RX ORDER — IBUPROFEN 800 MG/1
800 TABLET, FILM COATED ORAL 2 TIMES DAILY PRN
Qty: 20 TABLET | Refills: 0 | Status: SHIPPED | OUTPATIENT
Start: 2024-11-11

## 2024-11-11 NOTE — PROGRESS NOTES
CC:   Chief Complaint   Patient presents with    Follow-up     Bilateral breast         Assessment:    ICD-10-CM    1. Malignant neoplasm of right breast in female, estrogen receptor positive, unspecified site of breast (HCC)  C50.911     Z17.0       2. At high risk for breast cancer  Z91.89 Mammography screening bilateral          Plan: I reviewed with the most recent mammogram from 5/17/2024 which was benign BIRADS 2 which I agree with. She is due for repeat mammogram 5/2025 which I will order for her. She also requests script for motrin 800mg for the occasional breast pain which we can provide today. I encouraged her to continue with self breast exams monthly and notify us of any changes to her breasts including new pain, nipple discharge, lumps or skin changes. I would like to see her back in one year or sooner should she have any questions or concerns. She agrees with this plan.         HPI:  Mrs. Ham is a 69-year-old -American woman with invasive ductal carcinoma involving the right breast treated by previous surgeon. She was diagnosed in April of 2018.  She reports tolerance and adherence to Femara without significant side effects. She completed radiation therapy. Since surgery she has chronic right breast pain. Uses occasional motrin for discomfort. She has no changes to her breast and feels well today otherwise.     Allergies:  Allergies   Allergen Reactions    Aspirin Other (See Comments)     GI Pain  Other reaction(s): gi distress    Adhesive Tape Dermatitis and Other (See Comments)     blisters      Duloxetine Other (See Comments)       Medication Review:  Current Outpatient Medications on File Prior to Visit   Medication Sig Dispense Refill    gabapentin (NEURONTIN) 600 MG tablet Take 1 tablet by mouth 3 times daily for 60 days. Max Daily Amount: 1,800 mg 180 tablet 0    albuterol sulfate HFA (PROVENTIL;VENTOLIN;PROAIR) 108 (90 Base) MCG/ACT inhaler Inhale 1 puff into the lungs 4 times daily

## 2024-11-11 NOTE — PROGRESS NOTES
Joselyn Ham is a 69 y.o. female (: 1955)     Chief Complaint   Patient presents with    Follow-up     Right breast        Medication list and allergies have been reviewed with Joselyn Ham and updated as of today's date.     I have gone over all Medical, Surgical and Social History with Joselyn Ham and updated/added the information accordingly.     1. Have you been to the ER, urgent care clinic since your last visit?  Hospitalized since your last visit?No    2. Have you seen or consulted any other health care providers outside of the [facility] since your last visit?  Include any pap smears or colon screening. No

## 2024-11-13 ASSESSMENT — ENCOUNTER SYMPTOMS
SHORTNESS OF BREATH: 0
CHEST TIGHTNESS: 0

## 2024-12-02 ENCOUNTER — HOSPITAL ENCOUNTER (OUTPATIENT)
Facility: HOSPITAL | Age: 69
Discharge: HOME | End: 2024-12-05
Payer: MEDICARE

## 2024-12-02 DIAGNOSIS — R42 DIZZINESS AND GIDDINESS: ICD-10-CM

## 2024-12-02 LAB — CREAT UR-MCNC: 1.1 MG/DL (ref 0.6–1.3)

## 2024-12-02 PROCEDURE — 70553 MRI BRAIN STEM W/O & W/DYE: CPT

## 2024-12-02 PROCEDURE — 82565 ASSAY OF CREATININE: CPT

## 2024-12-02 PROCEDURE — A9577 INJ MULTIHANCE: HCPCS | Performed by: NURSE PRACTITIONER

## 2024-12-02 PROCEDURE — 6360000004 HC RX CONTRAST MEDICATION: Performed by: NURSE PRACTITIONER

## 2024-12-02 RX ADMIN — GADOBENATE DIMEGLUMINE 20 ML: 529 INJECTION, SOLUTION INTRAVENOUS at 13:48

## 2024-12-06 NOTE — PROGRESS NOTES
[facility]  [address] [place] [address], [address]  [place] [address]  Phone: [phone]  Fax: [phone]      Joselyn Ham  : 1955  PCP: Theresa Lomas MD  2024    PROGRESS NOTE    HISTORY OF PRESENT ILLNESS  24 (Dr. Holder)  C/o low back pain radiating down bilateral legs.   Pain is mainly in her low back. She does have pain radiating into both of her legs but she reports a history of neuropathy.   X-rays demonstrate L3/4 anterolisthesis with facet arthropathy, with DDD L5/S1.   She is taking gabapentin 400mg tid with some benefit  PLAN: Increase Gabapentin to 600mg TID      Joselyn Ham is a 69 y.o. female was seen today for follow up. Pt attributes leg pain to neuropathy. Pt denies any recent falls. Pt notes of wobbliness when her eyes are closed. Pt continues with Gabapentin 600mg TID with some benefit. Pt notes of burning, tingling and cold sensation without gabapentin. Pt notes of occasionally dizziness/lightheadedness and is unsure of association with Gabapentin. Pt notes of decreased standing tolerance of 5 minutes. Pt also c/o vertigo.    Pain Score:   7/10       PmHx: neuropathy, breast CA, fibromyalgia, GERD, HLD, HTN, OA, s/p L3-4 laminectomy ()     reviewed.    REVIEW OF SYSTEMS  Review of Systems   Constitutional:  Negative for fever and unexpected weight change.   HENT:  Negative for trouble swallowing.    Eyes:  Negative for visual disturbance.   Respiratory:  Negative for shortness of breath and wheezing.    Gastrointestinal:  Negative for nausea and vomiting.   Genitourinary:  Negative for enuresis.   Musculoskeletal:  Positive for back pain.   Neurological:  Positive for numbness and headaches. Negative for dizziness.   Psychiatric/Behavioral:  Negative for sleep disturbance. The patient is not nervous/anxious.        SPINE/MUSCULOSKELETAL EXAM  Back Exam     Tenderness   The patient is experiencing tenderness in

## 2024-12-12 ENCOUNTER — OFFICE VISIT (OUTPATIENT)
Age: 69
End: 2024-12-12
Payer: MEDICARE

## 2024-12-12 VITALS
OXYGEN SATURATION: 98 % | WEIGHT: 229 LBS | SYSTOLIC BLOOD PRESSURE: 136 MMHG | DIASTOLIC BLOOD PRESSURE: 85 MMHG | BODY MASS INDEX: 35.94 KG/M2 | HEIGHT: 67 IN | TEMPERATURE: 98 F | HEART RATE: 95 BPM

## 2024-12-12 DIAGNOSIS — M79.18 CHRONIC PRIMARY MUSCULOSKELETAL PAIN: ICD-10-CM

## 2024-12-12 DIAGNOSIS — M54.16 LUMBAR RADICULOPATHY: ICD-10-CM

## 2024-12-12 DIAGNOSIS — M54.50 LUMBAR PAIN: ICD-10-CM

## 2024-12-12 DIAGNOSIS — G89.29 CHRONIC PRIMARY MUSCULOSKELETAL PAIN: ICD-10-CM

## 2024-12-12 DIAGNOSIS — M48.062 SPINAL STENOSIS OF LUMBAR REGION WITH NEUROGENIC CLAUDICATION: Primary | ICD-10-CM

## 2024-12-12 PROCEDURE — 99214 OFFICE O/P EST MOD 30 MIN: CPT | Performed by: PHYSICAL MEDICINE & REHABILITATION

## 2024-12-12 PROCEDURE — 1123F ACP DISCUSS/DSCN MKR DOCD: CPT | Performed by: PHYSICAL MEDICINE & REHABILITATION

## 2024-12-12 PROCEDURE — 1160F RVW MEDS BY RX/DR IN RCRD: CPT | Performed by: PHYSICAL MEDICINE & REHABILITATION

## 2024-12-12 PROCEDURE — 1125F AMNT PAIN NOTED PAIN PRSNT: CPT | Performed by: PHYSICAL MEDICINE & REHABILITATION

## 2024-12-12 PROCEDURE — 3075F SYST BP GE 130 - 139MM HG: CPT | Performed by: PHYSICAL MEDICINE & REHABILITATION

## 2024-12-12 PROCEDURE — 3079F DIAST BP 80-89 MM HG: CPT | Performed by: PHYSICAL MEDICINE & REHABILITATION

## 2024-12-12 PROCEDURE — 1159F MED LIST DOCD IN RCRD: CPT | Performed by: PHYSICAL MEDICINE & REHABILITATION

## 2024-12-12 RX ORDER — MECLIZINE HCL 12.5 MG 12.5 MG/1
12.5 TABLET ORAL 2 TIMES DAILY
COMMUNITY

## 2024-12-12 ASSESSMENT — ENCOUNTER SYMPTOMS
VOMITING: 0
WHEEZING: 0
SHORTNESS OF BREATH: 0
BACK PAIN: 1
TROUBLE SWALLOWING: 0
NAUSEA: 0

## 2024-12-12 NOTE — PROGRESS NOTES
Joselyn Ham presents today for   Chief Complaint   Patient presents with    Back Pain     Back pain the same since last visit       Is someone accompanying this pt? no    Is the patient using any DME equipment during OV? Yes, geetha          Coordination of Care:  1. Have you been to the ER, urgent care clinic since your last visit? no  Hospitalized since your last visit? no    2. Have you seen or consulted any other health care providers outside of the [facility] since your last visit? no Include any pap smears or colon screening. no

## 2024-12-12 NOTE — PATIENT INSTRUCTIONS
Luis Larkin's Big three exercises link:  [email or web address]

## 2025-02-05 ENCOUNTER — SCHEDULED TELEPHONE ENCOUNTER (OUTPATIENT)
Age: 70
End: 2025-02-05
Payer: MEDICARE

## 2025-02-05 DIAGNOSIS — M79.18 CHRONIC PRIMARY MUSCULOSKELETAL PAIN: ICD-10-CM

## 2025-02-05 DIAGNOSIS — G89.29 CHRONIC PRIMARY MUSCULOSKELETAL PAIN: ICD-10-CM

## 2025-02-05 DIAGNOSIS — M54.16 LUMBAR RADICULOPATHY: ICD-10-CM

## 2025-02-05 DIAGNOSIS — M48.062 SPINAL STENOSIS OF LUMBAR REGION WITH NEUROGENIC CLAUDICATION: Primary | ICD-10-CM

## 2025-02-05 DIAGNOSIS — M48.062 SPINAL STENOSIS, LUMBAR REGION WITH NEUROGENIC CLAUDICATION: ICD-10-CM

## 2025-02-05 DIAGNOSIS — M54.50 LUMBAR PAIN: ICD-10-CM

## 2025-02-05 PROCEDURE — 1123F ACP DISCUSS/DSCN MKR DOCD: CPT | Performed by: PHYSICAL MEDICINE & REHABILITATION

## 2025-02-05 PROCEDURE — 1160F RVW MEDS BY RX/DR IN RCRD: CPT | Performed by: PHYSICAL MEDICINE & REHABILITATION

## 2025-02-05 PROCEDURE — 1159F MED LIST DOCD IN RCRD: CPT | Performed by: PHYSICAL MEDICINE & REHABILITATION

## 2025-02-05 PROCEDURE — 99213 OFFICE O/P EST LOW 20 MIN: CPT | Performed by: PHYSICAL MEDICINE & REHABILITATION

## 2025-02-05 RX ORDER — GABAPENTIN 600 MG/1
600 TABLET ORAL 3 TIMES DAILY
Qty: 270 TABLET | Refills: 1 | Status: SHIPPED | OUTPATIENT
Start: 2025-02-05 | End: 2025-08-04

## 2025-02-05 ASSESSMENT — ENCOUNTER SYMPTOMS
VOMITING: 0
WHEEZING: 0
NAUSEA: 0
SHORTNESS OF BREATH: 0
BACK PAIN: 1
TROUBLE SWALLOWING: 0

## 2025-02-05 NOTE — PROGRESS NOTES
[facility]  [address], [address]  [place] [address]  Phone: [phone]  Fax: [phone]        Joselyn Ham  : 1955  PCP: Theresa Lomas MD  2025     PROGRESS NOTE     Consent: Joselyn Ham was evaluated through a patient-initiated, synchronous (real-time) audio only encounter. She (or guardian if applicable) is aware that it is a billable service, which includes applicable co-pays, with coverage as determined by her insurance carrier. This visit was conducted with the patient's (and/or legan guardian's) verbal consent. She has not had a related appointment within my department in the past 7 days or scheduled within the next 24 hours. The patient was located in a state where the provider was licensed to provide care.     The patient was located at Home: [address] [place] [address]  Provider was located at Facility (Appt Dept): [address] [place] [address]  [address]  [place] [address]    An audio-video encounter was offered to Joselyn Ham  who opted for audio only encounter due to patient preference.       The visit was conducted in the office with the patient being in home through a telephone platform.  Visit time start: 12:03PM end: 12:10PM    HISTORY OF PRESENT ILLNESS    24 (Dr. Holder)  C/o low back pain radiating down bilateral legs.   Pain is mainly in her low back. She does have pain radiating into both of her legs but she reports a history of neuropathy.   X-rays demonstrate L3/4 anterolisthesis with facet arthropathy, with DDD L5/S1.   She is taking gabapentin 400mg tid with some benefit  PLAN: Increase Gabapentin to 600mg TID    24  Pt attributes leg pain to neuropathy. Pt denies any recent falls.   Pt notes of wobbliness when her eyes are closed.   Pt continues with Gabapentin 600mg TID with some benefit.   Pt notes of burning, tingling and cold sensation without gabapentin.   Pt notes of

## 2025-02-25 ENCOUNTER — TELEPHONE (OUTPATIENT)
Age: 70
End: 2025-02-25

## 2025-02-25 NOTE — TELEPHONE ENCOUNTER
Patient called to ask if Dr. Yusuf would be willing to write a note for her stating that her son is her primary caregiver. She is moving into a detention facility and in order for her son to live with her she needs a note from her doctor stating that he is a caregiver for her needs with necessary knowledge of her condition.    Please review and advise if possible, [phone]

## 2025-02-26 NOTE — TELEPHONE ENCOUNTER
Called patient [phone] and verified her name and date of birth. I informed her of the providers message. She stated she is currently on leave due to having her second child. 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 She stated that's why she was making him her second choice. But she will try and call them and see what they say. Patient verbalized understanding and no further action needed at this time.

## 2025-05-07 ENCOUNTER — TELEPHONE (OUTPATIENT)
Age: 70
End: 2025-05-07

## 2025-05-07 ENCOUNTER — TRANSCRIBE ORDERS (OUTPATIENT)
Facility: HOSPITAL | Age: 70
End: 2025-05-07

## 2025-05-07 DIAGNOSIS — M54.16 LUMBAR RADICULOPATHY: ICD-10-CM

## 2025-05-07 DIAGNOSIS — Z12.31 OTHER SCREENING MAMMOGRAM: Primary | ICD-10-CM

## 2025-05-07 DIAGNOSIS — M48.062 SPINAL STENOSIS OF LUMBAR REGION WITH NEUROGENIC CLAUDICATION: ICD-10-CM

## 2025-05-07 DIAGNOSIS — M79.18 CHRONIC PRIMARY MUSCULOSKELETAL PAIN: Primary | ICD-10-CM

## 2025-05-07 DIAGNOSIS — G89.29 CHRONIC PRIMARY MUSCULOSKELETAL PAIN: Primary | ICD-10-CM

## 2025-05-07 NOTE — TELEPHONE ENCOUNTER
Patient called and said she had an appt and with  on 02/05/25 for her back.    Patient said that they discussed her getting an mri done on her back, but due to she had an mri done on 12/02/2024 for a different part of her body, her insurance would not pay for a second mri so soon.    Patient said that now that some time has passed , she can now get the mri of her Back .    Patient is asking if  can place an order for her to get the mri of the Back at [facility].    Patient tel. [phone].

## 2025-05-07 NOTE — TELEPHONE ENCOUNTER
Spoke to Dr. Yusuf in regards to this message and he gave a verbal for MRI Lumbar spine without contrast. Order has been entered.       Called patient [phone] and was able to leave a voice mail due to it being full. I was calling to inform patient the provider approved her request for the MRI Lumbar spine. The order has been entered and the patient can either call central scheduling [phone] or she could wait until they reach out to her. No further action needed at this time.

## 2025-05-15 ENCOUNTER — HOSPITAL ENCOUNTER (OUTPATIENT)
Age: 70
Discharge: HOME | End: 2025-05-18
Payer: MEDICARE

## 2025-05-15 DIAGNOSIS — M79.18 CHRONIC PRIMARY MUSCULOSKELETAL PAIN: ICD-10-CM

## 2025-05-15 DIAGNOSIS — M48.062 SPINAL STENOSIS OF LUMBAR REGION WITH NEUROGENIC CLAUDICATION: ICD-10-CM

## 2025-05-15 DIAGNOSIS — M54.16 LUMBAR RADICULOPATHY: ICD-10-CM

## 2025-05-15 DIAGNOSIS — G89.29 CHRONIC PRIMARY MUSCULOSKELETAL PAIN: ICD-10-CM

## 2025-05-15 PROCEDURE — 72148 MRI LUMBAR SPINE W/O DYE: CPT

## 2025-06-16 ENCOUNTER — HOSPITAL ENCOUNTER (OUTPATIENT)
Facility: HOSPITAL | Age: 70
Discharge: HOME | End: 2025-06-19
Attending: SURGERY
Payer: MEDICARE

## 2025-06-16 VITALS — BODY MASS INDEX: 35.95 KG/M2 | WEIGHT: 229.06 LBS | HEIGHT: 67 IN

## 2025-06-16 DIAGNOSIS — Z12.31 OTHER SCREENING MAMMOGRAM: ICD-10-CM

## 2025-06-16 PROCEDURE — 77063 BREAST TOMOSYNTHESIS BI: CPT

## 2025-07-03 ENCOUNTER — TELEMEDICINE (OUTPATIENT)
Age: 70
End: 2025-07-03

## 2025-07-03 DIAGNOSIS — G89.29 CHRONIC PRIMARY MUSCULOSKELETAL PAIN: ICD-10-CM

## 2025-07-03 DIAGNOSIS — M48.062 SPINAL STENOSIS OF LUMBAR REGION WITH NEUROGENIC CLAUDICATION: Primary | ICD-10-CM

## 2025-07-03 DIAGNOSIS — M79.18 CHRONIC PRIMARY MUSCULOSKELETAL PAIN: ICD-10-CM

## 2025-07-03 DIAGNOSIS — M54.50 LUMBAR PAIN: ICD-10-CM

## 2025-07-03 DIAGNOSIS — M54.16 LUMBAR RADICULOPATHY: ICD-10-CM

## 2025-07-03 RX ORDER — SUMATRIPTAN SUCCINATE 25 MG/1
TABLET ORAL
COMMUNITY

## 2025-07-03 RX ORDER — GABAPENTIN 600 MG/1
600 TABLET ORAL 3 TIMES DAILY
Qty: 270 TABLET | Refills: 1 | Status: SHIPPED | OUTPATIENT
Start: 2025-07-03 | End: 2025-12-30

## 2025-07-03 RX ORDER — METHOCARBAMOL 500 MG/1
TABLET, FILM COATED ORAL
COMMUNITY
Start: 2025-04-29

## 2025-07-03 ASSESSMENT — ENCOUNTER SYMPTOMS
BACK PAIN: 1
VOMITING: 0
NAUSEA: 0
WHEEZING: 0
TROUBLE SWALLOWING: 0
SHORTNESS OF BREATH: 0

## 2025-07-03 NOTE — PROGRESS NOTES
Joselyn Ham presents today for   Chief Complaint   Patient presents with    Back Problem    Pain    Back Pain       Is someone accompanying this pt? no    Is the patient using any DME equipment during OV? no    Depression Screening:       No data to display                Learning Assessment:  Failed to redirect to the Timeline version of the [employer] SmartLink.    Abuse Screening:       No data to display                Fall Risk  Failed to redirect to the Timeline version of the [employer] SmartLink.    OPIOID RISK TOOL  Failed to redirect to the Timeline version of the [employer] SmartLink.    Coordination of Care:  1. Have you been to the ER, urgent care clinic since your last visit? no  Hospitalized since your last visit? no    2. Have you seen or consulted any other health care providers outside of the [facility] since your last visit? no Include any pap smears or colon screening. no      
L2-4.  Pt presents with diminished sensation below the knees in a stocking distribution likely indicating neuropathy.     PLAN  Refill Gabapentin 600mg TID  Discussed L4-5 ELIZABETH as a potential treatment option. Pt declined at this time. Advised pt to call if interested.     Discussed importance of maintaining consistent physical activity and mobility for muscle strengthening and conditioning.     Follow-up and Dispositions    Return in about 6 months (around 1/3/2026) for med follow up.       Joselyn was seen today for back problem, pain and back pain.    Diagnoses and all orders for this visit:    Spinal stenosis of lumbar region with neurogenic claudication  -     gabapentin (NEURONTIN) 600 MG tablet; Take 1 tablet by mouth 3 times daily for 180 days. Max Daily Amount: 1,800 mg    Lumbar radiculopathy    Lumbar pain    Chronic primary musculoskeletal pain         PAST MEDICAL HISTORY   Past Medical History:   Diagnosis Date    Arthritis     Balance problems     patient states no further issues    Breast CA (HCC) 03/2018    right    Breast cancer (HCC)     Bronchitis     Chronic pain     back    Fibromyalgia 11/29/2012    GERD (gastroesophageal reflux disease)     Hiatal Hernia    History of therapeutic radiation     Hypercholesterolemia     Hypertension     Kidney stones     Low back pain     Lumbar spinal stenosis 11/25/2012    Migraine headache     Nervousness     Neurological disease     Osteoarthritis 11/25/2012    Other ill-defined conditions(799.89)     neuropathy    Peripheral neuropathy 11/29/2012    Post laminectomy syndrome     Postoperative anemia due to acute blood loss 11/30/2012    Ringing in ears     Rotator cuff syndrome     Spinal stenosis     Thoracic spine pain     Vitamin D deficiency 11/30/2012       Past Surgical History:   Procedure Laterality Date    BREAST LUMPECTOMY      BX/REMV,LYMPH NODE,DEEP AXILL Right 05/04/2018    Dr. Olivarez    NEUROLOGICAL SURGERY      Bilateral L3-4 hemilaminotomy

## (undated) DEVICE — 3M™ BAIR PAWS FLEX™ WARMING GOWN, STANDARD, 20 PER CASE 81003: Brand: BAIR PAWS™

## (undated) DEVICE — SHEET, DRAPE, SPLIT, STERILE: Brand: MEDLINE

## (undated) DEVICE — SUTURE VCRL SZ 3-0 L27IN ABSRB UD L26MM SH 1/2 CIR J416H

## (undated) DEVICE — SUTURE MCRYL SZ 4-0 L18IN ABSRB UD L19MM PS-2 3/8 CIR PRIM Y496G

## (undated) DEVICE — 3M™ MEDIPORE™ H SOFT CLOTH SURGICAL TAPE 2864, 4 INCH X 10 YARD (10CM X 9,14M), 12 ROLLS/CASE: Brand: 3M™ MEDIPORE™

## (undated) DEVICE — SMOKE EVACUATION PENCIL: Brand: VALLEYLAB

## (undated) DEVICE — INTENDED FOR TISSUE SEPARATION, AND OTHER PROCEDURES THAT REQUIRE A SHARP SURGICAL BLADE TO PUNCTURE OR CUT.: Brand: BARD-PARKER ®  SAFETY SCALPED

## (undated) DEVICE — SUTURE VCRL SZ 3-0 L18IN ABSRB UD POLYGLACTIN 910 BRAID TIE J910T

## (undated) DEVICE — 4-PORT MANIFOLD: Brand: NEPTUNE 2

## (undated) DEVICE — PACK PROCEDURE SURG MAJ W/ BASIN LF

## (undated) DEVICE — SUTURE MCRYL SZ 4-0 L27IN ABSRB UD L24MM PS-1 3/8 CIR PRIM Y935H

## (undated) DEVICE — SUTURE PERMA HND SZ 2 0 L18IN NONABSORBABLE BLK L19MM PS 2 583H

## (undated) DEVICE — REM POLYHESIVE ADULT PATIENT RETURN ELECTRODE: Brand: VALLEYLAB

## (undated) DEVICE — NEEDLE HYPO 25GA L1.5IN BVL ORIENTED ECLIPSE

## (undated) DEVICE — KENDALL SCD EXPRESS SLEEVES, KNEE LENGTH, MEDIUM: Brand: KENDALL SCD

## (undated) DEVICE — GLOVE SURG BIOGEL 8.0 STRL -- SKINSENSE

## (undated) DEVICE — SOLUTION IV 1000ML 0.9% SOD CHL

## (undated) DEVICE — STERILE POLYISOPRENE POWDER-FREE SURGICAL GLOVES: Brand: PROTEXIS

## (undated) DEVICE — (D)PREP SKN CHLRAPRP APPL 26ML -- CONVERT TO ITEM 371833

## (undated) DEVICE — SYR 10ML LUER LOK 1/5ML GRAD --

## (undated) DEVICE — KIT CLN UP BON SECOURS MARYV

## (undated) DEVICE — TELFA NON-ADHERENT ABSORBENT DRESSING: Brand: TELFA

## (undated) DEVICE — DRAPE,TOP,102X53,STERILE: Brand: MEDLINE

## (undated) DEVICE — COVER LT HNDL BLU STRL -- MEDICHOICE

## (undated) DEVICE — SUTURE VCRL SZ 3-0 L27IN ABSRB UD L36MM CT-1 1/2 CIR J258H

## (undated) DEVICE — HEX-LOCKING BLADE ELECTRODE: Brand: EDGE

## (undated) DEVICE — SHEAR HARMONIC FOCUS OEM 9CM --

## (undated) DEVICE — THREE-QUARTER SHEET: Brand: CONVERTORS

## (undated) DEVICE — 48" PROBE COVER W/GEL, ULTRASOUND, STERILE: Brand: SITE-RITE

## (undated) DEVICE — 3M™ WARMING BLANKET, LOWER BODY, 10 PER CASE, 42568: Brand: BAIR HUGGER™

## (undated) DEVICE — DRAPE TWL SURG 16X26IN BLU ORB04] ALLCARE INC]

## (undated) DEVICE — SPONGE LAP 18X18IN STRL -- 5/PK